# Patient Record
Sex: FEMALE | Race: WHITE | Employment: OTHER | URBAN - METROPOLITAN AREA
[De-identification: names, ages, dates, MRNs, and addresses within clinical notes are randomized per-mention and may not be internally consistent; named-entity substitution may affect disease eponyms.]

---

## 2018-06-20 ENCOUNTER — HOSPITAL ENCOUNTER (OUTPATIENT)
Dept: MAMMOGRAPHY | Age: 53
Discharge: HOME | End: 2018-06-21
Attending: PHYSICIAN ASSISTANT | Admitting: PHYSICIAN ASSISTANT

## 2018-06-20 DIAGNOSIS — Z12.31 VISIT FOR SCREENING MAMMOGRAM: ICD-10-CM

## 2019-01-14 ENCOUNTER — APPOINTMENT (OUTPATIENT)
Dept: CT IMAGING | Age: 54
End: 2019-01-14
Payer: MEDICARE

## 2019-01-14 ENCOUNTER — HOSPITAL ENCOUNTER (EMERGENCY)
Age: 54
Discharge: HOME | End: 2019-01-14
Attending: EMERGENCY MEDICINE
Payer: MEDICARE

## 2019-01-14 ENCOUNTER — APPOINTMENT (OUTPATIENT)
Dept: GENERAL RADIOLOGY | Age: 54
End: 2019-01-14
Payer: MEDICARE

## 2019-01-14 VITALS
BODY MASS INDEX: 55.32 KG/M2 | TEMPERATURE: 98.5 F | RESPIRATION RATE: 16 BRPM | DIASTOLIC BLOOD PRESSURE: 110 MMHG | WEIGHT: 293 LBS | OXYGEN SATURATION: 95 % | HEART RATE: 88 BPM | HEIGHT: 61 IN | SYSTOLIC BLOOD PRESSURE: 141 MMHG

## 2019-01-14 DIAGNOSIS — R25.1 TREMOR: ICD-10-CM

## 2019-01-14 DIAGNOSIS — R03.0 ELEVATED BLOOD PRESSURE READING: ICD-10-CM

## 2019-01-14 DIAGNOSIS — R42 DIZZINESS: Primary | ICD-10-CM

## 2019-01-14 DIAGNOSIS — E83.42 HYPOMAGNESEMIA: ICD-10-CM

## 2019-01-14 LAB
A/G RATIO: 0.9 (ref 1.1–2.2)
ALBUMIN SERPL-MCNC: 3.5 G/DL (ref 3.4–5)
ALP BLD-CCNC: 93 U/L (ref 40–129)
ALT SERPL-CCNC: 19 U/L (ref 10–40)
ANION GAP SERPL CALCULATED.3IONS-SCNC: 10 MMOL/L (ref 3–16)
AST SERPL-CCNC: 14 U/L (ref 15–37)
BACTERIA: ABNORMAL /HPF
BASOPHILS ABSOLUTE: 0 K/UL (ref 0–0.2)
BASOPHILS RELATIVE PERCENT: 0.4 %
BILIRUB SERPL-MCNC: <0.2 MG/DL (ref 0–1)
BILIRUBIN URINE: NEGATIVE
BLOOD, URINE: ABNORMAL
BUN BLDV-MCNC: 22 MG/DL (ref 7–20)
CALCIUM SERPL-MCNC: 8.9 MG/DL (ref 8.3–10.6)
CHLORIDE BLD-SCNC: 98 MMOL/L (ref 99–110)
CLARITY: CLEAR
CO2: 29 MMOL/L (ref 21–32)
COLOR: YELLOW
CREAT SERPL-MCNC: 0.9 MG/DL (ref 0.6–1.1)
EOSINOPHILS ABSOLUTE: 0.1 K/UL (ref 0–0.6)
EOSINOPHILS RELATIVE PERCENT: 1.1 %
EPITHELIAL CELLS, UA: ABNORMAL /HPF
GFR AFRICAN AMERICAN: >60
GFR NON-AFRICAN AMERICAN: >60
GLOBULIN: 3.9 G/DL
GLUCOSE BLD-MCNC: 234 MG/DL (ref 70–99)
GLUCOSE URINE: 100 MG/DL
HCT VFR BLD CALC: 35.4 % (ref 36–48)
HEMOGLOBIN: 11.1 G/DL (ref 12–16)
KETONES, URINE: NEGATIVE MG/DL
LEUKOCYTE ESTERASE, URINE: NEGATIVE
LYMPHOCYTES ABSOLUTE: 1.3 K/UL (ref 1–5.1)
LYMPHOCYTES RELATIVE PERCENT: 17 %
MAGNESIUM: 1.4 MG/DL (ref 1.8–2.4)
MAGNESIUM: 1.8 MG/DL (ref 1.8–2.4)
MCH RBC QN AUTO: 26.6 PG (ref 26–34)
MCHC RBC AUTO-ENTMCNC: 31.4 G/DL (ref 31–36)
MCV RBC AUTO: 84.7 FL (ref 80–100)
MICROSCOPIC EXAMINATION: YES
MONOCYTES ABSOLUTE: 0.3 K/UL (ref 0–1.3)
MONOCYTES RELATIVE PERCENT: 4.5 %
NEUTROPHILS ABSOLUTE: 5.7 K/UL (ref 1.7–7.7)
NEUTROPHILS RELATIVE PERCENT: 77 %
NITRITE, URINE: NEGATIVE
PDW BLD-RTO: 14.5 % (ref 12.4–15.4)
PH UA: 6
PLATELET # BLD: 202 K/UL (ref 135–450)
PMV BLD AUTO: 8.8 FL (ref 5–10.5)
POTASSIUM SERPL-SCNC: 5.1 MMOL/L (ref 3.5–5.1)
POTASSIUM SERPL-SCNC: 5.5 MMOL/L (ref 3.5–5.1)
POTASSIUM SERPL-SCNC: 5.7 MMOL/L (ref 3.5–5.1)
PROTEIN UA: NEGATIVE MG/DL
RBC # BLD: 4.18 M/UL (ref 4–5.2)
RBC UA: ABNORMAL /HPF (ref 0–2)
SODIUM BLD-SCNC: 137 MMOL/L (ref 136–145)
SPECIFIC GRAVITY UA: 1.01
TOTAL PROTEIN: 7.4 G/DL (ref 6.4–8.2)
TROPONIN: <0.01 NG/ML
URINE TYPE: ABNORMAL
UROBILINOGEN, URINE: 0.2 E.U./DL
WBC # BLD: 7.4 K/UL (ref 4–11)
WBC UA: ABNORMAL /HPF (ref 0–5)

## 2019-01-14 PROCEDURE — 70498 CT ANGIOGRAPHY NECK: CPT

## 2019-01-14 PROCEDURE — 84132 ASSAY OF SERUM POTASSIUM: CPT

## 2019-01-14 PROCEDURE — 99284 EMERGENCY DEPT VISIT MOD MDM: CPT

## 2019-01-14 PROCEDURE — 6360000002 HC RX W HCPCS: Performed by: EMERGENCY MEDICINE

## 2019-01-14 PROCEDURE — 71046 X-RAY EXAM CHEST 2 VIEWS: CPT

## 2019-01-14 PROCEDURE — 83735 ASSAY OF MAGNESIUM: CPT

## 2019-01-14 PROCEDURE — 80053 COMPREHEN METABOLIC PANEL: CPT

## 2019-01-14 PROCEDURE — 84484 ASSAY OF TROPONIN QUANT: CPT

## 2019-01-14 PROCEDURE — 81001 URINALYSIS AUTO W/SCOPE: CPT

## 2019-01-14 PROCEDURE — 85025 COMPLETE CBC W/AUTO DIFF WBC: CPT

## 2019-01-14 PROCEDURE — 96366 THER/PROPH/DIAG IV INF ADDON: CPT

## 2019-01-14 PROCEDURE — 93010 ELECTROCARDIOGRAM REPORT: CPT | Performed by: INTERNAL MEDICINE

## 2019-01-14 PROCEDURE — 6370000000 HC RX 637 (ALT 250 FOR IP): Performed by: NURSE PRACTITIONER

## 2019-01-14 PROCEDURE — 70450 CT HEAD/BRAIN W/O DYE: CPT

## 2019-01-14 PROCEDURE — 93005 ELECTROCARDIOGRAM TRACING: CPT | Performed by: EMERGENCY MEDICINE

## 2019-01-14 PROCEDURE — 70496 CT ANGIOGRAPHY HEAD: CPT

## 2019-01-14 PROCEDURE — 6360000004 HC RX CONTRAST MEDICATION: Performed by: EMERGENCY MEDICINE

## 2019-01-14 PROCEDURE — 2580000003 HC RX 258: Performed by: EMERGENCY MEDICINE

## 2019-01-14 PROCEDURE — 96365 THER/PROPH/DIAG IV INF INIT: CPT

## 2019-01-14 RX ORDER — PREGABALIN 300 MG/1
300 CAPSULE ORAL 2 TIMES DAILY
COMMUNITY

## 2019-01-14 RX ORDER — MECLIZINE HCL 12.5 MG/1
25 TABLET ORAL ONCE
Status: COMPLETED | OUTPATIENT
Start: 2019-01-14 | End: 2019-01-14

## 2019-01-14 RX ORDER — MAGNESIUM SULFATE IN WATER 40 MG/ML
2 INJECTION, SOLUTION INTRAVENOUS ONCE
Status: COMPLETED | OUTPATIENT
Start: 2019-01-14 | End: 2019-01-14

## 2019-01-14 RX ORDER — MELOXICAM 15 MG/1
15 TABLET ORAL DAILY
COMMUNITY

## 2019-01-14 RX ORDER — 0.9 % SODIUM CHLORIDE 0.9 %
500 INTRAVENOUS SOLUTION INTRAVENOUS ONCE
Status: COMPLETED | OUTPATIENT
Start: 2019-01-14 | End: 2019-01-14

## 2019-01-14 RX ORDER — MECLIZINE HYDROCHLORIDE 25 MG/1
25 TABLET ORAL 3 TIMES DAILY PRN
Qty: 20 TABLET | Refills: 0 | Status: SHIPPED | OUTPATIENT
Start: 2019-01-14 | End: 2019-01-24

## 2019-01-14 RX ADMIN — MAGNESIUM SULFATE HEPTAHYDRATE 2 G: 40 INJECTION, SOLUTION INTRAVENOUS at 21:19

## 2019-01-14 RX ADMIN — MECLIZINE 25 MG: 12.5 TABLET ORAL at 18:39

## 2019-01-14 RX ADMIN — SODIUM CHLORIDE 500 ML: 9 INJECTION, SOLUTION INTRAVENOUS at 21:26

## 2019-01-14 RX ADMIN — IOPAMIDOL 75 ML: 755 INJECTION, SOLUTION INTRAVENOUS at 21:36

## 2019-01-15 PROBLEM — R03.0 ELEVATED BLOOD PRESSURE READING: Status: ACTIVE | Noted: 2019-01-15

## 2019-01-15 LAB
EKG ATRIAL RATE: 85 BPM
EKG DIAGNOSIS: NORMAL
EKG P AXIS: 30 DEGREES
EKG P-R INTERVAL: 176 MS
EKG Q-T INTERVAL: 398 MS
EKG QRS DURATION: 94 MS
EKG QTC CALCULATION (BAZETT): 473 MS
EKG R AXIS: 19 DEGREES
EKG T AXIS: 58 DEGREES
EKG VENTRICULAR RATE: 85 BPM

## 2019-01-15 ASSESSMENT — ENCOUNTER SYMPTOMS
WHEEZING: 0
COUGH: 0
NAUSEA: 0
COLOR CHANGE: 0
SHORTNESS OF BREATH: 0
ABDOMINAL PAIN: 0
DIARRHEA: 0
BACK PAIN: 0
PHOTOPHOBIA: 0
VOMITING: 0

## 2019-07-25 ENCOUNTER — APPOINTMENT (OUTPATIENT)
Dept: CT IMAGING | Age: 54
End: 2019-07-25
Payer: MEDICARE

## 2019-07-25 ENCOUNTER — HOSPITAL ENCOUNTER (EMERGENCY)
Age: 54
Discharge: HOME | End: 2019-07-25
Attending: EMERGENCY MEDICINE
Payer: MEDICARE

## 2019-07-25 VITALS
SYSTOLIC BLOOD PRESSURE: 135 MMHG | TEMPERATURE: 99.5 F | HEIGHT: 62 IN | DIASTOLIC BLOOD PRESSURE: 88 MMHG | BODY MASS INDEX: 53.92 KG/M2 | OXYGEN SATURATION: 99 % | HEART RATE: 84 BPM | WEIGHT: 293 LBS | RESPIRATION RATE: 18 BRPM

## 2019-07-25 DIAGNOSIS — I25.10 CORONARY ARTERY CALCIFICATION SEEN ON CAT SCAN: ICD-10-CM

## 2019-07-25 DIAGNOSIS — R10.13 ABDOMINAL PAIN, EPIGASTRIC: ICD-10-CM

## 2019-07-25 DIAGNOSIS — E83.42 HYPOMAGNESEMIA: ICD-10-CM

## 2019-07-25 DIAGNOSIS — R25.1 TREMOR: Primary | ICD-10-CM

## 2019-07-25 LAB
A/G RATIO: 1 (ref 1.1–2.2)
ALBUMIN SERPL-MCNC: 4 G/DL (ref 3.4–5)
ALP BLD-CCNC: 92 U/L (ref 40–129)
ALT SERPL-CCNC: 29 U/L (ref 10–40)
ANION GAP SERPL CALCULATED.3IONS-SCNC: 10 MMOL/L (ref 3–16)
AST SERPL-CCNC: 17 U/L (ref 15–37)
BACTERIA: ABNORMAL /HPF
BASOPHILS ABSOLUTE: 0.1 K/UL (ref 0–0.2)
BASOPHILS RELATIVE PERCENT: 0.7 %
BILIRUB SERPL-MCNC: <0.2 MG/DL (ref 0–1)
BILIRUBIN URINE: NEGATIVE
BLOOD, URINE: NEGATIVE
BUN BLDV-MCNC: 22 MG/DL (ref 7–20)
CALCIUM SERPL-MCNC: 9.4 MG/DL (ref 8.3–10.6)
CHLORIDE BLD-SCNC: 98 MMOL/L (ref 99–110)
CLARITY: ABNORMAL
CO2: 27 MMOL/L (ref 21–32)
COLOR: YELLOW
CREAT SERPL-MCNC: 0.8 MG/DL (ref 0.6–1.1)
EOSINOPHILS ABSOLUTE: 0.1 K/UL (ref 0–0.6)
EOSINOPHILS RELATIVE PERCENT: 1.1 %
EPITHELIAL CELLS, UA: ABNORMAL /HPF
GFR AFRICAN AMERICAN: >60
GFR NON-AFRICAN AMERICAN: >60
GLOBULIN: 3.9 G/DL
GLUCOSE BLD-MCNC: 219 MG/DL (ref 70–99)
GLUCOSE URINE: >=1000 MG/DL
HCG QUALITATIVE: NEGATIVE
HCT VFR BLD CALC: 39.5 % (ref 36–48)
HEMOGLOBIN: 12.5 G/DL (ref 12–16)
KETONES, URINE: NEGATIVE MG/DL
LACTIC ACID, SEPSIS: 1.3 MMOL/L (ref 0.4–1.9)
LACTIC ACID, SEPSIS: 2.1 MMOL/L (ref 0.4–1.9)
LEUKOCYTE ESTERASE, URINE: NEGATIVE
LIPASE: 64 U/L (ref 13–60)
LYMPHOCYTES ABSOLUTE: 1.6 K/UL (ref 1–5.1)
LYMPHOCYTES RELATIVE PERCENT: 17.2 %
MAGNESIUM: 1.6 MG/DL (ref 1.8–2.4)
MCH RBC QN AUTO: 26.6 PG (ref 26–34)
MCHC RBC AUTO-ENTMCNC: 31.7 G/DL (ref 31–36)
MCV RBC AUTO: 83.7 FL (ref 80–100)
MICROSCOPIC EXAMINATION: YES
MONOCYTES ABSOLUTE: 0.4 K/UL (ref 0–1.3)
MONOCYTES RELATIVE PERCENT: 4.4 %
NEUTROPHILS ABSOLUTE: 7.2 K/UL (ref 1.7–7.7)
NEUTROPHILS RELATIVE PERCENT: 76.6 %
NITRITE, URINE: NEGATIVE
PDW BLD-RTO: 15.2 % (ref 12.4–15.4)
PH UA: 5.5 (ref 5–8)
PLATELET # BLD: 197 K/UL (ref 135–450)
PMV BLD AUTO: 9 FL (ref 5–10.5)
POTASSIUM SERPL-SCNC: 5.1 MMOL/L (ref 3.5–5.1)
PROTEIN UA: ABNORMAL MG/DL
RBC # BLD: 4.72 M/UL (ref 4–5.2)
RBC UA: ABNORMAL /HPF (ref 0–2)
SODIUM BLD-SCNC: 135 MMOL/L (ref 136–145)
SPECIFIC GRAVITY UA: 1.02 (ref 1–1.03)
TOTAL PROTEIN: 7.9 G/DL (ref 6.4–8.2)
TROPONIN: <0.01 NG/ML
TROPONIN: <0.01 NG/ML
URINE TYPE: ABNORMAL
UROBILINOGEN, URINE: 0.2 E.U./DL
WBC # BLD: 9.4 K/UL (ref 4–11)
WBC UA: ABNORMAL /HPF (ref 0–5)

## 2019-07-25 PROCEDURE — 74177 CT ABD & PELVIS W/CONTRAST: CPT

## 2019-07-25 PROCEDURE — 6370000000 HC RX 637 (ALT 250 FOR IP): Performed by: EMERGENCY MEDICINE

## 2019-07-25 PROCEDURE — 93005 ELECTROCARDIOGRAM TRACING: CPT | Performed by: EMERGENCY MEDICINE

## 2019-07-25 PROCEDURE — 85025 COMPLETE CBC W/AUTO DIFF WBC: CPT

## 2019-07-25 PROCEDURE — 99284 EMERGENCY DEPT VISIT MOD MDM: CPT

## 2019-07-25 PROCEDURE — 96365 THER/PROPH/DIAG IV INF INIT: CPT

## 2019-07-25 PROCEDURE — 6360000002 HC RX W HCPCS: Performed by: EMERGENCY MEDICINE

## 2019-07-25 PROCEDURE — 81001 URINALYSIS AUTO W/SCOPE: CPT

## 2019-07-25 PROCEDURE — 80053 COMPREHEN METABOLIC PANEL: CPT

## 2019-07-25 PROCEDURE — 96375 TX/PRO/DX INJ NEW DRUG ADDON: CPT

## 2019-07-25 PROCEDURE — 83735 ASSAY OF MAGNESIUM: CPT

## 2019-07-25 PROCEDURE — 83690 ASSAY OF LIPASE: CPT

## 2019-07-25 PROCEDURE — 84703 CHORIONIC GONADOTROPIN ASSAY: CPT

## 2019-07-25 PROCEDURE — 71260 CT THORAX DX C+: CPT

## 2019-07-25 PROCEDURE — 2580000003 HC RX 258: Performed by: EMERGENCY MEDICINE

## 2019-07-25 PROCEDURE — 83605 ASSAY OF LACTIC ACID: CPT

## 2019-07-25 PROCEDURE — 6360000004 HC RX CONTRAST MEDICATION: Performed by: EMERGENCY MEDICINE

## 2019-07-25 PROCEDURE — 36415 COLL VENOUS BLD VENIPUNCTURE: CPT

## 2019-07-25 PROCEDURE — 96366 THER/PROPH/DIAG IV INF ADDON: CPT

## 2019-07-25 PROCEDURE — 84484 ASSAY OF TROPONIN QUANT: CPT

## 2019-07-25 RX ORDER — ONDANSETRON 2 MG/ML
4 INJECTION INTRAMUSCULAR; INTRAVENOUS ONCE
Status: COMPLETED | OUTPATIENT
Start: 2019-07-25 | End: 2019-07-25

## 2019-07-25 RX ORDER — MAGNESIUM SULFATE 1 G/100ML
1 INJECTION INTRAVENOUS ONCE
Status: COMPLETED | OUTPATIENT
Start: 2019-07-25 | End: 2019-07-25

## 2019-07-25 RX ORDER — 0.9 % SODIUM CHLORIDE 0.9 %
500 INTRAVENOUS SOLUTION INTRAVENOUS ONCE
Status: COMPLETED | OUTPATIENT
Start: 2019-07-25 | End: 2019-07-25

## 2019-07-25 RX ORDER — OXYCODONE HYDROCHLORIDE 5 MG/1
10 TABLET ORAL ONCE
Status: COMPLETED | OUTPATIENT
Start: 2019-07-25 | End: 2019-07-25

## 2019-07-25 RX ADMIN — SODIUM CHLORIDE 500 ML: 9 INJECTION, SOLUTION INTRAVENOUS at 18:08

## 2019-07-25 RX ADMIN — IOPAMIDOL 75 ML: 755 INJECTION, SOLUTION INTRAVENOUS at 18:39

## 2019-07-25 RX ADMIN — ONDANSETRON 4 MG: 2 INJECTION INTRAMUSCULAR; INTRAVENOUS at 18:09

## 2019-07-25 RX ADMIN — MAGNESIUM SULFATE IN DEXTROSE 1 G: 10 INJECTION, SOLUTION INTRAVENOUS at 20:03

## 2019-07-25 RX ADMIN — OXYCODONE HYDROCHLORIDE 10 MG: 5 TABLET ORAL at 18:21

## 2019-07-25 ASSESSMENT — PAIN SCALES - GENERAL: PAINLEVEL_OUTOF10: 4

## 2019-07-25 NOTE — ED PROVIDER NOTES
provided for review. Dose modulation, iterative reconstruction, and/or weight based adjustment of the mA/kV was utilized to reduce the radiation dose to as low as reasonably achievable. COMPARISON: 10/07/2016 HISTORY: ORDERING SYSTEM PROVIDED HISTORY: ABDOMINAL PAIN TECHNOLOGIST PROVIDED HISTORY: Additional Contrast?->None Reason for Exam: generalized abd pain x2-3 days, nausea Acuity: Acute Type of Exam: Initial Relevant Medical/Surgical History: prev  History of diverticulosis, hyperlipidemia, diabetes, Caesarean section, endometrial ablation FINDINGS: Lower Chest: Posterior left lower lobe scarring is again identified. Organs: The liver is prominent size and shows diffuse fatty infiltration, also present on prior study. No focal hepatic lesion. The spleen, pancreas, adrenal glands, gallbladder, and kidneys show no acute abnormality. GI/Bowel: The the bowel shows normal caliber and course without suspected wall thickening. Normal appendix. There are diverticula throughout the colon, most numerous of the distal colon, without evidence of active inflammation. Pelvis: The uterus and urinary bladder are within normal limits. Peritoneum/Retroperitoneum: The aorta is normal in caliber and course, showing a few calcifications. Bones/Soft Tissues: No acute bony abnormality is identified. There is laxity of the lower anterior abdominal wall, similar to the prior study. There is also increased attenuation within the subcutaneous fat of the lower anterior abdominal wall. No subcutaneous focal fluid collection is identified. Fatty infiltration of the liver. Colonic diverticulosis without radiographic evidence of active inflammation. Increased attenuation within the subcutaneous fat of the lower anterior abdominal wall. Correlation for possibility of cellulitis is recommended.          Ct Chest Pulmonary Embolism W Contrast    Result Date: 2019  EXAMINATION: CTA OF THE CHEST 2019 6:32 pm TECHNIQUE:

## 2019-07-26 ENCOUNTER — HOSPITAL ENCOUNTER (INPATIENT)
Age: 54
LOS: 1 days | Discharge: HOME | DRG: 092 | End: 2019-07-27
Attending: EMERGENCY MEDICINE | Admitting: INTERNAL MEDICINE
Payer: MEDICARE

## 2019-07-26 ENCOUNTER — APPOINTMENT (OUTPATIENT)
Dept: CT IMAGING | Age: 54
DRG: 092 | End: 2019-07-26
Payer: MEDICARE

## 2019-07-26 ENCOUNTER — APPOINTMENT (OUTPATIENT)
Dept: GENERAL RADIOLOGY | Age: 54
DRG: 092 | End: 2019-07-26
Payer: MEDICARE

## 2019-07-26 DIAGNOSIS — E83.42 HYPOMAGNESEMIA: ICD-10-CM

## 2019-07-26 DIAGNOSIS — H66.003 NON-RECURRENT ACUTE SUPPURATIVE OTITIS MEDIA OF BOTH EARS WITHOUT SPONTANEOUS RUPTURE OF TYMPANIC MEMBRANES: Primary | ICD-10-CM

## 2019-07-26 DIAGNOSIS — R11.2 INTRACTABLE VOMITING WITH NAUSEA, UNSPECIFIED VOMITING TYPE: ICD-10-CM

## 2019-07-26 DIAGNOSIS — R10.13 ABDOMINAL PAIN, EPIGASTRIC: ICD-10-CM

## 2019-07-26 PROBLEM — R25.1 TREMOR: Status: ACTIVE | Noted: 2019-07-26

## 2019-07-26 PROBLEM — E66.01 MORBID OBESITY (HCC): Status: ACTIVE | Noted: 2019-07-26

## 2019-07-26 PROBLEM — R25.1 TREMORS OF NERVOUS SYSTEM: Status: ACTIVE | Noted: 2019-07-26

## 2019-07-26 PROBLEM — E78.5 HYPERLIPIDEMIA: Status: ACTIVE | Noted: 2019-07-26

## 2019-07-26 PROBLEM — E11.9 DM (DIABETES MELLITUS) (HCC): Status: ACTIVE | Noted: 2019-07-26

## 2019-07-26 PROBLEM — I50.9 CHF (CONGESTIVE HEART FAILURE) (HCC): Status: ACTIVE | Noted: 2019-07-26

## 2019-07-26 LAB
A/G RATIO: 1 (ref 1.1–2.2)
ALBUMIN SERPL-MCNC: 3.9 G/DL (ref 3.4–5)
ALP BLD-CCNC: 78 U/L (ref 40–129)
ALT SERPL-CCNC: 24 U/L (ref 10–40)
ANION GAP SERPL CALCULATED.3IONS-SCNC: 8 MMOL/L (ref 3–16)
AST SERPL-CCNC: 13 U/L (ref 15–37)
BASOPHILS ABSOLUTE: 0 K/UL (ref 0–0.2)
BASOPHILS RELATIVE PERCENT: 0.5 %
BILIRUB SERPL-MCNC: <0.2 MG/DL (ref 0–1)
BUN BLDV-MCNC: 19 MG/DL (ref 7–20)
CALCIUM SERPL-MCNC: 9.6 MG/DL (ref 8.3–10.6)
CHLORIDE BLD-SCNC: 97 MMOL/L (ref 99–110)
CO2: 29 MMOL/L (ref 21–32)
CREAT SERPL-MCNC: 0.8 MG/DL (ref 0.6–1.1)
EKG ATRIAL RATE: 83 BPM
EKG DIAGNOSIS: NORMAL
EKG P AXIS: 31 DEGREES
EKG P-R INTERVAL: 180 MS
EKG Q-T INTERVAL: 410 MS
EKG QRS DURATION: 92 MS
EKG QTC CALCULATION (BAZETT): 481 MS
EKG R AXIS: 37 DEGREES
EKG T AXIS: 38 DEGREES
EKG VENTRICULAR RATE: 83 BPM
EOSINOPHILS ABSOLUTE: 0 K/UL (ref 0–0.6)
EOSINOPHILS RELATIVE PERCENT: 0.2 %
GFR AFRICAN AMERICAN: >60
GFR NON-AFRICAN AMERICAN: >60
GLOBULIN: 4 G/DL
GLUCOSE BLD-MCNC: 139 MG/DL (ref 70–99)
GLUCOSE BLD-MCNC: 249 MG/DL (ref 70–99)
HCG QUALITATIVE: NEGATIVE
HCT VFR BLD CALC: 35.7 % (ref 36–48)
HEMOGLOBIN: 11.7 G/DL (ref 12–16)
LACTIC ACID, SEPSIS: 1.1 MMOL/L (ref 0.4–1.9)
LACTIC ACID, SEPSIS: 2 MMOL/L (ref 0.4–1.9)
LYMPHOCYTES ABSOLUTE: 1.3 K/UL (ref 1–5.1)
LYMPHOCYTES RELATIVE PERCENT: 15.9 %
MAGNESIUM: 1.7 MG/DL (ref 1.8–2.4)
MCH RBC QN AUTO: 27.2 PG (ref 26–34)
MCHC RBC AUTO-ENTMCNC: 32.7 G/DL (ref 31–36)
MCV RBC AUTO: 83.1 FL (ref 80–100)
MONOCYTES ABSOLUTE: 0.3 K/UL (ref 0–1.3)
MONOCYTES RELATIVE PERCENT: 3.4 %
NEUTROPHILS ABSOLUTE: 6.7 K/UL (ref 1.7–7.7)
NEUTROPHILS RELATIVE PERCENT: 80 %
PDW BLD-RTO: 15 % (ref 12.4–15.4)
PERFORMED ON: ABNORMAL
PLATELET # BLD: 188 K/UL (ref 135–450)
PMV BLD AUTO: 8.7 FL (ref 5–10.5)
POTASSIUM SERPL-SCNC: 4.9 MMOL/L (ref 3.5–5.1)
PRO-BNP: 808 PG/ML (ref 0–124)
RBC # BLD: 4.29 M/UL (ref 4–5.2)
SODIUM BLD-SCNC: 134 MMOL/L (ref 136–145)
TOTAL PROTEIN: 7.9 G/DL (ref 6.4–8.2)
TROPONIN: <0.01 NG/ML
WBC # BLD: 8.4 K/UL (ref 4–11)

## 2019-07-26 PROCEDURE — 96374 THER/PROPH/DIAG INJ IV PUSH: CPT

## 2019-07-26 PROCEDURE — 84703 CHORIONIC GONADOTROPIN ASSAY: CPT

## 2019-07-26 PROCEDURE — 83605 ASSAY OF LACTIC ACID: CPT

## 2019-07-26 PROCEDURE — 83880 ASSAY OF NATRIURETIC PEPTIDE: CPT

## 2019-07-26 PROCEDURE — 2580000003 HC RX 258: Performed by: INTERNAL MEDICINE

## 2019-07-26 PROCEDURE — 70450 CT HEAD/BRAIN W/O DYE: CPT

## 2019-07-26 PROCEDURE — 93005 ELECTROCARDIOGRAM TRACING: CPT | Performed by: EMERGENCY MEDICINE

## 2019-07-26 PROCEDURE — 94761 N-INVAS EAR/PLS OXIMETRY MLT: CPT

## 2019-07-26 PROCEDURE — 2580000003 HC RX 258: Performed by: EMERGENCY MEDICINE

## 2019-07-26 PROCEDURE — 93010 ELECTROCARDIOGRAM REPORT: CPT | Performed by: INTERNAL MEDICINE

## 2019-07-26 PROCEDURE — 85025 COMPLETE CBC W/AUTO DIFF WBC: CPT

## 2019-07-26 PROCEDURE — 99285 EMERGENCY DEPT VISIT HI MDM: CPT

## 2019-07-26 PROCEDURE — 94150 VITAL CAPACITY TEST: CPT

## 2019-07-26 PROCEDURE — 80053 COMPREHEN METABOLIC PANEL: CPT

## 2019-07-26 PROCEDURE — 6360000002 HC RX W HCPCS: Performed by: EMERGENCY MEDICINE

## 2019-07-26 PROCEDURE — 84484 ASSAY OF TROPONIN QUANT: CPT

## 2019-07-26 PROCEDURE — 94640 AIRWAY INHALATION TREATMENT: CPT

## 2019-07-26 PROCEDURE — 6360000002 HC RX W HCPCS: Performed by: INTERNAL MEDICINE

## 2019-07-26 PROCEDURE — 83735 ASSAY OF MAGNESIUM: CPT

## 2019-07-26 PROCEDURE — 2700000000 HC OXYGEN THERAPY PER DAY

## 2019-07-26 PROCEDURE — 1200000000 HC SEMI PRIVATE

## 2019-07-26 PROCEDURE — 71045 X-RAY EXAM CHEST 1 VIEW: CPT

## 2019-07-26 PROCEDURE — 6370000000 HC RX 637 (ALT 250 FOR IP): Performed by: INTERNAL MEDICINE

## 2019-07-26 PROCEDURE — 6370000000 HC RX 637 (ALT 250 FOR IP): Performed by: EMERGENCY MEDICINE

## 2019-07-26 RX ORDER — LEVOFLOXACIN 500 MG/1
500 TABLET, FILM COATED ORAL DAILY
Status: DISCONTINUED | OUTPATIENT
Start: 2019-07-27 | End: 2019-07-27 | Stop reason: HOSPADM

## 2019-07-26 RX ORDER — FERROUS SULFATE 325(65) MG
325 TABLET ORAL
Status: DISCONTINUED | OUTPATIENT
Start: 2019-07-26 | End: 2019-07-27 | Stop reason: HOSPADM

## 2019-07-26 RX ORDER — PROCHLORPERAZINE EDISYLATE 5 MG/ML
10 INJECTION INTRAMUSCULAR; INTRAVENOUS EVERY 6 HOURS PRN
Status: DISCONTINUED | OUTPATIENT
Start: 2019-07-26 | End: 2019-07-27 | Stop reason: HOSPADM

## 2019-07-26 RX ORDER — MAGNESIUM SULFATE IN WATER 40 MG/ML
2 INJECTION, SOLUTION INTRAVENOUS ONCE
Status: COMPLETED | OUTPATIENT
Start: 2019-07-26 | End: 2019-07-26

## 2019-07-26 RX ORDER — NICOTINE POLACRILEX 4 MG
15 LOZENGE BUCCAL PRN
Status: DISCONTINUED | OUTPATIENT
Start: 2019-07-26 | End: 2019-07-27 | Stop reason: HOSPADM

## 2019-07-26 RX ORDER — INSULIN GLARGINE 100 [IU]/ML
60 INJECTION, SOLUTION SUBCUTANEOUS NIGHTLY
Status: DISCONTINUED | OUTPATIENT
Start: 2019-07-26 | End: 2019-07-27 | Stop reason: HOSPADM

## 2019-07-26 RX ORDER — ALBUTEROL SULFATE 2.5 MG/3ML
2.5 SOLUTION RESPIRATORY (INHALATION) EVERY 6 HOURS PRN
Status: DISCONTINUED | OUTPATIENT
Start: 2019-07-26 | End: 2019-07-27 | Stop reason: HOSPADM

## 2019-07-26 RX ORDER — SODIUM CHLORIDE 0.9 % (FLUSH) 0.9 %
10 SYRINGE (ML) INJECTION PRN
Status: DISCONTINUED | OUTPATIENT
Start: 2019-07-26 | End: 2019-07-27 | Stop reason: HOSPADM

## 2019-07-26 RX ORDER — ALBUTEROL SULFATE 90 UG/1
2 AEROSOL, METERED RESPIRATORY (INHALATION) EVERY 6 HOURS PRN
Status: DISCONTINUED | OUTPATIENT
Start: 2019-07-26 | End: 2019-07-26

## 2019-07-26 RX ORDER — ESCITALOPRAM OXALATE 10 MG/1
40 TABLET ORAL DAILY
Status: DISCONTINUED | OUTPATIENT
Start: 2019-07-26 | End: 2019-07-27 | Stop reason: HOSPADM

## 2019-07-26 RX ORDER — MELOXICAM 7.5 MG/1
7.5 TABLET ORAL DAILY
Status: DISCONTINUED | OUTPATIENT
Start: 2019-07-26 | End: 2019-07-27 | Stop reason: HOSPADM

## 2019-07-26 RX ORDER — ONDANSETRON 2 MG/ML
4 INJECTION INTRAMUSCULAR; INTRAVENOUS EVERY 6 HOURS PRN
Status: DISCONTINUED | OUTPATIENT
Start: 2019-07-26 | End: 2019-07-27 | Stop reason: HOSPADM

## 2019-07-26 RX ORDER — ONDANSETRON 2 MG/ML
4 INJECTION INTRAMUSCULAR; INTRAVENOUS ONCE
Status: COMPLETED | OUTPATIENT
Start: 2019-07-26 | End: 2019-07-26

## 2019-07-26 RX ORDER — 0.9 % SODIUM CHLORIDE 0.9 %
1000 INTRAVENOUS SOLUTION INTRAVENOUS ONCE
Status: COMPLETED | OUTPATIENT
Start: 2019-07-26 | End: 2019-07-26

## 2019-07-26 RX ORDER — LEVOFLOXACIN 5 MG/ML
750 INJECTION, SOLUTION INTRAVENOUS ONCE
Status: COMPLETED | OUTPATIENT
Start: 2019-07-26 | End: 2019-07-26

## 2019-07-26 RX ORDER — DEXTROSE MONOHYDRATE 50 MG/ML
100 INJECTION, SOLUTION INTRAVENOUS PRN
Status: DISCONTINUED | OUTPATIENT
Start: 2019-07-26 | End: 2019-07-27 | Stop reason: HOSPADM

## 2019-07-26 RX ORDER — LOSARTAN POTASSIUM 25 MG/1
50 TABLET ORAL DAILY
Status: DISCONTINUED | OUTPATIENT
Start: 2019-07-26 | End: 2019-07-27 | Stop reason: HOSPADM

## 2019-07-26 RX ORDER — DICYCLOMINE HYDROCHLORIDE 10 MG/1
10 CAPSULE ORAL 3 TIMES DAILY
Status: DISCONTINUED | OUTPATIENT
Start: 2019-07-26 | End: 2019-07-27 | Stop reason: HOSPADM

## 2019-07-26 RX ORDER — OXYCODONE HYDROCHLORIDE 5 MG/1
10 TABLET ORAL ONCE
Status: COMPLETED | OUTPATIENT
Start: 2019-07-26 | End: 2019-07-26

## 2019-07-26 RX ORDER — MAGNESIUM SULFATE HEPTAHYDRATE 500 MG/ML
2 INJECTION, SOLUTION INTRAMUSCULAR; INTRAVENOUS ONCE
Status: DISCONTINUED | OUTPATIENT
Start: 2019-07-26 | End: 2019-07-26

## 2019-07-26 RX ORDER — DEXTROSE MONOHYDRATE 25 G/50ML
12.5 INJECTION, SOLUTION INTRAVENOUS PRN
Status: DISCONTINUED | OUTPATIENT
Start: 2019-07-26 | End: 2019-07-27 | Stop reason: HOSPADM

## 2019-07-26 RX ORDER — SODIUM CHLORIDE 0.9 % (FLUSH) 0.9 %
10 SYRINGE (ML) INJECTION EVERY 12 HOURS SCHEDULED
Status: DISCONTINUED | OUTPATIENT
Start: 2019-07-26 | End: 2019-07-27 | Stop reason: HOSPADM

## 2019-07-26 RX ORDER — ALBUTEROL SULFATE 90 UG/1
2 AEROSOL, METERED RESPIRATORY (INHALATION)
Status: DISCONTINUED | OUTPATIENT
Start: 2019-07-27 | End: 2019-07-27 | Stop reason: HOSPADM

## 2019-07-26 RX ORDER — OXYCODONE AND ACETAMINOPHEN 7.5; 325 MG/1; MG/1
1 TABLET ORAL EVERY 4 HOURS PRN
Status: DISCONTINUED | OUTPATIENT
Start: 2019-07-26 | End: 2019-07-27 | Stop reason: HOSPADM

## 2019-07-26 RX ORDER — GLIPIZIDE 5 MG/1
5 TABLET ORAL
Status: DISCONTINUED | OUTPATIENT
Start: 2019-07-27 | End: 2019-07-27 | Stop reason: HOSPADM

## 2019-07-26 RX ORDER — ASPIRIN 81 MG/1
81 TABLET ORAL DAILY
Status: DISCONTINUED | OUTPATIENT
Start: 2019-07-26 | End: 2019-07-27 | Stop reason: HOSPADM

## 2019-07-26 RX ORDER — SIMVASTATIN 10 MG
20 TABLET ORAL NIGHTLY
Status: DISCONTINUED | OUTPATIENT
Start: 2019-07-26 | End: 2019-07-27 | Stop reason: HOSPADM

## 2019-07-26 RX ADMIN — MAGNESIUM SULFATE IN WATER 2 G: 40 INJECTION, SOLUTION INTRAVENOUS at 21:17

## 2019-07-26 RX ADMIN — Medication 10 ML: at 21:13

## 2019-07-26 RX ADMIN — LEVOFLOXACIN 750 MG: 5 INJECTION, SOLUTION INTRAVENOUS at 17:14

## 2019-07-26 RX ADMIN — DICYCLOMINE HYDROCHLORIDE 10 MG: 10 CAPSULE ORAL at 21:13

## 2019-07-26 RX ADMIN — ONDANSETRON 4 MG: 2 INJECTION INTRAMUSCULAR; INTRAVENOUS at 17:13

## 2019-07-26 RX ADMIN — SIMVASTATIN 20 MG: 10 TABLET, FILM COATED ORAL at 21:13

## 2019-07-26 RX ADMIN — ASPIRIN 81 MG: 81 TABLET ORAL at 21:13

## 2019-07-26 RX ADMIN — PREGABALIN 100 MG: 75 CAPSULE ORAL at 21:13

## 2019-07-26 RX ADMIN — OXYCODONE HYDROCHLORIDE 10 MG: 5 TABLET ORAL at 18:20

## 2019-07-26 RX ADMIN — Medication 2 PUFF: at 20:42

## 2019-07-26 RX ADMIN — OXYCODONE HYDROCHLORIDE AND ACETAMINOPHEN 1 TABLET: 7.5; 325 TABLET ORAL at 21:13

## 2019-07-26 RX ADMIN — FERROUS SULFATE TAB 325 MG (65 MG ELEMENTAL FE) 325 MG: 325 (65 FE) TAB at 21:13

## 2019-07-26 RX ADMIN — INSULIN GLARGINE 60 UNITS: 100 INJECTION, SOLUTION SUBCUTANEOUS at 22:18

## 2019-07-26 RX ADMIN — SODIUM CHLORIDE 1000 ML: 9 INJECTION, SOLUTION INTRAVENOUS at 17:13

## 2019-07-26 RX ADMIN — PROCHLORPERAZINE EDISYLATE 10 MG: 5 INJECTION INTRAMUSCULAR; INTRAVENOUS at 21:15

## 2019-07-26 RX ADMIN — MELOXICAM 7.5 MG: 7.5 TABLET ORAL at 21:13

## 2019-07-26 RX ADMIN — LOSARTAN POTASSIUM 50 MG: 25 TABLET, FILM COATED ORAL at 21:13

## 2019-07-26 ASSESSMENT — PAIN SCALES - GENERAL
PAINLEVEL_OUTOF10: 5
PAINLEVEL_OUTOF10: 5
PAINLEVEL_OUTOF10: 0
PAINLEVEL_OUTOF10: 5
PAINLEVEL_OUTOF10: 5

## 2019-07-26 ASSESSMENT — PAIN DESCRIPTION - LOCATION: LOCATION: ABDOMEN

## 2019-07-26 ASSESSMENT — PAIN DESCRIPTION - DESCRIPTORS: DESCRIPTORS: ACHING

## 2019-07-26 ASSESSMENT — PAIN DESCRIPTION - ONSET: ONSET: ON-GOING

## 2019-07-26 ASSESSMENT — PAIN DESCRIPTION - PAIN TYPE: TYPE: ACUTE PAIN;CHRONIC PAIN

## 2019-07-26 NOTE — ED PROVIDER NOTES
[facility] Emergency Department    CHIEF COMPLAINT  Fever (seen yesterday, since leaving, feeling weaker, frequent vomiting. low grade fever upon arrival. )      HISTORY OF PRESENT ILLNESS  Brianne Alatorre is a 46 y.o. female returning to the ER after being seen yesterday. Patient says that she got home yesterday and was able to sleep and picked up her Ceftin antibiotic prescription for her ear infections both of her ears. She took the medication and ended up having several episodes of vomiting. She says her tremors returned that she came in for yesterday and also had some transient upper abdominal pain. She denies any chest pain. She does have a mild headache as well that she thinks is related to the vomiting. She denies any diarrhea. No other complaints, modifying factors or associated symptoms. I have reviewed the following from the nursing documentation.     Past Medical History:   Diagnosis Date    Anxiety     Arthritis     CHF (congestive heart failure) (HCC)     COPD (chronic obstructive pulmonary disease) (HCC)     Depression     Diabetes mellitus (HCC)     Diverticulosis     Hyperlipidemia     Obesities, morbid (HCC)      Past Surgical History:   Procedure Laterality Date     SECTION      x3    ENDOMETRIAL ABLATION       Family History   Problem Relation Age of Onset    Colon Cancer Mother     High Cholesterol Father     Heart Disease Father      Social History     Socioeconomic History    Marital status:      Spouse name: Not on file    Number of children: Not on file    Years of education: Not on file    Highest education level: Not on file   Occupational History    Not on file   Social Needs    Financial resource strain: Not on file    Food insecurity:     Worry: Not on file     Inability: Not on file    Transportation needs:     Medical: Not on file     Non-medical: Not on file   Tobacco Use    Smoking status: Former Smoker     Packs/day: tablet 1    albuterol sulfate  (90 BASE) MCG/ACT inhaler Inhale 2 puffs into the lungs every 6 hours as needed for Wheezing      nystatin (MYCOSTATIN) 705898 UNIT/GM cream Apply topically nightly Apply under breasts at night      OXYGEN Inhale 4 L into the lungs      insulin aspart (NOVOLOG) 100 UNIT/ML injection pen Inject into the skin 3 times daily (before meals) SSI.  metFORMIN (GLUCOPHAGE) 500 MG tablet Take 1,000 mg by mouth 2 times daily (with meals)       aspirin 81 MG EC tablet Take 1 tablet by mouth daily 30 tablet 3    ferrous sulfate (FE TABS) 325 (65 FE) MG EC tablet Take 1 tablet by mouth 3 times daily (with meals) 90 tablet 2    Insulin Syringes, Disposable, U-100 0.3 ML MISC 1 each by Does not apply route daily 100 each 3    Insulin Pen Needle 29G X 12.7MM MISC 1 each by Does not apply route daily 100 each 3    glucose monitoring kit (FREESTYLE) monitoring kit 1 kit by Does not apply route daily as needed 1 kit 0    nystatin (MYCOSTATIN) 361351 UNIT/GM powder Three times daily on the anterior abdominal wall and inguinal area. Use for 2 weeks. (Patient taking differently: Apply topically 2-3 times per day under breasts) 2 Bottle 0    escitalopram (LEXAPRO) 20 MG tablet Take 40 mg by mouth daily       Simvastatin (ZOCOR PO) Take 20 mg by mouth nightly       dicyclomine (BENTYL) 10 MG capsule Take 10 mg by mouth 3 times daily.  Esomeprazole Magnesium (NEXIUM PO) Take 40 mg by mouth daily        Allergies   Allergen Reactions    Dilaudid [Hydromorphone Hcl]      Respiratory distress    Erythromycin Other (See Comments)     Increases blood sugar    Lisinopril     Morphine     Pcn [Penicillins]        REVIEW OF SYSTEMS  10 systems reviewed, pertinent positives per HPI otherwise noted to be negative.     PHYSICAL EXAM  BP (!) 158/88   Pulse 88   Temp 100 °F (37.8 °C) (Oral)   Resp 16   Ht 5' 2\" (1.575 m)   Wt (!) 430 lb (195 kg)   SpO2 98%   BMI 78.65 kg/m²   GENERAL APPEARANCE: Awake and alert. Cooperative. Morbidly obese  HEAD: Normocephalic. Atraumatic. EYES: PERRL. EOM's grossly intact. ENT: Mucous membranes are minimally dry. Bilateral tympanic membrane erythema right greater than left  NECK: Supple. Non-tender  HEART: RRR. LUNGS: Respirations unlabored. CTAB. Good air exchange  ABDOMEN: Soft. Non-distended. Non-tender. No masses. No organomegaly. No guarding or rebound. BACK:  No midline Tenderness. EXTREMITIES: No peripheral edema. Moves all extremities equally. All extremities neurovascularly intact. SKIN: Warm and dry. No acute rashes. NEUROLOGICAL: Alert and oriented. CN's 2-12 intact. No gross facial drooping. Strength 5/5, sensation intact  PSYCHIATRIC: Normal mood and affect. LABS  I have reviewed all labs for this visit.    Results for orders placed or performed during the hospital encounter of 07/26/19   CBC Auto Differential   Result Value Ref Range    WBC 8.4 4.0 - 11.0 K/uL    RBC 4.29 4.00 - 5.20 M/uL    Hemoglobin 11.7 (L) 12.0 - 16.0 g/dL    Hematocrit 35.7 (L) 36.0 - 48.0 %    MCV 83.1 80.0 - 100.0 fL    MCH 27.2 26.0 - 34.0 pg    MCHC 32.7 31.0 - 36.0 g/dL    RDW 15.0 12.4 - 15.4 %    Platelets 068 181 - 255 K/uL    MPV 8.7 5.0 - 10.5 fL    Neutrophils % 80.0 %    Lymphocytes % 15.9 %    Monocytes % 3.4 %    Eosinophils % 0.2 %    Basophils % 0.5 %    Neutrophils # 6.7 1.7 - 7.7 K/uL    Lymphocytes # 1.3 1.0 - 5.1 K/uL    Monocytes # 0.3 0.0 - 1.3 K/uL    Eosinophils # 0.0 0.0 - 0.6 K/uL    Basophils # 0.0 0.0 - 0.2 K/uL   Comprehensive Metabolic Panel   Result Value Ref Range    Sodium 134 (L) 136 - 145 mmol/L    Potassium 4.9 3.5 - 5.1 mmol/L    Chloride 97 (L) 99 - 110 mmol/L    CO2 29 21 - 32 mmol/L    Anion Gap 8 3 - 16    Glucose 249 (H) 70 - 99 mg/dL    BUN 19 7 - 20 mg/dL    CREATININE 0.8 0.6 - 1.1 mg/dL    GFR Non-African American >60 >60    GFR African American >60 >60    Calcium 9.6 8.3 - 10.6 mg/dL    Total Protein 7.9 6.4 - 8.2 g/dL    Alb 3.9 3.4 - 5.0 g/dL    Albumin/Globulin Ratio 1.0 (L) 1.1 - 2.2    Total Bilirubin <0.2 0.0 - 1.0 mg/dL    Alkaline Phosphatase 78 40 - 129 U/L    ALT 24 10 - 40 U/L    AST 13 (L) 15 - 37 U/L    Globulin 4.0 g/dL   Lactate, Sepsis   Result Value Ref Range    Lactic Acid, Sepsis 2.0 (H) 0.4 - 1.9 mmol/L   Magnesium   Result Value Ref Range    Magnesium 1.70 (L) 1.80 - 2.40 mg/dL   Troponin   Result Value Ref Range    Troponin <0.01 <0.01 ng/mL   Brain Natriuretic Peptide   Result Value Ref Range    Pro- (H) 0 - 124 pg/mL   HCG Qualitative, Serum   Result Value Ref Range    hCG Qual Negative Detects HCG level >10 MIU/mL   EKG 12 Lead   Result Value Ref Range    Ventricular Rate 85 BPM    Atrial Rate 85 BPM    P-R Interval 180 ms    QRS Duration 94 ms    Q-T Interval 408 ms    QTc Calculation (Bazett) 485 ms    P Axis 36 degrees    R Axis -12 degrees    T Axis 62 degrees    Diagnosis       Normal sinus rhythmLow voltage QRSCannot rule out Anterior infarct (cited on or before 25-JUL-2019)Abnormal ECGWhen compared with ECG of 25-JUL-2019 18:14,No significant change was found       The Ekg interpreted by me shows  normal sinus rhythm with a rate of 85  Axis is   Normal  QTc is  normal  Intervals and Durations are unremarkable. ST Segments: no acute change        RADIOLOGY    Ct Head Wo Contrast    Result Date: 7/26/2019  EXAMINATION: CT OF THE HEAD WITHOUT CONTRAST  7/26/2019 6:01 pm TECHNIQUE: CT of the head was performed without the administration of intravenous contrast. Dose modulation, iterative reconstruction, and/or weight based adjustment of the mA/kV was utilized to reduce the radiation dose to as low as reasonably achievable. COMPARISON: 01/14/2019 HISTORY: ORDERING SYSTEM PROVIDED HISTORY: HEADACHE TECHNOLOGIST PROVIDED HISTORY: Has a \"code stroke\" or \"stroke alert\" been called? ->No Reason for Exam: Headache, dizziness, vomitting x 1 day Acuity: Acute Type of Exam: Initial FINDINGS:

## 2019-07-26 NOTE — H&P
failure, controlled on home meds - continued. DM2 - controlled on home Lantus - continued w/ oral antiGlycemics, Metformin held. Follow FSBS/SSI med regimen. Ear infection - bilateral, started Ceftin outpt which seems to have caused the N/V. Levaquin in ED - continued. DVT Prophylaxis:LMWH  Diet: General  Code Status: Full Code      PT/OT Eval Status: Not yet ordered    Dispo - Likely discharge Sunday 28 July pending clinical improvement. Camden Garcia MD    Thank you Katherine Mercedes PA-C for the opportunity to be involved in this patient's care. If you have any questions or concerns please feel free to contact me at [phone].

## 2019-07-26 NOTE — ED NOTES
Report given at bedside. L of fluids complete. Repeat lactic drawn and sent to lab. Pt left ED in stable condition.       Juan Jacobo RN  07/26/19 6274

## 2019-07-27 VITALS
RESPIRATION RATE: 18 BRPM | WEIGHT: 293 LBS | HEIGHT: 62 IN | BODY MASS INDEX: 53.92 KG/M2 | HEART RATE: 95 BPM | DIASTOLIC BLOOD PRESSURE: 85 MMHG | TEMPERATURE: 97.8 F | SYSTOLIC BLOOD PRESSURE: 135 MMHG | OXYGEN SATURATION: 92 %

## 2019-07-27 LAB
ALBUMIN SERPL-MCNC: 3.9 G/DL (ref 3.4–5)
ANION GAP SERPL CALCULATED.3IONS-SCNC: 8 MMOL/L (ref 3–16)
BUN BLDV-MCNC: 15 MG/DL (ref 7–20)
CALCIUM SERPL-MCNC: 9.6 MG/DL (ref 8.3–10.6)
CHLORIDE BLD-SCNC: 99 MMOL/L (ref 99–110)
CO2: 31 MMOL/L (ref 21–32)
CREAT SERPL-MCNC: 0.7 MG/DL (ref 0.6–1.1)
EKG ATRIAL RATE: 85 BPM
EKG DIAGNOSIS: NORMAL
EKG P AXIS: 36 DEGREES
EKG P-R INTERVAL: 180 MS
EKG Q-T INTERVAL: 408 MS
EKG QRS DURATION: 94 MS
EKG QTC CALCULATION (BAZETT): 485 MS
EKG R AXIS: -12 DEGREES
EKG T AXIS: 62 DEGREES
EKG VENTRICULAR RATE: 85 BPM
GFR AFRICAN AMERICAN: >60
GFR NON-AFRICAN AMERICAN: >60
GLUCOSE BLD-MCNC: 193 MG/DL (ref 70–99)
GLUCOSE BLD-MCNC: 210 MG/DL (ref 70–99)
HCT VFR BLD CALC: 36.7 % (ref 36–48)
HEMOGLOBIN: 11.8 G/DL (ref 12–16)
MAGNESIUM: 2 MG/DL (ref 1.8–2.4)
MCH RBC QN AUTO: 26.7 PG (ref 26–34)
MCHC RBC AUTO-ENTMCNC: 32.2 G/DL (ref 31–36)
MCV RBC AUTO: 83.1 FL (ref 80–100)
PDW BLD-RTO: 14.8 % (ref 12.4–15.4)
PERFORMED ON: ABNORMAL
PHOSPHORUS: 3 MG/DL (ref 2.5–4.9)
PLATELET # BLD: 191 K/UL (ref 135–450)
PMV BLD AUTO: 8.9 FL (ref 5–10.5)
POTASSIUM SERPL-SCNC: 4.8 MMOL/L (ref 3.5–5.1)
RBC # BLD: 4.41 M/UL (ref 4–5.2)
SODIUM BLD-SCNC: 138 MMOL/L (ref 136–145)
WBC # BLD: 8.1 K/UL (ref 4–11)

## 2019-07-27 PROCEDURE — 83735 ASSAY OF MAGNESIUM: CPT

## 2019-07-27 PROCEDURE — 94761 N-INVAS EAR/PLS OXIMETRY MLT: CPT

## 2019-07-27 PROCEDURE — 36415 COLL VENOUS BLD VENIPUNCTURE: CPT

## 2019-07-27 PROCEDURE — 2500000003 HC RX 250 WO HCPCS: Performed by: INTERNAL MEDICINE

## 2019-07-27 PROCEDURE — 6360000002 HC RX W HCPCS: Performed by: INTERNAL MEDICINE

## 2019-07-27 PROCEDURE — 6370000000 HC RX 637 (ALT 250 FOR IP): Performed by: INTERNAL MEDICINE

## 2019-07-27 PROCEDURE — 6370000000 HC RX 637 (ALT 250 FOR IP): Performed by: NURSE PRACTITIONER

## 2019-07-27 PROCEDURE — 2580000003 HC RX 258: Performed by: INTERNAL MEDICINE

## 2019-07-27 PROCEDURE — 2700000000 HC OXYGEN THERAPY PER DAY

## 2019-07-27 PROCEDURE — 94640 AIRWAY INHALATION TREATMENT: CPT

## 2019-07-27 PROCEDURE — 85027 COMPLETE CBC AUTOMATED: CPT

## 2019-07-27 PROCEDURE — 80069 RENAL FUNCTION PANEL: CPT

## 2019-07-27 PROCEDURE — 83036 HEMOGLOBIN GLYCOSYLATED A1C: CPT

## 2019-07-27 PROCEDURE — 93010 ELECTROCARDIOGRAM REPORT: CPT | Performed by: INTERNAL MEDICINE

## 2019-07-27 RX ORDER — PANTOPRAZOLE SODIUM 40 MG/1
40 TABLET, DELAYED RELEASE ORAL
Status: DISCONTINUED | OUTPATIENT
Start: 2019-07-27 | End: 2019-07-27 | Stop reason: HOSPADM

## 2019-07-27 RX ORDER — LEVOFLOXACIN 500 MG/1
500 TABLET, FILM COATED ORAL DAILY
Qty: 7 TABLET | Refills: 0 | Status: SHIPPED | OUTPATIENT
Start: 2019-07-27 | End: 2019-08-03

## 2019-07-27 RX ORDER — CHOLECALCIFEROL (VITAMIN D3) 125 MCG
5 CAPSULE ORAL NIGHTLY PRN
Status: DISCONTINUED | OUTPATIENT
Start: 2019-07-27 | End: 2019-07-27 | Stop reason: HOSPADM

## 2019-07-27 RX ADMIN — LOSARTAN POTASSIUM 50 MG: 25 TABLET, FILM COATED ORAL at 09:10

## 2019-07-27 RX ADMIN — MICONAZOLE NITRATE: 20 POWDER TOPICAL at 09:11

## 2019-07-27 RX ADMIN — Medication 10 ML: at 09:11

## 2019-07-27 RX ADMIN — FERROUS SULFATE TAB 325 MG (65 MG ELEMENTAL FE) 325 MG: 325 (65 FE) TAB at 09:11

## 2019-07-27 RX ADMIN — Medication 2 PUFF: at 08:00

## 2019-07-27 RX ADMIN — Medication 2 PUFF: at 11:30

## 2019-07-27 RX ADMIN — MELATONIN TAB 5 MG 5 MG: 5 TAB at 02:40

## 2019-07-27 RX ADMIN — LEVOFLOXACIN 500 MG: 500 TABLET, FILM COATED ORAL at 09:10

## 2019-07-27 RX ADMIN — ASPIRIN 81 MG: 81 TABLET ORAL at 09:11

## 2019-07-27 RX ADMIN — ENOXAPARIN SODIUM 40 MG: 40 INJECTION SUBCUTANEOUS at 09:11

## 2019-07-27 RX ADMIN — INSULIN LISPRO 4 UNITS: 100 INJECTION, SOLUTION INTRAVENOUS; SUBCUTANEOUS at 09:14

## 2019-07-27 RX ADMIN — ESCITALOPRAM OXALATE 40 MG: 10 TABLET ORAL at 09:10

## 2019-07-27 RX ADMIN — MELOXICAM 7.5 MG: 7.5 TABLET ORAL at 09:10

## 2019-07-27 RX ADMIN — PANTOPRAZOLE SODIUM 40 MG: 40 TABLET, DELAYED RELEASE ORAL at 05:58

## 2019-07-27 RX ADMIN — ONDANSETRON 4 MG: 2 INJECTION INTRAMUSCULAR; INTRAVENOUS at 09:27

## 2019-07-27 RX ADMIN — PREGABALIN 100 MG: 75 CAPSULE ORAL at 09:10

## 2019-07-27 RX ADMIN — DICYCLOMINE HYDROCHLORIDE 10 MG: 10 CAPSULE ORAL at 09:12

## 2019-07-27 RX ADMIN — Medication 10 ML: at 00:40

## 2019-07-27 RX ADMIN — OXYCODONE HYDROCHLORIDE AND ACETAMINOPHEN 1 TABLET: 7.5; 325 TABLET ORAL at 09:11

## 2019-07-27 RX ADMIN — GLIPIZIDE 5 MG: 5 TABLET ORAL at 05:58

## 2019-07-27 RX ADMIN — ONDANSETRON 4 MG: 2 INJECTION INTRAMUSCULAR; INTRAVENOUS at 00:40

## 2019-07-27 ASSESSMENT — PAIN DESCRIPTION - LOCATION: LOCATION: HEAD

## 2019-07-27 ASSESSMENT — PAIN DESCRIPTION - FREQUENCY: FREQUENCY: CONTINUOUS

## 2019-07-27 ASSESSMENT — PAIN SCALES - GENERAL
PAINLEVEL_OUTOF10: 9
PAINLEVEL_OUTOF10: 9

## 2019-07-27 ASSESSMENT — PAIN DESCRIPTION - ONSET: ONSET: AWAKENED FROM SLEEP

## 2019-07-27 ASSESSMENT — PAIN DESCRIPTION - DESCRIPTORS: DESCRIPTORS: ACHING

## 2019-07-27 ASSESSMENT — PAIN DESCRIPTION - PAIN TYPE: TYPE: ACUTE PAIN

## 2019-07-27 NOTE — DISCHARGE INSTR - COC
Continuity of Care Form    Patient Name: Mayte Doe   :  1965  MRN:  [id number]    Admit date:  2019  Discharge date:  ***    Code Status Order: Full Code   Advance Directives:   Advance Care Flowsheet Documentation     Date/Time Healthcare Directive Type of Healthcare Directive Copy in [address] Agent's Name Healthcare Agent's Phone Number    [phone]  No, patient does not have an advance directive for healthcare treatment -- -- -- -- --          Admitting Physician:  Eb Reza MD  PCP: Eric Varghese PA-C    Discharging Nurse: [facility] Unit/Room#: 3277/6626-38  Discharging Unit Phone Number: ***    Emergency Contact:   Extended Emergency Contact Information  Primary Emergency Contact: Jose Gunn  Address: [address]           [facility], [address] [place] [address] [address]  Phone: [phone]  Relation: Spouse  Secondary Emergency Contact: Malou Higginbotham  Address: [address]           [phone]  Home Phone: [phone]  Relation: Child    Past Surgical History:  Past Surgical History:   Procedure Laterality Date     SECTION      x3    ENDOMETRIAL ABLATION         Immunization History:   Immunization History   Administered Date(s) Administered    Influenza, Quadv, 3 yrs and older, IM, PF (Fluzone 3 yrs and older or Afluria 5 yrs and older) 10/11/2016       Active Problems:  Patient Active Problem List   Diagnosis Code    [facility] J96.00    Fluid overload E87.70    Acidosis E87.2    Panniculitis M79.3    COPD (chronic obstructive pulmonary disease) ([facility] J44.9    General weakness R53.1    PNA (pneumonia) J18.9    Elevated blood pressure reading R03.0    Morbid obesity ([facility] E66.01    Hyperlipidemia E78.5    DM (diabetes mellitus) ([facility] E11.9    CHF (congestive heart failure) (HCC) I50.9    Tremor R25.1    Hypomagnesemia E83.42    Tremors of nervous system R25.1

## 2019-07-28 LAB
ESTIMATED AVERAGE GLUCOSE: 208.7 MG/DL
HBA1C MFR BLD: 8.9 %

## 2019-08-16 ENCOUNTER — APPOINTMENT (OUTPATIENT)
Dept: GENERAL RADIOLOGY | Age: 54
End: 2019-08-16
Payer: MEDICARE

## 2019-08-16 ENCOUNTER — APPOINTMENT (OUTPATIENT)
Dept: CT IMAGING | Age: 54
End: 2019-08-16
Payer: MEDICARE

## 2019-08-16 ENCOUNTER — HOSPITAL ENCOUNTER (EMERGENCY)
Age: 54
Discharge: HOME | End: 2019-08-17
Attending: EMERGENCY MEDICINE
Payer: MEDICARE
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
## 2019-08-16 DIAGNOSIS — S09.90XA INJURY OF HEAD, INITIAL ENCOUNTER: ICD-10-CM

## 2019-08-16 DIAGNOSIS — M79.642 HAND PAIN, LEFT: ICD-10-CM

## 2019-08-16 DIAGNOSIS — S00.83XA CONTUSION OF FACE, INITIAL ENCOUNTER: ICD-10-CM

## 2019-08-16 PROCEDURE — 90715 TDAP VACCINE 7 YRS/> IM: CPT | Performed by: NURSE PRACTITIONER

## 2019-08-16 PROCEDURE — 99284 EMERGENCY DEPT VISIT MOD MDM: CPT

## 2019-08-16 PROCEDURE — 72125 CT NECK SPINE W/O DYE: CPT

## 2019-08-16 PROCEDURE — 6360000002 HC RX W HCPCS: Performed by: NURSE PRACTITIONER

## 2019-08-16 PROCEDURE — 70450 CT HEAD/BRAIN W/O DYE: CPT

## 2019-08-16 PROCEDURE — 90471 IMMUNIZATION ADMIN: CPT | Performed by: NURSE PRACTITIONER

## 2019-08-16 PROCEDURE — 73130 X-RAY EXAM OF HAND: CPT

## 2019-08-16 RX ADMIN — TETANUS TOXOID, REDUCED DIPHTHERIA TOXOID AND ACELLULAR PERTUSSIS VACCINE, ADSORBED 0.5 ML: 5; 2.5; 8; 8; 2.5 SUSPENSION INTRAMUSCULAR at 23:46

## 2019-08-16 ASSESSMENT — PAIN SCALES - GENERAL: PAINLEVEL_OUTOF10: 9

## 2019-08-17 VITALS
BODY MASS INDEX: 53.92 KG/M2 | DIASTOLIC BLOOD PRESSURE: 64 MMHG | HEART RATE: 74 BPM | HEIGHT: 62 IN | OXYGEN SATURATION: 95 % | TEMPERATURE: 98.8 F | WEIGHT: 293 LBS | SYSTOLIC BLOOD PRESSURE: 127 MMHG | RESPIRATION RATE: 18 BRPM

## 2019-08-17 PROCEDURE — 6370000000 HC RX 637 (ALT 250 FOR IP): Performed by: NURSE PRACTITIONER

## 2019-08-17 RX ORDER — ACETAMINOPHEN 325 MG/1
650 TABLET ORAL ONCE
Status: COMPLETED | OUTPATIENT
Start: 2019-08-17 | End: 2019-08-17

## 2019-08-17 RX ADMIN — ACETAMINOPHEN 650 MG: 325 TABLET ORAL at 00:26

## 2019-08-17 ASSESSMENT — PAIN SCALES - GENERAL
PAINLEVEL_OUTOF10: 9
PAINLEVEL_OUTOF10: 7

## 2019-08-17 NOTE — ED PROVIDER NOTES
Date: 8/17/2019  EXAMINATION: CT OF THE HEAD WITHOUT CONTRAST  8/16/2019 11:19 pm TECHNIQUE: CT of the head was performed without the administration of intravenous contrast. Dose modulation, iterative reconstruction, and/or weight based adjustment of the mA/kV was utilized to reduce the radiation dose to as low as reasonably achievable. COMPARISON: 07/26/2019 HISTORY: ORDERING SYSTEM PROVIDED HISTORY: fall, head injury TECHNOLOGIST PROVIDED HISTORY: Has a \"code stroke\" or \"stroke alert\" been called? ->No Reason for Exam: fall. Acuity: Acute Type of Exam: Initial Relevant Medical/Surgical History: no hx of seizure, stroke or sugery. FINDINGS: BRAIN/VENTRICLES: There is no acute intracranial hemorrhage, mass effect or midline shift. No abnormal extra-axial fluid collection. The gray-white differentiation is maintained without evidence of an acute infarct. There is no evidence of hydrocephalus. ORBITS: The visualized portion of the orbits demonstrate no acute abnormality. SINUSES: The visualized paranasal sinuses and mastoid air cells demonstrate no acute abnormality. SOFT TISSUES/SKULL:  No acute abnormality of the visualized skull or soft tissues. No acute intracranial abnormality. Ct Cervical Spine Wo Contrast    Result Date: 8/17/2019  EXAMINATION: CT OF THE CERVICAL SPINE WITHOUT CONTRAST 8/16/2019 11:19 pm TECHNIQUE: CT of the cervical spine was performed without the administration of intravenous contrast. Multiplanar reformatted images are provided for review. Dose modulation, iterative reconstruction, and/or weight based adjustment of the mA/kV was utilized to reduce the radiation dose to as low as reasonably achievable. COMPARISON: None. HISTORY: ORDERING SYSTEM PROVIDED HISTORY: fall, pain TECHNOLOGIST PROVIDED HISTORY: Reason for Exam: fall. neck pain Acuity: Acute Type of Exam: Initial Relevant Medical/Surgical History: milton hx of surgery.  FINDINGS: BONES/ALIGNMENT: Study is technically limited by discussed returning to the Emergency Department immediately if new or worsening symptoms occur. We have discussed the symptoms which are most concerning (e.g., changing or worsening pain, weakness, vomiting, fever) that necessitate immediate return. Final Impression    1. Fall, initial encounter    2. Injury of head, initial encounter    3. Contusion of face, initial encounter    4. Hand pain, left        Discharge Vital Signs:  Blood pressure 127/64, pulse 74, temperature 98.8 °F (37.1 °C), temperature source Oral, resp. rate 18, height 5' 2\" (1.575 m), weight (!) 347 lb (157.4 kg), SpO2 95 %. DISPOSITION  Patient was discharged to home in good condition.           AMAYA Tran - MCKENNA  08/17/19 9399

## 2019-08-18 ENCOUNTER — HOSPITAL ENCOUNTER (EMERGENCY)
Age: 54
Discharge: HOME | End: 2019-08-19
Attending: EMERGENCY MEDICINE
Payer: MEDICARE

## 2019-08-18 DIAGNOSIS — R42 LIGHTHEADEDNESS: Primary | ICD-10-CM

## 2019-08-18 DIAGNOSIS — E87.5 HYPERKALEMIA: ICD-10-CM

## 2019-08-18 LAB
A/G RATIO: 1.1 (ref 1.1–2.2)
ALBUMIN SERPL-MCNC: 3.8 G/DL (ref 3.4–5)
ALP BLD-CCNC: 86 U/L (ref 40–129)
ALT SERPL-CCNC: 24 U/L (ref 10–40)
ANION GAP SERPL CALCULATED.3IONS-SCNC: 8 MMOL/L (ref 3–16)
AST SERPL-CCNC: 23 U/L (ref 15–37)
BACTERIA: ABNORMAL /HPF
BASOPHILS ABSOLUTE: 0.1 K/UL (ref 0–0.2)
BASOPHILS RELATIVE PERCENT: 0.8 %
BILIRUB SERPL-MCNC: <0.2 MG/DL (ref 0–1)
BILIRUBIN URINE: NEGATIVE
BLOOD, URINE: NEGATIVE
BUN BLDV-MCNC: 20 MG/DL (ref 7–20)
CALCIUM SERPL-MCNC: 9.3 MG/DL (ref 8.3–10.6)
CHLORIDE BLD-SCNC: 100 MMOL/L (ref 99–110)
CLARITY: CLEAR
CO2: 30 MMOL/L (ref 21–32)
COLOR: YELLOW
CREAT SERPL-MCNC: 0.9 MG/DL (ref 0.6–1.1)
EOSINOPHILS ABSOLUTE: 0.1 K/UL (ref 0–0.6)
EOSINOPHILS RELATIVE PERCENT: 1.4 %
EPITHELIAL CELLS, UA: ABNORMAL /HPF
GFR AFRICAN AMERICAN: >60
GFR NON-AFRICAN AMERICAN: >60
GLOBULIN: 3.4 G/DL
GLUCOSE BLD-MCNC: 221 MG/DL (ref 70–99)
GLUCOSE URINE: 250 MG/DL
HCT VFR BLD CALC: 35.4 % (ref 36–48)
HEMOGLOBIN: 11 G/DL (ref 12–16)
KETONES, URINE: ABNORMAL MG/DL
LEUKOCYTE ESTERASE, URINE: ABNORMAL
LYMPHOCYTES ABSOLUTE: 1.3 K/UL (ref 1–5.1)
LYMPHOCYTES RELATIVE PERCENT: 18.5 %
MAGNESIUM: 1.8 MG/DL (ref 1.8–2.4)
MCH RBC QN AUTO: 26.2 PG (ref 26–34)
MCHC RBC AUTO-ENTMCNC: 30.9 G/DL (ref 31–36)
MCV RBC AUTO: 84.8 FL (ref 80–100)
MICROSCOPIC EXAMINATION: YES
MONOCYTES ABSOLUTE: 0.3 K/UL (ref 0–1.3)
MONOCYTES RELATIVE PERCENT: 4.9 %
NEUTROPHILS ABSOLUTE: 5.1 K/UL (ref 1.7–7.7)
NEUTROPHILS RELATIVE PERCENT: 74.4 %
NITRITE, URINE: NEGATIVE
PDW BLD-RTO: 15.2 % (ref 12.4–15.4)
PH UA: 5.5 (ref 5–8)
PLATELET # BLD: 173 K/UL (ref 135–450)
PMV BLD AUTO: 9.4 FL (ref 5–10.5)
POTASSIUM SERPL-SCNC: 5.9 MMOL/L (ref 3.5–5.1)
POTASSIUM SERPL-SCNC: 6 MMOL/L (ref 3.5–5.1)
PROTEIN UA: NEGATIVE MG/DL
RBC # BLD: 4.18 M/UL (ref 4–5.2)
RBC UA: ABNORMAL /HPF (ref 0–2)
SODIUM BLD-SCNC: 138 MMOL/L (ref 136–145)
SPECIFIC GRAVITY UA: 1.02 (ref 1–1.03)
TOTAL PROTEIN: 7.2 G/DL (ref 6.4–8.2)
URINE TYPE: ABNORMAL
UROBILINOGEN, URINE: 0.2 E.U./DL
WBC # BLD: 6.8 K/UL (ref 4–11)
WBC UA: ABNORMAL /HPF (ref 0–5)

## 2019-08-18 PROCEDURE — 93005 ELECTROCARDIOGRAM TRACING: CPT | Performed by: EMERGENCY MEDICINE

## 2019-08-18 PROCEDURE — 96374 THER/PROPH/DIAG INJ IV PUSH: CPT

## 2019-08-18 PROCEDURE — 87086 URINE CULTURE/COLONY COUNT: CPT

## 2019-08-18 PROCEDURE — 85025 COMPLETE CBC W/AUTO DIFF WBC: CPT

## 2019-08-18 PROCEDURE — 2580000003 HC RX 258: Performed by: NURSE PRACTITIONER

## 2019-08-18 PROCEDURE — 80053 COMPREHEN METABOLIC PANEL: CPT

## 2019-08-18 PROCEDURE — 99284 EMERGENCY DEPT VISIT MOD MDM: CPT

## 2019-08-18 PROCEDURE — 6370000000 HC RX 637 (ALT 250 FOR IP): Performed by: NURSE PRACTITIONER

## 2019-08-18 PROCEDURE — 87186 SC STD MICRODIL/AGAR DIL: CPT

## 2019-08-18 PROCEDURE — 87077 CULTURE AEROBIC IDENTIFY: CPT

## 2019-08-18 PROCEDURE — 84132 ASSAY OF SERUM POTASSIUM: CPT

## 2019-08-18 PROCEDURE — 83735 ASSAY OF MAGNESIUM: CPT

## 2019-08-18 PROCEDURE — 81001 URINALYSIS AUTO W/SCOPE: CPT

## 2019-08-18 RX ORDER — ALBUTEROL SULFATE 2.5 MG/3ML
10 SOLUTION RESPIRATORY (INHALATION) ONCE
Status: COMPLETED | OUTPATIENT
Start: 2019-08-18 | End: 2019-08-19

## 2019-08-18 RX ORDER — 0.9 % SODIUM CHLORIDE 0.9 %
1000 INTRAVENOUS SOLUTION INTRAVENOUS ONCE
Status: COMPLETED | OUTPATIENT
Start: 2019-08-18 | End: 2019-08-19

## 2019-08-18 RX ADMIN — SODIUM CHLORIDE 1000 ML: 9 INJECTION, SOLUTION INTRAVENOUS at 22:41

## 2019-08-18 RX ADMIN — INSULIN HUMAN 7 UNITS: 100 INJECTION, SOLUTION PARENTERAL at 22:49

## 2019-08-18 ASSESSMENT — PAIN DESCRIPTION - DESCRIPTORS: DESCRIPTORS: ACHING

## 2019-08-18 ASSESSMENT — PAIN DESCRIPTION - PAIN TYPE: TYPE: ACUTE PAIN

## 2019-08-19 VITALS
HEART RATE: 83 BPM | OXYGEN SATURATION: 95 % | DIASTOLIC BLOOD PRESSURE: 67 MMHG | BODY MASS INDEX: 53.92 KG/M2 | WEIGHT: 293 LBS | TEMPERATURE: 99.4 F | SYSTOLIC BLOOD PRESSURE: 128 MMHG | HEIGHT: 62 IN | RESPIRATION RATE: 16 BRPM

## 2019-08-19 LAB
EKG ATRIAL RATE: 76 BPM
EKG DIAGNOSIS: NORMAL
EKG P AXIS: 36 DEGREES
EKG P-R INTERVAL: 178 MS
EKG Q-T INTERVAL: 428 MS
EKG QRS DURATION: 94 MS
EKG QTC CALCULATION (BAZETT): 481 MS
EKG R AXIS: 33 DEGREES
EKG T AXIS: 54 DEGREES
EKG VENTRICULAR RATE: 76 BPM
POTASSIUM SERPL-SCNC: 5.4 MMOL/L (ref 3.5–5.1)

## 2019-08-19 PROCEDURE — 2700000000 HC OXYGEN THERAPY PER DAY

## 2019-08-19 PROCEDURE — 6360000002 HC RX W HCPCS: Performed by: NURSE PRACTITIONER

## 2019-08-19 PROCEDURE — 94761 N-INVAS EAR/PLS OXIMETRY MLT: CPT

## 2019-08-19 PROCEDURE — 94644 CONT INHLJ TX 1ST HOUR: CPT

## 2019-08-19 PROCEDURE — 93010 ELECTROCARDIOGRAM REPORT: CPT | Performed by: INTERNAL MEDICINE

## 2019-08-19 PROCEDURE — 84132 ASSAY OF SERUM POTASSIUM: CPT

## 2019-08-19 RX ADMIN — ALBUTEROL SULFATE 10 MG: 2.5 SOLUTION RESPIRATORY (INHALATION) at 00:32

## 2019-08-19 NOTE — ED PROVIDER NOTES
breastfeeding.     DISPOSITION  Patient was discharged to home in good condition.    ]      Jeannie Worthy, AMAYA - CNP  08/19/19 4086

## 2019-08-20 ENCOUNTER — HOSPITAL ENCOUNTER (EMERGENCY)
Age: 54
Discharge: HOME | End: 2019-08-20
Attending: EMERGENCY MEDICINE
Payer: MEDICARE

## 2019-08-20 ENCOUNTER — APPOINTMENT (OUTPATIENT)
Dept: GENERAL RADIOLOGY | Age: 54
End: 2019-08-20
Payer: MEDICARE

## 2019-08-20 ENCOUNTER — APPOINTMENT (OUTPATIENT)
Dept: CT IMAGING | Age: 54
End: 2019-08-20
Payer: MEDICARE

## 2019-08-20 VITALS
BODY MASS INDEX: 53.92 KG/M2 | SYSTOLIC BLOOD PRESSURE: 155 MMHG | HEART RATE: 71 BPM | DIASTOLIC BLOOD PRESSURE: 72 MMHG | HEIGHT: 62 IN | RESPIRATION RATE: 16 BRPM | TEMPERATURE: 98.1 F | OXYGEN SATURATION: 100 % | WEIGHT: 293 LBS

## 2019-08-20 DIAGNOSIS — E87.5 HYPERKALEMIA: ICD-10-CM

## 2019-08-20 DIAGNOSIS — R53.1 GENERALIZED WEAKNESS: Primary | ICD-10-CM

## 2019-08-20 LAB
A/G RATIO: 0.9 (ref 1.1–2.2)
ALBUMIN SERPL-MCNC: 3.9 G/DL (ref 3.4–5)
ALP BLD-CCNC: 85 U/L (ref 40–129)
ALT SERPL-CCNC: 27 U/L (ref 10–40)
ANION GAP SERPL CALCULATED.3IONS-SCNC: 9 MMOL/L (ref 3–16)
AST SERPL-CCNC: 26 U/L (ref 15–37)
BACTERIA: ABNORMAL /HPF
BASOPHILS ABSOLUTE: 0 K/UL (ref 0–0.2)
BASOPHILS RELATIVE PERCENT: 0.4 %
BILIRUB SERPL-MCNC: <0.2 MG/DL (ref 0–1)
BILIRUBIN URINE: NEGATIVE
BLOOD, URINE: NEGATIVE
BUN BLDV-MCNC: 22 MG/DL (ref 7–20)
CALCIUM SERPL-MCNC: 9.4 MG/DL (ref 8.3–10.6)
CHLORIDE BLD-SCNC: 101 MMOL/L (ref 99–110)
CLARITY: ABNORMAL
CO2: 30 MMOL/L (ref 21–32)
COLOR: YELLOW
CREAT SERPL-MCNC: 1 MG/DL (ref 0.6–1.1)
EKG ATRIAL RATE: 74 BPM
EKG DIAGNOSIS: NORMAL
EKG P AXIS: 47 DEGREES
EKG P-R INTERVAL: 188 MS
EKG Q-T INTERVAL: 426 MS
EKG QRS DURATION: 98 MS
EKG QTC CALCULATION (BAZETT): 472 MS
EKG R AXIS: 81 DEGREES
EKG T AXIS: 51 DEGREES
EKG VENTRICULAR RATE: 74 BPM
EOSINOPHILS ABSOLUTE: 0.1 K/UL (ref 0–0.6)
EOSINOPHILS RELATIVE PERCENT: 1.3 %
EPITHELIAL CELLS, UA: ABNORMAL /HPF
GFR AFRICAN AMERICAN: >60
GFR NON-AFRICAN AMERICAN: 58
GLOBULIN: 4.2 G/DL
GLUCOSE BLD-MCNC: 140 MG/DL (ref 70–99)
GLUCOSE URINE: NEGATIVE MG/DL
HCT VFR BLD CALC: 37.1 % (ref 36–48)
HEMOGLOBIN: 11.5 G/DL (ref 12–16)
KETONES, URINE: ABNORMAL MG/DL
LEUKOCYTE ESTERASE, URINE: ABNORMAL
LYMPHOCYTES ABSOLUTE: 1.3 K/UL (ref 1–5.1)
LYMPHOCYTES RELATIVE PERCENT: 19.7 %
MCH RBC QN AUTO: 26.5 PG (ref 26–34)
MCHC RBC AUTO-ENTMCNC: 31.1 G/DL (ref 31–36)
MCV RBC AUTO: 85.2 FL (ref 80–100)
MICROSCOPIC EXAMINATION: YES
MONOCYTES ABSOLUTE: 0.4 K/UL (ref 0–1.3)
MONOCYTES RELATIVE PERCENT: 5.7 %
NEUTROPHILS ABSOLUTE: 4.9 K/UL (ref 1.7–7.7)
NEUTROPHILS RELATIVE PERCENT: 72.9 %
NITRITE, URINE: NEGATIVE
PDW BLD-RTO: 15 % (ref 12.4–15.4)
PH UA: 5 (ref 5–8)
PHOSPHORUS: 3.9 MG/DL (ref 2.5–4.9)
PLATELET # BLD: 168 K/UL (ref 135–450)
PMV BLD AUTO: 9.3 FL (ref 5–10.5)
POTASSIUM REFLEX MAGNESIUM: 5.5 MMOL/L (ref 3.5–5.1)
PROTEIN UA: ABNORMAL MG/DL
RBC # BLD: 4.35 M/UL (ref 4–5.2)
RBC UA: ABNORMAL /HPF (ref 0–2)
SODIUM BLD-SCNC: 140 MMOL/L (ref 136–145)
SPECIFIC GRAVITY UA: >=1.03 (ref 1–1.03)
TOTAL PROTEIN: 8.1 G/DL (ref 6.4–8.2)
URINE TYPE: ABNORMAL
UROBILINOGEN, URINE: 0.2 E.U./DL
WBC # BLD: 6.8 K/UL (ref 4–11)
WBC UA: ABNORMAL /HPF (ref 0–5)

## 2019-08-20 PROCEDURE — 97110 THERAPEUTIC EXERCISES: CPT

## 2019-08-20 PROCEDURE — 99284 EMERGENCY DEPT VISIT MOD MDM: CPT

## 2019-08-20 PROCEDURE — 97162 PT EVAL MOD COMPLEX 30 MIN: CPT

## 2019-08-20 PROCEDURE — 85025 COMPLETE CBC W/AUTO DIFF WBC: CPT

## 2019-08-20 PROCEDURE — 2580000003 HC RX 258: Performed by: EMERGENCY MEDICINE

## 2019-08-20 PROCEDURE — 93005 ELECTROCARDIOGRAM TRACING: CPT | Performed by: EMERGENCY MEDICINE

## 2019-08-20 PROCEDURE — 93010 ELECTROCARDIOGRAM REPORT: CPT | Performed by: INTERNAL MEDICINE

## 2019-08-20 PROCEDURE — 84100 ASSAY OF PHOSPHORUS: CPT

## 2019-08-20 PROCEDURE — 97530 THERAPEUTIC ACTIVITIES: CPT

## 2019-08-20 PROCEDURE — 70450 CT HEAD/BRAIN W/O DYE: CPT

## 2019-08-20 PROCEDURE — 71046 X-RAY EXAM CHEST 2 VIEWS: CPT

## 2019-08-20 PROCEDURE — 80053 COMPREHEN METABOLIC PANEL: CPT

## 2019-08-20 PROCEDURE — 81001 URINALYSIS AUTO W/SCOPE: CPT

## 2019-08-20 RX ORDER — 0.9 % SODIUM CHLORIDE 0.9 %
1000 INTRAVENOUS SOLUTION INTRAVENOUS ONCE
Status: COMPLETED | OUTPATIENT
Start: 2019-08-20 | End: 2019-08-20

## 2019-08-20 RX ADMIN — SODIUM CHLORIDE 1000 ML: 9 INJECTION, SOLUTION INTRAVENOUS at 13:17

## 2019-08-20 ASSESSMENT — ENCOUNTER SYMPTOMS
SHORTNESS OF BREATH: 0
ABDOMINAL PAIN: 0
VOMITING: 0
CHEST TIGHTNESS: 0
SORE THROAT: 0
WHEEZING: 0
RHINORRHEA: 0
COUGH: 0
DIARRHEA: 0
NAUSEA: 0
TROUBLE SWALLOWING: 0
STRIDOR: 0

## 2019-08-20 NOTE — ED NOTES
Bed: 07  Expected date:   Expected time:   Means of arrival:   Comments:     Liz Solis RN  08/20/19 1049

## 2019-08-20 NOTE — CARE COORDINATION
CASE MANAGEMENT INITIAL ASSESSMENT      Reviewed chart and met with patient today, re: 46year old female. Explained Case Management role/services. Family present:    Primary contact information: Elizabeth Poole [phone]    Admit date/status: 8/20 ED  Diagnosis: weakness    Insurance: medicare primary and Medicaid secondary   Precert required for SNF - n       3 night stay required - y    Living arrangements, Adls, care needs, prior to admission: patient lives with  in a mobile home. Has 3 steps to enter. Transportation: private    [employer] at home: Walker_X_Cane_X_RTS__ BSC__Shower Chair__  02_X-[employer]_ HHN__ CPAP__  BiPap__  Hospital Bed__ W/C_X__ Other__________    Services in the home and/or outpatient, prior to admission: none    PT/OT recs: SNF    Hospital Exemption Notification (HEN): not initiated    Barriers to discharge: none    Plan/comments:   Met with patient and  at bedside. They are voicing concern about her returning home.  states \"if I take her home she will fall. \"  In order for patient to go to SNF she would need 3 inpatient midnights and patient does not meet criteria for admission. Spoke to MD who reports that Per nursing patient was able to ambulate to [facility] and to [facility]. When therapy was there to work with and evaluate her per nursing patient declined to walk stating she was too weak. Due to her lack of effort with therapy they recommended SNF. After discussions with MD we do not feel SNF is appropriate and that [facility] and if fails at that time the [address] [facility] could assist with placement via Medicaid through the LTC benefit. Writer and MD went together to bedside to discuss this and patient and spouse upset stating to take IV out and discharge her and she will fall and go to another hospital.  CM and MD offered to arrange [address] and they declined stating \"its pointless. \"      Ayana Clemente RN

## 2019-08-20 NOTE — ED NOTES
Called DCP-Boris @[phone]  Re: consult for SNF placement  @[phone] Boris spoke to  re: need for 3 [place] stays to qualify for SNF placement based on insurance requirements     Cyd Doing Naegele  08/20/19 1533

## 2019-08-20 NOTE — ED PROVIDER NOTES
[facility]  ED  eMERGENCYdEPARTMENT eNCOUnter      Pt Name: Mazin Gill  MRN: [id number]  Hilarygfaidee 1965  Date of evaluation: 8/20/2019  Ladonna Couch MD    CHIEF COMPLAINT       Chief Complaint   Patient presents with    Other     pt was seen last night in the ED last night for hyperkalemia. discharged with 5.4 lab value. squad reports patient returning to the ED to have K+ checked again because she \"felt unsteady on her feet\" pt believes that her potassium is off. per squad report pt ambulatory with assistance upon their arrival. pt walked from cot to bed in the ED. She reports she has a walker at home but doesn't use it          HISTORY OF PRESENT ILLNESS   (Location/Symptom, Timing/Onset,Context/Setting, Quality, Duration, Modifying Factors, Severity)  Note limiting factors. Mazin Gill is a 46 y.o. female who presents to the emergency department for   -patient reports she was told on Sunday that her potassium was high,   -Today is hardly able to stand up, speech is slurred, 'shaking really bad.' 'jerking' and it is the same way she's felt when her potassium is high. -per  she is not currently having slurred speech, but 'when you continue to talk to her she gets slurred' and has had previous episodes for it before. HPI    Nursing Notes were reviewed. REVIEW OF SYSTEMS    (2-9 systems for level 4, 10 or more for level 5)     Review of Systems   Constitutional: Positive for fatigue. Negative for activity change, appetite change, diaphoresis and fever. HENT: Negative for congestion, rhinorrhea, sore throat and trouble swallowing. Respiratory: Negative for cough, chest tightness, shortness of breath, wheezing and stridor. Cardiovascular: Negative for chest pain and palpitations. Gastrointestinal: Negative for abdominal pain, diarrhea, nausea and vomiting. Genitourinary: Negative for dysuria and flank pain. Skin: Negative for rash and wound. motor subscore is 6. Skin: Skin is warm and dry. No rash noted. DIAGNOSTIC RESULTS     EKG: All EKG's are interpreted by the Emergency Department Physicianwho either signs or Co-signs this chart in the absence of a cardiologist.    The Ekg interpreted by me shows  normal sinus rhythm with a rate of 74  Axis is   Normal  QTc is  472  Intervals and Durations are unremarkable. ST Segments: normal  No significant change from prior EKG dated 8/18/2019      RADIOLOGY:   Non-plain film images such as CT, Ultrasound and MRI are read by the radiologist. Plain radiographic images are visualized and preliminarily interpreted by the emergency physician with the below findings:      Interpretation per the Radiologist below, if available at the time of this note:    XR CHEST STANDARD (2 VW)   Final Result   No acute cardiopulmonary disease is identified. CT Head WO Contrast   Final Result   No acute intracranial abnormality.                ED BEDSIDE ULTRASOUND:   Performed by ED Physician - none    LABS:  Labs Reviewed   CBC WITH AUTO DIFFERENTIAL - Abnormal; Notable for the following components:       Result Value    Hemoglobin 11.5 (*)     All other components within normal limits    Narrative:     Performed at:  [address], [facility]   Phone [phone]   COMPREHENSIVE METABOLIC PANEL W/ REFLEX TO MG FOR LOW K - Abnormal; Notable for the following components:    Potassium reflex Magnesium 5.5 (*)     Glucose 140 (*)     BUN 22 (*)     GFR Non-African American 58 (*)     Albumin/Globulin Ratio 0.9 (*)     All other components within normal limits    Narrative:     Performed at:  [address], [facility]   Phone [phone]   URINALYSIS - Abnormal; Notable for the following components:    Clarity, UA SL CLOUDY (*)     Ketones, Urine TRACE (*)     Protein, UA TRACE (*)     Leukocyte

## 2019-08-21 LAB
ORGANISM: ABNORMAL
URINE CULTURE, ROUTINE: ABNORMAL
URINE CULTURE, ROUTINE: ABNORMAL

## 2019-09-24 NOTE — ED PROVIDER NOTES
I personally interviewed and examined this patient, discussed the findings, diagnostic studies, interventions and treatment plan with CARLOS. . I reviewed the clinical notes and test results. I personally supervised all pertinent procedures performed on the patient by the CARLOS throughout the course and was available to manage complications as they arose, if applicable. I agree with the assessment, management and disposition as presented by the CARLOS with exceptions/corrections as documented. Patient presented here for dizziness. Her orthostatic vital signs were negative. Her potassium was high though at 6.0. Patient was given IV fluids insulin and albuterol. Potassium was 5.4. After intervention. I do think the patient is stable for discharge home. Nothing she requires the mission to the hospital at this time. I do think she should follow up urgently with her primary care provider patient verbalize understanding about this. On my exam she appears in no distress lungs are clear heart is regular rate and rhythm no abdominal pain per the patient she is no abdominal tenderness or distention on exam. Patient denies any muscle cramping. EKG showed Normal sinus rhythm, QTC 41, QRS 94, no significant ST elevations or depressions. For further details of this emergency department encounter, please see the CARLOS's documentation. ED Triage Vitals [08/18/19 1907]   BP Temp Temp Source Pulse Resp SpO2 Height Weight   (!) 147/63 99.4 °F (37.4 °C) Oral 81 18 92 % 5' 2\" (1.575 m) (!) 344 lb (156 kg)        Results for orders placed or performed during the hospital encounter of 08/18/19   Urine Culture   Result Value Ref Range    Urine Culture, Routine (A)      <10,000 CFU/ml mixed skin/urogenital kofi.  No further workup    Organism Enterococcus faecium (A)     Urine Culture, Routine 50,000 CFU/ml        Susceptibility    Enterococcus faecium - BACTERIAL SUSCEPTIBILITY PANEL BY MENA     ampicillin <=2 Sensitive mcg/mL ciprofloxacin <=1 Sensitive mcg/mL     levofloxacin <=1 Sensitive mcg/mL     nitrofurantoin 64 Intermediate mcg/mL     tetracycline <=4 Sensitive mcg/mL     vancomycin 1 Sensitive mcg/mL   CBC Auto Differential   Result Value Ref Range    WBC 6.8 4.0 - 11.0 K/uL    RBC 4.18 4.00 - 5.20 M/uL    Hemoglobin 11.0 (L) 12.0 - 16.0 g/dL    Hematocrit 35.4 (L) 36.0 - 48.0 %    MCV 84.8 80.0 - 100.0 fL    MCH 26.2 26.0 - 34.0 pg    MCHC 30.9 (L) 31.0 - 36.0 g/dL    RDW 15.2 12.4 - 15.4 %    Platelets 660 938 - 389 K/uL    MPV 9.4 5.0 - 10.5 fL    Neutrophils % 74.4 %    Lymphocytes % 18.5 %    Monocytes % 4.9 %    Eosinophils % 1.4 %    Basophils % 0.8 %    Neutrophils Absolute 5.1 1.7 - 7.7 K/uL    Lymphocytes Absolute 1.3 1.0 - 5.1 K/uL    Monocytes Absolute 0.3 0.0 - 1.3 K/uL    Eosinophils Absolute 0.1 0.0 - 0.6 K/uL    Basophils Absolute 0.1 0.0 - 0.2 K/uL   Comprehensive metabolic panel   Result Value Ref Range    Sodium 138 136 - 145 mmol/L    Potassium 5.9 (H) 3.5 - 5.1 mmol/L    Chloride 100 99 - 110 mmol/L    CO2 30 21 - 32 mmol/L    Anion Gap 8 3 - 16    Glucose 221 (H) 70 - 99 mg/dL    BUN 20 7 - 20 mg/dL    CREATININE 0.9 0.6 - 1.1 mg/dL    GFR Non-African American >60 >60    GFR African American >60 >60    Calcium 9.3 8.3 - 10.6 mg/dL    Total Protein 7.2 6.4 - 8.2 g/dL    Alb 3.8 3.4 - 5.0 g/dL    Albumin/Globulin Ratio 1.1 1.1 - 2.2    Total Bilirubin <0.2 0.0 - 1.0 mg/dL    Alkaline Phosphatase 86 40 - 129 U/L    ALT 24 10 - 40 U/L    AST 23 15 - 37 U/L    Globulin 3.4 g/dL   Magnesium   Result Value Ref Range    Magnesium 1.80 1.80 - 2.40 mg/dL   Urinalysis, reflex to microscopic   Result Value Ref Range    Color, UA Yellow Straw/Yellow    Clarity, UA Clear Clear    Glucose, Ur 250 (A) Negative mg/dL    Bilirubin Urine Negative Negative    Ketones, Urine TRACE (A) Negative mg/dL    Specific Gravity, UA 1.025 1.005 - 1.030    Blood, Urine Negative Negative    pH, UA 5.5 5.0 - 8.0    Protein, UA Negative

## 2021-06-15 ENCOUNTER — APPOINTMENT (OUTPATIENT)
Dept: CT IMAGING | Age: 56
End: 2021-06-15
Payer: MEDICARE

## 2021-06-15 ENCOUNTER — HOSPITAL ENCOUNTER (EMERGENCY)
Age: 56
Discharge: HOME | End: 2021-06-16
Attending: EMERGENCY MEDICINE
Payer: MEDICARE

## 2021-06-15 ENCOUNTER — APPOINTMENT (OUTPATIENT)
Dept: GENERAL RADIOLOGY | Age: 56
End: 2021-06-15
Payer: MEDICARE

## 2021-06-15 DIAGNOSIS — J44.1 COPD EXACERBATION (HCC): Primary | ICD-10-CM

## 2021-06-15 DIAGNOSIS — R09.02 HYPOXEMIA REQUIRING SUPPLEMENTAL OXYGEN: ICD-10-CM

## 2021-06-15 DIAGNOSIS — R06.09 DYSPNEA ON EXERTION: ICD-10-CM

## 2021-06-15 DIAGNOSIS — Z99.81 HYPOXEMIA REQUIRING SUPPLEMENTAL OXYGEN: ICD-10-CM

## 2021-06-15 LAB
A/G RATIO: 0.9 (ref 1.1–2.2)
ALBUMIN SERPL-MCNC: 3.7 G/DL (ref 3.4–5)
ALP BLD-CCNC: 86 U/L (ref 40–129)
ALT SERPL-CCNC: 30 U/L (ref 10–40)
ANION GAP SERPL CALCULATED.3IONS-SCNC: 15 MMOL/L (ref 3–16)
AST SERPL-CCNC: 27 U/L (ref 15–37)
BASOPHILS ABSOLUTE: 0 K/UL (ref 0–0.2)
BASOPHILS RELATIVE PERCENT: 0.6 %
BILIRUB SERPL-MCNC: <0.2 MG/DL (ref 0–1)
BUN BLDV-MCNC: 16 MG/DL (ref 7–20)
CALCIUM SERPL-MCNC: 9.6 MG/DL (ref 8.3–10.6)
CHLORIDE BLD-SCNC: 98 MMOL/L (ref 99–110)
CO2: 25 MMOL/L (ref 21–32)
CREAT SERPL-MCNC: 1 MG/DL (ref 0.6–1.1)
EOSINOPHILS ABSOLUTE: 0.1 K/UL (ref 0–0.6)
EOSINOPHILS RELATIVE PERCENT: 1.7 %
GFR AFRICAN AMERICAN: >60
GFR NON-AFRICAN AMERICAN: 57
GLOBULIN: 4.3 G/DL
GLUCOSE BLD-MCNC: 196 MG/DL (ref 70–99)
HCT VFR BLD CALC: 36.8 % (ref 36–48)
HEMOGLOBIN: 11.8 G/DL (ref 12–16)
LYMPHOCYTES ABSOLUTE: 2.2 K/UL (ref 1–5.1)
LYMPHOCYTES RELATIVE PERCENT: 27.3 %
MCH RBC QN AUTO: 26.9 PG (ref 26–34)
MCHC RBC AUTO-ENTMCNC: 32.1 G/DL (ref 31–36)
MCV RBC AUTO: 83.6 FL (ref 80–100)
MONOCYTES ABSOLUTE: 0.5 K/UL (ref 0–1.3)
MONOCYTES RELATIVE PERCENT: 5.8 %
NEUTROPHILS ABSOLUTE: 5.3 K/UL (ref 1.7–7.7)
NEUTROPHILS RELATIVE PERCENT: 64.6 %
PDW BLD-RTO: 14.3 % (ref 12.4–15.4)
PLATELET # BLD: 173 K/UL (ref 135–450)
PMV BLD AUTO: 8.8 FL (ref 5–10.5)
POTASSIUM SERPL-SCNC: 4.7 MMOL/L (ref 3.5–5.1)
RBC # BLD: 4.4 M/UL (ref 4–5.2)
SARS-COV-2, NAAT: NOT DETECTED
SODIUM BLD-SCNC: 138 MMOL/L (ref 136–145)
TOTAL PROTEIN: 8 G/DL (ref 6.4–8.2)
WBC # BLD: 8.1 K/UL (ref 4–11)

## 2021-06-15 PROCEDURE — 83880 ASSAY OF NATRIURETIC PEPTIDE: CPT

## 2021-06-15 PROCEDURE — 71046 X-RAY EXAM CHEST 2 VIEWS: CPT

## 2021-06-15 PROCEDURE — 84484 ASSAY OF TROPONIN QUANT: CPT

## 2021-06-15 PROCEDURE — 93005 ELECTROCARDIOGRAM TRACING: CPT | Performed by: NURSE PRACTITIONER

## 2021-06-15 PROCEDURE — 71260 CT THORAX DX C+: CPT

## 2021-06-15 PROCEDURE — 6360000004 HC RX CONTRAST MEDICATION: Performed by: NURSE PRACTITIONER

## 2021-06-15 PROCEDURE — 87635 SARS-COV-2 COVID-19 AMP PRB: CPT

## 2021-06-15 PROCEDURE — 96374 THER/PROPH/DIAG INJ IV PUSH: CPT

## 2021-06-15 PROCEDURE — 99284 EMERGENCY DEPT VISIT MOD MDM: CPT

## 2021-06-15 PROCEDURE — 6360000002 HC RX W HCPCS: Performed by: NURSE PRACTITIONER

## 2021-06-15 PROCEDURE — 85025 COMPLETE CBC W/AUTO DIFF WBC: CPT

## 2021-06-15 PROCEDURE — 80053 COMPREHEN METABOLIC PANEL: CPT

## 2021-06-15 RX ORDER — METHYLPREDNISOLONE SODIUM SUCCINATE 125 MG/2ML
80 INJECTION, POWDER, LYOPHILIZED, FOR SOLUTION INTRAMUSCULAR; INTRAVENOUS ONCE
Status: COMPLETED | OUTPATIENT
Start: 2021-06-15 | End: 2021-06-15

## 2021-06-15 RX ADMIN — IOPAMIDOL 75 ML: 755 INJECTION, SOLUTION INTRAVENOUS at 23:16

## 2021-06-15 RX ADMIN — METHYLPREDNISOLONE SODIUM SUCCINATE 80 MG: 125 INJECTION, POWDER, FOR SOLUTION INTRAMUSCULAR; INTRAVENOUS at 21:31

## 2021-06-15 NOTE — ED NOTES
Bed: 09  Expected date:   Expected time:   Means of arrival:   Comments:     Nicohlas Davis RN  06/15/21 1954

## 2021-06-16 VITALS
OXYGEN SATURATION: 94 % | TEMPERATURE: 98 F | BODY MASS INDEX: 62.19 KG/M2 | DIASTOLIC BLOOD PRESSURE: 71 MMHG | RESPIRATION RATE: 17 BRPM | SYSTOLIC BLOOD PRESSURE: 164 MMHG | WEIGHT: 293 LBS | HEART RATE: 84 BPM

## 2021-06-16 LAB
EKG ATRIAL RATE: 86 BPM
EKG DIAGNOSIS: NORMAL
EKG P AXIS: 3 DEGREES
EKG P-R INTERVAL: 172 MS
EKG Q-T INTERVAL: 412 MS
EKG QRS DURATION: 92 MS
EKG QTC CALCULATION (BAZETT): 493 MS
EKG R AXIS: 38 DEGREES
EKG T AXIS: 47 DEGREES
EKG VENTRICULAR RATE: 86 BPM
PRO-BNP: 292 PG/ML (ref 0–124)
TROPONIN: <0.01 NG/ML

## 2021-06-16 PROCEDURE — 94761 N-INVAS EAR/PLS OXIMETRY MLT: CPT

## 2021-06-16 PROCEDURE — 6370000000 HC RX 637 (ALT 250 FOR IP): Performed by: NURSE PRACTITIONER

## 2021-06-16 PROCEDURE — 94640 AIRWAY INHALATION TREATMENT: CPT

## 2021-06-16 PROCEDURE — 2700000000 HC OXYGEN THERAPY PER DAY

## 2021-06-16 PROCEDURE — 93010 ELECTROCARDIOGRAM REPORT: CPT | Performed by: INTERNAL MEDICINE

## 2021-06-16 RX ORDER — IPRATROPIUM BROMIDE AND ALBUTEROL SULFATE 2.5; .5 MG/3ML; MG/3ML
1 SOLUTION RESPIRATORY (INHALATION) ONCE
Status: COMPLETED | OUTPATIENT
Start: 2021-06-16 | End: 2021-06-16

## 2021-06-16 RX ORDER — PREDNISONE 10 MG/1
40 TABLET ORAL DAILY
Qty: 20 TABLET | Refills: 0 | Status: SHIPPED | OUTPATIENT
Start: 2021-06-16 | End: 2021-06-21

## 2021-06-16 RX ADMIN — IPRATROPIUM BROMIDE AND ALBUTEROL SULFATE 1 AMPULE: .5; 3 SOLUTION RESPIRATORY (INHALATION) at 02:38

## 2021-06-16 ASSESSMENT — ENCOUNTER SYMPTOMS
WHEEZING: 0
DIARRHEA: 0
VOMITING: 0
CHEST TIGHTNESS: 1
BACK PAIN: 0
NAUSEA: 0
SHORTNESS OF BREATH: 1
COUGH: 0
COLOR CHANGE: 0
ABDOMINAL PAIN: 0

## 2021-06-16 NOTE — ED PROVIDER NOTES
**ADVANCED PRACTICE PROVIDER, I HAVE EVALUATED THIS [facility]  ED  EMERGENCY DEPARTMENT ENCOUNTER      Pt Name: Jose Grey  QYI:[id number]  [place] 1965  Date of evaluation: 6/15/2021  Provider: AMAYA Deras CNP      Chief Complaint:    Chief Complaint   Patient presents with    Shortness of Breath     states woresning sob, diagnosed with pneu last week, finished abx,          Nursing Notes, Past Medical Hx, Past Surgical Hx, Social Hx, Allergies, and Family Hx were all reviewed and agreed with or any disagreements were addressed in the HPI.    HPI: (Location, Duration, Timing, Severity, Quality, Assoc Sx, Context, Modifying factors)  This is a  63 y.o. female who presents with SOB and was diagnosed with pneumonia last week, she was admitted to [facility] last week, was discharged on Friday, as she is from [address] She states that she is continued to have SOB, she also has history of COPD and CHF, she is a former smoke, quit smoking 7-8 years ago. She states that she has tightness in her chest with having a hard time breathing. She wears O2 at home 4lpm/nc. She states that her covid test was negative, along with influenza, she has had both covid vaccines. PastMedical/Surgical History:      Diagnosis Date    Anxiety     Arthritis     CHF (congestive heart failure) (HCC)     COPD (chronic obstructive pulmonary disease) (HCC)     Depression     [facility]     Diverticulosis     Hyperlipidemia     Obesities, morbid ([facility]          Procedure Laterality Date     SECTION      x3    ENDOMETRIAL ABLATION         Medications:  Previous Medications    ALBUTEROL SULFATE  (90 BASE) MCG/ACT INHALER    Inhale 2 puffs into the lungs every 6 hours as needed for Wheezing    ASPIRIN 81 MG EC TABLET    Take 1 tablet by mouth daily    DICYCLOMINE (BENTYL) 10 MG CAPSULE    Take 10 mg by mouth 3 times daily. ESCITALOPRAM (LEXAPRO) 20 MG TABLET    Take 40 mg by mouth daily     ESOMEPRAZOLE MAGNESIUM (NEXIUM PO)    Take 40 mg by mouth daily     FERROUS SULFATE (FE TABS) 325 (65 FE) MG EC TABLET    Take 1 tablet by mouth 3 times daily (with meals)    GLIPIZIDE (GLUCOTROL XL) 5 MG EXTENDED RELEASE TABLET    Take 5 mg by mouth daily    GLUCOSE MONITORING KIT (FREESTYLE) MONITORING KIT    1 kit by Does not apply route daily as needed    INSULIN ASPART (NOVOLOG) 100 UNIT/ML INJECTION PEN    Inject into the skin 3 times daily (before meals) SSI. INSULIN DETEMIR (LEVEMIR) 100 UNIT/ML INJECTION PEN    Inject 50 Units into the skin nightly    INSULIN PEN NEEDLE 29G X 12.7MM MISC    1 each by Does not apply route daily    INSULIN SYRINGES, DISPOSABLE, U-100 0.3 ML MISC    1 each by Does not apply route daily    LOSARTAN (COZAAR) 25 MG TABLET    Take 2 tablets by mouth daily    MELOXICAM (MOBIC) 7.5 MG TABLET    Take 7.5 mg by mouth daily    METFORMIN (GLUCOPHAGE) 500 MG TABLET    Take 1,000 mg by mouth 2 times daily (with meals)     NYSTATIN (MYCOSTATIN) 181840 UNIT/GM CREAM    Apply topically nightly Apply under breasts at night    NYSTATIN (MYCOSTATIN) 288603 UNIT/GM POWDER    Three times daily on the anterior abdominal wall and inguinal area. Use for 2 weeks. OXYCODONE-ACETAMINOPHEN (PERCOCET) 7.5-325 MG PER TABLET    Take 1 tablet by mouth every 4 hours as needed for Pain . OXYGEN    Inhale 4 L into the lungs    PREGABALIN (LYRICA) 100 MG CAPSULE    Take 100 mg by mouth 2 times daily. Newton Kung SIMVASTATIN (ZOCOR PO)    Take 20 mg by mouth nightly          Review of Systems:  (2-9 systems needed)  Review of Systems   Constitutional: Negative for chills and fever. HENT: Negative for congestion. Respiratory: Positive for chest tightness and shortness of breath. Negative for cough and wheezing.          Patient complains of SOB and was diagnosed with pneumonia last week, she was admitted to [facility] last week, was discharged on Friday, as she is from [place], [place]. She states that she is continued to have SOB, she also has history of COPD and CHF, she is a former smoke, quit smoking 7-8 years ago. She states that she has tightness in her chest with having a hard time breathing. She wears O2 at home 4lpm/nc. Cardiovascular: Negative for chest pain. Gastrointestinal: Negative for abdominal pain, diarrhea, nausea and vomiting. Genitourinary: Negative for difficulty urinating and dysuria. Musculoskeletal: Negative for back pain. Skin: Negative for color change. Neurological: Negative for weakness, numbness and headaches. Physical Exam:  Physical Exam  Vitals and nursing note reviewed. Constitutional:       Appearance: She is well-developed. She is not diaphoretic. HENT:      Head: Normocephalic. Right Ear: External ear normal.      Left Ear: External ear normal.   Eyes:      General: No scleral icterus. Right eye: No discharge. Left eye: No discharge. Cardiovascular:      Rate and Rhythm: Normal rate and regular rhythm. Pulmonary:      Effort: Pulmonary effort is normal. No respiratory distress. Breath sounds: Wheezing present. Comments: Airway patent with symmetric rise and fall of chest, lungs are have inspiratory wheezing bilaterally however is not tachypneic during exam but she does become dyspneic. She is on O2 at 4 L per nasal cannula which she wears at home, saturations are 93%  Abdominal:      Palpations: Abdomen is soft. Musculoskeletal:         General: Normal range of motion. Cervical back: Normal range of motion and neck supple. Skin:     General: Skin is warm. Capillary Refill: Capillary refill takes less than 2 seconds. Coloration: Skin is not pale. Neurological:      General: No focal deficit present. Mental Status: She is alert and oriented to person, place, and time. GCS: GCS eye subscore is 4.  GCS verbal subscore findings:      Interpretation per the Radiologist below, if available at the time of this note:    CT CHEST PULMONARY EMBOLISM W CONTRAST   Final Result   No evidence of pulmonary embolism or acute pulmonary abnormality. XR CHEST (2 VW)   Final Result   Vascular congestion. No acute cardiopulmonary disease is otherwise identified. MEDICAL DECISION MAKING / ED COURSE:      PROCEDURES:   Procedures    None    Patient was given:  Medications   methylPREDNISolone sodium (SOLU-MEDROL) injection 80 mg (80 mg Intravenous Given 6/15/21 2131)   iopamidol (ISOVUE-370) 76 % injection 75 mL (75 mLs Intravenous Given 6/15/21 2316)   ipratropium-albuterol (DUONEB) nebulizer solution 1 ampule (1 ampule Inhalation Given 6/16/21 0238)       Patient complains of SOB and was diagnosed with pneumonia last week, she was [facility] last week, was discharged on Friday, as she is from [facility]. She states that she is continued to have SOB, she also has history of COPD and CHF, she is a former smoke, quit smoking 7-8 years ago. She states that she has tightness in her chest with having a hard time breathing. She wears O2 at home 4lpm/nc. After evaluation and examination patient IV access, blood work, chest x-ray and EKG were ordered. Patient does have bilateral inspiratory wheezing, however is not tachypneic during exam but she does become dyspneic. She is on O2 at 4 L per nasal cannula which she wears at home, saturations are 93%. I additionally ordered her steroids, she was refusing to first because she states it raises her blood sugar, informed her that the benefits outweigh the risk of giving her steroids due to her breathing compromise and her COPD exacerbation and that we could monitor her blood sugars more closely and adjust her insulin accordingly. CBC shows no sepsis or anemia. Chest x-ray shows vascular congestion, no acute cardiopulmonary disease otherwise noted. Metabolic panel shows no significant electrolyte disturbances or renal failure with the exception of her elevated glucose at 196. Liver functions are normal. Covid is negative. CT chest shows no evidence of PE. Although, I have low suspicion this is cardiac related, I did add on a troponin and BNP. Nursing staff walked the patient and she became hypoxic with her 4 L of nasal cannula, dropping 85% and also becoming more short of breath and dizzy. Because of this happening, I spoke with the family and patient about being admitted, they agree with this plan. Hospitalist was paged via [employer]. At [address] [place] [address] Dr. Kip Naylor, hospitalist on-call came down to see the patient, they had a long conversation with assessment, it was then agreed that the patient be discharged home with prednisone and increase her oxygen as needed along with monitoring her blood sugars more closely. Patient does not for me she has a pulmonologist that she can see down in [address] where she lives. It was then agreed that the patient be discharged home with outpatient follow-up versus being admitted to the hospital.  She was discharged home with prednisone, continue her nebulizers at home. The patient tolerated their visit well. I evaluated the patient. The physician was available for consultation as needed. The patient and / or the family were informed of the results of any tests, a time was given to answer questions, a plan was proposed and they agreed with plan. Patient verbalized understanding of discharge instructions and was discharged from the department in stable condition. CLINICAL IMPRESSION:  1. COPD exacerbation ([address]    2. Hypoxemia requiring supplemental oxygen    3.  Dyspnea on exertion        DISPOSITION Decision To Discharge 06/16/2021 02:48:31 AM      PATIENT REFERRED TO:  Carolyn Mir PA-C  [address] [facility]  [phone]    Schedule an appointment as soon as possible for a visit in 1 day  Please call your PCP and lung doctor in the morning and make an appointment for follow-up in the next 2 to 3 days for reevaluation    [facility]  ED  [facility] [address] [phone]  Go to   If symptoms worsen      DISCHARGE MEDICATIONS:  New Prescriptions    PREDNISONE (DELTASONE) 10 MG TABLET    Take 4 tablets by mouth daily for 5 doses       DISCONTINUED MEDICATIONS:  Discontinued Medications    No medications on file              (Please note the MDM and HPI sections of this note were completed with a voice recognition program.  Efforts were made to edit the dictations but occasionally words are mis-transcribed.)    Electronically signed, AMAYA Easton CNP,          AMAYA Easton CNP  06/16/21 025

## 2021-06-16 NOTE — ED NOTES
IV established. Pt medicated per order. Blood sent to lab. Pt denies further needs at this time.       Afia Law RN  06/15/21 1581

## 2021-06-16 NOTE — ED NOTES
Pt in bed resting. Pt waiting on plan of care to be established and for pt to be updated on test results. Pt denies needs at this time. Pt's  at bedside.       Rain Ornelas RN  06/16/21 5233

## 2021-06-16 NOTE — ED NOTES
Discharge: Pt discharged to home as per order. IV removed. Scripts plus instructions given. Pt verbalized understanding. Denied questions.        Victorina Cook RN  06/16/21 9740

## 2021-07-13 ENCOUNTER — HOSPITAL ENCOUNTER (EMERGENCY)
Age: 56
Discharge: HOME | End: 2021-07-13
Attending: EMERGENCY MEDICINE
Payer: MEDICARE

## 2021-07-13 VITALS
RESPIRATION RATE: 18 BRPM | HEART RATE: 75 BPM | SYSTOLIC BLOOD PRESSURE: 120 MMHG | TEMPERATURE: 98.7 F | HEIGHT: 61 IN | WEIGHT: 293 LBS | DIASTOLIC BLOOD PRESSURE: 76 MMHG | BODY MASS INDEX: 55.32 KG/M2 | OXYGEN SATURATION: 95 %

## 2021-07-13 DIAGNOSIS — R25.3 TWITCHING: Primary | ICD-10-CM

## 2021-07-13 LAB
A/G RATIO: 1.3 (ref 1.1–2.2)
ALBUMIN SERPL-MCNC: 4 G/DL (ref 3.4–5)
ALP BLD-CCNC: 84 U/L (ref 40–129)
ALT SERPL-CCNC: 25 U/L (ref 10–40)
ANION GAP SERPL CALCULATED.3IONS-SCNC: 11 MMOL/L (ref 3–16)
AST SERPL-CCNC: 25 U/L (ref 15–37)
BASE EXCESS VENOUS: -1.8 MMOL/L (ref -3–3)
BASOPHILS ABSOLUTE: 0.1 K/UL (ref 0–0.2)
BASOPHILS RELATIVE PERCENT: 1.2 %
BILIRUB SERPL-MCNC: <0.2 MG/DL (ref 0–1)
BUN BLDV-MCNC: 30 MG/DL (ref 7–20)
CALCIUM SERPL-MCNC: 9.2 MG/DL (ref 8.3–10.6)
CARBOXYHEMOGLOBIN: 3.2 % (ref 0–1.5)
CHLORIDE BLD-SCNC: 103 MMOL/L (ref 99–110)
CO2: 25 MMOL/L (ref 21–32)
CREAT SERPL-MCNC: 1 MG/DL (ref 0.6–1.1)
EOSINOPHILS ABSOLUTE: 0.1 K/UL (ref 0–0.6)
EOSINOPHILS RELATIVE PERCENT: 1.2 %
GFR AFRICAN AMERICAN: >60
GFR NON-AFRICAN AMERICAN: 57
GLOBULIN: 3.1 G/DL
GLUCOSE BLD-MCNC: 235 MG/DL (ref 70–99)
HCO3 VENOUS: 22.8 MMOL/L (ref 23–29)
HCT VFR BLD CALC: 41.8 % (ref 36–48)
HEMOGLOBIN: 13.2 G/DL (ref 12–16)
LYMPHOCYTES ABSOLUTE: 1.9 K/UL (ref 1–5.1)
LYMPHOCYTES RELATIVE PERCENT: 33.6 %
MAGNESIUM: 1.7 MG/DL (ref 1.8–2.4)
MCH RBC QN AUTO: 27.4 PG (ref 26–34)
MCHC RBC AUTO-ENTMCNC: 31.5 G/DL (ref 31–36)
MCV RBC AUTO: 87.1 FL (ref 80–100)
METHEMOGLOBIN VENOUS: 0.3 %
MONOCYTES ABSOLUTE: 0.3 K/UL (ref 0–1.3)
MONOCYTES RELATIVE PERCENT: 5.5 %
NEUTROPHILS ABSOLUTE: 3.3 K/UL (ref 1.7–7.7)
NEUTROPHILS RELATIVE PERCENT: 58.5 %
O2 CONTENT, VEN: 19 VOL %
O2 SAT, VEN: 99 %
O2 THERAPY: ABNORMAL
PCO2, VEN: 38.2 MMHG (ref 40–50)
PDW BLD-RTO: 14.7 % (ref 12.4–15.4)
PH VENOUS: 7.39 (ref 7.35–7.45)
PLATELET # BLD: 209 K/UL (ref 135–450)
PMV BLD AUTO: 9.5 FL (ref 5–10.5)
PO2, VEN: 124.1 MMHG (ref 25–40)
POTASSIUM SERPL-SCNC: 4.8 MMOL/L (ref 3.5–5.1)
PRO-BNP: 290 PG/ML (ref 0–124)
RBC # BLD: 4.81 M/UL (ref 4–5.2)
SODIUM BLD-SCNC: 139 MMOL/L (ref 136–145)
TCO2 CALC VENOUS: 24 MMOL/L
TOTAL PROTEIN: 7.1 G/DL (ref 6.4–8.2)
TROPONIN: <0.01 NG/ML
WBC # BLD: 5.6 K/UL (ref 4–11)

## 2021-07-13 PROCEDURE — 93005 ELECTROCARDIOGRAM TRACING: CPT | Performed by: EMERGENCY MEDICINE

## 2021-07-13 PROCEDURE — 83880 ASSAY OF NATRIURETIC PEPTIDE: CPT

## 2021-07-13 PROCEDURE — 85025 COMPLETE CBC W/AUTO DIFF WBC: CPT

## 2021-07-13 PROCEDURE — 82803 BLOOD GASES ANY COMBINATION: CPT

## 2021-07-13 PROCEDURE — 83735 ASSAY OF MAGNESIUM: CPT

## 2021-07-13 PROCEDURE — 99284 EMERGENCY DEPT VISIT MOD MDM: CPT

## 2021-07-13 PROCEDURE — 84484 ASSAY OF TROPONIN QUANT: CPT

## 2021-07-13 PROCEDURE — 80053 COMPREHEN METABOLIC PANEL: CPT

## 2021-07-13 RX ORDER — 0.9 % SODIUM CHLORIDE 0.9 %
1000 INTRAVENOUS SOLUTION INTRAVENOUS ONCE
Status: DISCONTINUED | OUTPATIENT
Start: 2021-07-13 | End: 2021-07-13 | Stop reason: HOSPADM

## 2021-07-13 RX ADMIN — Medication 1000 ML: at 19:18

## 2021-07-13 ASSESSMENT — PAIN SCALES - GENERAL: PAINLEVEL_OUTOF10: 2

## 2021-07-13 NOTE — ED TRIAGE NOTES
Pt c/o body twitching. Home o2 4L baseline. No increased SOB. Hx of elevated K+ w similar presenting s/s. No LE edema noted.  Hx of need for admission and bipap

## 2021-07-14 LAB
EKG ATRIAL RATE: 89 BPM
EKG DIAGNOSIS: NORMAL
EKG P AXIS: 4 DEGREES
EKG P-R INTERVAL: 176 MS
EKG Q-T INTERVAL: 396 MS
EKG QRS DURATION: 90 MS
EKG QTC CALCULATION (BAZETT): 481 MS
EKG R AXIS: -73 DEGREES
EKG T AXIS: 31 DEGREES
EKG VENTRICULAR RATE: 89 BPM

## 2021-07-14 PROCEDURE — 93010 ELECTROCARDIOGRAM REPORT: CPT | Performed by: INTERNAL MEDICINE

## 2021-07-14 NOTE — ED PROVIDER NOTES
CHIEF COMPLAINT  Other (pt states K is high. states she gets muscle twitching and slurred speech when her K gets high. s/s began yesterday PCP instructed to come to ED. Pt noted to have oxygen sat 88% during triage. Pt states wears oxygen at home however does not have oxygen with her at timeof visit. )      Evelia Rodriguez is a 63 y.o. female with a history of morbid obesity, diabetes, COPD on home oxygen, CHF, dyslipidemia, anxiety/depression who presents to the ED complaining of possible hyperkalemia. Patient states whenever her potassium levels are high she feels strange and has occasional twitching of her extremities. States she has experienced multiple episodes of hyperkalemia and has been taken off of her potassium supplements. Reports some dyspnea with exertion however this appears to be baseline. Oxygen saturations 88% on arrival however patient was not wearing her mandated supplemental O2. Denies recent illness or trauma. No other complaints, modifying factors or associated symptoms. I have reviewed the following from the nursing documentation.     Past Medical History:   Diagnosis Date    Anxiety     Arthritis     CHF (congestive heart failure) (HCC)     COPD (chronic obstructive pulmonary disease) (HCC)     Depression     Diabetes mellitus (HCC)     Diverticulosis     Hyperlipidemia     Obesities, morbid (HCC)      Past Surgical History:   Procedure Laterality Date     SECTION      x3    ENDOMETRIAL ABLATION       Family History   Problem Relation Age of Onset    Colon Cancer Mother     High Cholesterol Father     Heart Disease Father      Social History     Socioeconomic History    Marital status:      Spouse name: Not on file    Number of children: Not on file    Years of education: Not on file    Highest education level: Not on file   Occupational History    Not on file   Tobacco Use    Smoking status: Former Smoker Packs/day: 2.00     Years: 40.00     Pack years: 80.00     Quit date: 2014     Years since quittin.5    Smokeless tobacco: Never Used   Vaping Use    Vaping Use: Never used   Substance and Sexual Activity    Alcohol use: No    Drug use: No    Sexual activity: Never   Other Topics Concern    Not on file   Social History Narrative    Not on file     Social Determinants of Health     Financial Resource Strain:     Difficulty of Paying Living Expenses:    Food Insecurity:     Worried About Running Out of Food in the Last Year:     Ran Out of Food in the Last Year:    Transportation Needs:     Lack of Transportation (Medical):  Lack of Transportation (Non-Medical):    Physical Activity:     Days of Exercise per Week:     Minutes of Exercise per Session:    Stress:     Feeling of Stress :    Social Connections:     Frequency of Communication with Friends and Family:     Frequency of Social Gatherings with Friends and Family:     Attends Restoration Services:     Active Member of Clubs or Organizations:     Attends Club or Organization Meetings:     Marital Status:    Intimate Partner Violence:     Fear of Current or Ex-Partner:     Emotionally Abused:     Physically Abused:     Sexually Abused:      No current facility-administered medications for this encounter. Current Outpatient Medications   Medication Sig Dispense Refill    meloxicam (MOBIC) 7.5 MG tablet Take 7.5 mg by mouth daily      pregabalin (LYRICA) 100 MG capsule Take 100 mg by mouth 2 times daily. .      glipiZIDE (GLUCOTROL XL) 5 MG extended release tablet Take 5 mg by mouth daily      oxyCODONE-acetaminophen (PERCOCET) 7.5-325 MG per tablet Take 1 tablet by mouth every 4 hours as needed for Pain .       insulin detemir (LEVEMIR) 100 UNIT/ML injection pen Inject 50 Units into the skin nightly (Patient taking differently: Inject 60 Units into the skin nightly ) 5 Pen 0    losartan (COZAAR) 25 MG tablet Take 2 tablets by mouth daily 30 tablet 1    albuterol sulfate  (90 BASE) MCG/ACT inhaler Inhale 2 puffs into the lungs every 6 hours as needed for Wheezing      nystatin (MYCOSTATIN) 112354 UNIT/GM cream Apply topically nightly Apply under breasts at night      OXYGEN Inhale 4 L into the lungs      insulin aspart (NOVOLOG) 100 UNIT/ML injection pen Inject into the skin 3 times daily (before meals) SSI.  metFORMIN (GLUCOPHAGE) 500 MG tablet Take 1,000 mg by mouth 2 times daily (with meals)       aspirin 81 MG EC tablet Take 1 tablet by mouth daily 30 tablet 3    ferrous sulfate (FE TABS) 325 (65 FE) MG EC tablet Take 1 tablet by mouth 3 times daily (with meals) 90 tablet 2    Insulin Syringes, Disposable, U-100 0.3 ML MISC 1 each by Does not apply route daily 100 each 3    Insulin Pen Needle 29G X 12.7MM MISC 1 each by Does not apply route daily 100 each 3    glucose monitoring kit (FREESTYLE) monitoring kit 1 kit by Does not apply route daily as needed 1 kit 0    nystatin (MYCOSTATIN) 831573 UNIT/GM powder Three times daily on the anterior abdominal wall and inguinal area. Use for 2 weeks. (Patient taking differently: Apply topically 2-3 times per day under breasts) 2 Bottle 0    escitalopram (LEXAPRO) 20 MG tablet Take 40 mg by mouth daily       Simvastatin (ZOCOR PO) Take 20 mg by mouth nightly       dicyclomine (BENTYL) 10 MG capsule Take 10 mg by mouth 3 times daily.  Esomeprazole Magnesium (NEXIUM PO) Take 40 mg by mouth daily        Allergies   Allergen Reactions    Dilaudid [Hydromorphone Hcl]      Respiratory distress    Lisinopril Swelling     Swelling of eyes.  Morphine Itching    Pcn [Penicillins] Hives       REVIEW OF SYSTEMS  10 systems reviewed, pertinent positives per HPI otherwise noted to be negative.     PHYSICAL EXAM  /76   Pulse 75   Temp 98.7 °F (37.1 °C)   Resp 18   Ht 5' 1\" (1.549 m)   Wt (!) 326 lb (147.9 kg)   SpO2 95%   BMI 61.60 kg/m²   GENERAL APPEARANCE: Awake and alert. Cooperative. No acute distress. Morbidly obese  HEAD: Normocephalic. Atraumatic. EYES: PERRL. EOM's grossly intact. ENT: Mucous membranes are moist.   NECK: Supple, trachea midline. HEART: RRR. Normal S1, S2. No murmurs, rubs or gallops. LUNGS: Moderate air movement, suspect baseline. No rhonchi or rales speaking comfortably in full sentences. ABDOMEN: Soft. Non-distended. Non-tender. No guarding or rebound. Normal Bowel sounds. EXTREMITIES: No peripheral edema. MAEE. No acute deformities. SKIN: Warm and dry. No acute rashes. NEUROLOGICAL: Alert and oriented X 3. CN II-XII intact. No gross facial drooping. Strength 5/5, sensation intact. No pronator drift. Normal coordination. PSYCHIATRIC: Normal mood and affect. LABS  I have reviewed all labs for this visit.    Results for orders placed or performed during the hospital encounter of 07/13/21   CBC Auto Differential   Result Value Ref Range    WBC 5.6 4.0 - 11.0 K/uL    RBC 4.81 4.00 - 5.20 M/uL    Hemoglobin 13.2 12.0 - 16.0 g/dL    Hematocrit 41.8 36.0 - 48.0 %    MCV 87.1 80.0 - 100.0 fL    MCH 27.4 26.0 - 34.0 pg    MCHC 31.5 31.0 - 36.0 g/dL    RDW 14.7 12.4 - 15.4 %    Platelets 725 649 - 771 K/uL    MPV 9.5 5.0 - 10.5 fL    Neutrophils % 58.5 %    Lymphocytes % 33.6 %    Monocytes % 5.5 %    Eosinophils % 1.2 %    Basophils % 1.2 %    Neutrophils Absolute 3.3 1.7 - 7.7 K/uL    Lymphocytes Absolute 1.9 1.0 - 5.1 K/uL    Monocytes Absolute 0.3 0.0 - 1.3 K/uL    Eosinophils Absolute 0.1 0.0 - 0.6 K/uL    Basophils Absolute 0.1 0.0 - 0.2 K/uL   Blood Gas, Venous   Result Value Ref Range    pH, Isaac 7.393 7.350 - 7.450    pCO2, Isaac 38.2 (L) 40.0 - 50.0 mmHg    pO2, Isaac 124.1 (H) 25 - 40 mmHg    HCO3, Venous 22.8 (L) 23.0 - 29.0 mmol/L    Base Excess, Isaac -1.8 -3.0 - 3.0 mmol/L    O2 Sat, Isaac 99 Not Established %    Carboxyhemoglobin 3.2 (H) 0.0 - 1.5 %    MetHgb, Isaac 0.3 <1.5 %    TC02 (Calc), Isaac 24 Not Established mmol/L    O2 Content, Isaac 19 Not Established VOL %    O2 Therapy Unknown    Comprehensive Metabolic Panel   Result Value Ref Range    Sodium 139 136 - 145 mmol/L    Potassium 4.8 3.5 - 5.1 mmol/L    Chloride 103 99 - 110 mmol/L    CO2 25 21 - 32 mmol/L    Anion Gap 11 3 - 16    Glucose 235 (H) 70 - 99 mg/dL    BUN 30 (H) 7 - 20 mg/dL    CREATININE 1.0 0.6 - 1.1 mg/dL    GFR Non- 57 (A) >60    GFR African American >60 >60    Calcium 9.2 8.3 - 10.6 mg/dL    Total Protein 7.1 6.4 - 8.2 g/dL    Albumin 4.0 3.4 - 5.0 g/dL    Albumin/Globulin Ratio 1.3 1.1 - 2.2    Total Bilirubin <0.2 0.0 - 1.0 mg/dL    Alkaline Phosphatase 84 40 - 129 U/L    ALT 25 10 - 40 U/L    AST 25 15 - 37 U/L    Globulin 3.1 g/dL   Magnesium   Result Value Ref Range    Magnesium 1.70 (L) 1.80 - 2.40 mg/dL   Troponin   Result Value Ref Range    Troponin <0.01 <0.01 ng/mL   Brain Natriuretic Peptide   Result Value Ref Range    Pro- (H) 0 - 124 pg/mL   EKG 12 Lead   Result Value Ref Range    Ventricular Rate 89 BPM    Atrial Rate 89 BPM    P-R Interval 176 ms    QRS Duration 90 ms    Q-T Interval 396 ms    QTc Calculation (Bazett) 481 ms    P Axis 4 degrees    R Axis -73 degrees    T Axis 31 degrees    Diagnosis       Normal sinus rhythmLeft axis deviationLow voltage QRSCannot rule out Anterior infarct , age undeterminedAbnormal ECGWhen compared with ECG of 15-MELVIN-2021 20:57,QRS axis Shifted left       EKG  Normal sinus rhythm, rate 89, left axis deviation, QTC prolonged, no acute ST or T wave abnormalities compared with prior from 8/20/2019. Specifically no peaked T waves suggestive of hyperkalemia. RADIOLOGY  X-RAYS:  I have reviewed radiologic plain film image(s). ALL OTHER NON-PLAIN FILM IMAGES SUCH AS CT, ULTRASOUND AND MRI HAVE BEEN READ BY THE RADIOLOGIST. No orders to display              Rechecks: Physical assessment performed. Appears comfortable and nontoxic      ED COURSE/MDM  Patient seen and evaluated. Old records reviewed. Labs and imaging reviewed and results discussed with patient. Patient with nonspecific symptoms such as intermittent twitching which she attributed to possible hyperkalemia. Labs unremarkable and potassium is within normal limits. Plan for PCP follow-up versus return with any concerns. Discharge Medication List as of 7/13/2021  9:53 PM          CLINICAL IMPRESSION  1. Twitching        Blood pressure 120/76, pulse 75, temperature 98.7 °F (37.1 °C), resp. rate 18, height 5' 1\" (1.549 m), weight (!) 326 lb (147.9 kg), SpO2 95 %, not currently breastfeeding. DISPOSITION  Brianne Garcia was discharged to home in stable condition.         Marisa Royal MD  07/14/21 2042

## 2021-08-10 ENCOUNTER — APPOINTMENT (OUTPATIENT)
Dept: GENERAL RADIOLOGY | Age: 56
End: 2021-08-10
Payer: MEDICARE

## 2021-08-10 ENCOUNTER — HOSPITAL ENCOUNTER (EMERGENCY)
Age: 56
Discharge: HOME | End: 2021-08-10
Attending: EMERGENCY MEDICINE
Payer: MEDICARE

## 2021-08-10 ENCOUNTER — APPOINTMENT (OUTPATIENT)
Dept: CT IMAGING | Age: 56
End: 2021-08-10
Payer: MEDICARE

## 2021-08-10 VITALS
TEMPERATURE: 99 F | RESPIRATION RATE: 16 BRPM | DIASTOLIC BLOOD PRESSURE: 94 MMHG | SYSTOLIC BLOOD PRESSURE: 145 MMHG | HEIGHT: 61 IN | HEART RATE: 84 BPM | WEIGHT: 293 LBS | OXYGEN SATURATION: 96 % | BODY MASS INDEX: 55.32 KG/M2

## 2021-08-10 DIAGNOSIS — J34.0 NOSE CELLULITIS: Primary | ICD-10-CM

## 2021-08-10 DIAGNOSIS — R53.1 GENERAL WEAKNESS: ICD-10-CM
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
## 2021-08-10 DIAGNOSIS — T14.8XXA ABRASION: ICD-10-CM

## 2021-08-10 LAB
A/G RATIO: 1.1 (ref 1.1–2.2)
ALBUMIN SERPL-MCNC: 4.1 G/DL (ref 3.4–5)
ALP BLD-CCNC: 99 U/L (ref 40–129)
ALT SERPL-CCNC: 27 U/L (ref 10–40)
ANION GAP SERPL CALCULATED.3IONS-SCNC: 9 MMOL/L (ref 3–16)
AST SERPL-CCNC: 22 U/L (ref 15–37)
BASOPHILS ABSOLUTE: 0.1 K/UL (ref 0–0.2)
BASOPHILS RELATIVE PERCENT: 0.6 %
BILIRUB SERPL-MCNC: <0.2 MG/DL (ref 0–1)
BILIRUBIN URINE: NEGATIVE
BLOOD, URINE: NEGATIVE
BUN BLDV-MCNC: 20 MG/DL (ref 7–20)
CALCIUM SERPL-MCNC: 9.5 MG/DL (ref 8.3–10.6)
CHLORIDE BLD-SCNC: 98 MMOL/L (ref 99–110)
CLARITY: CLEAR
CO2: 31 MMOL/L (ref 21–32)
COLOR: YELLOW
CREAT SERPL-MCNC: 1 MG/DL (ref 0.6–1.1)
EOSINOPHILS ABSOLUTE: 0.1 K/UL (ref 0–0.6)
EOSINOPHILS RELATIVE PERCENT: 1.3 %
GFR AFRICAN AMERICAN: >60
GFR NON-AFRICAN AMERICAN: 57
GLOBULIN: 3.7 G/DL
GLUCOSE BLD-MCNC: 247 MG/DL (ref 70–99)
GLUCOSE URINE: >=1000 MG/DL
HCT VFR BLD CALC: 39.2 % (ref 36–48)
HEMOGLOBIN: 12.4 G/DL (ref 12–16)
INFLUENZA A: NOT DETECTED
INFLUENZA B: NOT DETECTED
KETONES, URINE: NEGATIVE MG/DL
LACTIC ACID, SEPSIS: 1.5 MMOL/L (ref 0.4–1.9)
LEUKOCYTE ESTERASE, URINE: NEGATIVE
LYMPHOCYTES ABSOLUTE: 1.3 K/UL (ref 1–5.1)
LYMPHOCYTES RELATIVE PERCENT: 15.1 %
MCH RBC QN AUTO: 27.1 PG (ref 26–34)
MCHC RBC AUTO-ENTMCNC: 31.6 G/DL (ref 31–36)
MCV RBC AUTO: 85.5 FL (ref 80–100)
MICROSCOPIC EXAMINATION: ABNORMAL
MONOCYTES ABSOLUTE: 0.4 K/UL (ref 0–1.3)
MONOCYTES RELATIVE PERCENT: 4.9 %
NEUTROPHILS ABSOLUTE: 6.7 K/UL (ref 1.7–7.7)
NEUTROPHILS RELATIVE PERCENT: 78.1 %
NITRITE, URINE: NEGATIVE
PDW BLD-RTO: 14.6 % (ref 12.4–15.4)
PH UA: 5 (ref 5–8)
PLATELET # BLD: 171 K/UL (ref 135–450)
PMV BLD AUTO: 8.8 FL (ref 5–10.5)
POTASSIUM REFLEX MAGNESIUM: 5.1 MMOL/L (ref 3.5–5.1)
PROCALCITONIN: 0.13 NG/ML (ref 0–0.15)
PROTEIN UA: NEGATIVE MG/DL
RBC # BLD: 4.59 M/UL (ref 4–5.2)
SARS-COV-2 RNA, RT PCR: NOT DETECTED
SODIUM BLD-SCNC: 138 MMOL/L (ref 136–145)
SPECIFIC GRAVITY UA: 1.01 (ref 1–1.03)
TOTAL PROTEIN: 7.8 G/DL (ref 6.4–8.2)
URINE REFLEX TO CULTURE: ABNORMAL
URINE TYPE: ABNORMAL
UROBILINOGEN, URINE: 0.2 E.U./DL
WBC # BLD: 8.6 K/UL (ref 4–11)

## 2021-08-10 PROCEDURE — 99285 EMERGENCY DEPT VISIT HI MDM: CPT

## 2021-08-10 PROCEDURE — 83605 ASSAY OF LACTIC ACID: CPT

## 2021-08-10 PROCEDURE — 93005 ELECTROCARDIOGRAM TRACING: CPT | Performed by: NURSE PRACTITIONER

## 2021-08-10 PROCEDURE — 80053 COMPREHEN METABOLIC PANEL: CPT

## 2021-08-10 PROCEDURE — 71046 X-RAY EXAM CHEST 2 VIEWS: CPT

## 2021-08-10 PROCEDURE — 2580000003 HC RX 258: Performed by: NURSE PRACTITIONER

## 2021-08-10 PROCEDURE — 6360000004 HC RX CONTRAST MEDICATION: Performed by: NURSE PRACTITIONER

## 2021-08-10 PROCEDURE — 70487 CT MAXILLOFACIAL W/DYE: CPT

## 2021-08-10 PROCEDURE — 73562 X-RAY EXAM OF KNEE 3: CPT

## 2021-08-10 PROCEDURE — 87636 SARSCOV2 & INF A&B AMP PRB: CPT

## 2021-08-10 PROCEDURE — 81003 URINALYSIS AUTO W/O SCOPE: CPT

## 2021-08-10 PROCEDURE — 6370000000 HC RX 637 (ALT 250 FOR IP): Performed by: NURSE PRACTITIONER

## 2021-08-10 PROCEDURE — 85025 COMPLETE CBC W/AUTO DIFF WBC: CPT

## 2021-08-10 PROCEDURE — 84145 PROCALCITONIN (PCT): CPT

## 2021-08-10 RX ORDER — 0.9 % SODIUM CHLORIDE 0.9 %
500 INTRAVENOUS SOLUTION INTRAVENOUS ONCE
Status: COMPLETED | OUTPATIENT
Start: 2021-08-10 | End: 2021-08-10

## 2021-08-10 RX ORDER — HYDROXYZINE PAMOATE 25 MG/1
1 CAPSULE ORAL 3 TIMES DAILY
COMMUNITY
Start: 2018-07-30

## 2021-08-10 RX ORDER — EPINEPHRINE 0.3 MG/.3ML
0.3 INJECTION SUBCUTANEOUS
COMMUNITY
Start: 2016-12-01

## 2021-08-10 RX ORDER — DOXYCYCLINE 100 MG/1
100 TABLET ORAL 2 TIMES DAILY
Qty: 20 TABLET | Refills: 0 | Status: SHIPPED | OUTPATIENT
Start: 2021-08-10 | End: 2021-08-20

## 2021-08-10 RX ORDER — CIPROFLOXACIN 500 MG/1
500 TABLET, FILM COATED ORAL 2 TIMES DAILY
Qty: 10 TABLET | Refills: 0 | Status: SHIPPED | OUTPATIENT
Start: 2021-08-10 | End: 2021-08-15

## 2021-08-10 RX ORDER — ALLOPURINOL 100 MG/1
100 TABLET ORAL DAILY
COMMUNITY
Start: 2018-07-18 | End: 2022-10-11

## 2021-08-10 RX ORDER — MAGNESIUM OXIDE 400 MG/1
400 TABLET ORAL DAILY
COMMUNITY

## 2021-08-10 RX ORDER — CIPROFLOXACIN 500 MG/1
500 TABLET, FILM COATED ORAL ONCE
Status: COMPLETED | OUTPATIENT
Start: 2021-08-10 | End: 2021-08-10

## 2021-08-10 RX ORDER — DULAGLUTIDE 1.5 MG/.5ML
1.5 INJECTION, SOLUTION SUBCUTANEOUS WEEKLY
COMMUNITY
Start: 2021-08-02

## 2021-08-10 RX ORDER — DOXYCYCLINE HYCLATE 100 MG
100 TABLET ORAL ONCE
Status: COMPLETED | OUTPATIENT
Start: 2021-08-10 | End: 2021-08-10

## 2021-08-10 RX ORDER — TRAZODONE HYDROCHLORIDE 100 MG/1
100 TABLET ORAL NIGHTLY
COMMUNITY
Start: 2018-03-08

## 2021-08-10 RX ADMIN — IOPAMIDOL 75 ML: 755 INJECTION, SOLUTION INTRAVENOUS at 12:11

## 2021-08-10 RX ADMIN — DOXYCYCLINE HYCLATE 100 MG: 100 TABLET, COATED ORAL at 14:16

## 2021-08-10 RX ADMIN — SODIUM CHLORIDE 500 ML: 9 INJECTION, SOLUTION INTRAVENOUS at 11:51

## 2021-08-10 RX ADMIN — SODIUM CHLORIDE 500 ML: 9 INJECTION, SOLUTION INTRAVENOUS at 11:16

## 2021-08-10 RX ADMIN — CIPROFLOXACIN 500 MG: 500 TABLET, FILM COATED ORAL at 14:16

## 2021-08-10 ASSESSMENT — PAIN SCALES - GENERAL: PAINLEVEL_OUTOF10: 6

## 2021-08-10 ASSESSMENT — PAIN DESCRIPTION - ORIENTATION: ORIENTATION: LEFT

## 2021-08-10 ASSESSMENT — ENCOUNTER SYMPTOMS
ABDOMINAL PAIN: 0
RHINORRHEA: 0
SHORTNESS OF BREATH: 0
COLOR CHANGE: 0
SORE THROAT: 0

## 2021-08-10 ASSESSMENT — PAIN DESCRIPTION - PAIN TYPE: TYPE: ACUTE PAIN

## 2021-08-10 ASSESSMENT — PAIN DESCRIPTION - LOCATION: LOCATION: KNEE

## 2021-08-10 NOTE — ED PROVIDER NOTES
[address] ED  EMERGENCY DEPARTMENT ENCOUNTER        Pt Name: Damir Lares  MRN: [id number]  [place] 1965  Dateof evaluation: 8/10/2021  Provider: AMAYA Santana CNP  PCP: Eladia Carpenter PA-C  ED Attending: No att. providers found    [address] [place] [address]       Chief Complaint   Patient presents with    Knee Pain     Pt states that she got up this morning to use the restroom and on her way back to bed pt states that she felt her knees give out. Pt states left knee pain.  Fever     Pt states that she noticed sores in her nose. Pt states that she saw her PCP on Friday and was given an oitment for her nose. Pt states that she has been running low grade fevers since Thursday. HISTORY OF PRESENTILLNESS   (Location/Symptom, Timing/Onset, Context/Setting, Quality, Duration, Modifying Factors, Severity)  Note limiting factors. Damir Lares is a 63 y.o. female for left knee pain. Onset was today. Context includes patient reports that she got up to use the restroom and felt generally weak and states that her legs gave out and she fell onto her knees. Patient has an abrasion on her left knee. Patient denies any dizziness or lightheadedness. She does not believe that she tripped on anything however she does use oxygen at baseline. Patient reports that she also was on antibiotic ointment for her nose. She states that she started noticing swelling to her nose and was put on an antibiotic ointment on Friday and has been running low-grade temps over the weekend. Alleviating factors include nothing. Aggravating factors include nothing. Pain is 6/10. Toradol and Zofran per EMS has been used for pain today. Nursing Notes were all reviewed and agreed with or any disagreements were addressed  in the HPI. REVIEW OF SYSTEMS    (2-9 systems for level 4, 10 or more for level 5)     Review of Systems   Constitutional: Positive for fever.         Fall   HENT: Negative for congestion, rhinorrhea and sore throat. Nasal swelling   Respiratory: Negative for shortness of breath. Cardiovascular: Negative for chest pain. Gastrointestinal: Negative for abdominal pain. Genitourinary: Negative for decreased urine volume and difficulty urinating. Musculoskeletal: Negative for arthralgias and myalgias. Left knee pain   Skin: Positive for wound. Negative for color change and rash. Neurological: Negative for dizziness and light-headedness. Psychiatric/Behavioral: Negative for agitation. All other systems reviewed and are negative. Positives and Pertinent negatives as per HPI. Except as noted above in the ROS, all other systems were reviewed and negative.        PAST MEDICAL HISTORY     Past Medical History:   Diagnosis Date    Anxiety     Arthritis     [facility]     COPD (chronic obstructive pulmonary disease) [facility]     Depression     Diabetes mellitus ([facility]     Diverticulosis     Hyperlipidemia     Obesities, morbid ([facility]          SURGICAL HISTORY       Past Surgical History:   Procedure Laterality Date     SECTION      x3    ENDOMETRIAL ABLATION           CURRENT MEDICATIONS       Discharge Medication List as of 8/10/2021  2:02 PM      CONTINUE these medications which have NOT CHANGED    Details   allopurinol (ZYLOPRIM) 100 MG tablet Take 100 mg by mouth dailyHistorical Med      TRULICITY 1.5 IU/9.9XD SOPN Inject 0.75 mg into the skin once a week, DAWHistorical Med      hydrOXYzine (VISTARIL) 25 MG capsule Take 1 capsule by mouthHistorical Med      magnesium oxide (MAG-OX) 400 MG tablet Take 400 mg by mouth dailyHistorical Med      EPINEPHrine (EPIPEN 2-AUGUSTO) 0.3 MG/0.3ML SOAJ injection Inject 0.3 mLs into the muscleHistorical Med      traZODone (DESYREL) 100 MG tablet Take 100 mg by mouth nightlyHistorical Med      meloxicam (MOBIC) 7.5 MG tablet Take 15 mg by mouth daily Historical Med      pregabalin (LYRICA) 100 MG capsule Take 150 mg by mouth three times daily. Historical Med      glipiZIDE (GLUCOTROL XL) 5 MG extended release tablet Take 5 mg by mouth dailyHistorical Med      oxyCODONE-acetaminophen (PERCOCET) 7.5-325 MG per tablet Take 1 tablet by mouth every 4 hours as needed for Pain . Historical Med      insulin detemir (LEVEMIR) 100 UNIT/ML injection pen Inject 50 Units into the skin nightly, Disp-5 Pen, R-0      albuterol sulfate  (90 BASE) MCG/ACT inhaler Inhale 2 puffs into the lungs every 6 hours as needed for Wheezing      nystatin (MYCOSTATIN) 935938 UNIT/GM cream Apply topically nightly Apply under breasts at night, Topical, NIGHTLY, Until Discontinued, Historical Med      OXYGEN Inhale 4 L into the lungs      insulin aspart (NOVOLOG) 100 UNIT/ML injection pen Inject into the skin 3 times daily (before meals) SSI.      metFORMIN (GLUCOPHAGE) 500 MG tablet Take 1,000 mg by mouth 2 times daily (with meals) Historical Med      aspirin 81 MG EC tablet Take 1 tablet by mouth daily, Disp-30 tablet, R-3      ferrous sulfate (FE TABS) 325 (65 FE) MG EC tablet Take 1 tablet by mouth 3 times daily (with meals), Disp-90 tablet, R-2      Insulin Syringes, Disposable, U-100 0.3 ML MISC DAILY Starting 2/9/2016, Until Discontinued, Disp-100 each, R-3, Print      Insulin Pen Needle 29G X 12.7MM MISC DAILY Starting 2/9/2016, Until Discontinued, Disp-100 each, R-3, Print      glucose monitoring kit (FREESTYLE) monitoring kit DAILY PRN Starting 2/9/2016, Until Discontinued, Disp-1 kit, R-0, Print      nystatin (MYCOSTATIN) 722763 UNIT/GM powder Three times daily on the anterior abdominal wall and inguinal area. Use for 2 weeks. , Disp-2 Bottle, R-0, Print      escitalopram (LEXAPRO) 20 MG tablet Take 40 mg by mouth daily Historical Med      Simvastatin (ZOCOR PO) Take 20 mg by mouth nightly       dicyclomine (BENTYL) 10 MG capsule Take 10 mg by mouth 3 times daily.       Esomeprazole Magnesium (NEXIUM PO) Take 40 mg by mouth daily       losartan (COZAAR) 25 MG tablet Take 2 tablets by mouth daily, Disp-30 tablet, R-1               ALLERGIES     Dilaudid [hydromorphone hcl], Lisinopril, Morphine, and Pcn [penicillins]    FAMILY HISTORY       Family History   Problem Relation Age of Onset    Colon Cancer Mother     High Cholesterol Father     Heart Disease Father           SOCIAL HISTORY       Social History     Socioeconomic History    Marital status:      Spouse name: None    Number of children: None    Years of education: None    Highest education level: None   Occupational History    None   Tobacco Use    Smoking status: Former Smoker     Packs/day: 2.00     Years: 40.00     Pack years: 80.00     Quit date: 2014     Years since quittin.6    Smokeless tobacco: Never Used   Vaping Use    Vaping Use: Never used   Substance and Sexual Activity    Alcohol use: No    Drug use: No    Sexual activity: Never   Other Topics Concern    None   Social History Narrative    None     Social Determinants of Health     Financial Resource Strain:     Difficulty of Paying Living Expenses:    Food Insecurity:     Worried About Running Out of Food in the Last Year:     Ran Out of Food in the Last Year:    Transportation Needs:     Lack of Transportation (Medical):      Lack of Transportation (Non-Medical):    Physical Activity:     Days of Exercise per Week:     Minutes of Exercise per Session:    Stress:     Feeling of Stress :    Social Connections:     Frequency of Communication with Friends and Family:     Frequency of Social Gatherings with Friends and Family:     Attends [employer] Services:     Active Member of Clubs or Organizations:     Attends Club or Organization Meetings:     Marital Status:    Intimate Partner Violence:     Fear of Current or Ex-Partner:     Emotionally Abused:     Physically Abused:     Sexually Abused:        SCREENINGS             PHYSICAL EXAM  (up to 7 for level 4, 8 or more for level 5)     ED Triage Vitals [08/10/21 1021]   BP Temp Temp Source Pulse Resp SpO2 Height Weight   (!) 178/165 99 °F (37.2 °C) Oral 96 20 96 % 5' 1\" (1.549 m) (!) 320 lb (145.2 kg)       Physical Exam  Constitutional:       Appearance: She is well-developed. She is obese. HENT:      Head: Normocephalic and atraumatic. Nose: Nasal tenderness and mucosal edema present. No nasal deformity, septal deviation, signs of injury, laceration, congestion or rhinorrhea. Right Nostril: No foreign body, epistaxis, septal hematoma or occlusion. Left Nostril: No foreign body, epistaxis, septal hematoma or occlusion. Cardiovascular:      Rate and Rhythm: Normal rate. Pulmonary:      Effort: Pulmonary effort is normal. No respiratory distress. Abdominal:      General: There is no distension. Palpations: Abdomen is soft. Tenderness: There is no abdominal tenderness. Musculoskeletal:         General: Tenderness and signs of injury present. No swelling or deformity. Cervical back: Normal range of motion. Comments: Decreased range of motion to left knee due to pain elicited with movement. Abrasion noted to the anterior surface of the left knee. Sensation strength and pulse intact to left leg. Skin:     General: Skin is warm and dry. Neurological:      Mental Status: She is alert and oriented to person, place, and time.          DIAGNOSTIC RESULTS   LABS:    Labs Reviewed   COMPREHENSIVE METABOLIC PANEL W/ REFLEX TO MG FOR LOW K - Abnormal; Notable for the following components:       Result Value    Chloride 98 (*)     Glucose 247 (*)     GFR Non- 57 (*)     All other components within normal limits    Narrative:     Performed at:  [facility]) [facility],  [place], [facility]   Phone [phone]   URINE RT REFLEX TO CULTURE - Abnormal; Notable for the following components:    Glucose, Ur >=1000 (*)     All other components within normal limits    Narrative:     Performed at:  [facility],  [place], [facility]   Phone [phone] & INFLUENZA COMBO    Narrative:     Performed at:  [facility],  [place], [facility]   Phone [phone]   CBC WITH AUTO DIFFERENTIAL    Narrative:     Performed at:  [place] [address],  [place], [facility]   Phone [phone]   LACTATE, SEPSIS    Narrative:     Performed at:  [facility],  [place], [facility]   Phone [phone]   PROCALCITONIN    Narrative:     Performed at:  [facility],  [place], [facility]   Phone [phone]       All other labs werewithin normal range or not returned as of this dictation. EKG: All EKG's are interpreted by the Emergency Department Physician who either signs or Co-signs this chart in the absence of a cardiologist.  Please see their note for interpretation of EKG. RADIOLOGY:   CT of the facial bones with contrast interpreted by radiologist for  Impression:    1. Soft tissue swelling in the region of the nostrils bilaterally, left   greater than right. 2. Somewhat focal decreased attenuation slightly protruding into the left   nostril measuring approximately 8 mm, which likely represents focal   phlegmonous change. 3. No convincing drainable abscess seen at this time. Left knee x-ray interpreted by radiologist for    FINDINGS:   There is no evidence of acute fracture or dislocation.  There is normal   alignment.  There is a joint effusion. Mariel Cheers is no focal soft tissue   swelling.  There is joint space narrowing of the medial tibiofemoral   compartment.  There is tricompartmental osteophyte formation.        Chest x-ray interpreted by radiologist for  Impression: 1. Cardiomegaly with no evidence of edema   2. No acute pulmonary abnormality   3. Scarring/chronic atelectasis in the left mid lung and left base             Interpretation per the Radiologist below, if available at the time of this note:    CT FACIAL BONES W CONTRAST   Final Result   1. Soft tissue swelling in the region of the nostrils bilaterally, left   greater than right. 2. Somewhat focal decreased attenuation slightly protruding into the left   nostril measuring approximately 8 mm, which likely represents focal   phlegmonous change. 3. No convincing drainable abscess seen at this time. XR KNEE LEFT (3 VIEWS)   Final Result   No acute osseous abnormality. XR CHEST (2 VW)   Final Result   1. Cardiomegaly with no evidence of edema   2. No acute pulmonary abnormality   3. Scarring/chronic atelectasis in the left mid lung and left base           No results found. PROCEDURES   Unless otherwise noted below, none     Procedures     CRITICAL CARE TIME   N/A    CONSULTS:  None      EMERGENCYDEPARTMENT COURSE and DIFFERENTIAL DIAGNOSIS/MDM:   Vitals:    Vitals:    08/10/21 1021 08/10/21 1034 08/10/21 1154 08/10/21 1441   BP: (!) 178/165 (!) 140/75 135/75 (!) 145/94   Pulse: 96  83 84   Resp: 20  18 16   Temp: 99 °F (37.2 °C)      TempSrc: Oral      SpO2: 96%  94% 96%   Weight: (!) 320 lb (145.2 kg)      Height: 5' 1\" (1.549 m)          Patient was given the following medications:  Medications   0.9 % sodium chloride bolus (0 mLs Intravenous Stopped 8/10/21 1150)   0.9 % sodium chloride bolus (0 mLs Intravenous Stopped 8/10/21 1402)   iopamidol (ISOVUE-370) 76 % injection 75 mL (75 mLs Intravenous Given 8/10/21 1211)   doxycycline hyclate (VIBRA-TABS) tablet 100 mg (100 mg Oral Given 8/10/21 1416)   ciprofloxacin (CIPRO) tablet 500 mg (500 mg Oral Given 8/10/21 1416)       Patient was seen and evaluated by myself and Dr. Bia Greenberg. Patient here for concerns from fall.   Patient states that she had a fall this morning. Patient adamantly denies any dizziness or lightheadedness. She also reports that she did not trip over anything. Patient states that she felt generally weak in her knees and her knees gave out. Patient reports that she did land on her left knee and has an abrasion to her left knee. She denies hitting her head or any other complaints. Patient also reports that she has been running a fever and has been placed on antibiotic ointment for her nose. She reports that her nose started having some erythema and swelling and was seen by her primary care doctor on Friday. Patient reports that she was put on antibiotics and the symptoms have not improved. Patient does use 4 L of nasal cannula oxygen at baseline. Patient states her tetanus is up-to-date. On exam she is awake and alert hemodynamically stable nontoxic in appearance. Lab values have been reviewed here. At this point I feel the patient would benefit from oral antibiotics. She was started on clindamycin and Cipro. She was given IV fluids here in the ED. Patient reports that she does have ambulatory assistive devices at home already. She will be given instructions to follow-up with her primary care doctor in the next few days and to return to the ED for worsening symptoms. Patient was ultimately discharged home with all questions answered. The patient tolerated their visit well. I have evaluated this patient. My supervising physician was available for consultation. The patient and / or the family were informed of the results of any tests, a time was given to answer questions, a plan was proposed and they agreed with plan. FINAL IMPRESSION      1. Nose cellulitis    2. General weakness    3. Fall, initial encounter    4.  Abrasion          DISPOSITION/PLAN   DISPOSITION Decision To Discharge 08/10/2021 01:46:16 PM      PATIENT REFERRED TO:  ANTONI Gandhi [address] [place] [address]   [phone]    Schedule an appointment as soon as possible for a visit in 2 days  If symptoms worsen    [place] [facility] ED  [address] [facility]  [phone]    If symptoms worsen    [facility]  [address] [facility]          DISCHARGE MEDICATIONS:  Discharge Medication List as of 8/10/2021  2:02 PM      START taking these medications    Details   doxycycline monohydrate (ADOXA) 100 MG tablet Take 1 tablet by mouth 2 times daily for 10 days, Disp-20 tablet, R-0Print      ciprofloxacin (CIPRO) 500 MG tablet Take 1 tablet by mouth 2 times daily for 5 days, Disp-10 tablet, R-0Print             DISCONTINUED MEDICATIONS:  Discharge Medication List as of 8/10/2021  2:02 PM                 (Please note that portions of this note were completed with a voice recognition program.  Efforts were made to edit the dictations but occasionally words are mis-transcribed.)    AMAYA Santana CNP (electronically signed)         AMAYA Santana CNP  08/10/21 8453

## 2021-08-10 NOTE — ED PROVIDER NOTES
I independently performed a history and physical on Indira Ferguson. All diagnostic, treatment, and disposition decisions were made by myself in conjunction with the advanced practice provider. For further details of [address] emergency department encounter, please see the CARLOS/resident's documentation. Briefly, this is a 58-year-old female with history of COPD on oxygen, type 2 diabetes, CHF who presents for evaluation of knee pain, fever, cellulitis. Patient states that she had a mechanical fall this morning and skinned her left knee. X-ray was negative of this. She also has had cellulitis develop of the nose and was prescribed a topical antibiotic by her PCP she has been using this but the redness has been worsening. She has not had any fevers, nausea or vomiting. On exam, no acute distress. She has erythema of the tip of the nose that is extending up to the bridge of the nose. There is no abscess or purulent drainage. There is no evidence of mucormycosis. Because of worsening redness, I have recommended oral antibiotic therapy in addition to the topical antibiotic. Advised on PCP follow-up. EKG  The Ekg interpreted by myself in the emergency department in the absence of a cardiologist.  normal sinus rhythm with a rate of 95  Axis is   Normal  QTc is  500  Intervals and Durations are unremarkable. No specific ST-T wave changes appreciated. No evidence of acute ischemia.    No significant change from prior EKG dated [address]    Forest Gill MD  08/10/21 2635 Hillary CARDOZA MD  08/10/21 7004

## 2021-08-10 NOTE — ED TRIAGE NOTES
Pt given 30 mg Toradol and 4 mg Zofran IV per EMS. Pt ambulatory to ED bed from EMS cot without issue.

## 2021-08-11 LAB
EKG ATRIAL RATE: 95 BPM
EKG DIAGNOSIS: NORMAL
EKG P AXIS: 104 DEGREES
EKG P-R INTERVAL: 176 MS
EKG Q-T INTERVAL: 398 MS
EKG QRS DURATION: 96 MS
EKG QTC CALCULATION (BAZETT): 500 MS
EKG R AXIS: 66 DEGREES
EKG T AXIS: 40 DEGREES
EKG VENTRICULAR RATE: 95 BPM

## 2021-08-11 PROCEDURE — 93010 ELECTROCARDIOGRAM REPORT: CPT | Performed by: INTERNAL MEDICINE

## 2022-09-08 ENCOUNTER — APPOINTMENT (OUTPATIENT)
Dept: CT IMAGING | Age: 57
End: 2022-09-08
Payer: MEDICARE

## 2022-09-08 ENCOUNTER — HOSPITAL ENCOUNTER (OUTPATIENT)
Age: 57
Setting detail: OBSERVATION
Discharge: HOME | End: 2022-09-09
Attending: STUDENT IN AN ORGANIZED HEALTH CARE EDUCATION/TRAINING PROGRAM | Admitting: INTERNAL MEDICINE
Payer: MEDICARE

## 2022-09-08 ENCOUNTER — APPOINTMENT (OUTPATIENT)
Dept: GENERAL RADIOLOGY | Age: 57
End: 2022-09-08
Payer: MEDICARE

## 2022-09-08 DIAGNOSIS — E66.01 CLASS 3 SEVERE OBESITY WITH BODY MASS INDEX (BMI) OF 50.0 TO 59.9 IN ADULT, UNSPECIFIED OBESITY TYPE, UNSPECIFIED WHETHER SERIOUS COMORBIDITY PRESENT (HCC): ICD-10-CM

## 2022-09-08 DIAGNOSIS — R53.1 GENERALIZED WEAKNESS: Primary | ICD-10-CM

## 2022-09-08 DIAGNOSIS — E87.5 HYPERKALEMIA: ICD-10-CM

## 2022-09-08 LAB
A/G RATIO: 1.2 (ref 1.1–2.2)
ALBUMIN SERPL-MCNC: 3.9 G/DL (ref 3.4–5)
ALP BLD-CCNC: 87 U/L (ref 40–129)
ALT SERPL-CCNC: 14 U/L (ref 10–40)
ANION GAP SERPL CALCULATED.3IONS-SCNC: 9 MMOL/L (ref 3–16)
AST SERPL-CCNC: 16 U/L (ref 15–37)
BASE EXCESS VENOUS: 1.8 MMOL/L (ref -3–3)
BASOPHILS ABSOLUTE: 0 K/UL (ref 0–0.2)
BASOPHILS RELATIVE PERCENT: 0.4 %
BILIRUB SERPL-MCNC: <0.2 MG/DL (ref 0–1)
BILIRUBIN URINE: ABNORMAL
BLOOD, URINE: NEGATIVE
BUN BLDV-MCNC: 23 MG/DL (ref 7–20)
CALCIUM SERPL-MCNC: 9.1 MG/DL (ref 8.3–10.6)
CARBOXYHEMOGLOBIN: 3.1 % (ref 0–1.5)
CHLORIDE BLD-SCNC: 100 MMOL/L (ref 99–110)
CLARITY: ABNORMAL
CO2: 30 MMOL/L (ref 21–32)
COLOR: YELLOW
CREAT SERPL-MCNC: 0.9 MG/DL (ref 0.6–1.1)
EKG ATRIAL RATE: 88 BPM
EKG DIAGNOSIS: NORMAL
EKG P AXIS: 43 DEGREES
EKG P-R INTERVAL: 190 MS
EKG Q-T INTERVAL: 416 MS
EKG QRS DURATION: 92 MS
EKG QTC CALCULATION (BAZETT): 503 MS
EKG R AXIS: 33 DEGREES
EKG T AXIS: 45 DEGREES
EKG VENTRICULAR RATE: 88 BPM
EOSINOPHILS ABSOLUTE: 0.1 K/UL (ref 0–0.6)
EOSINOPHILS RELATIVE PERCENT: 1.8 %
EPITHELIAL CELLS, UA: ABNORMAL /HPF (ref 0–5)
GFR AFRICAN AMERICAN: >60
GFR NON-AFRICAN AMERICAN: >60
GLUCOSE BLD-MCNC: 176 MG/DL (ref 70–99)
GLUCOSE BLD-MCNC: 245 MG/DL (ref 70–99)
GLUCOSE URINE: NEGATIVE MG/DL
HCO3 VENOUS: 31.4 MMOL/L (ref 23–29)
HCT VFR BLD CALC: 36.7 % (ref 36–48)
HEMOGLOBIN: 11.4 G/DL (ref 12–16)
KETONES, URINE: NEGATIVE MG/DL
LEUKOCYTE ESTERASE, URINE: NEGATIVE
LIPASE: 11 U/L (ref 13–60)
LYMPHOCYTES ABSOLUTE: 1.2 K/UL (ref 1–5.1)
LYMPHOCYTES RELATIVE PERCENT: 22.5 %
MCH RBC QN AUTO: 26.6 PG (ref 26–34)
MCHC RBC AUTO-ENTMCNC: 31 G/DL (ref 31–36)
MCV RBC AUTO: 86 FL (ref 80–100)
METHEMOGLOBIN VENOUS: 0.3 %
MICROSCOPIC EXAMINATION: YES
MONOCYTES ABSOLUTE: 0.3 K/UL (ref 0–1.3)
MONOCYTES RELATIVE PERCENT: 6 %
NEUTROPHILS ABSOLUTE: 3.7 K/UL (ref 1.7–7.7)
NEUTROPHILS RELATIVE PERCENT: 69.3 %
NITRITE, URINE: NEGATIVE
O2 CONTENT, VEN: 16 VOL %
O2 SAT, VEN: 90 %
O2 THERAPY: ABNORMAL
PCO2, VEN: 77.5 MMHG (ref 40–50)
PDW BLD-RTO: 14.2 % (ref 12.4–15.4)
PERFORMED ON: ABNORMAL
PH UA: 6 (ref 5–8)
PH VENOUS: 7.23 (ref 7.35–7.45)
PLATELET # BLD: 138 K/UL (ref 135–450)
PMV BLD AUTO: 8.7 FL (ref 5–10.5)
PO2, VEN: 70.5 MMHG (ref 25–40)
POTASSIUM REFLEX MAGNESIUM: 5.4 MMOL/L (ref 3.5–5.1)
PRO-BNP: 378 PG/ML (ref 0–124)
PROTEIN UA: ABNORMAL MG/DL
RBC # BLD: 4.27 M/UL (ref 4–5.2)
RBC UA: ABNORMAL /HPF (ref 0–4)
SARS-COV-2, NAAT: NOT DETECTED
SODIUM BLD-SCNC: 139 MMOL/L (ref 136–145)
SPECIFIC GRAVITY UA: >=1.03 (ref 1–1.03)
TCO2 CALC VENOUS: 34 MMOL/L
TOTAL PROTEIN: 7.1 G/DL (ref 6.4–8.2)
TROPONIN: <0.01 NG/ML
URINE REFLEX TO CULTURE: ABNORMAL
URINE TYPE: ABNORMAL
UROBILINOGEN, URINE: 0.2 E.U./DL
WBC # BLD: 5.4 K/UL (ref 4–11)
WBC UA: ABNORMAL /HPF (ref 0–5)

## 2022-09-08 PROCEDURE — 80053 COMPREHEN METABOLIC PANEL: CPT

## 2022-09-08 PROCEDURE — 70498 CT ANGIOGRAPHY NECK: CPT

## 2022-09-08 PROCEDURE — 6360000002 HC RX W HCPCS: Performed by: INTERNAL MEDICINE

## 2022-09-08 PROCEDURE — 84484 ASSAY OF TROPONIN QUANT: CPT

## 2022-09-08 PROCEDURE — 87635 SARS-COV-2 COVID-19 AMP PRB: CPT

## 2022-09-08 PROCEDURE — 96372 THER/PROPH/DIAG INJ SC/IM: CPT

## 2022-09-08 PROCEDURE — G0378 HOSPITAL OBSERVATION PER HR: HCPCS

## 2022-09-08 PROCEDURE — 99285 EMERGENCY DEPT VISIT HI MDM: CPT

## 2022-09-08 PROCEDURE — 83690 ASSAY OF LIPASE: CPT

## 2022-09-08 PROCEDURE — 36415 COLL VENOUS BLD VENIPUNCTURE: CPT

## 2022-09-08 PROCEDURE — 83880 ASSAY OF NATRIURETIC PEPTIDE: CPT

## 2022-09-08 PROCEDURE — 81001 URINALYSIS AUTO W/SCOPE: CPT

## 2022-09-08 PROCEDURE — 93005 ELECTROCARDIOGRAM TRACING: CPT | Performed by: STUDENT IN AN ORGANIZED HEALTH CARE EDUCATION/TRAINING PROGRAM

## 2022-09-08 PROCEDURE — 6360000004 HC RX CONTRAST MEDICATION: Performed by: INTERNAL MEDICINE

## 2022-09-08 PROCEDURE — 94761 N-INVAS EAR/PLS OXIMETRY MLT: CPT

## 2022-09-08 PROCEDURE — 6370000000 HC RX 637 (ALT 250 FOR IP): Performed by: INTERNAL MEDICINE

## 2022-09-08 PROCEDURE — 82803 BLOOD GASES ANY COMBINATION: CPT

## 2022-09-08 PROCEDURE — 2580000003 HC RX 258: Performed by: INTERNAL MEDICINE

## 2022-09-08 PROCEDURE — 71045 X-RAY EXAM CHEST 1 VIEW: CPT

## 2022-09-08 PROCEDURE — 93010 ELECTROCARDIOGRAM REPORT: CPT | Performed by: INTERNAL MEDICINE

## 2022-09-08 PROCEDURE — 70450 CT HEAD/BRAIN W/O DYE: CPT

## 2022-09-08 PROCEDURE — 85025 COMPLETE CBC W/AUTO DIFF WBC: CPT

## 2022-09-08 PROCEDURE — 2700000000 HC OXYGEN THERAPY PER DAY

## 2022-09-08 RX ORDER — ONDANSETRON 2 MG/ML
4 INJECTION INTRAMUSCULAR; INTRAVENOUS EVERY 6 HOURS PRN
Status: DISCONTINUED | OUTPATIENT
Start: 2022-09-08 | End: 2022-09-09 | Stop reason: HOSPADM

## 2022-09-08 RX ORDER — LANOLIN ALCOHOL/MO/W.PET/CERES
400 CREAM (GRAM) TOPICAL DAILY
Status: DISCONTINUED | OUTPATIENT
Start: 2022-09-08 | End: 2022-09-09 | Stop reason: HOSPADM

## 2022-09-08 RX ORDER — ESCITALOPRAM OXALATE 10 MG/1
40 TABLET ORAL DAILY
Status: DISCONTINUED | OUTPATIENT
Start: 2022-09-08 | End: 2022-09-09 | Stop reason: HOSPADM

## 2022-09-08 RX ORDER — ALBUTEROL SULFATE 90 UG/1
2 AEROSOL, METERED RESPIRATORY (INHALATION) EVERY 6 HOURS PRN
Status: DISCONTINUED | OUTPATIENT
Start: 2022-09-08 | End: 2022-09-08

## 2022-09-08 RX ORDER — OXYCODONE AND ACETAMINOPHEN 7.5; 325 MG/1; MG/1
1 TABLET ORAL EVERY 4 HOURS PRN
Status: DISCONTINUED | OUTPATIENT
Start: 2022-09-08 | End: 2022-09-09 | Stop reason: HOSPADM

## 2022-09-08 RX ORDER — ONDANSETRON 4 MG/1
4 TABLET, ORALLY DISINTEGRATING ORAL EVERY 8 HOURS PRN
Status: DISCONTINUED | OUTPATIENT
Start: 2022-09-08 | End: 2022-09-09 | Stop reason: HOSPADM

## 2022-09-08 RX ORDER — ACETAMINOPHEN 325 MG/1
650 TABLET ORAL EVERY 6 HOURS PRN
Status: DISCONTINUED | OUTPATIENT
Start: 2022-09-08 | End: 2022-09-09 | Stop reason: HOSPADM

## 2022-09-08 RX ORDER — GLIPIZIDE 5 MG/1
5 TABLET ORAL
Status: DISCONTINUED | OUTPATIENT
Start: 2022-09-09 | End: 2022-09-09 | Stop reason: HOSPADM

## 2022-09-08 RX ORDER — SODIUM CHLORIDE 0.9 % (FLUSH) 0.9 %
5-40 SYRINGE (ML) INJECTION PRN
Status: DISCONTINUED | OUTPATIENT
Start: 2022-09-08 | End: 2022-09-09 | Stop reason: HOSPADM

## 2022-09-08 RX ORDER — HYDROXYZINE 50 MG/1
25 TABLET, FILM COATED ORAL 3 TIMES DAILY PRN
Status: DISCONTINUED | OUTPATIENT
Start: 2022-09-08 | End: 2022-09-09 | Stop reason: HOSPADM

## 2022-09-08 RX ORDER — ACETAMINOPHEN 650 MG/1
650 SUPPOSITORY RECTAL EVERY 6 HOURS PRN
Status: DISCONTINUED | OUTPATIENT
Start: 2022-09-08 | End: 2022-09-09 | Stop reason: HOSPADM

## 2022-09-08 RX ORDER — PREGABALIN 100 MG/1
300 CAPSULE ORAL 2 TIMES DAILY
Status: DISCONTINUED | OUTPATIENT
Start: 2022-09-08 | End: 2022-09-09 | Stop reason: HOSPADM

## 2022-09-08 RX ORDER — SODIUM CHLORIDE 0.9 % (FLUSH) 0.9 %
5-40 SYRINGE (ML) INJECTION EVERY 12 HOURS SCHEDULED
Status: DISCONTINUED | OUTPATIENT
Start: 2022-09-08 | End: 2022-09-09 | Stop reason: HOSPADM

## 2022-09-08 RX ORDER — INSULIN LISPRO 100 [IU]/ML
0-4 INJECTION, SOLUTION INTRAVENOUS; SUBCUTANEOUS NIGHTLY
Status: DISCONTINUED | OUTPATIENT
Start: 2022-09-08 | End: 2022-09-09 | Stop reason: HOSPADM

## 2022-09-08 RX ORDER — TRAZODONE HYDROCHLORIDE 100 MG/1
100 TABLET ORAL NIGHTLY
Status: DISCONTINUED | OUTPATIENT
Start: 2022-09-08 | End: 2022-09-09 | Stop reason: HOSPADM

## 2022-09-08 RX ORDER — DICYCLOMINE HYDROCHLORIDE 10 MG/1
10 CAPSULE ORAL 3 TIMES DAILY
Status: DISCONTINUED | OUTPATIENT
Start: 2022-09-08 | End: 2022-09-09 | Stop reason: HOSPADM

## 2022-09-08 RX ORDER — ENOXAPARIN SODIUM 100 MG/ML
40 INJECTION SUBCUTANEOUS DAILY
Status: DISCONTINUED | OUTPATIENT
Start: 2022-09-08 | End: 2022-09-09 | Stop reason: HOSPADM

## 2022-09-08 RX ORDER — ALLOPURINOL 100 MG/1
100 TABLET ORAL DAILY
Status: DISCONTINUED | OUTPATIENT
Start: 2022-09-08 | End: 2022-09-09 | Stop reason: HOSPADM

## 2022-09-08 RX ORDER — MELOXICAM 15 MG/1
15 TABLET ORAL DAILY
Status: DISCONTINUED | OUTPATIENT
Start: 2022-09-08 | End: 2022-09-09 | Stop reason: HOSPADM

## 2022-09-08 RX ORDER — INSULIN LISPRO 100 [IU]/ML
0-4 INJECTION, SOLUTION INTRAVENOUS; SUBCUTANEOUS
Status: DISCONTINUED | OUTPATIENT
Start: 2022-09-09 | End: 2022-09-09 | Stop reason: HOSPADM

## 2022-09-08 RX ORDER — POLYETHYLENE GLYCOL 3350 17 G/17G
17 POWDER, FOR SOLUTION ORAL DAILY PRN
Status: DISCONTINUED | OUTPATIENT
Start: 2022-09-08 | End: 2022-09-09 | Stop reason: HOSPADM

## 2022-09-08 RX ORDER — ESOMEPRAZOLE MAGNESIUM 40 MG/1
40 FOR SUSPENSION ORAL DAILY
Status: ON HOLD | COMMUNITY
End: 2022-09-09 | Stop reason: HOSPADM

## 2022-09-08 RX ORDER — ASPIRIN 81 MG/1
81 TABLET ORAL DAILY
Status: DISCONTINUED | OUTPATIENT
Start: 2022-09-08 | End: 2022-09-09 | Stop reason: HOSPADM

## 2022-09-08 RX ORDER — ALBUTEROL SULFATE 90 UG/1
2 AEROSOL, METERED RESPIRATORY (INHALATION) EVERY 4 HOURS PRN
Status: DISCONTINUED | OUTPATIENT
Start: 2022-09-08 | End: 2022-09-09 | Stop reason: HOSPADM

## 2022-09-08 RX ORDER — SODIUM CHLORIDE 9 MG/ML
INJECTION, SOLUTION INTRAVENOUS PRN
Status: DISCONTINUED | OUTPATIENT
Start: 2022-09-08 | End: 2022-09-09 | Stop reason: HOSPADM

## 2022-09-08 RX ADMIN — ENOXAPARIN SODIUM 40 MG: 100 INJECTION SUBCUTANEOUS at 23:25

## 2022-09-08 RX ADMIN — DICYCLOMINE HYDROCHLORIDE 10 MG: 10 CAPSULE ORAL at 23:20

## 2022-09-08 RX ADMIN — METFORMIN HYDROCHLORIDE 1000 MG: 500 TABLET ORAL at 23:17

## 2022-09-08 RX ADMIN — Medication 10 ML: at 23:19

## 2022-09-08 RX ADMIN — TRAZODONE HYDROCHLORIDE 100 MG: 100 TABLET ORAL at 23:19

## 2022-09-08 RX ADMIN — MELOXICAM 15 MG: 15 TABLET ORAL at 23:19

## 2022-09-08 RX ADMIN — Medication 400 MG: at 23:18

## 2022-09-08 RX ADMIN — IOPAMIDOL 85 ML: 755 INJECTION, SOLUTION INTRAVENOUS at 19:10

## 2022-09-08 RX ADMIN — PREGABALIN 300 MG: 100 CAPSULE ORAL at 23:19

## 2022-09-08 RX ADMIN — ALLOPURINOL 100 MG: 100 TABLET ORAL at 23:18

## 2022-09-08 ASSESSMENT — PAIN SCALES - GENERAL: PAINLEVEL_OUTOF10: 3

## 2022-09-08 ASSESSMENT — PAIN DESCRIPTION - ORIENTATION: ORIENTATION: LOWER

## 2022-09-08 ASSESSMENT — PAIN DESCRIPTION - DESCRIPTORS: DESCRIPTORS: ACHING

## 2022-09-08 ASSESSMENT — PAIN - FUNCTIONAL ASSESSMENT: PAIN_FUNCTIONAL_ASSESSMENT: NONE - DENIES PAIN

## 2022-09-08 ASSESSMENT — PAIN DESCRIPTION - LOCATION: LOCATION: BACK

## 2022-09-08 NOTE — ED NOTES
1754 Perfect serve sent to Dr. Cristiana Doan  09/08/22 1751    56 consult completed with call back from Dr. Fabio Weldon speaking with Emi Adam  09/08/22 [phone]

## 2022-09-08 NOTE — ED PROVIDER NOTES
ATTENDING PHYSICIAN NOTE       Date of evaluation: 2022    Chief Complaint     Extremity Weakness (Via ems, pt reports her rt arm and leg are weak, pt describes it as \"a fish flopping. \" Pt reports this has been happening for years. Pt denies tingling and numbness. Pt reports hx of high potassium and states this happens with that. Pt on 4L nasal canulla at baseline. Pt has hx of copd. Sating 98% at this time. Pt denies worsening sob, denies cp.)      History of Present Illness     Brianne Bruce is a 61 y.o. female who presents with extremity weakness and shakiness. Reports that symptoms have progressively worsened over the last few days. At baseline she says she has right arm and right leg are weakness which has not worsened today. Reports that her shakiness feels like her arms are fish flopping. Denies any recent fever, appetite change, vision changes, sore throat, congestion, sinus pressure, chest pain, palpitations, lower extremity swelling, orthopnea, shortness of breath, cough, vomiting, abdominal pain, changes in bowel or bladder. She has remained on her home 4 L of supplemental oxygen for COPD. She denies any sick contacts. She has been compliant on her diabetic medications. Review of Systems     Review of Systems   All other systems reviewed and are negative. Past Medical, Surgical, Family, and Social History     She has a past medical history of Anxiety, Arthritis, CHF (congestive heart failure) ([address] COPD (chronic obstructive pulmonary disease) ([address] Depression, Diabetes mellitus ([address] Diverticulosis, Hyperlipidemia, Hypertension, and Obesities, morbid ([address] She has a past surgical history that includes Endometrial ablation;  section; Colonoscopy; and Endoscopy, colon, diagnostic. Her family history includes Colon Cancer in her mother; Heart Disease in her father; High Cholesterol in her father. She reports that she quit smoking about 7 years ago.  Her smoking use included cigarettes. She has a 80.00 pack-year smoking history. She has never used smokeless tobacco. She reports that she does not drink alcohol and does not use drugs. Medications     Current Discharge Medication List        CONTINUE these medications which have NOT CHANGED    Details   !! esomeprazole Magnesium (NEXIUM) 40 MG PACK Take 40 mg by mouth daily      allopurinol (ZYLOPRIM) 100 MG tablet Take 100 mg by mouth daily      TRULICITY 1.5 SP/5.8NK SOPN Inject 0.75 mg into the skin once a week Takes on Fridays due tomorrow      hydrOXYzine pamoate (VISTARIL) 25 MG capsule Take 1 capsule by mouth 3 times daily      magnesium oxide (MAG-OX) 400 MG tablet Take 400 mg by mouth daily      EPINEPHrine (EPIPEN) 0.3 MG/0.3ML SOAJ injection Inject 0.3 mLs into the muscle Allergy of unknown origin      traZODone (DESYREL) 100 MG tablet Take 100 mg by mouth nightly      meloxicam (MOBIC) 7.5 MG tablet Take 15 mg by mouth daily       pregabalin (LYRICA) 100 MG capsule Take 150 mg by mouth three times daily. glipiZIDE (GLUCOTROL XL) 5 MG extended release tablet Take 10 mg by mouth daily      oxyCODONE-acetaminophen (PERCOCET) 7.5-325 MG per tablet Take 1 tablet by mouth every 4 hours as needed for Pain .       insulin detemir (LEVEMIR) 100 UNIT/ML injection pen Inject 50 Units into the skin nightly  Qty: 5 Pen, Refills: 0      losartan (COZAAR) 25 MG tablet Take 2 tablets by mouth daily  Qty: 30 tablet, Refills: 1      albuterol sulfate  (90 BASE) MCG/ACT inhaler Inhale 2 puffs into the lungs every 6 hours as needed for Wheezing      nystatin (MYCOSTATIN) 179562 UNIT/GM cream Apply topically nightly Apply under breasts at night      OXYGEN Inhale 4 L into the lungs      insulin aspart (NOVOLOG) 100 UNIT/ML injection pen Inject into the skin 3 times daily (before meals) SSI.      metFORMIN (GLUCOPHAGE) 500 MG tablet Take 1,000 mg by mouth 2 times daily (with meals)       aspirin 81 MG EC tablet Take 1 tablet by mouth daily  Qty: 30 tablet, Refills: 3      ferrous sulfate (FE TABS) 325 (65 FE) MG EC tablet Take 1 tablet by mouth 3 times daily (with meals)  Qty: 90 tablet, Refills: 2      Insulin Syringes, Disposable, U-100 0.3 ML MISC 1 each by Does not apply route daily  Qty: 100 each, Refills: 3      Insulin Pen Needle 29G X 12.7MM MISC 1 each by Does not apply route daily  Qty: 100 each, Refills: 3      glucose monitoring kit (FREESTYLE) monitoring kit 1 kit by Does not apply route daily as needed  Qty: 1 kit, Refills: 0      nystatin (MYCOSTATIN) 016510 UNIT/GM powder Three times daily on the anterior abdominal wall and inguinal area. Use for 2 weeks. Qty: 2 Bottle, Refills: 0      escitalopram (LEXAPRO) 20 MG tablet Take 40 mg by mouth daily       Simvastatin (ZOCOR PO) Take 20 mg by mouth nightly       dicyclomine (BENTYL) 10 MG capsule Take 10 mg by mouth 3 times daily. !! Esomeprazole Magnesium (NEXIUM PO) Take 40 mg by mouth daily        ! ! - Potential duplicate medications found. Please discuss with provider. Allergies     She is allergic to dilaudid [hydromorphone hcl], lisinopril, morphine, and pcn [penicillins]. Physical Exam     INITIAL VITALS: BP: 117/81, Temp: 97.6 °F (36.4 °C), Heart Rate: 84, Resp: 18, SpO2: 98 %   Physical Exam  Vitals and nursing note reviewed. Constitutional:       General: She is not in acute distress. Appearance: She is not toxic-appearing. HENT:      Head: Normocephalic and atraumatic. Right Ear: External ear normal.      Left Ear: External ear normal.      Nose: Nose normal.      Mouth/Throat:      Pharynx: Oropharynx is clear. No oropharyngeal exudate or posterior oropharyngeal erythema. Eyes:      Conjunctiva/sclera: Conjunctivae normal.   Cardiovascular:      Rate and Rhythm: Normal rate and regular rhythm. Pulses: Normal pulses. Heart sounds: Normal heart sounds.    Pulmonary:      Effort: Pulmonary effort is normal.      Breath sounds: Normal breath sounds. Abdominal:      General: There is no distension. Palpations: Abdomen is soft. Tenderness: There is no abdominal tenderness. There is no right CVA tenderness, left CVA tenderness, guarding or rebound. Musculoskeletal:         General: Normal range of motion. Cervical back: Normal range of motion and neck supple. No rigidity. Skin:     General: Skin is warm. Capillary Refill: Capillary refill takes less than 2 seconds. Findings: No bruising or erythema. Neurological:      General: No focal deficit present. Cranial Nerves: No cranial nerve deficit. Sensory: No sensory deficit. Motor: No weakness. Coordination: Coordination normal.      Gait: Gait normal.   Psychiatric:         Mood and Affect: Mood normal.         Behavior: Behavior normal.       Diagnostic Results     EKG   I interpreted the EKG and note normal sinus rhythm, ventricular rate of 88, normal QRS, QTC prolonged at 503, no new ST abnormalities when compared to prior. RADIOLOGY:  CTA HEAD NECK W CONTRAST   Final Result   1. No acute intracranial abnormality. 2. No acute arterial abnormality or hemodynamically significant arterial   stenosis in the head or neck. CT HEAD WO CONTRAST   Final Result   1. No acute intracranial abnormality. 2. No acute arterial abnormality or hemodynamically significant arterial   stenosis in the head or neck. XR CHEST PORTABLE   Final Result   Cardiomegaly with associated mild pulmonary vascular congestion.              LABS:   Results for orders placed or performed during the hospital encounter of 09/08/22   COVID-19, Rapid    Specimen: Nasopharyngeal Swab   Result Value Ref Range    SARS-CoV-2, NAAT Not Detected Not Detected   CBC with Auto Differential   Result Value Ref Range    WBC 5.4 4.0 - 11.0 K/uL    RBC 4.27 4.00 - 5.20 M/uL    Hemoglobin 11.4 (L) 12.0 - 16.0 g/dL    Hematocrit 36.7 36.0 - 48.0 %    MCV 86.0 80.0 - 100.0 fL    MCH 26.6 26.0 - 34.0 pg    MCHC 31.0 31.0 - 36.0 g/dL    RDW 14.2 12.4 - 15.4 %    Platelets 835 851 - 727 K/uL    MPV 8.7 5.0 - 10.5 fL    Neutrophils % 69.3 %    Lymphocytes % 22.5 %    Monocytes % 6.0 %    Eosinophils % 1.8 %    Basophils % 0.4 %    Neutrophils Absolute 3.7 1.7 - 7.7 K/uL    Lymphocytes Absolute 1.2 1.0 - 5.1 K/uL    Monocytes Absolute 0.3 0.0 - 1.3 K/uL    Eosinophils Absolute 0.1 0.0 - 0.6 K/uL    Basophils Absolute 0.0 0.0 - 0.2 K/uL   CMP w/ Reflex to MG   Result Value Ref Range    Sodium 139 136 - 145 mmol/L    Potassium reflex Magnesium 5.4 (H) 3.5 - 5.1 mmol/L    Chloride 100 99 - 110 mmol/L    CO2 30 21 - 32 mmol/L    Anion Gap 9 3 - 16    Glucose 176 (H) 70 - 99 mg/dL    BUN 23 (H) 7 - 20 mg/dL    Creatinine 0.9 0.6 - 1.1 mg/dL    GFR Non-African American >60 >60    GFR African American >60 >60    Calcium 9.1 8.3 - 10.6 mg/dL    Total Protein 7.1 6.4 - 8.2 g/dL    Albumin 3.9 3.4 - 5.0 g/dL    Albumin/Globulin Ratio 1.2 1.1 - 2.2    Total Bilirubin <0.2 0.0 - 1.0 mg/dL    Alkaline Phosphatase 87 40 - 129 U/L    ALT 14 10 - 40 U/L    AST 16 15 - 37 U/L   Lipase   Result Value Ref Range    Lipase 11.0 (L) 13.0 - 60.0 U/L   Troponin   Result Value Ref Range    Troponin <0.01 <0.01 ng/mL   Urinalysis with Reflex to Culture    Specimen: Urine   Result Value Ref Range    Color, UA Yellow Straw/Yellow    Clarity, UA SL CLOUDY (A) Clear    Glucose, Ur Negative Negative mg/dL    Bilirubin Urine SMALL (A) Negative    Ketones, Urine Negative Negative mg/dL    Specific Gravity, UA >=1.030 1.005 - 1.030    Blood, Urine Negative Negative    pH, UA 6.0 5.0 - 8.0    Protein, UA TRACE (A) Negative mg/dL    Urobilinogen, Urine 0.2 <2.0 E.U./dL    Nitrite, Urine Negative Negative    Leukocyte Esterase, Urine Negative Negative    Microscopic Examination YES     Urine Type NotGiven     Urine Reflex to Culture Not Indicated    Blood Gas, Venous   Result Value Ref Range    pH, Isaac 7.226 (L) 7.350 - 7.450    pCO2, Isaac 77.5 (H) 40.0 - 50.0 mmHg    pO2, Isaac 70.5 (H) 25.0 - 40.0 mmHg    HCO3, Venous 31.4 (H) 23.0 - 29.0 mmol/L    Base Excess, Isaac 1.8 -3.0 - 3.0 mmol/L    O2 Sat, Isaac 90 Not Established %    Carboxyhemoglobin 3.1 (H) 0.0 - 1.5 %    MetHgb, Isaac 0.3 <1.5 %    TC02 (Calc), Isaac 34 Not Established mmol/L    O2 Content, Isaac 16 Not Established VOL %    O2 Therapy Unknown    Brain Natriuretic Peptide   Result Value Ref Range    Pro- (H) 0 - 124 pg/mL   Microscopic Urinalysis   Result Value Ref Range    WBC, UA 0-2 0 - 5 /HPF    RBC, UA 0-2 0 - 4 /HPF    Epithelial Cells, UA 11-20 (A) 0 - 5 /HPF   POCT Glucose   Result Value Ref Range    POC Glucose 245 (H) 70 - 99 mg/dl    Performed on ACCU-CHEK    EKG 12 Lead   Result Value Ref Range    Ventricular Rate 88 BPM    Atrial Rate 88 BPM    P-R Interval 190 ms    QRS Duration 92 ms    Q-T Interval 416 ms    QTc Calculation (Bazett) 503 ms    P Axis 43 degrees    R Axis 33 degrees    T Axis 45 degrees    Diagnosis       Sinus rhythm with Premature atrial complexesLow voltage QRSSeptal infarct (cited on or before 13-JUL-2021)Prolonged QTNonspecific ST abnormalityWhen compared with ECG of 10-AUG-2021 11:01,Premature atrial complexes are now PresentConfirmed by MUKUND WINTER MD ([id number]) on 9/8/2022 6:17:26 PM         ED BEDSIDE ULTRASOUND:  No results found. RECENT VITALS:  BP: (!) 164/87,Temp: 98.7 °F (37.1 °C), Heart Rate: 94, Resp: 18, SpO2: 98 %     Procedures         ED Course     Nursing Notes, Past Medical Hx, Past Surgical Hx, Social Hx,Allergies, and Family Hx were reviewed.          patient was given the following medications:  Orders Placed This Encounter   Medications    DISCONTD: albuterol sulfate HFA (PROVENTIL;VENTOLIN;PROAIR) 108 (90 Base) MCG/ACT inhaler 2 puff     Order Specific Question:   Initiate RT Bronchodilator Protocol     Answer:   Yes - Inpatient Protocol    allopurinol (ZYLOPRIM) tablet 100 mg    aspirin EC tablet 81 mg    dicyclomine (BENTYL) capsule 10 mg    escitalopram (LEXAPRO) tablet 40 mg    glipiZIDE (GLUCOTROL) tablet 5 mg    hydrOXYzine HCl (ATARAX) tablet 25 mg    magnesium oxide (MAG-OX) tablet 400 mg    meloxicam (MOBIC) tablet 15 mg    metFORMIN (GLUCOPHAGE) tablet 1,000 mg    oxyCODONE-acetaminophen (PERCOCET) 7.5-325 MG per tablet 1 tablet    pregabalin (LYRICA) capsule 300 mg    traZODone (DESYREL) tablet 100 mg    sodium chloride flush 0.9 % injection 5-40 mL    sodium chloride flush 0.9 % injection 5-40 mL    0.9 % sodium chloride infusion    enoxaparin (LOVENOX) injection 40 mg     Order Specific Question:   Indication of Use     Answer:   Prophylaxis-DVT/PE    OR Linked Order Group     ondansetron (ZOFRAN-ODT) disintegrating tablet 4 mg     ondansetron (ZOFRAN) injection 4 mg    polyethylene glycol (GLYCOLAX) packet 17 g    OR Linked Order Group     acetaminophen (TYLENOL) tablet 650 mg     acetaminophen (TYLENOL) suppository 650 mg    insulin lispro (HUMALOG) injection vial 0-4 Units    insulin lispro (HUMALOG) injection vial 0-4 Units    iopamidol (ISOVUE-370) 76 % injection 85 mL    albuterol sulfate HFA (PROVENTIL;VENTOLIN;PROAIR) 108 (90 Base) MCG/ACT inhaler 2 puff     Order Specific Question:   Initiate RT Bronchodilator Protocol     Answer:   Yes - Inpatient Protocol       CONSULTS:  IP CONSULT TO HOSPITALIST    MEDICAL DECISIONMAKING / ASSESSMENT / Leopoldo Closs is a 61 y.o. female who presents with extremity weakness. Patient presented normotensive, afebrile, heart rate of 84, normal respiratory rate of 18 and satting at 98% on room air. On exam she does appear tremulous but otherwise she is neuro intact. Given history and exam my differential diagnosis includes but is not limited to DKA, electrolyte abnormalities, FREDRICK, dehydration, COVID-19 infection, pneumonia, COPD exacerbation. Again she has no focal neurologic deficits to suggest acute stroke or hemorrhagic change.   She has never been followed for neurology regarding this issue. I obtained labs and imaging studies as noted below    I interpreted the labs and note  CBC with normal white blood cell count, decreased hemoglobin to 11.4 but normal hematocrit at 36.7, normal platelet cell count  CMP with hyperkalemia to 5.4, normal sodium, elevated BUN to 23, normal creatinine and GFR, hyperglycemic to 176, normal LFTs and T bili, low lipase at 11  Troponin negative at less than 0.01  UA with no evidence of infection    Interpreted the imaging and note  Chest x-ray significant for cardiomegaly and mild pulmonary vascular congestion  CT head without contrast with no acute abnormality  CTA head and neck with no acute abnormality or stenosis    We attempted to ambulate patient however she felt extremely unstable. At this time I felt she was a fall risk. We are unable to obtain PT or OT assessment given timeframe here in the emergency department. Given this I admitted patient to medicine team.  Patient is amenable with admission. All questions and concerns were addressed. Critical Care: I spent 25 minutes providing critical care. This time excludes time spent performing procedures but includes time spent on direct patient care, history retrieval, review of the chart, and discussions with patient, family, and consultant(s). Clinical Impression     1. Generalized weakness    2. Class 3 severe obesity with body mass index (BMI) of 50.0 to 59.9 in adult, unspecified obesity type, unspecified whether serious comorbidity present ([facility] [address]    3. Hyperkalemia        Disposition     PATIENT REFERRED TO:  No follow-up provider specified.     DISCHARGE MEDICATIONS:  Current Discharge Medication List          DISPOSITION Admitted 09/08/2022 05:58:56 PM          Cori Ayon MD  09/08/22 [id number]

## 2022-09-09 VITALS
SYSTOLIC BLOOD PRESSURE: 137 MMHG | OXYGEN SATURATION: 97 % | BODY MASS INDEX: 55.32 KG/M2 | WEIGHT: 293 LBS | RESPIRATION RATE: 16 BRPM | HEIGHT: 61 IN | HEART RATE: 76 BPM | DIASTOLIC BLOOD PRESSURE: 72 MMHG | TEMPERATURE: 98.2 F

## 2022-09-09 PROBLEM — E66.813 CLASS 3 SEVERE OBESITY WITH BODY MASS INDEX (BMI) OF 50.0 TO 59.9 IN ADULT: Status: ACTIVE | Noted: 2019-07-26

## 2022-09-09 PROBLEM — E87.5 HYPERKALEMIA: Status: ACTIVE | Noted: 2022-09-09

## 2022-09-09 LAB
ANION GAP SERPL CALCULATED.3IONS-SCNC: 10 MMOL/L (ref 3–16)
BUN BLDV-MCNC: 20 MG/DL (ref 7–20)
CALCIUM SERPL-MCNC: 9.2 MG/DL (ref 8.3–10.6)
CHLORIDE BLD-SCNC: 100 MMOL/L (ref 99–110)
CO2: 28 MMOL/L (ref 21–32)
CREAT SERPL-MCNC: 0.9 MG/DL (ref 0.6–1.1)
GFR AFRICAN AMERICAN: >60
GFR NON-AFRICAN AMERICAN: >60
GLUCOSE BLD-MCNC: 182 MG/DL (ref 70–99)
GLUCOSE BLD-MCNC: 221 MG/DL (ref 70–99)
GLUCOSE BLD-MCNC: 229 MG/DL (ref 70–99)
GLUCOSE BLD-MCNC: 245 MG/DL (ref 70–99)
PERFORMED ON: ABNORMAL
POTASSIUM SERPL-SCNC: 5.3 MMOL/L (ref 3.5–5.1)
SODIUM BLD-SCNC: 138 MMOL/L (ref 136–145)

## 2022-09-09 PROCEDURE — 80048 BASIC METABOLIC PNL TOTAL CA: CPT

## 2022-09-09 PROCEDURE — 97116 GAIT TRAINING THERAPY: CPT

## 2022-09-09 PROCEDURE — 97166 OT EVAL MOD COMPLEX 45 MIN: CPT

## 2022-09-09 PROCEDURE — 2580000003 HC RX 258: Performed by: INTERNAL MEDICINE

## 2022-09-09 PROCEDURE — 36415 COLL VENOUS BLD VENIPUNCTURE: CPT

## 2022-09-09 PROCEDURE — 97162 PT EVAL MOD COMPLEX 30 MIN: CPT

## 2022-09-09 PROCEDURE — 99220 PR INITIAL OBSERVATION CARE/DAY 70 MINUTES: CPT | Performed by: INTERNAL MEDICINE

## 2022-09-09 PROCEDURE — 97530 THERAPEUTIC ACTIVITIES: CPT

## 2022-09-09 PROCEDURE — 96372 THER/PROPH/DIAG INJ SC/IM: CPT

## 2022-09-09 PROCEDURE — 97535 SELF CARE MNGMENT TRAINING: CPT

## 2022-09-09 PROCEDURE — 6370000000 HC RX 637 (ALT 250 FOR IP): Performed by: INTERNAL MEDICINE

## 2022-09-09 PROCEDURE — 94761 N-INVAS EAR/PLS OXIMETRY MLT: CPT

## 2022-09-09 PROCEDURE — 97110 THERAPEUTIC EXERCISES: CPT

## 2022-09-09 PROCEDURE — 2700000000 HC OXYGEN THERAPY PER DAY

## 2022-09-09 PROCEDURE — 96374 THER/PROPH/DIAG INJ IV PUSH: CPT

## 2022-09-09 PROCEDURE — 6360000002 HC RX W HCPCS: Performed by: INTERNAL MEDICINE

## 2022-09-09 PROCEDURE — G0378 HOSPITAL OBSERVATION PER HR: HCPCS

## 2022-09-09 RX ORDER — FUROSEMIDE 10 MG/ML
40 INJECTION INTRAMUSCULAR; INTRAVENOUS ONCE
Status: COMPLETED | OUTPATIENT
Start: 2022-09-09 | End: 2022-09-09

## 2022-09-09 RX ADMIN — ALLOPURINOL 100 MG: 100 TABLET ORAL at 08:37

## 2022-09-09 RX ADMIN — MELOXICAM 15 MG: 15 TABLET ORAL at 08:37

## 2022-09-09 RX ADMIN — ESCITALOPRAM OXALATE 40 MG: 10 TABLET ORAL at 08:37

## 2022-09-09 RX ADMIN — METFORMIN HYDROCHLORIDE 1000 MG: 500 TABLET ORAL at 08:37

## 2022-09-09 RX ADMIN — INSULIN LISPRO 1 UNITS: 100 INJECTION, SOLUTION INTRAVENOUS; SUBCUTANEOUS at 12:00

## 2022-09-09 RX ADMIN — Medication 400 MG: at 08:37

## 2022-09-09 RX ADMIN — ASPIRIN 81 MG: 81 TABLET, COATED ORAL at 08:37

## 2022-09-09 RX ADMIN — INSULIN LISPRO 1 UNITS: 100 INJECTION, SOLUTION INTRAVENOUS; SUBCUTANEOUS at 08:38

## 2022-09-09 RX ADMIN — GLIPIZIDE 5 MG: 5 TABLET ORAL at 06:16

## 2022-09-09 RX ADMIN — PREGABALIN 300 MG: 100 CAPSULE ORAL at 08:37

## 2022-09-09 RX ADMIN — Medication 10 ML: at 08:38

## 2022-09-09 RX ADMIN — FUROSEMIDE 40 MG: 10 INJECTION, SOLUTION INTRAMUSCULAR; INTRAVENOUS at 12:00

## 2022-09-09 RX ADMIN — ENOXAPARIN SODIUM 40 MG: 100 INJECTION SUBCUTANEOUS at 08:37

## 2022-09-09 RX ADMIN — DICYCLOMINE HYDROCHLORIDE 10 MG: 10 CAPSULE ORAL at 15:22

## 2022-09-09 RX ADMIN — DICYCLOMINE HYDROCHLORIDE 10 MG: 10 CAPSULE ORAL at 08:37

## 2022-09-09 ASSESSMENT — PAIN SCALES - GENERAL: PAINLEVEL_OUTOF10: 0

## 2022-09-09 NOTE — PROGRESS NOTES
Inpatient Physical Therapy Daily Treatment Note    Unit: [facility]  Date:  9/9/2022  Patient Name:    Kerrie Sierra  Admitting diagnosis:  Weakness [R53.1]  Admit Date:  9/8/2022  Precautions/Restrictions/WB Status/ Lines/ Wounds/ Oxygen: Fall risk, Bed/chair alarm, and Lines -Supplemental O2 (4L)    Treatment Time:  7238-0721  Treatment Number:  1   Timed Code Treatment Minutes: 60 minutes  Total Treatment Minutes:  60  minutes    Patient Goals for Therapy: pt now says she would like to go home,  in agreeement.  asks that we help pt trial walking across room (simulating chair<>bathroom at home). He says if she can do this he would like to take her home. Discharge Recommendations: Home 24 hr assist  and Home PT  DME needs for discharge: Needs Met       Therapy recommendation for EMS Transport: can transport by wheelchair    Therapy recommendations for staff:   Assist of 1 with use of rolling walker (RW) and gait belt for all stand pivot ambulation to/from bathroom    History of Present Illness: Per Dr. Queen Richards H&P 509: \"61year-old female the history of morbid obesity, COPD, chronic hypoxic respiratory failure, diastolic congestive heart failure, type 2 diabetes presented emergency room with generalized weakness and inability to walk. She states she is not able to take any steps because her legs just give out. Also she feels like her arms and legs sometimes jerk on their own. Denies any other complaints. Denies any chest pain or shortness of breath no cough or fever\"    Home Health S4 Level Recommendation:  NA  AM-PAC Mobility Score    AM-PAC Inpatient Mobility Raw Score : 22       Pain   Yes  Location: low back  Rating: mild /10  Pain Medicine Status: No request made    Cognition    A&O x4   Able to follow 2 step commands    Subjective  Patient lying supine in bed with family at bedside. Pt agreeable to this PT/OT cotx to progress mobility.  Says she's starting to feel stronger, less shaylee. Pt and  say this is a typical presentation, ie relatively rapid onset of symptoms and rapid resolution within a few days, typically under medical management. Coordination and Tone  No tremors at rest. Also no tremors now noted with ROM and mobility. Balance  Sitting:  Normal; Supervision  Comments: at EOB and toilet. Standing: Good - ; CGA  Comments: able to take steps, rotate, pivot without UE support. Bed Mobility   Supine to Sit:    Supervision  Sit to Supine:   Not Tested  Rolling:   Not Tested  Scooting in sitting: Supervision  Scooting in supine:  Not Tested    Transfer Training     Sit to stand:   CGA from EOB, w/c x numerous trials, recliner, std height commode. Stand to sit:   CGA  Bed > Chair:   CGA with use of No AD and gait belt. Pt able to stand from wheelchair parked outside bathroom door and walk in to sit on the commode without walker. Gait gait completed as indicated below  Distance:      8 ft + 20 ft + 10 ft  Deviations (firm surface/linoleum):  increased DEANNA, M/L sway (likely 2/2 body habitus)  Assistive Device Used:    rolling walker (RW) - bariatric. Level of Assist:    CGA  Comment: Steady throughout. Stair Training stairs completed as indicated below  # of Steps:   4  Level of Assist:  CGA  UE Support:  bilateral  Assistive Device:  No AD  Pattern:   non-reciprocal pattern  Comments: Relative ease. Activity Tolerance   Pt completed therapy session with No adverse symptoms noted w/activity. VSS throughout. Positioning Needs   Pt up in chair, alarm set, positioned in proper neutral alignment and pressure relief provided. Call light provided and all needs within reach    Exercises Initiated  Cony deferred secondary to treatment focus on functional mobility  NA    Other  None.     Patient/Family Education   Pt educated on role of inpatient PT, POC, importance of continued activity, DC recommendations, HEP, and calling for assist with mobility. Assessment  2nd therapy session of the day at the request of case management, who reports family is now requesting pt d/c home vs SNF. Upon re-assessment this afternoon, pt is now demonstrating no intention tremors and no tremors with resistance/weight bearing in any extremities. Staff also reports observation of this change over course of the day today. Pt was able to ambulate multiple household-distance bouts with walker at [facility]/SBA and relative ease. She demonstrated ability to transfer on/off std height toilet at SBA and ascend/descend 4 stairs as needed for home entry. Pt appears to be now functioning at or very near her baseline. Recommending Home 24 hr assist and with home PT upon discharge as patient functioning close to baseline level and would benefit from continued therapy services to ensure safety at home given recent dramatic change in level of function which has only resolved over last few hours. Goals : To be met in 3 visits:  1). Independent with LE/Trunk Ex x 10 reps    To be met in 6 visits:  1). Supine to/from sit: SBA  2). Sit to/from stand: Supervision  3). Bed to chair: Supervision  4). Gait: Ambulate  25 ft.   with  SBA and use of LRAD (least restrictive assistive device)  5). Tolerate B LE exercises 3 sets of 10-15 reps    Rehabilitation Potential: Good  Strengths for achieving goals include:   Pt motivated, PLOF, Family Support, and Pt cooperative   Barriers to achieving goals include:    No Barriers    Plan    To be seen 3-5 x / week  while in acute care setting for therapeutic exercises, bed mobility, transfers, progressive gait training, balance training, and family/patient education. Signature: Delrae Babinski, PT, DPT    If patient discharges from this facility prior to next visit, this note will serve as the Discharge Summary.

## 2022-09-09 NOTE — CARE COORDINATION
Case Management Assessment  Initial Evaluation      Patient Name: Eliza Lock  YOB: 1965  Diagnosis: Weakness [R53.1]  Date / Time: 9/8/2022  1:36 PM    Admission status/Date:09/08/2022 Observation   Chart Reviewed: Yes      Patient Interviewed: Yes   Family Interviewed:  Yes - , son and his girlfriend with pt's permission      Hospitalization in the last 30 days:  No    Health Care Decision Maker : pt did not address    Met with: pt and family at bedside     Current PCP: Eli Jefferson [address] required for SNF : Y          3 night stay required -  N    ADLS  Support Systems/Care Needs: Spouse/Significant Other, Children, Family Members  Transportation:       Meal Preparation:     Housing  Living Arrangements: pt lives at home with her  who is with her 24/7  Steps: 3  Intent for return to present living arrangements: Yes  Identified Issues: Dex Chaudhry  Active with [email or web address] : No Agency:(Services)  Type of Home Care Services: None  Passport/Waiver : No  :                      Phone Number:    Passport/Waiver Services: n/a          Durable Medical Equiptment   DME Provider: [address]  [phone] confirmed pt has home o2 at 2 liters cont. Equipment:   Walker__x_Cane__x_RTS___ BSC__x (dont use)_Shower Chair___Hospital Bed___W/C_x___Other________  02 at _4___Liter(s)---wears(frequency)___cont____ HHN ___ CPAP___ BiPap___   N/A____    Home O2 Use :  Yes    If No for home O2---if presently on O2 during hospitalization:  Yes  if yes CM to follow for potential DC O2 need  Informed of need for care provider to bring portable home O2 tank on day of discharge for nursing to connect prior to leaving:   Yes  Verbalized agreement/Understanding:   Yes    Community Service Affiliation  Dialysis:  No    Agency:  Location:  Dialysis Schedule:  Phone:   Fax:     Other Community Services: n/a    DISCHARGE PLAN: Explained Case Management role/services. Chart review completed. Met with pt and family at bedside. Pt stated she is normally independent at home and has no current services. Discussed PT recommendations for SNF and provided her with a SNF list. Both pt and  are interested in working with PT/OT to see if she is able to go home rather than SNF. Explained it is there choice and the goal is home if they feel comfortable with going home. Pt's  stated that if pt is able to walk to the bathroom, etc then he would feel comfortable caring for pt at home. Explained [place] [facility] [id number] vs SNF regarding therapy. [facility] [place] PT stated that OT will see pt this PM and will work with pt/; they are aware. Fran Blake at [facility] [address] stated they have no current openings. Pt and family aware. Provided them with a [place] [id number] list as well. CM will follow. Please notify CM if needs or concerns arise.      Ursula Merino MSW, SHARONS

## 2022-09-09 NOTE — PROGRESS NOTES
RT Inhaler-Nebulizer Bronchodilator Protocol Note    There is a bronchodilator order in the chart from a provider indicating to follow the RT Bronchodilator Protocol and there is an Initiate RT Inhaler-Nebulizer Bronchodilator Protocol order as well (see protocol at bottom of note). CXR Findings:  XR CHEST PORTABLE    Result Date: 9/8/2022  Cardiomegaly with associated mild pulmonary vascular congestion. The findings from the last RT Protocol Assessment were as follows:   History Pulmonary Disease: Chronic pulmonary disease  Respiratory Pattern: Regular pattern and RR 12-20 bpm  Breath Sounds: Clear breath sounds  Cough: Strong, spontaneous, non-productive  Indication for Bronchodilator Therapy:    Bronchodilator Assessment Score: 2    Aerosolized bronchodilator medication orders have been revised according to the RT Inhaler-Nebulizer Bronchodilator Protocol below. Respiratory Therapist to perform RT Therapy Protocol Assessment initially then follow the protocol. Repeat RT Therapy Protocol Assessment PRN for score 0-3 or on second treatment, BID, and PRN for scores above 3. No Indications - adjust the frequency to every 6 hours PRN wheezing or bronchospasm, if no treatments needed after 48 hours then discontinue using Per Protocol order mode. If indication present, adjust the RT bronchodilator orders based on the Bronchodilator Assessment Score as indicated below. Use Inhaler orders unless patient has one or more of the following: on home nebulizer, not able to hold breath for 10 seconds, is not alert and oriented, cannot activate and use MDI correctly, or respiratory rate 25 breaths per minute or more, then use the equivalent nebulizer order(s) with same Frequency and PRN reasons based on the score. If a patient is on this medication at home then do not decrease Frequency below that used at home.     0-3 - enter or revise RT bronchodilator order(s) to equivalent RT Bronchodilator order with Frequency of every 4 hours PRN for wheezing or increased work of breathing using Per Protocol order mode. 4-6 - enter or revise RT Bronchodilator order(s) to two equivalent RT bronchodilator orders with one order with BID Frequency and one order with Frequency of every 4 hours PRN wheezing or increased work of breathing using Per Protocol order mode. 7-10 - enter or revise RT Bronchodilator order(s) to two equivalent RT bronchodilator orders with one order with TID Frequency and one order with Frequency of every 4 hours PRN wheezing or increased work of breathing using Per Protocol order mode. 11-13 - enter or revise RT Bronchodilator order(s) to one equivalent RT bronchodilator order with QID Frequency and an Albuterol order with Frequency of every 4 hours PRN wheezing or increased work of breathing using Per Protocol order mode. Greater than 13 - enter or revise RT Bronchodilator order(s) to one equivalent RT bronchodilator order with every 4 hours Frequency and an Albuterol order with Frequency of every 2 hours PRN wheezing or increased work of breathing using Per Protocol order mode.          Electronically signed by Barbara Mcdonald RCP on 9/8/2022 at 8:48 PM

## 2022-09-09 NOTE — PROGRESS NOTES
Occupational Therapy  Inpatient Occupational Therapy  Evaluation and Treatment    Unit: [facility]  Date:  9/9/2022  Patient Name:    Virginia Ellsworth  Admitting diagnosis:  Weakness [R53.1]  Admit Date:  9/8/2022  Precautions/Restrictions/WB Status/ Lines/ Wounds/ Oxygen: Fall risk, Bed/chair alarm, and Lines -Supplemental O2 (4L)     Treatment Time:  8461-8530  Treatment Number: 1     Billable Treatment Time: 50 minutes   Total Treatment Time:   60   minutes    Patient Goals for Therapy:  \" Go home \"      Discharge Recommendations: Home with PRN assist and home therapy  DME needs for discharge: defer to home OT       Therapy recommendations for staff:   Assist of 1 with use of rolling walker (RW) for all transfers to/from bathroom    History of Present Illness:  Per Dr. Shelley Cifuentes H&P 509: \"61year-old female the history of morbid obesity, COPD, chronic hypoxic respiratory failure, diastolic congestive heart failure, type 2 diabetes presented emergency room with generalized weakness and inability to walk. She states she is not able to take any steps because her legs just give out. Also she feels like her arms and legs sometimes jerk on their own. Denies any other complaints. Denies any chest pain or shortness of breath no cough or fever\"    Home Health S4 Level Recommendation:  Level 1 Standard  AM-PAC Score: AM-PAC Inpatient Daily Activity Raw Score: 18    Preadmission Environment    Pt. Lives with spouse who is on disability and is available 24/7. Home environment:    one story home  Steps to enter first floor:  3 steps to enter and hand rail bilateral  Steps to second floor: N/A  Bathroom: tub/shower unit and standard height commode  Equipment owned: SW, wc (manual), SPC, home O2 (4L) continous, pulse ox, and reacher     Preadmission Status:  Pt. Able to drive: No  Pt Fully independent with ADLs: No - pt bathes indep (sitting on edge of tub).   does setup assist for clothing and total assist to don socks and shoes. Pt. Required assistance from family for: Cleaning, Cooking, and Laundry   Pt. independent for transfers and gait and walked within the house (recliner<>bathroom only) with No Device; she uses w/c for community mobility.  provides light physical assist up/down stairs. History of falls Yes    Subjective  Patient lying supine in bed with  present   Pt agreeable to this OT eval & tx. Cognition    A&O x4   Able to follow 2 step commands    Pain  No  Rating:NA  Location:NA  Pain Medicine Status: No request made        Upper Extremity ROM:    WFL,  pt able to perform all bed mobility, transfers, and gait without ROM limitation. Upper Extremity Strength:    Strength Assessment (measured on a 0-5 scale):    RUE:  Shoulder flexion 4-  Elbow flexion  4  Elbow extension 4    LUE:  Shoulder flexion 3+  Elbow flexion  4  Elbow extension 4    Upper Extremity Sensation    WNL    Upper Extremity Proprioception:  WNL    Coordination and Tone  WNL    Balance  Static Sitting:  Good - EOB  Dynamic Sitting: Not tested  Static Standing:  Good - w/o device  Dynamic Standing: Not tested    Bed mobility:    Supine to sit:   SBA  Sit to supine:   Not Tested  Rolling:    Not Tested  Scooting in sitting:  SBA  Scooting to head of bed:   Not Tested    Bridging:   Not Tested    Transfers:    Sit to stand:  CGA  Stand to sit:  CGA  Bed >BSC> WC:  CGA- with RW  WC>Standard toilet: CGA- without AD      Dressing:      UE:   Not Tested  LE:    Max A- Zayraen Estimable-  typically dons socks at home. Bathing:    UE:  Not Tested  LE:  Not Tested    Eating:   Not Tested    Toileting:  Initially Min A for mac care using BSC progressed to  SBA on toilet.     Grooming/hygiene: Not Tested    Activity Tolerance   Pt completed therapy session with No adverse symptoms noted w/activity        BP (mmHg)  HR (bpm) SpO2 (%)   Seated after activity  65 97%         Positioning Needs:   Up in chair, call light and needs in reach. Alarm Set    Exercise / Activities Initiated:   N/A    Patient/Family Education:   Role of OT  Recommendations for DC  Use of AE  Safe RW use/hand placement  Oxygen tubing management    Assessment of Deficits: Pt seen for Occupational therapy evaluation in acute care setting. Pt demonstrated decreased Activity tolerance, ADLs, and Transfers. Pt functioning below baseline and will likely benefit from skilled occupational therapy services to maximize safety and independence. Goal(s) : To be met in 3 Visits:  1). Bed to toilet/BSC: Supervision    To be met in 5 Visits:  1). Supine to/from Sit:  Independent  2). Upper Body Bathing:   Independent  3). Lower Body Bathing:   Supervision  4). Upper Body Dressing:  Independent  5). Lower Body Dressing:  CGA  6). Pt to demonstrate UE exs x 15 reps with minimal cues    Rehabilitation Potential:  Good for goals listed above. Strengths for achieving goals include: PLOF, Family Support, and Pt cooperative  Barriers to achieving goals include:  Complexity of condition     Plan: To be seen 3-5 x/wk while in acute care setting for therapeutic exercises, bed mobility, transfers, dressing, bathing, family/patient education, ADL/IADL retraining, energy conservation training.       Barbara Garay OTR/L #[id number]        If patient discharges from this facility prior to next visit, this note will serve as the Discharge Summary

## 2022-09-09 NOTE — PROGRESS NOTES
Inpatient Physical Therapy Evaluation and Treatment    Unit: [facility]  Date:  9/9/2022  Patient Name:    Keara Yadav  Admitting diagnosis:  Weakness [R53.1]  Admit Date:  9/8/2022  Precautions/Restrictions/WB Status/ Lines/ Wounds/ Oxygen: Fall risk, Bed/chair alarm, and Lines -Supplemental O2 (4L)    Treatment Time:  10:15-11:25  Treatment Number:  1   Timed Code Treatment Minutes: 60 minutes  Total Treatment Minutes:  70  minutes    Patient Goals for Therapy: assistance up to [facility]        Discharge Recommendations: SNF  DME needs for discharge: defer to facility       Therapy recommendation for EMS Transport: requires transport by cot due to need for assist x 2 for safe transfers. Therapy recommendations for staff:   Assist of 2 with use of No AD and gait belt for all stand pivot transfers to/from [facility]  to/from chair    History of Present Illness: Per Dr. Senthil Pak H&P 509: \"61year-old female the history of morbid obesity, COPD, chronic hypoxic respiratory failure, diastolic congestive heart failure, type 2 diabetes presented emergency room with generalized weakness and inability to walk. She states she is not able to take any steps because her legs just give out. Also she feels like her arms and legs sometimes jerk on their own. Denies any other complaints. Denies any chest pain or shortness of breath no cough or fever\"    Home Health S4 Level Recommendation:  NA  AM-PAC Mobility Score    AM-PAC Inpatient Mobility Raw Score : 14       Preadmission Environment    Pt. Lives with spouse who is on disability and is available 24/7.    Home environment:  one story home  Steps to enter first floor:  3 steps to enter and hand rail bilateral  Steps to second floor: N/A  Bathroom: tub/shower unit and standard height commode  Equipment owned: SW, wc (manual), SPC, home O2 (4L) continous, pulse ox, and reacher    Preadmission Status:  Pt. Able to drive: No  Pt Fully independent with ADLs: No - pt bathes indep (sitting on edge of tub).  does setup assist for clothing and total assist to don socks and shoes. Pt. Required assistance from family for: Cleaning, Cooking, and Laundry   Pt. independent for transfers and gait and walked within the house (recliner<>bathroom only) with No Device; she uses w/c for community mobility.  provides light physical assist up/down stairs. History of falls Yes    Pain   Yes  Location: low back  Rating: mild /10  Pain Medicine Status: No request made    Cognition    A&O x4   Able to follow 2 step commands    Subjective  Patient lying supine in bed with no family present. Pt agreeable to this PT eval & tx. Pt says shakiness has started in last few days. She says this happens from time to time, usually lasts a few days, and has been chronic over many years. She typically goes to ED and is treated for electrolyte imbalances. Upper Extremity ROM/Strength  Please see OT evaluation. Lower Extremity ROM / Strength   AROM WFL: No  ROM limitations: bilat ankles WFL. R knee WFL. L knee lacking 20* extension passive and active (pt says this has been since falling 5 years ago). Hips WFL but with limitations 2/2 body habitus. Strength Assessment (measured on a 0-5 scale):  R LE   Quad   3+   Ant Tib  3+   Hamstring 3+   Iliopsoas 2+  L LE  Quad   3+   Ant Tib  3+   Hamstring 3+   Iliopsoas 2+  Notable shakiness upon initiation of resistance. Lower Extremity Sensation    [facility]    Lower Extremity Proprioception:   NT    Coordination and Tone  No tremors at rest. UEs and LEs display mild tremors during active range of motion and moderate tremors/shakiness with resistance. Balance  Sitting:  Normal; Supervision  Comments: at EOB. Standing: Fair ; CGA  Comments: brief standing bouts (5-10 seconds) with noted shakiness in bilat LEs.      Bed Mobility   Supine to Sit:    Min A (of note, pt sleeps in recliner at baseline)  Sit to Supine:   Supervision  Rolling:   Not Tested  Scooting in sitting: Supervision  Scooting in supine:  Not Tested    Transfer Training     Sit to stand:   CGA  Stand to sit:   CGA  Bed <> BSC:   CGA with use of No AD and gait belt. Pt declined use of RW. Based on observation of lateral side steps at EOB with walker, agree that use of the walker is probably more cumbersome for her. Comment: close guarding/blocking of knees with concern for buckling. Gait gait completed as indicated below  Distance:      2 ft (lateral stepping to [facility])  Deviations (firm surface/linoleum): Unable to assess gait pattern. LEs mildly flexed. Assistive Device Used:    rolling walker (RW) - bariatric. Level of Assist:    CGA  Comment: LEs visibly fatigued, prompting rapid return to sitting at EOB. Stair Training deferred, pt unsafe/ not appropriate to complete stairs at this time    Activity Tolerance   Pt completed therapy session with No adverse symptoms noted w/activity. VSS throughout. Positioning Needs   Pt in bed, alarm set, positioned in proper neutral alignment and pressure relief provided. Call light provided and all needs within reach    Exercises Initiated  all completed bilaterally unless indicated, in supine  Knee rocks in hooklying x 25 reps (therapist stabilizing feet)  Ankle pumps x 25 reps  SAQ x 10 reps  Hip ABD x 10 reps  Glut sets x 20 reps  Pelvic tilts x 20 reps    Other  None. Patient/Family Education   Pt educated on role of inpatient PT, POC, importance of continued activity, DC recommendations, HEP, and calling for assist with mobility. Assessment  Pt seen for Physical Therapy evaluation in acute care setting. Pt demonstrated decreased Activity tolerance, Balance, ROM, Safety, and Strength as well as decreased independence with Ambulation and Transfers. Pt is normally indep for transfers and short bouts of ambulation within her home without an assistive device. Now able to complete stand pivot transfers only with careful CGA. Tremors noted in BLE with all AROM and functional movement. Recommending SNF upon discharge as patient functioning well below baseline, demonstrates good rehab potential and unable to return home due to burden of care beyond caregiver ability, home environment not conducive to patient recovery, and limited safety awareness. Goals : To be met in 3 visits:  1). Independent with LE/Trunk Ex x 10 reps    To be met in 6 visits:  1). Supine to/from sit: SBA  2). Sit to/from stand: Supervision  3). Bed to chair: Supervision  4). Gait: Ambulate  25 ft.   with  SBA and use of LRAD (least restrictive assistive device)  5). Tolerate B LE exercises 3 sets of 10-15 reps    Rehabilitation Potential: Good  Strengths for achieving goals include:   Pt motivated, PLOF, Family Support, and Pt cooperative   Barriers to achieving goals include:    No Barriers    Plan    To be seen 3-5 x / week  while in acute care setting for therapeutic exercises, bed mobility, transfers, progressive gait training, balance training, and family/patient education. Signature: Kamilla Gutierrez, PT, DPT    If patient discharges from this facility prior to next visit, this note will serve as the Discharge Summary.

## 2022-09-09 NOTE — H&P
History and Physical        HISTORY OF PRESENT ILLNESS: A 21-year-old female the history of morbid obesity, COPD, chronic hypoxic respiratory failure, diastolic congestive heart failure, type 2 diabetes presented emergency room with generalized weakness and inability to walk. She states she is not able to take any steps because her legs just give out. Also she feels like her arms and legs sometimes jerk on their own. Denies any other complaints. Denies any chest pain or shortness of breath no cough or fever    Patient is allergic to dilaudid [hydromorphone hcl], lisinopril, morphine, and pcn [penicillins].     Past Medical History:   Diagnosis Date    Anxiety     Arthritis     CHF (congestive heart failure) ([facility])     COPD (chronic obstructive pulmonary disease) ([facility])     Depression     Diabetes mellitus ([facility] [address]     Diverticulosis     Hyperlipidemia     Hypertension     Obesities, morbid ([facility] [address]        Past Surgical History:   Procedure Laterality Date     SECTION      x3    COLONOSCOPY      ENDOMETRIAL ABLATION      ENDOSCOPY, COLON, DIAGNOSTIC         Scheduled Meds:   furosemide  40 mg IntraVENous Once    allopurinol  100 mg Oral Daily    aspirin  81 mg Oral Daily    dicyclomine  10 mg Oral TID    escitalopram  40 mg Oral Daily    glipiZIDE  5 mg Oral QAM AC    magnesium oxide  400 mg Oral Daily    meloxicam  15 mg Oral Daily    metFORMIN  1,000 mg Oral BID WC    pregabalin  300 mg Oral BID    traZODone  100 mg Oral Nightly    sodium chloride flush  5-40 mL IntraVENous 2 times per day    enoxaparin  40 mg SubCUTAneous Daily    insulin lispro  0-4 Units SubCUTAneous TID WC    insulin lispro  0-4 Units SubCUTAneous Nightly       Continuous Infusions:   sodium chloride         PRN Meds:  hydrOXYzine HCl, oxyCODONE-acetaminophen, sodium chloride flush, sodium chloride, ondansetron **OR** ondansetron, polyethylene glycol, acetaminophen **OR** acetaminophen, albuterol sulfate HFA       reports that she quit smoking about 7 years ago. Her smoking use included cigarettes. She has a 80.00 pack-year smoking history. She has never used smokeless tobacco.    Family History   Problem Relation Age of Onset    Colon Cancer Mother     High Cholesterol Father     Heart Disease Father        Social History     Socioeconomic History    Marital status:      Spouse name: None    Number of children: None    Years of education: None    Highest education level: None   Tobacco Use    Smoking status: Former     Packs/day: 2.00     Years: 40.00     Pack years: 80.00     Types: Cigarettes     Quit date: 2014     Years since quittin.7    Smokeless tobacco: Never   Vaping Use    Vaping Use: Never used   Substance and Sexual Activity    Alcohol use: No    Drug use: No    Sexual activity: Never     REVIEW OF SYSTEMS:   Constitutional: Negative for fever   HENT: Negative for sore throat   Eyes: Negative for redness   Respiratory: Negative for dyspnea, cough   Cardiovascular: Negative for chest pain   Gastrointestinal: Negative for vomiting, diarrhea   Genitourinary: Negative for hematuria   Musculoskeletal: Negative for arthralgias   Skin: Negative for rash   Neurological: Negative for syncope   Hematological: Negative for adenopathy       Vitals:    22 0815   BP: (!) 159/80   Pulse: 74   Resp: 18   Temp: 97.2 °F (36.2 °C)   SpO2: 93%     Gen: No distress. Alert. Eyes: PERRL. No sclera icterus. No conjunctival injection. ENT: No discharge. Pharynx clear. Neck: Trachea midline. Normal thyroid. Resp: No accessory muscle use. No crackles. No wheezes. No rhonchi. No dullness on percussion. CV: Regular rate. Regular rhythm. No murmur or rub. No edema. GI: Non-tender. Non-distended. No masses. No organomegaly. Normal bowel sounds. No hernia. Skin: Warm and dry. No nodule on exposed extremities. No rash on exposed extremities. Lymph: No cervical LAD. No supraclavicular LAD. M/S: No cyanosis. No joint deformity. No clubbing. Neuro: Awake. Cranial nerves normal.  No focal deficits. Psych: Oriented x 3. No anxiety or agitation. CBC:   Recent Labs     09/08/22  1527   WBC 5.4   HGB 11.4*   HCT 36.7   MCV 86.0        BMP:   Recent Labs     09/08/22  1527      K 5.4*      CO2 30   BUN 23*   CREATININE 0.9     LIVER PROFILE:   Recent Labs     09/08/22  1527   AST 16   ALT 14   LIPASE 11.0*   BILITOT <0.2   ALKPHOS 87     PT/INR: No results for input(s): PROTIME, INR in the last 72 hours. APTT: No results for input(s): APTT in the last 72 hours. UA:  Recent Labs     09/08/22  1717   COLORU Yellow   PHUR 6.0   WBCUA 0-2   RBCUA 0-2   CLARITYU SL CLOUDY*   SPECGRAV >=1.030   LEUKOCYTESUR Negative   UROBILINOGEN 0.2   BILIRUBINUR SMALL*   BLOODU Negative   GLUCOSEU Negative       CTA HEAD NECK W CONTRAST   Final Result   1. No acute intracranial abnormality. 2. No acute arterial abnormality or hemodynamically significant arterial   stenosis in the head or neck. CT HEAD WO CONTRAST   Final Result   1. No acute intracranial abnormality. 2. No acute arterial abnormality or hemodynamically significant arterial   stenosis in the head or neck. XR CHEST PORTABLE   Final Result   Cardiomegaly with associated mild pulmonary vascular congestion. ASSESSMENT:  Principal Problem:    Weakness  Resolved Problems:    * No resolved hospital problems. *      PLAN:  Weakness both lower extremities. CTA head and neck normal.  No focal deficits. Her morbid obesity is contributing. Obtain OT PT consult    COPD and chronic hypoxic respiratory failure. Stable. Continue current inhalers. Continue 4 L of oxygen. Diastolic congestive heart failure. Chest x-ray showed mild vascular congestion. Give 1 dose of IV Lasix. She is not on any diuretics at home. Mild hyperkalemia.  1 dose of IV Lasix today. Morbid obesity. Dietitian consult. Type 2 diabetes. Controlled.   Continue home medications. Sliding scale insulin.     OT PT  Lovenox for DVT prophylaxis    Discharged home in stable condition      Elijah Saldivar MD 9/9/2022 9:32 AM

## 2022-09-09 NOTE — PLAN OF CARE
Problem: Discharge Planning  Goal: Discharge to home or other facility with appropriate resources  Outcome: Progressing  Flowsheets (Taken 9/8/2022 2053)  Discharge to home or other facility with appropriate resources: Identify barriers to discharge with patient and caregiver     Problem: Safety - Adult  Goal: Free from fall injury  Outcome: Progressing     Problem: Skin/Tissue Integrity  Goal: Absence of new skin breakdown  Description: 1. Monitor for areas of redness and/or skin breakdown  2. Assess vascular access sites hourly  3. Every 4-6 hours minimum:  Change oxygen saturation probe site  4. Every 4-6 hours:  If on nasal continuous positive airway pressure, respiratory therapy assess nares and determine need for appliance change or resting period.   Outcome: Progressing     Problem: Pain  Goal: Verbalizes/displays adequate comfort level or baseline comfort level  Outcome: Progressing

## 2022-09-09 NOTE — PROGRESS NOTES
Patient admitted to room 234 from ER. Patient oriented to room, call light, bed rails, phone, lights and bathroom. Patient instructed about the schedule of the day including: vital sign frequency, lab draws, possible tests, frequency of MD and staff rounds, daily weights, I &O's and prescribed diet. Bed alarm on and functioning properly. Bed locked, in lowest position, side rails up 2/4, call light within reach. Recliner Assessment:     Patient is not able to demonstrate the ability to move from a reclining position to an upright position within the recliner due to weakness . Bedside Mobility Assessment Tool (BMAT):     Assessment Level 1- Sit and Shake    1. From a semi-reclined position, ask patient to sit up and rotate to a seated position at the side of the bed. Can use the bedrail. 2. Ask patient to reach out and grab your hand and shake making sure patient reaches across his/her midline. Pass- Patient is able to come to a seated position, maintain core strength. Maintains seated balance while reaching across midline. Move on to Assessment Level 2. Assessment Level 2- Stretch and Point   1. With patient in seated position at the side of the bed, have patient place both feet on the floor (or stool) with knees no higher than hips. 2. Ask patient to stretch one leg and straighten the knee, then bend the ankle/flex and point the toes. If appropriate, repeat with the other leg. Pass- Patient is able to demonstrate appropriate quad strength on intended weight bearing limb(s). Move onto Assessment Level 3. Assessment Level 3- Stand   1. Ask patient to elevate off the bed or chair (seated to standing) using an assistive device (cane, bedrail). 2. Patient should be able to raise buttocks off be and hold for a count of five. May repeat once. Pass- Patient maintains standing stability for at least 5 seconds, proceed to assessment level 4. Assessment Level 4- Walk   1.  Ask patient to march in place at bedside. 2. Then ask patient to advance step and return each foot. Some medical conditions may render a patient from stepping backwards, use your best clinical judgement. Fail- Patient not able to complete tasks OR requires use of assistive device. Patient is MOBILITY LEVEL 3. Mobility Level- 3        4 Eyes Skin Assessment     The patient is being assess for   Admission    I agree that 2 RN's have performed a thorough Head to Toe Skin Assessment on the patient. ALL assessment sites listed below have been assessed. Areas assessed for pressure by both nurses:   [x]   Head, Face, and Ears   [x]   Shoulders, Back, and Chest, Abdomen  [x]   Arms, Elbows, and Hands   [x]   Coccyx, Sacrum, and Ischium  [x]   Legs, Feet, and Heels  Scattered bruising noted. Redness and moisture noted to abdominal folds, intra dry put in place. Skin Assessed Under all Medical Devices by both nurses:  N/a               All Mepilex Borders were peeled back and area peeked at by both nurses:  No: n/a  Please list where Mepilex Borders are located:  n/a             **SHARE this note so that the co-signing nurse is able to place an eSignature**    Co-signer eSignature: Electronically signed by Robert Jurado RN on 9/9/22 at 1:34 AM EDT    Does the Patient have Skin Breakdown related to pressure?   No            Vishnu Prevention initiated:  Yes   Wound Care Orders initiated:  NA      [facility] nurse consulted for Pressure Injury (Stage 3,4, Unstageable, DTI, NWPT, Complex wounds)and New or Established Ostomies:  NA      Primary Nurse eSignature: Electronically signed by Rohan Llamas RN on 9/9/22 at 1:32 AM EDT

## 2022-09-09 NOTE — PLAN OF CARE
Problem: Discharge Planning  Goal: Discharge to home or other facility with appropriate resources  9/9/2022 1143 by Andrey Koehler RN  Outcome: Progressing  Flowsheets  Taken 9/9/2022 0815 by Andrey Koehler RN  Discharge to home or other facility with appropriate resources:   Identify barriers to discharge with patient and caregiver   Arrange for needed discharge resources and transportation as appropriate   Identify discharge learning needs (meds, wound care, etc)   Arrange for interpreters to assist at discharge as needed   Refer to discharge planning if patient needs post-hospital services based on physician order or complex needs related to functional status, cognitive ability or social support system  Taken 9/9/2022 0212 by Daniel Moon RN  Discharge to home or other facility with appropriate resources: Identify barriers to discharge with patient and caregiver  9/9/2022 0129 by Daniel Moon RN  Outcome: Progressing  Flowsheets (Taken 9/8/2022 2053)  Discharge to home or other facility with appropriate resources: Identify barriers to discharge with patient and caregiver     Problem: Safety - Adult  Goal: Free from fall injury  9/9/2022 1143 by Andrey Koehler RN  Outcome: Progressing  9/9/2022 0129 by Daniel Moon RN  Outcome: Progressing     Problem: Skin/Tissue Integrity  Goal: Absence of new skin breakdown  Description: 1. Monitor for areas of redness and/or skin breakdown  2. Assess vascular access sites hourly  3. Every 4-6 hours minimum:  Change oxygen saturation probe site  4. Every 4-6 hours:  If on nasal continuous positive airway pressure, respiratory therapy assess nares and determine need for appliance change or resting period.   9/9/2022 1143 by Andrey Koehler RN  Outcome: Progressing  9/9/2022 0129 by Daniel Moon RN  Outcome: Progressing     Problem: Pain  Goal: Verbalizes/displays adequate comfort level or baseline comfort level  9/9/2022 1143 by Andrey Koehler RN  Outcome: Progressing  Flowsheets (Taken 9/9/2022 0815)  Verbalizes/displays adequate comfort level or baseline comfort level:   Encourage patient to monitor pain and request assistance   Assess pain using appropriate pain scale   Administer analgesics based on type and severity of pain and evaluate response  9/9/2022 0129 by Brigette Castro, RN  Outcome: Progressing

## 2022-09-10 NOTE — CARE COORDINATION
Pt discharged after CM left for the day. Chart reviewed. TC to spouse and he states he would like home care with [facility] for SN/PT/OT. LVM for [facility] as they are closed for the weekend, all needed home care documentation faxed to [facility] at this time. [address]: Spoke with Richelle Hunter from [facility] and she states they are able to accept the pt for SN/PT/OT, she has all needed documentation to start home care.

## 2022-09-10 NOTE — DISCHARGE INSTR - COC
Continuity of Care Form    Patient Name: Ryan May   :  1965  MRN:  [id number]    Admit date:  2022  Discharge date:  2022    Code Status Order: Prior   Advance Directives:     Admitting Physician:  Simon Humphrey MD  PCP: Dora Rogers PA-C    Discharging Nurse: [facility] Unit/Room#: 3095/7583-45  Discharging Unit Phone Number: ***    Emergency Contact:   Extended Emergency Contact Information  Primary Emergency Contact: Jose Gunn  Address: [place] [address] [address] [place] [address] Phone: [phone]  Mobile Phone: [phone]  Relation: Spouse  Secondary Emergency Contact: angelidunia meadows  Mobile Phone: [phone]  Relation: Child   needed?  No    Past Surgical History:  Past Surgical History:   Procedure Laterality Date     SECTION      x3    COLONOSCOPY      ENDOMETRIAL ABLATION      ENDOSCOPY, COLON, DIAGNOSTIC         Immunization History:   Immunization History   Administered Date(s) Administered    Influenza, FLUARIX, FLULAVAL, FLUZONE (age 10 mo+) AND AFLURIA, (age 1 y+), PF, 0.5mL 10/11/2016    Tdap (Boostrix, Adacel) 2019       Active Problems:  Patient Active Problem List   Diagnosis Code    Acute respiratory failure ([facility]) J96.00    Fluid overload E87.70    Acidosis E87.2    Panniculitis M79.3    COPD (chronic obstructive pulmonary disease) ([facility]) J44.9    General weakness R53.1    PNA (pneumonia) J18.9    Elevated blood pressure reading R03.0    Class 3 severe obesity with body mass index (BMI) of 50.0 to 59.9 in adult ([facility]) E66.01, Z68.43    Hyperlipidemia E78.5    DM (diabetes mellitus) ([facility]) E11.9    CHF (congestive heart failure) ([facility]) I50.9    Tremor R25.1    Hypomagnesemia E83.42    Tremors of nervous system R25.1    Generalized weakness R53.1    Hyperkalemia E87.5       Isolation/Infection:   Isolation            No Isolation          Patient Infection Status       Infection Onset Added Last Barium Swallow with Video (Video Swallowing Test): {Done Not Done JRGI:368748012}    Treatments at the Time of Hospital Discharge:   Respiratory Treatments: ***  Oxygen Therapy:  {Therapy; copd oxygen:73570}  Ventilator:    {MH CC Vent JDAI:487810146}    Rehab Therapies: {THERAPEUTIC INTERVENTION:6489121366}  Weight Bearing Status/Restrictions: [address] CC Weight Bearin}  Other Medical Equipment (for information only, NOT a DME order):  {EQUIPMENT:365054065}  Other Treatments: ***    Patient's personal belongings (please select all that are sent with patient):  {CHP DME Belongings:728695460}    RN SIGNATURE:  {Esignature:176039909}    CASE MANAGEMENT/SOCIAL WORK SECTION    Inpatient Status Date: 2022    Readmission Risk Assessment Score:  Readmission Risk              Risk of Unplanned Readmission:  0           Discharging to Facility/ Agency   Name: [facility]  Address:  [facility] [place]:[phone]  Fax:    Dialysis Facility (if applicable)   Name:  Address:  Dialysis Schedule:  Phone:  Fax:    / signature: Electronically signed by Maya Turner RN on 9/10/22 at 2:38 PM EDT    PHYSICIAN SECTION    Prognosis: Good    Condition at Discharge: Stable    Rehab Potential (if transferring to Rehab):     Recommended Labs or Other Treatments After Discharge: Home Care to include Skilled Nursing, PT/OT    Physician Certification: I certify the above information and transfer of Thuy Parks  is necessary for the continuing treatment of the diagnosis listed and that she requires Home Care for less 30 days.      Update Admission H&P: No change in H&P    PHYSICIAN SIGNATURE:  SYLVIA Suarez/Electronically signed by Maya Turner RN on 9/10/22 at 2:38 PM EDT

## 2022-09-16 ENCOUNTER — TELEPHONE (OUTPATIENT)
Dept: PULMONOLOGY | Age: 57
End: 2022-09-16

## 2022-10-11 ENCOUNTER — HOSPITAL ENCOUNTER (INPATIENT)
Age: 57
LOS: 1 days | Discharge: HOME HEALTH | DRG: 189 | End: 2022-10-13
Attending: EMERGENCY MEDICINE | Admitting: INTERNAL MEDICINE
Payer: MEDICARE

## 2022-10-11 ENCOUNTER — APPOINTMENT (OUTPATIENT)
Dept: GENERAL RADIOLOGY | Age: 57
DRG: 189 | End: 2022-10-11
Payer: MEDICARE

## 2022-10-11 DIAGNOSIS — J44.9 CHRONIC OBSTRUCTIVE PULMONARY DISEASE, UNSPECIFIED COPD TYPE (HCC): ICD-10-CM

## 2022-10-11 DIAGNOSIS — J96.01 ACUTE RESPIRATORY FAILURE WITH HYPOXEMIA (HCC): ICD-10-CM

## 2022-10-11 DIAGNOSIS — R06.89 HYPERCAPNIA: ICD-10-CM

## 2022-10-11 DIAGNOSIS — J44.1 CHRONIC OBSTRUCTIVE PULMONARY DISEASE WITH ACUTE EXACERBATION (HCC): ICD-10-CM

## 2022-10-11 DIAGNOSIS — E87.29 RESPIRATORY ACIDOSIS: Primary | ICD-10-CM

## 2022-10-11 LAB
A/G RATIO: 1.2 (ref 1.1–2.2)
ALBUMIN SERPL-MCNC: 4.1 G/DL (ref 3.4–5)
ALP BLD-CCNC: 98 U/L (ref 40–129)
ALT SERPL-CCNC: 23 U/L (ref 10–40)
ANION GAP SERPL CALCULATED.3IONS-SCNC: 9 MMOL/L (ref 3–16)
AST SERPL-CCNC: 20 U/L (ref 15–37)
BASE EXCESS VENOUS: 4.6 MMOL/L (ref -3–3)
BASE EXCESS VENOUS: 7.2 MMOL/L (ref -3–3)
BASOPHILS ABSOLUTE: 0 K/UL (ref 0–0.2)
BASOPHILS RELATIVE PERCENT: 0.3 %
BILIRUB SERPL-MCNC: <0.2 MG/DL (ref 0–1)
BUN BLDV-MCNC: 18 MG/DL (ref 7–20)
CALCIUM SERPL-MCNC: 9.4 MG/DL (ref 8.3–10.6)
CARBOXYHEMOGLOBIN: 1.1 % (ref 0–1.5)
CARBOXYHEMOGLOBIN: 1.3 % (ref 0–1.5)
CHLORIDE BLD-SCNC: 96 MMOL/L (ref 99–110)
CO2: 35 MMOL/L (ref 21–32)
CREAT SERPL-MCNC: 1 MG/DL (ref 0.6–1.1)
EOSINOPHILS ABSOLUTE: 0.1 K/UL (ref 0–0.6)
EOSINOPHILS RELATIVE PERCENT: 1.5 %
GFR AFRICAN AMERICAN: >60
GFR NON-AFRICAN AMERICAN: 57
GLUCOSE BLD-MCNC: 205 MG/DL (ref 70–99)
GLUCOSE BLD-MCNC: 207 MG/DL (ref 70–99)
HCO3 VENOUS: 33.5 MMOL/L (ref 23–29)
HCO3 VENOUS: 35.2 MMOL/L (ref 23–29)
HCT VFR BLD CALC: 39 % (ref 36–48)
HEMOGLOBIN: 12.2 G/DL (ref 12–16)
LYMPHOCYTES ABSOLUTE: 1.3 K/UL (ref 1–5.1)
LYMPHOCYTES RELATIVE PERCENT: 22.2 %
MCH RBC QN AUTO: 26.5 PG (ref 26–34)
MCHC RBC AUTO-ENTMCNC: 31.1 G/DL (ref 31–36)
MCV RBC AUTO: 85 FL (ref 80–100)
METHEMOGLOBIN VENOUS: 0 %
METHEMOGLOBIN VENOUS: 0.1 %
MONOCYTES ABSOLUTE: 0.4 K/UL (ref 0–1.3)
MONOCYTES RELATIVE PERCENT: 6.9 %
NEUTROPHILS ABSOLUTE: 4 K/UL (ref 1.7–7.7)
NEUTROPHILS RELATIVE PERCENT: 69.1 %
O2 SAT, VEN: 90 %
O2 SAT, VEN: 90 %
O2 THERAPY: ABNORMAL
O2 THERAPY: ABNORMAL
PCO2, VEN: 67.4 MMHG (ref 40–50)
PCO2, VEN: 73.3 MMHG (ref 40–50)
PDW BLD-RTO: 13.7 % (ref 12.4–15.4)
PERFORMED ON: ABNORMAL
PH VENOUS: 7.28 (ref 7.35–7.45)
PH VENOUS: 7.34 (ref 7.35–7.45)
PLATELET # BLD: 153 K/UL (ref 135–450)
PMV BLD AUTO: 8.4 FL (ref 5–10.5)
PO2, VEN: 63 MMHG (ref 25–40)
PO2, VEN: 66.6 MMHG (ref 25–40)
POTASSIUM REFLEX MAGNESIUM: 4.7 MMOL/L (ref 3.5–5.1)
RBC # BLD: 4.59 M/UL (ref 4–5.2)
SARS-COV-2, NAAT: NOT DETECTED
SODIUM BLD-SCNC: 140 MMOL/L (ref 136–145)
TCO2 CALC VENOUS: 36 MMOL/L
TCO2 CALC VENOUS: 37 MMOL/L
TOTAL PROTEIN: 7.5 G/DL (ref 6.4–8.2)
TROPONIN: <0.01 NG/ML
WBC # BLD: 5.7 K/UL (ref 4–11)

## 2022-10-11 PROCEDURE — 71045 X-RAY EXAM CHEST 1 VIEW: CPT

## 2022-10-11 PROCEDURE — 94761 N-INVAS EAR/PLS OXIMETRY MLT: CPT

## 2022-10-11 PROCEDURE — 83036 HEMOGLOBIN GLYCOSYLATED A1C: CPT

## 2022-10-11 PROCEDURE — 94660 CPAP INITIATION&MGMT: CPT

## 2022-10-11 PROCEDURE — 82803 BLOOD GASES ANY COMBINATION: CPT

## 2022-10-11 PROCEDURE — 85025 COMPLETE CBC W/AUTO DIFF WBC: CPT

## 2022-10-11 PROCEDURE — 2700000000 HC OXYGEN THERAPY PER DAY

## 2022-10-11 PROCEDURE — 83735 ASSAY OF MAGNESIUM: CPT

## 2022-10-11 PROCEDURE — 93005 ELECTROCARDIOGRAM TRACING: CPT | Performed by: EMERGENCY MEDICINE

## 2022-10-11 PROCEDURE — 99285 EMERGENCY DEPT VISIT HI MDM: CPT

## 2022-10-11 PROCEDURE — 87635 SARS-COV-2 COVID-19 AMP PRB: CPT

## 2022-10-11 PROCEDURE — 96374 THER/PROPH/DIAG INJ IV PUSH: CPT

## 2022-10-11 PROCEDURE — 80053 COMPREHEN METABOLIC PANEL: CPT

## 2022-10-11 PROCEDURE — 6360000002 HC RX W HCPCS: Performed by: EMERGENCY MEDICINE

## 2022-10-11 PROCEDURE — 84484 ASSAY OF TROPONIN QUANT: CPT

## 2022-10-11 PROCEDURE — 36415 COLL VENOUS BLD VENIPUNCTURE: CPT

## 2022-10-11 RX ORDER — ESOMEPRAZOLE MAGNESIUM 40 MG/1
CAPSULE, DELAYED RELEASE ORAL
COMMUNITY
Start: 2018-07-05 | End: 2022-10-11

## 2022-10-11 RX ORDER — DIPHENHYDRAMINE HYDROCHLORIDE 50 MG/ML
25 INJECTION INTRAMUSCULAR; INTRAVENOUS ONCE
Status: COMPLETED | OUTPATIENT
Start: 2022-10-11 | End: 2022-10-11

## 2022-10-11 RX ADMIN — DIPHENHYDRAMINE HYDROCHLORIDE 25 MG: 50 INJECTION, SOLUTION INTRAMUSCULAR; INTRAVENOUS at 21:44

## 2022-10-11 ASSESSMENT — PAIN - FUNCTIONAL ASSESSMENT: PAIN_FUNCTIONAL_ASSESSMENT: NONE - DENIES PAIN

## 2022-10-12 ENCOUNTER — APPOINTMENT (OUTPATIENT)
Dept: GENERAL RADIOLOGY | Age: 57
DRG: 189 | End: 2022-10-12
Payer: MEDICARE

## 2022-10-12 PROBLEM — J96.01 ACUTE RESPIRATORY FAILURE WITH HYPOXEMIA (HCC): Status: ACTIVE | Noted: 2022-10-12

## 2022-10-12 LAB
A/G RATIO: 1.2 (ref 1.1–2.2)
ALBUMIN SERPL-MCNC: 4 G/DL (ref 3.4–5)
ALP BLD-CCNC: 94 U/L (ref 40–129)
ALT SERPL-CCNC: 22 U/L (ref 10–40)
ANION GAP SERPL CALCULATED.3IONS-SCNC: 8 MMOL/L (ref 3–16)
AST SERPL-CCNC: 20 U/L (ref 15–37)
BASE EXCESS ARTERIAL: 4.7 MMOL/L (ref -3–3)
BASOPHILS ABSOLUTE: 0 K/UL (ref 0–0.2)
BASOPHILS RELATIVE PERCENT: 0.3 %
BILIRUB SERPL-MCNC: 0.3 MG/DL (ref 0–1)
BILIRUBIN URINE: NEGATIVE
BLOOD, URINE: NEGATIVE
BUN BLDV-MCNC: 17 MG/DL (ref 7–20)
CALCIUM SERPL-MCNC: 9.3 MG/DL (ref 8.3–10.6)
CARBOXYHEMOGLOBIN ARTERIAL: 0.4 % (ref 0–1.5)
CHLORIDE BLD-SCNC: 96 MMOL/L (ref 99–110)
CLARITY: CLEAR
CO2: 32 MMOL/L (ref 21–32)
COLOR: YELLOW
CREAT SERPL-MCNC: 0.9 MG/DL (ref 0.6–1.1)
EKG ATRIAL RATE: 85 BPM
EKG DIAGNOSIS: NORMAL
EKG P AXIS: 114 DEGREES
EKG P-R INTERVAL: 188 MS
EKG Q-T INTERVAL: 414 MS
EKG QRS DURATION: 96 MS
EKG QTC CALCULATION (BAZETT): 492 MS
EKG R AXIS: 65 DEGREES
EKG T AXIS: 57 DEGREES
EKG VENTRICULAR RATE: 85 BPM
EOSINOPHILS ABSOLUTE: 0 K/UL (ref 0–0.6)
EOSINOPHILS RELATIVE PERCENT: 0.7 %
ESTIMATED AVERAGE GLUCOSE: 185.8 MG/DL
GFR AFRICAN AMERICAN: >60
GFR NON-AFRICAN AMERICAN: >60
GLUCOSE BLD-MCNC: 179 MG/DL (ref 70–99)
GLUCOSE BLD-MCNC: 264 MG/DL (ref 70–99)
GLUCOSE BLD-MCNC: 281 MG/DL (ref 70–99)
GLUCOSE BLD-MCNC: 318 MG/DL (ref 70–99)
GLUCOSE BLD-MCNC: 325 MG/DL (ref 70–99)
GLUCOSE BLD-MCNC: 334 MG/DL (ref 70–99)
GLUCOSE BLD-MCNC: 339 MG/DL (ref 70–99)
GLUCOSE URINE: NEGATIVE MG/DL
HBA1C MFR BLD: 8.1 %
HCO3 ARTERIAL: 31.5 MMOL/L (ref 21–29)
HCT VFR BLD CALC: 38.1 % (ref 36–48)
HEMOGLOBIN, ART, EXTENDED: 13.3 G/DL (ref 12–16)
HEMOGLOBIN: 12.3 G/DL (ref 12–16)
KETONES, URINE: NEGATIVE MG/DL
LEUKOCYTE ESTERASE, URINE: NEGATIVE
LYMPHOCYTES ABSOLUTE: 0.9 K/UL (ref 1–5.1)
LYMPHOCYTES RELATIVE PERCENT: 13.4 %
MAGNESIUM: 1.6 MG/DL (ref 1.8–2.4)
MCH RBC QN AUTO: 27.5 PG (ref 26–34)
MCHC RBC AUTO-ENTMCNC: 32.4 G/DL (ref 31–36)
MCV RBC AUTO: 84.9 FL (ref 80–100)
METHEMOGLOBIN ARTERIAL: 0 %
MICROSCOPIC EXAMINATION: NORMAL
MONOCYTES ABSOLUTE: 0.2 K/UL (ref 0–1.3)
MONOCYTES RELATIVE PERCENT: 2.4 %
NEUTROPHILS ABSOLUTE: 5.9 K/UL (ref 1.7–7.7)
NEUTROPHILS RELATIVE PERCENT: 83.2 %
NITRITE, URINE: NEGATIVE
O2 SAT, ARTERIAL: 96 %
O2 THERAPY: ABNORMAL
PCO2 ARTERIAL: 56.3 MMHG (ref 35–45)
PDW BLD-RTO: 13.5 % (ref 12.4–15.4)
PERFORMED ON: ABNORMAL
PH ARTERIAL: 7.37 (ref 7.35–7.45)
PH UA: 6 (ref 5–8)
PLATELET # BLD: 149 K/UL (ref 135–450)
PMV BLD AUTO: 8.3 FL (ref 5–10.5)
PO2 ARTERIAL: 85.6 MMHG (ref 75–108)
POTASSIUM REFLEX MAGNESIUM: 5.3 MMOL/L (ref 3.5–5.1)
PROTEIN UA: NEGATIVE MG/DL
RBC # BLD: 4.49 M/UL (ref 4–5.2)
SODIUM BLD-SCNC: 136 MMOL/L (ref 136–145)
SPECIFIC GRAVITY UA: 1.02 (ref 1–1.03)
TCO2 ARTERIAL: 33.2 MMOL/L
TOTAL PROTEIN: 7.4 G/DL (ref 6.4–8.2)
URINE REFLEX TO CULTURE: NORMAL
URINE TYPE: NORMAL
UROBILINOGEN, URINE: 0.2 E.U./DL
WBC # BLD: 7 K/UL (ref 4–11)

## 2022-10-12 PROCEDURE — 94640 AIRWAY INHALATION TREATMENT: CPT

## 2022-10-12 PROCEDURE — 94660 CPAP INITIATION&MGMT: CPT

## 2022-10-12 PROCEDURE — 6370000000 HC RX 637 (ALT 250 FOR IP): Performed by: INTERNAL MEDICINE

## 2022-10-12 PROCEDURE — 36415 COLL VENOUS BLD VENIPUNCTURE: CPT

## 2022-10-12 PROCEDURE — 6360000002 HC RX W HCPCS: Performed by: INTERNAL MEDICINE

## 2022-10-12 PROCEDURE — 99223 1ST HOSP IP/OBS HIGH 75: CPT | Performed by: INTERNAL MEDICINE

## 2022-10-12 PROCEDURE — 2580000003 HC RX 258: Performed by: INTERNAL MEDICINE

## 2022-10-12 PROCEDURE — 85025 COMPLETE CBC W/AUTO DIFF WBC: CPT

## 2022-10-12 PROCEDURE — 71045 X-RAY EXAM CHEST 1 VIEW: CPT

## 2022-10-12 PROCEDURE — 93010 ELECTROCARDIOGRAM REPORT: CPT | Performed by: INTERNAL MEDICINE

## 2022-10-12 PROCEDURE — 82803 BLOOD GASES ANY COMBINATION: CPT

## 2022-10-12 PROCEDURE — 2500000003 HC RX 250 WO HCPCS: Performed by: INTERNAL MEDICINE

## 2022-10-12 PROCEDURE — 80053 COMPREHEN METABOLIC PANEL: CPT

## 2022-10-12 PROCEDURE — 94761 N-INVAS EAR/PLS OXIMETRY MLT: CPT

## 2022-10-12 PROCEDURE — 2700000000 HC OXYGEN THERAPY PER DAY

## 2022-10-12 PROCEDURE — 2060000000 HC ICU INTERMEDIATE R&B

## 2022-10-12 PROCEDURE — 81003 URINALYSIS AUTO W/O SCOPE: CPT

## 2022-10-12 RX ORDER — NYSTATIN 100000 U/G
CREAM TOPICAL 2 TIMES DAILY
Status: DISCONTINUED | OUTPATIENT
Start: 2022-10-12 | End: 2022-10-13 | Stop reason: HOSPADM

## 2022-10-12 RX ORDER — ALBUTEROL SULFATE 2.5 MG/3ML
2.5 SOLUTION RESPIRATORY (INHALATION)
Status: DISCONTINUED | OUTPATIENT
Start: 2022-10-12 | End: 2022-10-13 | Stop reason: HOSPADM

## 2022-10-12 RX ORDER — SODIUM CHLORIDE 0.9 % (FLUSH) 0.9 %
5-40 SYRINGE (ML) INJECTION PRN
Status: DISCONTINUED | OUTPATIENT
Start: 2022-10-12 | End: 2022-10-13 | Stop reason: HOSPADM

## 2022-10-12 RX ORDER — MAGNESIUM HYDROXIDE/ALUMINUM HYDROXICE/SIMETHICONE 120; 1200; 1200 MG/30ML; MG/30ML; MG/30ML
30 SUSPENSION ORAL EVERY 6 HOURS PRN
Status: DISCONTINUED | OUTPATIENT
Start: 2022-10-12 | End: 2022-10-13 | Stop reason: HOSPADM

## 2022-10-12 RX ORDER — DICYCLOMINE HYDROCHLORIDE 10 MG/1
10 CAPSULE ORAL 3 TIMES DAILY
Status: DISCONTINUED | OUTPATIENT
Start: 2022-10-12 | End: 2022-10-13 | Stop reason: HOSPADM

## 2022-10-12 RX ORDER — LANOLIN ALCOHOL/MO/W.PET/CERES
400 CREAM (GRAM) TOPICAL DAILY
Status: DISCONTINUED | OUTPATIENT
Start: 2022-10-12 | End: 2022-10-13 | Stop reason: HOSPADM

## 2022-10-12 RX ORDER — INSULIN GLARGINE 100 [IU]/ML
40 INJECTION, SOLUTION SUBCUTANEOUS NIGHTLY
Status: DISCONTINUED | OUTPATIENT
Start: 2022-10-12 | End: 2022-10-12

## 2022-10-12 RX ORDER — LOSARTAN POTASSIUM 25 MG/1
50 TABLET ORAL DAILY
Status: DISCONTINUED | OUTPATIENT
Start: 2022-10-12 | End: 2022-10-13 | Stop reason: HOSPADM

## 2022-10-12 RX ORDER — MELOXICAM 15 MG/1
15 TABLET ORAL DAILY
Status: DISCONTINUED | OUTPATIENT
Start: 2022-10-12 | End: 2022-10-13 | Stop reason: HOSPADM

## 2022-10-12 RX ORDER — PROCHLORPERAZINE EDISYLATE 5 MG/ML
10 INJECTION INTRAMUSCULAR; INTRAVENOUS EVERY 6 HOURS PRN
Status: DISCONTINUED | OUTPATIENT
Start: 2022-10-12 | End: 2022-10-13 | Stop reason: HOSPADM

## 2022-10-12 RX ORDER — ALLOPURINOL 100 MG/1
100 TABLET ORAL DAILY
Status: DISCONTINUED | OUTPATIENT
Start: 2022-10-12 | End: 2022-10-13 | Stop reason: HOSPADM

## 2022-10-12 RX ORDER — SODIUM CHLORIDE 9 MG/ML
INJECTION, SOLUTION INTRAVENOUS CONTINUOUS
Status: DISCONTINUED | OUTPATIENT
Start: 2022-10-12 | End: 2022-10-13 | Stop reason: HOSPADM

## 2022-10-12 RX ORDER — GLIPIZIDE 5 MG/1
10 TABLET, FILM COATED, EXTENDED RELEASE ORAL DAILY
Status: DISCONTINUED | OUTPATIENT
Start: 2022-10-12 | End: 2022-10-12 | Stop reason: CLARIF

## 2022-10-12 RX ORDER — ESOMEPRAZOLE MAGNESIUM 40 MG/1
40 CAPSULE, DELAYED RELEASE ORAL 2 TIMES DAILY
COMMUNITY

## 2022-10-12 RX ORDER — NYSTATIN 100000 U/G
CREAM TOPICAL 2 TIMES DAILY
Status: DISCONTINUED | OUTPATIENT
Start: 2022-10-12 | End: 2022-10-12

## 2022-10-12 RX ORDER — INSULIN GLARGINE 100 [IU]/ML
30 INJECTION, SOLUTION SUBCUTANEOUS NIGHTLY
Status: DISCONTINUED | OUTPATIENT
Start: 2022-10-12 | End: 2022-10-12

## 2022-10-12 RX ORDER — FERROUS SULFATE 325(65) MG
325 TABLET ORAL 2 TIMES DAILY WITH MEALS
Status: DISCONTINUED | OUTPATIENT
Start: 2022-10-12 | End: 2022-10-13 | Stop reason: HOSPADM

## 2022-10-12 RX ORDER — PREGABALIN 100 MG/1
300 CAPSULE ORAL 2 TIMES DAILY
Status: DISCONTINUED | OUTPATIENT
Start: 2022-10-12 | End: 2022-10-13 | Stop reason: HOSPADM

## 2022-10-12 RX ORDER — HYDRALAZINE HYDROCHLORIDE 20 MG/ML
10 INJECTION INTRAMUSCULAR; INTRAVENOUS EVERY 6 HOURS PRN
Status: DISCONTINUED | OUTPATIENT
Start: 2022-10-12 | End: 2022-10-13 | Stop reason: HOSPADM

## 2022-10-12 RX ORDER — ACETAMINOPHEN 650 MG/1
650 SUPPOSITORY RECTAL EVERY 6 HOURS PRN
Status: DISCONTINUED | OUTPATIENT
Start: 2022-10-12 | End: 2022-10-13 | Stop reason: HOSPADM

## 2022-10-12 RX ORDER — SODIUM CHLORIDE 9 MG/ML
INJECTION, SOLUTION INTRAVENOUS PRN
Status: DISCONTINUED | OUTPATIENT
Start: 2022-10-12 | End: 2022-10-13 | Stop reason: HOSPADM

## 2022-10-12 RX ORDER — ENOXAPARIN SODIUM 100 MG/ML
30 INJECTION SUBCUTANEOUS 2 TIMES DAILY
Status: DISCONTINUED | OUTPATIENT
Start: 2022-10-12 | End: 2022-10-13 | Stop reason: HOSPADM

## 2022-10-12 RX ORDER — SODIUM CHLORIDE 0.9 % (FLUSH) 0.9 %
5-40 SYRINGE (ML) INJECTION EVERY 12 HOURS SCHEDULED
Status: DISCONTINUED | OUTPATIENT
Start: 2022-10-12 | End: 2022-10-13 | Stop reason: HOSPADM

## 2022-10-12 RX ORDER — METHYLPREDNISOLONE SODIUM SUCCINATE 40 MG/ML
40 INJECTION, POWDER, LYOPHILIZED, FOR SOLUTION INTRAMUSCULAR; INTRAVENOUS EVERY 12 HOURS
Status: DISCONTINUED | OUTPATIENT
Start: 2022-10-12 | End: 2022-10-13 | Stop reason: HOSPADM

## 2022-10-12 RX ORDER — ALLOPURINOL 100 MG/1
100 TABLET ORAL DAILY
COMMUNITY

## 2022-10-12 RX ORDER — DEXTROSE MONOHYDRATE 100 MG/ML
INJECTION, SOLUTION INTRAVENOUS CONTINUOUS PRN
Status: DISCONTINUED | OUTPATIENT
Start: 2022-10-12 | End: 2022-10-13 | Stop reason: HOSPADM

## 2022-10-12 RX ORDER — INSULIN GLARGINE 100 [IU]/ML
50 INJECTION, SOLUTION SUBCUTANEOUS NIGHTLY
Status: DISCONTINUED | OUTPATIENT
Start: 2022-10-12 | End: 2022-10-13 | Stop reason: HOSPADM

## 2022-10-12 RX ORDER — POLYETHYLENE GLYCOL 3350 17 G/17G
17 POWDER, FOR SOLUTION ORAL DAILY PRN
Status: DISCONTINUED | OUTPATIENT
Start: 2022-10-12 | End: 2022-10-13 | Stop reason: HOSPADM

## 2022-10-12 RX ORDER — HYDROXYZINE HYDROCHLORIDE 25 MG/1
25 TABLET, FILM COATED ORAL 3 TIMES DAILY PRN
Status: DISCONTINUED | OUTPATIENT
Start: 2022-10-12 | End: 2022-10-13 | Stop reason: HOSPADM

## 2022-10-12 RX ORDER — PREDNISONE 20 MG/1
40 TABLET ORAL DAILY
Status: DISCONTINUED | OUTPATIENT
Start: 2022-10-14 | End: 2022-10-13 | Stop reason: HOSPADM

## 2022-10-12 RX ORDER — ACETAMINOPHEN 325 MG/1
650 TABLET ORAL EVERY 6 HOURS PRN
Status: DISCONTINUED | OUTPATIENT
Start: 2022-10-12 | End: 2022-10-13 | Stop reason: HOSPADM

## 2022-10-12 RX ORDER — HYDROXYZINE HYDROCHLORIDE 25 MG/1
25 TABLET, FILM COATED ORAL 3 TIMES DAILY PRN
Status: DISCONTINUED | OUTPATIENT
Start: 2022-10-12 | End: 2022-10-13 | Stop reason: SDUPTHER

## 2022-10-12 RX ORDER — PANTOPRAZOLE SODIUM 40 MG/1
40 TABLET, DELAYED RELEASE ORAL DAILY
Status: DISCONTINUED | OUTPATIENT
Start: 2022-10-12 | End: 2022-10-13 | Stop reason: HOSPADM

## 2022-10-12 RX ORDER — INSULIN LISPRO 100 [IU]/ML
0-16 INJECTION, SOLUTION INTRAVENOUS; SUBCUTANEOUS
Status: DISCONTINUED | OUTPATIENT
Start: 2022-10-12 | End: 2022-10-13 | Stop reason: HOSPADM

## 2022-10-12 RX ORDER — OXYCODONE AND ACETAMINOPHEN 7.5; 325 MG/1; MG/1
1 TABLET ORAL EVERY 6 HOURS PRN
Status: DISCONTINUED | OUTPATIENT
Start: 2022-10-12 | End: 2022-10-13 | Stop reason: HOSPADM

## 2022-10-12 RX ORDER — ASPIRIN 81 MG/1
81 TABLET ORAL DAILY
Status: DISCONTINUED | OUTPATIENT
Start: 2022-10-12 | End: 2022-10-13 | Stop reason: HOSPADM

## 2022-10-12 RX ORDER — ATORVASTATIN CALCIUM 10 MG/1
10 TABLET, FILM COATED ORAL NIGHTLY
Status: DISCONTINUED | OUTPATIENT
Start: 2022-10-12 | End: 2022-10-13 | Stop reason: HOSPADM

## 2022-10-12 RX ORDER — SIMVASTATIN 20 MG
20 TABLET ORAL NIGHTLY
COMMUNITY

## 2022-10-12 RX ORDER — INSULIN LISPRO 100 [IU]/ML
0-8 INJECTION, SOLUTION INTRAVENOUS; SUBCUTANEOUS EVERY 4 HOURS
Status: DISCONTINUED | OUTPATIENT
Start: 2022-10-12 | End: 2022-10-12

## 2022-10-12 RX ORDER — ESCITALOPRAM OXALATE 10 MG/1
10 TABLET ORAL DAILY
Status: DISCONTINUED | OUTPATIENT
Start: 2022-10-12 | End: 2022-10-13 | Stop reason: HOSPADM

## 2022-10-12 RX ORDER — INSULIN LISPRO 100 [IU]/ML
0-4 INJECTION, SOLUTION INTRAVENOUS; SUBCUTANEOUS NIGHTLY
Status: DISCONTINUED | OUTPATIENT
Start: 2022-10-12 | End: 2022-10-13 | Stop reason: HOSPADM

## 2022-10-12 RX ORDER — TRAZODONE HYDROCHLORIDE 100 MG/1
100 TABLET ORAL NIGHTLY
Status: DISCONTINUED | OUTPATIENT
Start: 2022-10-12 | End: 2022-10-13 | Stop reason: HOSPADM

## 2022-10-12 RX ORDER — GLIPIZIDE 10 MG/1
10 TABLET ORAL
Status: DISCONTINUED | OUTPATIENT
Start: 2022-10-12 | End: 2022-10-13 | Stop reason: HOSPADM

## 2022-10-12 RX ORDER — FUROSEMIDE 20 MG/1
10 TABLET ORAL DAILY
COMMUNITY

## 2022-10-12 RX ORDER — IPRATROPIUM BROMIDE AND ALBUTEROL SULFATE 2.5; .5 MG/3ML; MG/3ML
1 SOLUTION RESPIRATORY (INHALATION)
Status: DISCONTINUED | OUTPATIENT
Start: 2022-10-12 | End: 2022-10-13 | Stop reason: HOSPADM

## 2022-10-12 RX ADMIN — IPRATROPIUM BROMIDE AND ALBUTEROL SULFATE 1 AMPULE: 2.5; .5 SOLUTION RESPIRATORY (INHALATION) at 16:13

## 2022-10-12 RX ADMIN — METHYLPREDNISOLONE SODIUM SUCCINATE 40 MG: 40 INJECTION, POWDER, FOR SOLUTION INTRAMUSCULAR; INTRAVENOUS at 01:56

## 2022-10-12 RX ADMIN — DICYCLOMINE HYDROCHLORIDE 10 MG: 10 CAPSULE ORAL at 08:45

## 2022-10-12 RX ADMIN — MUPIROCIN: 20 OINTMENT TOPICAL at 08:46

## 2022-10-12 RX ADMIN — PANTOPRAZOLE SODIUM 40 MG: 40 TABLET, DELAYED RELEASE ORAL at 08:45

## 2022-10-12 RX ADMIN — ALLOPURINOL 100 MG: 100 TABLET ORAL at 12:28

## 2022-10-12 RX ADMIN — IPRATROPIUM BROMIDE AND ALBUTEROL SULFATE 1 AMPULE: 2.5; .5 SOLUTION RESPIRATORY (INHALATION) at 11:53

## 2022-10-12 RX ADMIN — ASPIRIN 81 MG: 81 TABLET, COATED ORAL at 08:46

## 2022-10-12 RX ADMIN — INSULIN LISPRO 12 UNITS: 100 INJECTION, SOLUTION INTRAVENOUS; SUBCUTANEOUS at 16:57

## 2022-10-12 RX ADMIN — INSULIN GLARGINE 50 UNITS: 100 INJECTION, SOLUTION SUBCUTANEOUS at 20:27

## 2022-10-12 RX ADMIN — MUPIROCIN: 20 OINTMENT TOPICAL at 20:19

## 2022-10-12 RX ADMIN — METFORMIN HYDROCHLORIDE 1000 MG: 500 TABLET ORAL at 17:00

## 2022-10-12 RX ADMIN — ACETAMINOPHEN 650 MG: 325 TABLET ORAL at 14:44

## 2022-10-12 RX ADMIN — LOSARTAN POTASSIUM 50 MG: 25 TABLET, FILM COATED ORAL at 08:46

## 2022-10-12 RX ADMIN — INSULIN LISPRO 12 UNITS: 100 INJECTION, SOLUTION INTRAVENOUS; SUBCUTANEOUS at 08:47

## 2022-10-12 RX ADMIN — DICYCLOMINE HYDROCHLORIDE 10 MG: 10 CAPSULE ORAL at 20:19

## 2022-10-12 RX ADMIN — DOXYCYCLINE 100 MG: 100 INJECTION, POWDER, LYOPHILIZED, FOR SOLUTION INTRAVENOUS at 12:34

## 2022-10-12 RX ADMIN — ACETAMINOPHEN 650 MG: 325 TABLET ORAL at 20:45

## 2022-10-12 RX ADMIN — ESCITALOPRAM OXALATE 10 MG: 10 TABLET ORAL at 08:46

## 2022-10-12 RX ADMIN — METHYLPREDNISOLONE SODIUM SUCCINATE 40 MG: 40 INJECTION, POWDER, FOR SOLUTION INTRAMUSCULAR; INTRAVENOUS at 12:28

## 2022-10-12 RX ADMIN — ATORVASTATIN CALCIUM 10 MG: 10 TABLET, FILM COATED ORAL at 20:19

## 2022-10-12 RX ADMIN — INSULIN LISPRO 4 UNITS: 100 INJECTION, SOLUTION INTRAVENOUS; SUBCUTANEOUS at 05:15

## 2022-10-12 RX ADMIN — Medication 10 ML: at 20:20

## 2022-10-12 RX ADMIN — DOXYCYCLINE 100 MG: 100 INJECTION, POWDER, LYOPHILIZED, FOR SOLUTION INTRAVENOUS at 01:59

## 2022-10-12 RX ADMIN — IPRATROPIUM BROMIDE AND ALBUTEROL SULFATE 1 AMPULE: 2.5; .5 SOLUTION RESPIRATORY (INHALATION) at 08:25

## 2022-10-12 RX ADMIN — FERROUS SULFATE TAB 325 MG (65 MG ELEMENTAL FE) 325 MG: 325 (65 FE) TAB at 08:50

## 2022-10-12 RX ADMIN — SODIUM CHLORIDE: 9 INJECTION, SOLUTION INTRAVENOUS at 01:57

## 2022-10-12 RX ADMIN — MAGNESIUM GLUCONATE 500 MG ORAL TABLET 400 MG: 500 TABLET ORAL at 08:46

## 2022-10-12 RX ADMIN — FERROUS SULFATE TAB 325 MG (65 MG ELEMENTAL FE) 325 MG: 325 (65 FE) TAB at 17:00

## 2022-10-12 RX ADMIN — IPRATROPIUM BROMIDE AND ALBUTEROL SULFATE 1 AMPULE: 2.5; .5 SOLUTION RESPIRATORY (INHALATION) at 23:17

## 2022-10-12 RX ADMIN — PREGABALIN 300 MG: 100 CAPSULE ORAL at 20:19

## 2022-10-12 RX ADMIN — GLIPIZIDE 10 MG: 10 TABLET ORAL at 15:52

## 2022-10-12 RX ADMIN — Medication 10 ML: at 08:50

## 2022-10-12 RX ADMIN — INSULIN LISPRO 4 UNITS: 100 INJECTION, SOLUTION INTRAVENOUS; SUBCUTANEOUS at 20:27

## 2022-10-12 RX ADMIN — DICYCLOMINE HYDROCHLORIDE 10 MG: 10 CAPSULE ORAL at 12:28

## 2022-10-12 RX ADMIN — MELOXICAM 15 MG: 15 TABLET ORAL at 12:28

## 2022-10-12 RX ADMIN — NYSTATIN: 100000 CREAM TOPICAL at 20:20

## 2022-10-12 RX ADMIN — PREGABALIN 300 MG: 100 CAPSULE ORAL at 08:45

## 2022-10-12 RX ADMIN — INSULIN LISPRO 12 UNITS: 100 INJECTION, SOLUTION INTRAVENOUS; SUBCUTANEOUS at 12:28

## 2022-10-12 RX ADMIN — ALUMINUM HYDROXIDE, MAGNESIUM HYDROXIDE, AND SIMETHICONE 30 ML: 200; 200; 20 SUSPENSION ORAL at 20:18

## 2022-10-12 RX ADMIN — ENOXAPARIN SODIUM 30 MG: 100 INJECTION SUBCUTANEOUS at 08:46

## 2022-10-12 RX ADMIN — IPRATROPIUM BROMIDE AND ALBUTEROL SULFATE 1 AMPULE: 2.5; .5 SOLUTION RESPIRATORY (INHALATION) at 19:38

## 2022-10-12 RX ADMIN — ENOXAPARIN SODIUM 30 MG: 100 INJECTION SUBCUTANEOUS at 20:18

## 2022-10-12 RX ADMIN — TRAZODONE HYDROCHLORIDE 100 MG: 100 TABLET ORAL at 20:19

## 2022-10-12 RX ADMIN — SALINE NASAL SPRAY 1 SPRAY: 1.5 SOLUTION NASAL at 08:46

## 2022-10-12 RX ADMIN — INSULIN GLARGINE 30 UNITS: 100 INJECTION, SOLUTION SUBCUTANEOUS at 01:56

## 2022-10-12 ASSESSMENT — PAIN DESCRIPTION - LOCATION
LOCATION: HEAD
LOCATION: HEAD

## 2022-10-12 ASSESSMENT — PAIN SCALES - GENERAL
PAINLEVEL_OUTOF10: 6
PAINLEVEL_OUTOF10: 8

## 2022-10-12 ASSESSMENT — PAIN DESCRIPTION - DESCRIPTORS: DESCRIPTORS: ACHING

## 2022-10-12 NOTE — THERAPY EVALUATION
Music Therapy  Individual Services/Consult & Assessment  Facility/Department: [address] [place] [address]        NAME: Cecil Kay  : 63  MRN: [id number]  ROOM: [place] [address]  ADMISSION DATE: 10/12/22       Referral Source: Pt request    Type of Session: Individual    Patient was received: in bed, in pt room    Others present: none     Treatment Objectives: Increase: mood, autonomy and control, self-expression  Decrease: anxiety, isolation    Music Therapy Interventions/Techniques:  Counseling  Breathing techniques  Music-assisted relaxation  Singing    Behavioral Observation:  Initial affect displayed: labile, withdrawn  Ending affect displayed: calm, contented    Physiological Responses:  Heart Rate: N/A  Respiration: N/A  Oxygen Saturation: N/A    Self-Report:  Initial Pain Score: 4 Ending Pain Score: 4  Initial Anxiety Level: 4 Ending Anxiety Level: 2  Initial Relaxation Level: 0 Ending Relaxation Level: 8    Communication Responses: active listening, smiling, verbalizations, positive non-verbal behaviors  Patient Verbalized: action-plan statement, statements re: emotions, anxiety about future, coping statements  Motor Responses: fine-motor movements, LE + UE  Behavioral Responses: active participation, relaxation behaviors  Musical Responses: singing, rhythmic movement     Notes: This pt waved MT-BC into room after visual contact made in hallway. During session, Brianne reflected on her difficulty resting and getting sleep. She discussed the circumstances leading to hospitalization at length, referencing challenges in seeking treatment through covid-19 pandemic, losing her primary care provider due to failure to appear in sessions (pt attributes this to agoraphobia and fear of leaving home due to increased risk of exposure). She spoke fondly of treatment team here on ICU, expressed \"ease\" after seeing physician today and establishing continuity of care.  Brianne reflected on structure of home environment and relationship with youngest daughter and granddaughter as primary support and outlets for expression. She expressed challenges with loss of autonomy and mobility, limited ability to engage in preferred leisure activities or relaxation stimuli. She verbalized preferences for live music stimuli, adapting focus to select music with the goal of increasing relaxation. Through transition to relaxing music stimuli, she was observed yawning as well as verbally reporting an increase in feelings of active relaxation. At close of session she expressed gratitude for services rendered and desire to continue receiving music therapy services through hospitalization.       Sealm Gamboa, MM, MT-BC  Board-Certified Music Therapist  Psychoeducation Specialist

## 2022-10-12 NOTE — PROGRESS NOTES
Pharmacy Medication Reconciliation Note     List of medications Brianne Wheleer is currently taking is complete. Source of information:   1. EMR and med dispense reports     Notes regarding home medications:   1. All listed home meds have recent dispense reports with history showing adherence. 2. Unable to verify basal/bolus insulin units, RN has updated these   3.  Added and deleted medications, will update meds on rounds with ICU MD and pharmacist     Tami Weinstein, Pharmacy Intern  10/12/2022  8:01 AM

## 2022-10-12 NOTE — PROGRESS NOTES
10/11/22 3660   NIV Type   $NIV $Daily Charge   Skin Assessment Clean, dry, & intact   Skin Protection for O2 Device Yes   NIV Started/Stopped (S)  On   Equipment Type V60   Mode Bilevel   Mask Type Full face mask   Mask Size Medium   Settings/Measurements   IPAP 16 cmH20   CPAP/EPAP 6 cmH2O   Vt (Measured) 671 mL   Rate Ordered 18   Resp 21   FiO2  35 %   Mask Leak (lpm) 9 lpm   Comfort Level Good   Using Accessory Muscles No   SpO2 100   Patient's Home Machine No   Alarm Settings   Alarms On Y

## 2022-10-12 NOTE — ACP (ADVANCE CARE PLANNING)
Advance Care Planning     General Advance Care Planning (ACP) Conversation    Date of Conversation: 10/11/2022  Conducted with: Patient with Decision Making Capacity    Healthcare Decision Maker:    Primary Decision Maker: Koko Sapp - Spouse - [phone]  Click here to complete Healthcare Decision Makers including selection of the Healthcare Decision Maker Relationship (ie \"Primary\"). Today we documented Decision Maker(s) consistent with Legal Next of Kin hierarchy.     Content/Action Overview:    Reviewed DNR/DNI and patient elects Full Code (Attempt Resuscitation)  ventilation preferences  Pt stated she would want a Vent if medically necessary     Length of Voluntary ACP Conversation in minutes:  <16 minutes (Non-Billable)    Payam Unger MSW, SHARONS

## 2022-10-12 NOTE — H&P
Hospital Medicine History & Physical      PCP: Marcus Lynn PA-C    Date of Service: Pt seen/examined on 10/12/22 and admitted on 10/12/22 to Inpatient    Chief Complaint   Patient presents with    Tremors     Pt called 911 for \" whole body shaking\" that started this morning. Pt states that the last time this happened her potasium was low. Denies v/d.  ON lasix but does not take potassium because it makes her potassium too high. History Of Present Illness: The patient is a 61 y.o. female with PMH below, presents with SOB, tremors. Pt reports that she has had whole body tremors throughout the day today. She has had this in the past when her K was low. She is on Lasix at home but does not take K supp 2/2 Hx of it making her K too high. Her K was normal.  She was found to be hypoxic and was eventually placed on NIV in the ER. She has upcoming New Pt appt in  for JOSE w/ Dr. Randal Roman. She is obese, snores and reports apneic events at night. No CP, cough, N/V, fever. Past Medical History:        Diagnosis Date    Anxiety     Arthritis     CHF (congestive heart failure) (HCC)     COPD (chronic obstructive pulmonary disease) (HCC)     Depression     Diabetes mellitus ([facility] [address]     Diverticulosis     Hyperlipidemia     Hypertension     Obesities, morbid ([facility] [address]        Past Surgical History:        Procedure Laterality Date     SECTION      x3    COLONOSCOPY      ENDOMETRIAL ABLATION      ENDOSCOPY, COLON, DIAGNOSTIC         Medications Prior to Admission:    Prior to Admission medications    Medication Sig Start Date End Date Taking?  Authorizing Provider   TRULICITY 1.5 UC/1.7TX SOPN Inject 0.75 mg into the skin once a week Takes on  due tomorrow 21   Historical Provider, MD   hydrOXYzine pamoate (VISTARIL) 25 MG capsule Take 1 capsule by mouth 3 times daily 18   Historical Provider, MD   magnesium oxide (MAG-OX) 400 MG tablet Take 400 mg by mouth daily    Historical Provider, MD   EPINEPHrine (EPIPEN) 0.3 MG/0.3ML SOAJ injection Inject 0.3 mLs into the muscle Allergy of unknown origin 12/1/16   Historical Provider, MD   traZODone (DESYREL) 100 MG tablet Take 100 mg by mouth nightly 3/8/18   Historical Provider, MD   meloxicam (MOBIC) 7.5 MG tablet Take 15 mg by mouth daily     Historical Provider, MD   pregabalin (LYRICA) 100 MG capsule Take 150 mg by mouth three times daily. Historical Provider, MD   glipiZIDE (GLUCOTROL XL) 5 MG extended release tablet Take 10 mg by mouth daily    Historical Provider, MD   oxyCODONE-acetaminophen (PERCOCET) 7.5-325 MG per tablet Take 1 tablet by mouth every 4 hours as needed for Pain . Historical Provider, MD   insulin detemir (LEVEMIR) 100 UNIT/ML injection pen Inject 50 Units into the skin nightly  Patient taking differently: Inject 60 Units into the skin nightly  10/11/16   Candelaria Martinez MD   losartan (COZAAR) 25 MG tablet Take 2 tablets by mouth daily 10/11/16   Candelaria Martinez MD   albuterol sulfate  (90 BASE) MCG/ACT inhaler Inhale 2 puffs into the lungs every 6 hours as needed for Wheezing    Historical Provider, MD   nystatin (MYCOSTATIN) 159722 UNIT/GM cream Apply topically nightly Apply under breasts at night    Historical Provider, MD   OXYGEN Inhale 4 L into the lungs    Historical Provider, MD   insulin aspart (NOVOLOG) 100 UNIT/ML injection pen Inject into the skin 3 times daily (before meals) SSI.     Historical Provider, MD   metFORMIN (GLUCOPHAGE) 500 MG tablet Take 1,000 mg by mouth 2 times daily (with meals)     Historical Provider, MD   aspirin 81 MG EC tablet Take 1 tablet by mouth daily 2/9/16   Jamaica Guevara MD   ferrous sulfate (FE TABS) 325 (65 FE) MG EC tablet Take 1 tablet by mouth 3 times daily (with meals) 2/9/16   Jamaica Guevara MD   Insulin Syringes, Disposable, U-100 0.3 ML MISC 1 each by Does not apply route daily 2/9/16   Jamaica Guevara MD   Insulin Pen Needle 29G X 12.7MM MISC 1 each by Does not apply route daily 2/9/16   David Stanton MD   glucose monitoring kit (FREESTYLE) monitoring kit 1 kit by Does not apply route daily as needed 2/9/16   David Stanton MD   nystatin (MYCOSTATIN) 735182 UNIT/GM powder Three times daily on the anterior abdominal wall and inguinal area. Use for 2 weeks. Patient taking differently: Apply topically 2-3 times per day under breasts 12/28/14   Jane Hawkins MD   escitalopram (LEXAPRO) 20 MG tablet Take 40 mg by mouth daily     Historical Provider, MD   Simvastatin (ZOCOR PO) Take 20 mg by mouth nightly     Historical Provider, MD   dicyclomine (BENTYL) 10 MG capsule Take 10 mg by mouth 3 times daily. Historical Provider, MD   Esomeprazole Magnesium (NEXIUM PO) Take 40 mg by mouth daily     Historical Provider, MD       Allergies:  Dilaudid [hydromorphone hcl], Lisinopril, Morphine, and Pcn [penicillins]    Social History:    TOBACCO:   reports that she quit smoking about 7 years ago. Her smoking use included cigarettes. She has a 80.00 pack-year smoking history. She has never used smokeless tobacco.  ETOH:   reports no history of alcohol use. Family History:  Reviewed in detail and negative for DM, Early CAD, Cancer (except as below). Positive as follows:        Problem Relation Age of Onset    Colon Cancer Mother     High Cholesterol Father     Heart Disease Father        REVIEW OF SYSTEMS:   Pertinent positives/negatives as follows: SOB, tremors, and as discussed in HPI, otherwise a complete ROS performed and all other systems are negative. PHYSICAL EXAM PERFORMED:  /69   Pulse 78   Temp 98.6 °F (37 °C) (Oral)   Resp 21   Ht 5' 1\" (1.549 m)   Wt (!) 330 lb (149.7 kg)   SpO2 97%   BMI 62.35 kg/m²   GEN:  A&Ox3. Obese female  HEENT:  NC/AT,EOMI, MMM, no erythema/exudates or visible masses. CVS:  Normal S1,S2. RRR. Without M/G/R.   LUNG:   Diminished bilat. Tachypnea. Mild increased WOB.   No wheezes, rales or rhonchi. ABD:  Soft, ND/NT, BS+ x4. Without G/R.  EXT: 2+ pulses, no c/c/e. Brisk cap refill. PSY:  Thought process intact, affect appropriate. MEET:  CN III-XII grossly intact. Moves all 4 spontaneously. Sensory grossly intact. Mildly tremulous upper and lower ext. SKIN: No rash or lesions on visible skin. Chart review shows recent radiographs:  XR CHEST PORTABLE    Result Date: 10/11/2022  EXAMINATION: ONE XRAY VIEW OF THE CHEST 10/11/2022 10:03 pm COMPARISON: 09/08/2022 HISTORY: ORDERING SYSTEM PROVIDED HISTORY: hypercapneic resp failure TECHNOLOGIST PROVIDED HISTORY: Reason for exam:->hypercapneic resp failure Reason for Exam: cough sob Initial encounter FINDINGS: Slightly increasing right basilar airspace opacity. No other focal consolidation, pleural effusion or pneumothorax. The cardiomediastinal silhouette is stable. No overt pulmonary edema. The osseous structures are stable. Right basilar atelectasis versus pneumonia.        EKG:    EKG 12 Lead [[id number]]    Collected: 10/11/22 1934    Updated: 10/11/22 2032     Ventricular Rate 85 BPM    Atrial Rate 85 BPM    P-R Interval 188 ms    QRS Duration 96 ms    Q-T Interval 414 ms    QTc Calculation (Bazett) 492 ms    P Axis 114 degrees    R Axis 65 degrees    T Axis 57 degrees    Diagnosis Sinus rhythm with frequent Premature ventricular complexesLow voltage QRSProlonged QTAbnormal ECGWhen compared with ECG of 08-SEP-2022 14:20,Premature ventricular complexes are now PresentPremature atrial complexes are no longer Present       CBC:  Recent Labs     10/11/22  1941   WBC 5.7   HGB 12.2   HCT 39.0         RENAL  Recent Labs     10/11/22  1941      K 4.7   CL 96*   CO2 35*   BUN 18   CREATININE 1.0   GLUCOSE 207*     Hemoglobin a1c:  Lab Results   Component Value Date    LABA1C 8.9 07/27/2019    LABA1C 12.3 10/08/2016    LABA1C 12.6 02/05/2016       LFT'S:  Recent Labs     10/11/22  1941   AST 20   ALT 23   BILITOT <0.2 ALKPHOS 98     CARDIAC ENZYMES:   Recent Labs     10/11/22  1941   TROPONINI <0.01     Lab Results   Component Value Date    PROBNP 378 (H) 09/08/2022    PROBNP 290 (H) 07/13/2021    PROBNP 292 (H) 06/15/2021       VBG:  Recent Labs     10/11/22  2002 10/11/22  2256   PHVEN 7.278* 7.336*   YSM3FOH 73.3* 67.4*   JWN9VZD 33.5* 35.2*   PO2VEN 66.6* 63.0*   [id number] 90 90        PHYSICIAN CERTIFICATION  I certify that Cyndee Horton is expected to be hospitalized for 2 midnights based on the following assessment and plan:    ASSESSMENT/PLAN:  Acute on chronic respiratory failure with hypoxia and hypercapnia, likely related to #2, supplemental O2 to maintain SPO2 ? 92%, continuous pulse ox. Wean as tolerated. Continue BiPAP per my orders until seen by Pulm Service. Pulm c/s. aeCOPD w/ probable MOHS component and JOSE not compliant w/ home NIV. IV solumedrol, IV Doxy, PRN/JOSHUA intensive NEB therapy. Check respiratory culture. Pulm consult. Hold home regimen for now. DM2, hold oral Rx, chk A1c, add Mod SSI q4h, continue home long acting insulinat 1/2 dose while NPO  Morbid Obesity (BMI is 62 kg/m²). Counseled wt loss. Complicating assessment and treatment. Placing patient at risk for multiple comorbidities as well as early death and contributing to the patients presentation. Prolonged QTc, 492 ms, avoid QT prolonging agents as able. Chronic pain, cont home Percocet. OARRS verified     DVT Prophylaxis: Lx  Diet: NPO  Code Status: Full Code   PT/OT Eval Status: Will order if needed and as patient condition allows  Dispo - Admit to inpatient ICU    Total critical care time caring for this patient with life threatening, unstable organ failure, including direct patient contact, management of life support systems, review of data including imaging and labs, discussions with other team members and physicians is 32 minutes so far today, excluding procedures.       Ashkan Tomlin MD    Thank you Silas Apley ANTONI White for the opportunity to be involved in this patient's care. If you have any questions or concerns please feel free to contact me via the [employer] at [phone]. This chart was generated using the Barnie Pinzon dictation system. I created this record but it may contain dictation errors given the limitations of this technology.

## 2022-10-12 NOTE — ED PROVIDER NOTES
[facility] Emergency Department      CHIEF COMPLAINT  Tremors (Pt called 911 for \" whole body shaking\" that started this morning. Pt states that the last time this happened her potasium was low. Denies v/d.  ON lasix but does not take potassium because it makes her potassium too high. )      HISTORY OF PRESENT ILLNESS  Brianne Alfaro is a 61 y.o. female with a history of CHF, COPD, diabetes, hypertension and obesity presents with twitching. She states since she got up this morning she has been having twitching in her whole body. She states this happened 1 time in the past when her potassium was low. She denies chest pain or shortness of breath. No fever or cough. She has been eating and drinking normally. She is on Lasix at home. .   No other complaints, modifying factors or associated symptoms. I have reviewed the following from the nursing documentation.     Past Medical History:   Diagnosis Date    Anxiety     Arthritis     CHF (congestive heart failure) (HCC)     COPD (chronic obstructive pulmonary disease) (HCC)     Depression     Diabetes mellitus ([address]     Diverticulosis     Hyperlipidemia     Hypertension     Obesities, morbid ([address]      Past Surgical History:   Procedure Laterality Date     SECTION      x3    COLONOSCOPY      ENDOMETRIAL ABLATION      ENDOSCOPY, COLON, DIAGNOSTIC       Family History   Problem Relation Age of Onset    Colon Cancer Mother     High Cholesterol Father     Heart Disease Father      Social History     Socioeconomic History    Marital status:      Spouse name: Not on file    Number of children: Not on file    Years of education: Not on file    Highest education level: Not on file   Occupational History    Not on file   Tobacco Use    Smoking status: Former     Packs/day: 2.00     Years: 40.00     Pack years: 80.00     Types: Cigarettes     Quit date: 2014     Years since quittin.7    Smokeless tobacco: Never   Vaping Use    Vaping Use: Never used   Substance and Sexual Activity    Alcohol use: No    Drug use: No    Sexual activity: Never   Other Topics Concern    Not on file   Social History Narrative    Not on file     Social Determinants of Health     Financial Resource Strain: Not on file   Food Insecurity: Not on file   Transportation Needs: Not on file   Physical Activity: Not on file   Stress: Not on file   Social Connections: Not on file   Intimate Partner Violence: Not on file   Housing Stability: Not on file     No current facility-administered medications for this encounter. Current Outpatient Medications   Medication Sig Dispense Refill    TRULICITY 1.5 ZG/8.8XR SOPN Inject 0.75 mg into the skin once a week Takes on Fridays due tomorrow      hydrOXYzine pamoate (VISTARIL) 25 MG capsule Take 1 capsule by mouth 3 times daily      magnesium oxide (MAG-OX) 400 MG tablet Take 400 mg by mouth daily      EPINEPHrine (EPIPEN) 0.3 MG/0.3ML SOAJ injection Inject 0.3 mLs into the muscle Allergy of unknown origin      traZODone (DESYREL) 100 MG tablet Take 100 mg by mouth nightly      meloxicam (MOBIC) 7.5 MG tablet Take 15 mg by mouth daily       pregabalin (LYRICA) 100 MG capsule Take 150 mg by mouth three times daily. glipiZIDE (GLUCOTROL XL) 5 MG extended release tablet Take 10 mg by mouth daily      oxyCODONE-acetaminophen (PERCOCET) 7.5-325 MG per tablet Take 1 tablet by mouth every 4 hours as needed for Pain .       insulin detemir (LEVEMIR) 100 UNIT/ML injection pen Inject 50 Units into the skin nightly (Patient taking differently: Inject 60 Units into the skin nightly ) 5 Pen 0    losartan (COZAAR) 25 MG tablet Take 2 tablets by mouth daily 30 tablet 1    albuterol sulfate  (90 BASE) MCG/ACT inhaler Inhale 2 puffs into the lungs every 6 hours as needed for Wheezing      nystatin (MYCOSTATIN) 962537 UNIT/GM cream Apply topically nightly Apply under breasts at night      OXYGEN Inhale 4 L into the lungs      insulin aspart (NOVOLOG) 100 UNIT/ML injection pen Inject into the skin 3 times daily (before meals) SSI.      metFORMIN (GLUCOPHAGE) 500 MG tablet Take 1,000 mg by mouth 2 times daily (with meals)       aspirin 81 MG EC tablet Take 1 tablet by mouth daily 30 tablet 3    ferrous sulfate (FE TABS) 325 (65 FE) MG EC tablet Take 1 tablet by mouth 3 times daily (with meals) 90 tablet 2    Insulin Syringes, Disposable, U-100 0.3 ML MISC 1 each by Does not apply route daily 100 each 3    Insulin Pen Needle 29G X 12.7MM MISC 1 each by Does not apply route daily 100 each 3    glucose monitoring kit (FREESTYLE) monitoring kit 1 kit by Does not apply route daily as needed 1 kit 0    nystatin (MYCOSTATIN) 894391 UNIT/GM powder Three times daily on the anterior abdominal wall and inguinal area. Use for 2 weeks. (Patient taking differently: Apply topically 2-3 times per day under breasts) 2 Bottle 0    escitalopram (LEXAPRO) 20 MG tablet Take 40 mg by mouth daily       Simvastatin (ZOCOR PO) Take 20 mg by mouth nightly       dicyclomine (BENTYL) 10 MG capsule Take 10 mg by mouth 3 times daily. Esomeprazole Magnesium (NEXIUM PO) Take 40 mg by mouth daily        Allergies   Allergen Reactions    Dilaudid [Hydromorphone Hcl]      Respiratory distress    Lisinopril Swelling     Swelling of eyes. Morphine Itching    Pcn [Penicillins] Hives       REVIEW OF SYSTEMS      General:  No fevers  Eyes:  No recent vison changes  ENT:  No sore throat, no nasal congestion  Cardiovascular:  no palpitations  Respiratory:   no cough, no wheezing  Gastrointestinal:  No abdominal pain, no vomiting, no diarrhea  Musculoskeletal: Twitching of her arms and legs.   Skin:  No rash   Neurologic:  No speech problems, no headache, no extremity numbness, no extremity weakness  Genitourinary:  No dysuria  Extremities:  no edema, no pain      Unless otherwise stated in this report, this patient's positive and negative responses for review of systems (constitutional, eyes, ENT, cardiovascular, respiratory, gastrointestinal, neurological, genitourinary, musculoskeletal, integument systems and systems related to the presenting problem) are either stated in the preceding paragraph, were not pertinent or were negative for the symptoms and/or complaints related to the medical problem. PHYSICAL EXAM  /73   Pulse 83   Temp 98.6 °F (37 °C) (Oral)   Resp 21   Ht 5' 1\" (1.549 m)   Wt (!) 330 lb (149.7 kg)   SpO2 97%   BMI 62.35 kg/m²   GENERAL APPEARANCE: Awake and alert. Cooperative. Morbidly obese. HEAD: Normocephalic. Atraumatic. EYES: PERRL. EOM's grossly intact. ENT: Mucous membranes are moist.   NECK: Supple, trachea midline. HEART: RRR. LUNGS: Respirations unlabored. CTAB. Good air exchange. No wheezes, rales, or rhonchi. Speaking comfortably in full sentences. ABDOMEN: Soft. Non-distended. Non-tender. No guarding or rebound. EXTREMITIES: No peripheral edema. MAEE. No acute deformities. SKIN: Warm, dry and intact. No acute rashes. NEUROLOGICAL: Alert and oriented X 3. CN II-XII grossly intact. Strength 5/5, sensation intact. Muscle twitching noted in the lower legs bilaterally. PSYCHIATRIC: Normal mood and affect. LABS  I have reviewed all labs for this visit.    Results for orders placed or performed during the hospital encounter of 10/11/22   COVID-19, Rapid    Specimen: Nasopharyngeal Swab   Result Value Ref Range    SARS-CoV-2, NAAT Not Detected Not Detected   Comprehensive Metabolic Panel w/ Reflex to MG   Result Value Ref Range    Sodium 140 136 - 145 mmol/L    Potassium reflex Magnesium 4.7 3.5 - 5.1 mmol/L    Chloride 96 (L) 99 - 110 mmol/L    CO2 35 (H) 21 - 32 mmol/L    Anion Gap 9 3 - 16    Glucose 207 (H) 70 - 99 mg/dL    BUN 18 7 - 20 mg/dL    Creatinine 1.0 0.6 - 1.1 mg/dL    GFR Non- 57 (A) >60    GFR African American >60 >60    Calcium 9.4 8.3 - 10.6 mg/dL    Total Protein 7.5 6.4 - 8.2 g/dL Albumin 4.1 3.4 - 5.0 g/dL    Albumin/Globulin Ratio 1.2 1.1 - 2.2    Total Bilirubin <0.2 0.0 - 1.0 mg/dL    Alkaline Phosphatase 98 40 - 129 U/L    ALT 23 10 - 40 U/L    AST 20 15 - 37 U/L   CBC with Auto Differential   Result Value Ref Range    WBC 5.7 4.0 - 11.0 K/uL    RBC 4.59 4.00 - 5.20 M/uL    Hemoglobin 12.2 12.0 - 16.0 g/dL    Hematocrit 39.0 36.0 - 48.0 %    MCV 85.0 80.0 - 100.0 fL    MCH 26.5 26.0 - 34.0 pg    MCHC 31.1 31.0 - 36.0 g/dL    RDW 13.7 12.4 - 15.4 %    Platelets 942 905 - 837 K/uL    MPV 8.4 5.0 - 10.5 fL    Neutrophils % 69.1 %    Lymphocytes % 22.2 %    Monocytes % 6.9 %    Eosinophils % 1.5 %    Basophils % 0.3 %    Neutrophils Absolute 4.0 1.7 - 7.7 K/uL    Lymphocytes Absolute 1.3 1.0 - 5.1 K/uL    Monocytes Absolute 0.4 0.0 - 1.3 K/uL    Eosinophils Absolute 0.1 0.0 - 0.6 K/uL    Basophils Absolute 0.0 0.0 - 0.2 K/uL   Troponin   Result Value Ref Range    Troponin <0.01 <0.01 ng/mL   Blood gas, venous   Result Value Ref Range    pH, Isaac 7.278 (L) 7.350 - 7.450    pCO2, Isaac 73.3 (H) 40.0 - 50.0 mmHg    pO2, Isaac 66.6 (H) 25.0 - 40.0 mmHg    HCO3, Venous 33.5 (H) 23.0 - 29.0 mmol/L    Base Excess, Isaac 4.6 (H) -3.0 - 3.0 mmol/L    O2 Sat, Isaac 90 Not Established %    Carboxyhemoglobin 1.3 0.0 - 1.5 %    MetHgb, Isaac 0.0 <1.5 %    TC02 (Calc), Isaac 36 Not Established mmol/L    O2 Therapy Unknown    EKG 12 Lead   Result Value Ref Range    Ventricular Rate 85 BPM    Atrial Rate 85 BPM    P-R Interval 188 ms    QRS Duration 96 ms    Q-T Interval 414 ms    QTc Calculation (Bazett) 492 ms    P Axis 114 degrees    R Axis 65 degrees    T Axis 57 degrees    Diagnosis       Sinus rhythm with frequent Premature ventricular complexesLow voltage QRSProlonged QTAbnormal ECGWhen compared with ECG of 08-SEP-2022 14:20,Premature ventricular complexes are now PresentPremature atrial complexes are no longer Present       EKG  The Ekg interpreted by myself  normal sinus rhythm with a rate of 85 with frequent PVCs  Axis is   Normal  QTc is prolonged  Intervals and Durations are unremarkable. No specific ST-T wave changes appreciated. No evidence of acute ischemia. No significant change from prior EKG dated September 8, 2022            RADIOLOGY  X-RAYS: ALL IMAGES INCLUDING PLAIN FILMS, CT, ULTRASOUND AND MRI HAVE BEEN READ BY THE RADIOLOGIST. I have personally reviewed plain film images and have reviewed the radiology reports. XR CHEST PORTABLE   Final Result   Right basilar atelectasis versus pneumonia. Rechecks: Physical assessment performed. Patient is tolerating BiPAP. She has been updated on her lab work findings. Sepsis:  Is this patient to be included in the SEP-1 Core Measure due to severe sepsis or septic shock? No   Exclusion criteria - the patient is NOT to be included for SEP-1 Core Measure due to: Infection is not suspected           ED COURSE/MDM  Patient seen and evaluated. Here the patient is afebrile with normal vitals signs. Old records reviewed. Here the patient is morbidly obese. Lungs are clear bilaterally. She is having some twitches in her legs and arms noted. She is having frequent PVCs. EKG shows sinus rhythm. Electrolytes are normal on her blood work but she is significantly hypercarbic with what appears to be respiratory acidosis. On further discussion she states she has been told she has sleep apnea and is supposed to wear CPAP at night but has not been set up with this yet. She states she is supposed to see a pulmonologist but cannot get in until January. I suspect that she has sleep apnea and obesity hypoventilation syndrome. I suspect this hypercapnia is causing her twitching. I will place her on BiPAP and plan to trial this for a short period of time and repeat her blood gas. She will need to be admitted to the hospitalist for further treatment. She denies chest pain, cough or fever.   Labs and imaging reviewed and results discussed with patient. Patient was reassessed as noted above . Plan of care discussed with patient. Patient in agreement with plan. CLINICAL IMPRESSION  1. Respiratory acidosis    2. Hypercapnia        Blood pressure 139/73, pulse 83, temperature 98.6 °F (37 °C), temperature source Oral, resp. rate 21, height 5' 1\" (1.549 m), weight (!) 330 lb (149.7 kg), SpO2 97 %, not currently breastfeeding. DISPOSITION  Brianne Varner was admitted in stable condition. Urszula Bell MD am the primary clinician of record.     (Please note this note was completed with a voice recognition program.  Efforts were made to edit the dictations but occasionally words are mis-transcribed.)        Manju Vyas MD  10/11/22 [id number]

## 2022-10-12 NOTE — PROGRESS NOTES
Overnight events:   Pt admitted to the ICU from the ER via stretcher. A & O x 4. Pt wears 4L NC at home. Pt brought up on 4L and bipap placed shortly after. Pt on bipap for 1.5 hours and pulled her mask off, getting angry because she is hungry and wants to drink because the mask is making her dry. Pt states that she has not yet had a sleep study and can't get into the pulmonologist until January. Dr. Zoë Mcpherson to the bedside to see pt and educated on the reason for bipap and NPO status. Also suggested for her to get a sleep study prior to pulmonology appt. Pt agreed to wear the bipap at this time. Pt has been hypertensive, pt takes losartan QD at home in the am.     Pt was seen using Afrin nasal spray out of her purse. Educated about use of afrin, told pt she wan't able to use that without doctors order in the hospital. Will ask MD for alternative medication, pt uses multiple times a day.      Neuro:  A & O x 4    Resp:  O2 4L home baseline  Bipap 35%-16/6    Cardiac:  NSR with PVC's    GI:  NPO  BS active  Last BM-10/10    :  Up to bathroom  No problems with urination    Skin:  Redness to buttocks  Abdominal fold no redness, does use nystatin at home    Lines:  PIV x 1    Gtts:  NS @ 75

## 2022-10-12 NOTE — PROGRESS NOTES
4 Eyes Skin Assessment     The patient is being assess for   Admission    I agree that 2 RN's have performed a thorough Head to Toe Skin Assessment on the patient. ALL assessment sites listed below have been assessed. Areas assessed by both nurses:   [x]   Head, Face, and Ears   [x]   Shoulders, Back, and Chest, Abdomen  [x]   Arms, Elbows, and Hands   [x]   Coccyx, Sacrum, and Ischium  [x]   Legs, Feet, and Heels        SKIN: redness to buttocks/blanchable    Co-signer eSignature: Electronically signed by Ernesto Severino RN on 10/12/22 at 6:36 AM EDT    Does the Patient have Skin Breakdown?   No          Vishnu Prevention initiated:  No   Wound Care Orders initiated:  No      WOC nurse consulted for Pressure Injury (Stage 3,4, Unstageable, DTI, NWPT, Complex wounds)and New or Established Ostomies:  No      Primary Nurse eSignature: Electronically signed by Lucas Acosta RN on 10/12/22 at 3:09 AM EDT

## 2022-10-12 NOTE — CONSULTS
IntraVENous, PRN, Vincent Alexander MD    polyethylene glycol [facility]) packet 17 g, 17 g, Oral, Daily PRN, Vincent Alexander MD    enoxaparin Sodium (LOVENOX) injection 30 mg, 30 mg, SubCUTAneous, BID, Vincent Alexander MD, 30 mg at 10/12/22 0846    acetaminophen (TYLENOL) tablet 650 mg, 650 mg, Oral, Q6H PRN **OR** acetaminophen (TYLENOL) suppository 650 mg, 650 mg, Rectal, Q6H PRN, Vincent Alexander MD    0.9 % sodium chloride infusion, , IntraVENous, Continuous, Vincent Alexander MD, Last Rate: 75 mL/hr at 10/12/22 0509, Rate Verify at 10/12/22 0509    albuterol (PROVENTIL) nebulizer solution 2.5 mg, 2.5 mg, Nebulization, Q2H PRN, Vincent Alexander MD    ipratropium-albuterol (DUONEB) nebulizer solution 1 ampule, 1 ampule, Inhalation, Q4H WA, Vincent Alexander MD, 1 ampule at 10/12/22 0825    doxycycline (VIBRAMYCIN) 100 mg in dextrose 5 % 100 mL IVPB, 100 mg, IntraVENous, Q12H, Vincent Alexander MD, Stopped at 10/12/22 0259    methylPREDNISolone sodium (SOLU-MEDROL) injection 40 mg, 40 mg, IntraVENous, Q12H, 40 mg at 10/12/22 0156 **FOLLOWED BY** [START ON 10/14/2022] predniSONE (DELTASONE) tablet 40 mg, 40 mg, Oral, Daily, Vincent Alexander MD    prochlorperazine (COMPAZINE) injection 10 mg, 10 mg, IntraVENous, Q6H PRN, Vincent Alexander MD    nystatin (MYCOSTATIN) cream, , Topical, BID, Vincent Alexander MD    mupirocin Neftaly Robin) 2 % ointment, , Nasal, BID, Santa Duarte MD, Given at 10/12/22 0846    insulin lispro (HUMALOG) injection vial 0-16 Units, 0-16 Units, SubCUTAneous, TID WC, Sriram Smith MD, 12 Units at 10/12/22 0847    insulin lispro (HUMALOG) injection vial 0-4 Units, 0-4 Units, SubCUTAneous, Nightly, Sriram Smith MD    sodium chloride (OCEAN, BABY AYR) 0.65 % nasal spray 1 spray, 1 spray, Each Nostril, Q2H PRN, Sriram Smith MD, 1 spray at 10/12/22 0846      REVIEW OF SYSTEMS:  Constitutional: Negative for fever  HENT: Negative for sore throat  Eyes: Negative for redness   Respiratory:+dyspnea, cough  Cardiovascular: Negative for chest pain  Gastrointestinal: Negative for vomiting, diarrhea   Genitourinary: Negative for hematuria   Musculoskeletal: Negative for arthralgias   Skin: Negative for rash  Neurological: Negative for syncope  Hematological: Negative for adenopathy  Psychiatric/Behavorial: Negative for anxiety      Objective:   PHYSICAL EXAM:    Blood pressure (!) 142/84, pulse 98, temperature 98.6 °F (37 °C), temperature source Oral, resp. rate 15, height 5' 1\" (1.549 m), weight (!) 319 lb 3.6 oz (144.8 kg), SpO2 93 %, not currently breastfeeding.' on RA  Gen: No distress. Eyes: PERRL. No sclera icterus. No conjunctival injection. ENT: No discharge. Pharynx clear. Neck: Trachea midline. No obvious mass. Resp: Rhonchi bilateral   CV: Regular rate. Regular rhythm. No murmur or rub. No edema. GI: Non-tender. Non-distended. No hernia. Skin: Warm and dry. No nodule on exposed extremities. Lymph: No cervical LAD. No supraclavicular LAD. M/S: No cyanosis. No joint deformity. No clubbing. Neuro: Awake. Alert. Moves all four extremities. Psych: Oriented x 3. No anxiety.            DATA reviewed by me:   CXRno acute process ,        LABS/IMAGING:    CBC:  Lab Results   Component Value Date    WBC 7.0 10/12/2022    HGB 12.3 10/12/2022    HCT 38.1 10/12/2022    MCV 84.9 10/12/2022     10/12/2022    LYMPHOPCT 13.4 10/12/2022    RBC 4.49 10/12/2022    MCH 27.5 10/12/2022    MCHC 32.4 10/12/2022    RDW 13.5 10/12/2022    NEUTOPHILPCT 83.2 10/12/2022    MONOPCT 2.4 10/12/2022    BASOPCT 0.3 10/12/2022    NEUTROABS 5.9 10/12/2022    LYMPHSABS 0.9 (L) 10/12/2022    MONOSABS 0.2 10/12/2022    EOSABS 0.0 10/12/2022    BASOSABS 0.0 10/12/2022       Recent Labs     10/12/22  0442 10/11/22  1941   WBC 7.0 5.7   HGB 12.3 12.2   HCT 38.1 39.0   MCV 84.9 85.0    153       BMP:   Recent Labs     10/11/22  1941 10/12/22  0442    136   K 4.7 5.3*   CL 96* 96*   CO2 35* 32   BUN 18 17   CREATININE 1.0 0.9       MG:   Lab Results   Component Value Date/Time    MG 1.60 10/11/2022 07:41 PM     Ca/Phos:   Lab Results   Component Value Date    CALCIUM 9.3 10/12/2022    PHOS 3.9 08/20/2019     LIVER PROFILE:   Recent Labs     10/11/22  1941 10/12/22  0442   AST 20 20   ALT 23 22   BILITOT <0.2 0.3   ALKPHOS 98 94       PT/INR: No results for input(s): PROTIME, INR in the last 72 hours. APTT: No results for input(s): APTT in the last 72 hours. Cardiac Enzymes:  Lab Results   Component Value Date    TROPONINI <0.01 10/11/2022       Assessment:       -Acute hypoxic hypercapnic resp failure   -Acute  copd exacerbation   -Acute encephalopathy sec above  -Possible JOSE  -      Plan:      *-IV steroids wean to PO    check viral panel   *- Sputum culture   *BiPAP  *- procalcitonin  *-BD  ICS   D dimer   Incentive spirmetery and flutter valve   Watch IVF   CT chest :as OP and PFT   Check o2 at ambulation before discharge  May needed NIMV   This patient has Chronic Respiratory Failure due to copd and hypercapnia   Patients condition has worsened, and their quality of life has deteriorated. A non-invasive home ventilator is quired to reduce the risk of dying from acute on top of chronic hypercapnic respiratory failure if not seen in an acute care hospital in time. It is my medical opinion that patient is requiring augmented tidal volume to resolve CO2 retention that is not provided by BiPAP or BiPAP ST. This patient would benefit from NIV utilizing the AVAPS AE to provide a targeted tidal volume. Patients last PFT shows_COPD . It is therefore required that due to the severity of lung disease present that the non-invasive ventilator is medically necessary in the management of patients severe condition.  Without advanced therapy in the home this patient it at risk for life threatening exacerbations of thir diagnosis wilma    Pr MAX 36  P min 6  PS MAx 30   EPAP 4 ,EPA max 15  Auto rate     Thank you very much for allowing me to participate in the care of this pleasant patient , should you have any questions ,please do not hesitate to contact me      Aniceto Leach MD,[facility]  Pulmonary&Critical Care Medicine    Heriberto Fritz    NOTE: This report was transcribed using voice recognition software. Every effort was made to ensure accuracy; however, inadvertent computerized transcription errors may be present.

## 2022-10-12 NOTE — PROGRESS NOTES
RT Inhaler-Nebulizer Bronchodilator Protocol Note    There is a bronchodilator order in the chart from a provider indicating to follow the RT Bronchodilator Protocol and there is an Initiate RT Inhaler-Nebulizer Bronchodilator Protocol order as well (see protocol at bottom of note). CXR Findings:  XR CHEST PORTABLE    Result Date: 10/11/2022  Right basilar atelectasis versus pneumonia. The findings from the last RT Protocol Assessment were as follows:   History Pulmonary Disease: (P) Chronic pulmonary disease  Respiratory Pattern: (P) Dyspnea on exertion or RR 21-25 bpm  Breath Sounds: (P) Slightly diminished and/or crackles  Cough: (P) Strong, spontaneous, non-productive  Indication for Bronchodilator Therapy: (P) On home bronchodilators  Bronchodilator Assessment Score: (P) 6    Aerosolized bronchodilator medication orders have been revised according to the RT Inhaler-Nebulizer Bronchodilator Protocol below. Respiratory Therapist to perform RT Therapy Protocol Assessment initially then follow the protocol. Repeat RT Therapy Protocol Assessment PRN for score 0-3 or on second treatment, BID, and PRN for scores above 3. No Indications - adjust the frequency to every 6 hours PRN wheezing or bronchospasm, if no treatments needed after 48 hours then discontinue using Per Protocol order mode. If indication present, adjust the RT bronchodilator orders based on the Bronchodilator Assessment Score as indicated below. Use Inhaler orders unless patient has one or more of the following: on home nebulizer, not able to hold breath for 10 seconds, is not alert and oriented, cannot activate and use MDI correctly, or respiratory rate 25 breaths per minute or more, then use the equivalent nebulizer order(s) with same Frequency and PRN reasons based on the score. If a patient is on this medication at home then do not decrease Frequency below that used at home.     0-3 - enter or revise RT bronchodilator order(s) to equivalent RT Bronchodilator order with Frequency of every 4 hours PRN for wheezing or increased work of breathing using Per Protocol order mode. 4-6 - enter or revise RT Bronchodilator order(s) to two equivalent RT bronchodilator orders with one order with BID Frequency and one order with Frequency of every 4 hours PRN wheezing or increased work of breathing using Per Protocol order mode. 7-10 - enter or revise RT Bronchodilator order(s) to two equivalent RT bronchodilator orders with one order with TID Frequency and one order with Frequency of every 4 hours PRN wheezing or increased work of breathing using Per Protocol order mode. 11-13 - enter or revise RT Bronchodilator order(s) to one equivalent RT bronchodilator order with QID Frequency and an Albuterol order with Frequency of every 4 hours PRN wheezing or increased work of breathing using Per Protocol order mode. Greater than 13 - enter or revise RT Bronchodilator order(s) to one equivalent RT bronchodilator order with every 4 hours Frequency and an Albuterol order with Frequency of every 2 hours PRN wheezing or increased work of breathing using Per Protocol order mode.        Electronically signed by Ashlyn Alejo RCP on 10/12/2022 at 3:20 AM

## 2022-10-12 NOTE — ED NOTES
Chief Complaint   Patient presents with    Tremors     Pt called 911 for \" whole body shaking\" that started this morning. Pt states that the last time this happened her potasium was low. Denies v/d.  ON lasix but does not take potassium because it makes her potassium too high.         / Level of Consciousness: Alert (0)    Vitals:    10/11/22 2306 10/11/22 2332 10/12/22 0001 10/12/22 0007   BP: 114/69 126/68 125/70    Pulse: 78 74 73 76   Resp:       Temp:       TempSrc:       SpO2: 97% 95% 96% 97%   Weight:       Height:              Allergies   Allergen Reactions    Dilaudid [Hydromorphone Hcl]      Respiratory distress    Lisinopril Swelling     Swelling of eyes.  Morphine Itching    Pcn [Penicillins] Hives       No current facility-administered medications for this encounter. Current Outpatient Medications   Medication Sig Dispense Refill    TRULICITY 1.5 CT/6.7GU SOPN Inject 0.75 mg into the skin once a week Takes on Fridays due tomorrow      hydrOXYzine pamoate (VISTARIL) 25 MG capsule Take 1 capsule by mouth 3 times daily      magnesium oxide (MAG-OX) 400 MG tablet Take 400 mg by mouth daily      EPINEPHrine (EPIPEN) 0.3 MG/0.3ML SOAJ injection Inject 0.3 mLs into the muscle Allergy of unknown origin      traZODone (DESYREL) 100 MG tablet Take 100 mg by mouth nightly      meloxicam (MOBIC) 7.5 MG tablet Take 15 mg by mouth daily       pregabalin (LYRICA) 100 MG capsule Take 150 mg by mouth three times daily.  glipiZIDE (GLUCOTROL XL) 5 MG extended release tablet Take 10 mg by mouth daily      oxyCODONE-acetaminophen (PERCOCET) 7.5-325 MG per tablet Take 1 tablet by mouth every 4 hours as needed for Pain .       insulin detemir (LEVEMIR) 100 UNIT/ML injection pen Inject 50 Units into the skin nightly (Patient taking differently: Inject 60 Units into the skin nightly ) 5 Pen 0    losartan (COZAAR) 25 MG tablet Take 2 tablets by mouth daily 30 tablet 1    albuterol sulfate  (90 BASE) MCG/ACT inhaler Inhale 2 puffs into the lungs every 6 hours as needed for Wheezing      nystatin (MYCOSTATIN) 397891 UNIT/GM cream Apply topically nightly Apply under breasts at night      OXYGEN Inhale 4 L into the lungs      insulin aspart (NOVOLOG) 100 UNIT/ML injection pen Inject into the skin 3 times daily (before meals) SSI.  metFORMIN (GLUCOPHAGE) 500 MG tablet Take 1,000 mg by mouth 2 times daily (with meals)       aspirin 81 MG EC tablet Take 1 tablet by mouth daily 30 tablet 3    ferrous sulfate (FE TABS) 325 (65 FE) MG EC tablet Take 1 tablet by mouth 3 times daily (with meals) 90 tablet 2    Insulin Syringes, Disposable, U-100 0.3 ML MISC 1 each by Does not apply route daily 100 each 3    Insulin Pen Needle 29G X 12.7MM MISC 1 each by Does not apply route daily 100 each 3    glucose monitoring kit (FREESTYLE) monitoring kit 1 kit by Does not apply route daily as needed 1 kit 0    nystatin (MYCOSTATIN) 254946 UNIT/GM powder Three times daily on the anterior abdominal wall and inguinal area. Use for 2 weeks. (Patient taking differently: Apply topically 2-3 times per day under breasts) 2 Bottle 0    escitalopram (LEXAPRO) 20 MG tablet Take 40 mg by mouth daily       Simvastatin (ZOCOR PO) Take 20 mg by mouth nightly       dicyclomine (BENTYL) 10 MG capsule Take 10 mg by mouth 3 times daily.  Esomeprazole Magnesium (NEXIUM PO) Take 40 mg by mouth daily         List of medications patient is currently taking is complete. Source of medications in list are updated. .       Patient Active Problem List   Diagnosis    Acute respiratory failure (HCC)    Fluid overload    Acidosis    Panniculitis    COPD (chronic obstructive pulmonary disease) (HCC)    General weakness    PNA (pneumonia)    Elevated blood pressure reading    Class 3 severe obesity with body mass index (BMI) of 50.0 to 59.9 in adult ([facility] [address]    Hyperlipidemia    DM (diabetes mellitus) ([facility] [address]    CHF (congestive heart failure) (HCC)    Tremor    Hypomagnesemia    Tremors of nervous system    Generalized weakness    Hyperkalemia    Acute respiratory failure with hypoxemia (HCC)                     Vitals:    10/11/22 2306 10/11/22 2332 10/12/22 0001 10/12/22 0007   BP: 114/69 126/68 125/70    Pulse: 78 74 73 76   Resp:       Temp:       TempSrc:       SpO2: 97% 95% 96% 97%   Weight:       Height:              -----------------------------------------------------------------------------------------    Pt was updated about admission. **To be filled out after report is complete*    Bed assignment: 307    Report called to [facility] on ICU. Pertinent labs, allergies, medications and conditions were reviewed. Present Skin issues include redness to bottom, no open areas. Patient belongings that will be going with the patient during admission are included in pt belongings bag. Emergency contact * was notified of admission.      Mushtaq Chen, RN     Mushtaq Chen, RN  10/12/22 [address] [facility] [address], RN  10/12/22 2914

## 2022-10-12 NOTE — PROGRESS NOTES
08:30 Pt awake a&o x4, VSS assessment as charted  09:45 Critical care rounds completed @ bedside w/ Dr. Rosanna Russell, pt downgraded to PCU status  15:24 Pt remains awake  a&o x4, VS remian stable reassessment stable @ as charted  19:00 Bedside handoff given to night nurse Ruddy Zepeda, pt awake a&o x4, stabel @ handoff

## 2022-10-12 NOTE — PROGRESS NOTES
RT Inhaler-Nebulizer Bronchodilator Protocol Note    There is a bronchodilator order in the chart from a provider indicating to follow the RT Bronchodilator Protocol and there is an Initiate RT Inhaler-Nebulizer Bronchodilator Protocol order as well (see protocol at bottom of note). CXR Findings:  XR CHEST PORTABLE    Result Date: 10/12/2022  1. No significant change. XR CHEST PORTABLE    Result Date: 10/11/2022  Right basilar atelectasis versus pneumonia. The findings from the last RT Protocol Assessment were as follows:   History Pulmonary Disease: (P) Chronic pulmonary disease  Respiratory Pattern: (P) Dyspnea on exertion or RR 21-25 bpm  Breath Sounds: (P) Slightly diminished and/or crackles  Cough: (P) Strong, spontaneous, non-productive  Indication for Bronchodilator Therapy: (P) On home bronchodilators  Bronchodilator Assessment Score: (P) 6    Aerosolized bronchodilator medication orders have been revised according to the RT Inhaler-Nebulizer Bronchodilator Protocol below. Respiratory Therapist to perform RT Therapy Protocol Assessment initially then follow the protocol. Repeat RT Therapy Protocol Assessment PRN for score 0-3 or on second treatment, BID, and PRN for scores above 3. No Indications - adjust the frequency to every 6 hours PRN wheezing or bronchospasm, if no treatments needed after 48 hours then discontinue using Per Protocol order mode. If indication present, adjust the RT bronchodilator orders based on the Bronchodilator Assessment Score as indicated below. Use Inhaler orders unless patient has one or more of the following: on home nebulizer, not able to hold breath for 10 seconds, is not alert and oriented, cannot activate and use MDI correctly, or respiratory rate 25 breaths per minute or more, then use the equivalent nebulizer order(s) with same Frequency and PRN reasons based on the score.   If a patient is on this medication at home then do not decrease Frequency below that used at home. 0-3 - enter or revise RT bronchodilator order(s) to equivalent RT Bronchodilator order with Frequency of every 4 hours PRN for wheezing or increased work of breathing using Per Protocol order mode. 4-6 - enter or revise RT Bronchodilator order(s) to two equivalent RT bronchodilator orders with one order with BID Frequency and one order with Frequency of every 4 hours PRN wheezing or increased work of breathing using Per Protocol order mode. 7-10 - enter or revise RT Bronchodilator order(s) to two equivalent RT bronchodilator orders with one order with TID Frequency and one order with Frequency of every 4 hours PRN wheezing or increased work of breathing using Per Protocol order mode. 11-13 - enter or revise RT Bronchodilator order(s) to one equivalent RT bronchodilator order with QID Frequency and an Albuterol order with Frequency of every 4 hours PRN wheezing or increased work of breathing using Per Protocol order mode. Greater than 13 - enter or revise RT Bronchodilator order(s) to one equivalent RT bronchodilator order with every 4 hours Frequency and an Albuterol order with Frequency of every 2 hours PRN wheezing or increased work of breathing using Per Protocol order mode.        Electronically signed by Melissa Jain RCP on 10/12/2022 at 7:40 PM

## 2022-10-12 NOTE — CARE COORDINATION
Case Management Assessment  Initial Evaluation      Patient Name: Fariha Torre  YOB: 1965  Diagnosis: Respiratory acidosis [E87.29]  Hypercapnia [R06.89]  Acute respiratory failure with hypoxemia ([facility] [address] [J96.01]  Date / Time: 10/11/2022  7:19 PM    Admission status/Date:10/11/2022 Inpatient   Chart Reviewed: Yes      Patient Interviewed: Yes   Family Interviewed:  No      Hospitalization in the last 30 days:  No    Health Care Decision Maker :   Primary Decision Maker: GunnJose - Spouse - [phone]    (CM - must 1st enter selection under Navigator - emergency contact- Health Care Decision Maker Relationship and pick relationship)   Who do you trust or have selected to make healthcare decisions for you    Met with: pt  Interview conducted  (bedside/phone):bedside    Current PCP: Nenita Miles PA-C    Financial  [place] Medicare and Medicaid   Precert required for SNF : Y          3 night stay required -  N    ADLS  Support Systems/Care Needs:    Transportation:       Meal Preparation:     Housing  Living Arrangements: pt lives at home with her  who is with her 24/7  Steps: 0  Intent for return to present living arrangements: Yes  Identified Issues: Dex Chaudhry  Active with [address] : Yes - [facility]) Agency:(Services) Nelda confirmed pt is active with them for RN and opened her on 9/13. PT did an eval only and OT discharged her on 10/10. She stated to notify her on pt's discharge. Passport/Waiver : No  :                      Phone Number:    Passport/Waiver Services: n/a          Durable Medical Equiptment   DME Provider: Jovan Morse ([phone])Matt confirmed pt has home o2 with them at 2 liters continuous and will need updated orders.    Equipment:   Walker_x__Cane_x__RTS___ BSC_x__Shower Chair__x_Hospital Bed__x_W/C_x___Other_Rolator_______  02 at __4__Liter(s)---wears(frequency)___cont____ [facility] __x_ CPAP___ BiPap___   N/A____    Home O2 Use :  Yes    If No for home O2---if presently on O2 during hospitalization:  Yes  if yes CM to follow for potential DC O2 need  Informed of need for care provider to bring portable home O2 tank on day of discharge for nursing to connect prior to leaving:   Yes  Verbalized agreement/Understanding:   Yes    Community Service Affiliation  Dialysis:  No    Agency:  Location:  Dialysis Schedule:  Phone:   Fax: Other Community Services: n/a    DISCHARGE PLAN: Explained Case Management role/services. Chart review completed. Met with pt at bedside. Pt stated she is independent at home with her ADL's and her  helps her with dressing when needed. She stated that the plan is for her to return home when discharged. She stated she has an upcoming appointment with the Pulmonary group at [facility]. She stated she will have a ride home on discharged. Completed Interdisciplinary rounds with ICU staff. Dr. Debbi Sarabia stated pt will need a Trilogy/AVAP on discharge and will put documentation in his note today. Met with pt at bedside after rounds. She is aware of the above and that sometimes, she will need to follow up on discharge for the machine if not able to be obtained directly from the hospital. Discussed DME companies verbally per her request and she was given a DME list. She stated \"[employer] drags their feet\" and \"[employer] is not good\". She requested referral to [facility] to see if they could obtain one for her \"quicker\" and go to her location. Referral called to Oscar Mcrae at [facility] who states she will review pt's chart and stated they can go to the location. She is aware pt doesn't want to switch o2 providers. Addendum at 3:02pm: Oscar Mcrae confirmed they can go to pt's location and will continue to follow. CM will continue to follow and assist. Please notify CM if needs or concerns arise.     Lashanda REDD, LORRI

## 2022-10-13 ENCOUNTER — APPOINTMENT (OUTPATIENT)
Dept: GENERAL RADIOLOGY | Age: 57
DRG: 189 | End: 2022-10-13
Payer: MEDICARE

## 2022-10-13 VITALS
OXYGEN SATURATION: 95 % | SYSTOLIC BLOOD PRESSURE: 153 MMHG | BODY MASS INDEX: 55.32 KG/M2 | RESPIRATION RATE: 20 BRPM | DIASTOLIC BLOOD PRESSURE: 93 MMHG | HEART RATE: 98 BPM | TEMPERATURE: 98 F | WEIGHT: 293 LBS | HEIGHT: 61 IN

## 2022-10-13 PROBLEM — R06.89 HYPERCAPNIA: Status: ACTIVE | Noted: 2022-10-13

## 2022-10-13 LAB
ANION GAP SERPL CALCULATED.3IONS-SCNC: 9 MMOL/L (ref 3–16)
BASOPHILS ABSOLUTE: 0 K/UL (ref 0–0.2)
BASOPHILS RELATIVE PERCENT: 0.4 %
BUN BLDV-MCNC: 26 MG/DL (ref 7–20)
CALCIUM SERPL-MCNC: 9.8 MG/DL (ref 8.3–10.6)
CHLORIDE BLD-SCNC: 95 MMOL/L (ref 99–110)
CO2: 29 MMOL/L (ref 21–32)
CREAT SERPL-MCNC: 0.9 MG/DL (ref 0.6–1.1)
EOSINOPHILS ABSOLUTE: 0 K/UL (ref 0–0.6)
EOSINOPHILS RELATIVE PERCENT: 0 %
GFR AFRICAN AMERICAN: >60
GFR NON-AFRICAN AMERICAN: >60
GLUCOSE BLD-MCNC: 338 MG/DL (ref 70–99)
GLUCOSE BLD-MCNC: 402 MG/DL (ref 70–99)
GLUCOSE BLD-MCNC: 511 MG/DL (ref 70–99)
HCT VFR BLD CALC: 38.6 % (ref 36–48)
HEMOGLOBIN: 12.2 G/DL (ref 12–16)
LYMPHOCYTES ABSOLUTE: 0.6 K/UL (ref 1–5.1)
LYMPHOCYTES RELATIVE PERCENT: 8.1 %
MCH RBC QN AUTO: 26.4 PG (ref 26–34)
MCHC RBC AUTO-ENTMCNC: 31.7 G/DL (ref 31–36)
MCV RBC AUTO: 83.4 FL (ref 80–100)
MONOCYTES ABSOLUTE: 0.1 K/UL (ref 0–1.3)
MONOCYTES RELATIVE PERCENT: 1.9 %
NEUTROPHILS ABSOLUTE: 6.8 K/UL (ref 1.7–7.7)
NEUTROPHILS RELATIVE PERCENT: 89.6 %
PDW BLD-RTO: 13.8 % (ref 12.4–15.4)
PERFORMED ON: ABNORMAL
PERFORMED ON: ABNORMAL
PLATELET # BLD: 185 K/UL (ref 135–450)
PMV BLD AUTO: 9.2 FL (ref 5–10.5)
POTASSIUM SERPL-SCNC: 5.2 MMOL/L (ref 3.5–5.1)
RBC # BLD: 4.63 M/UL (ref 4–5.2)
SODIUM BLD-SCNC: 133 MMOL/L (ref 136–145)
WBC # BLD: 7.6 K/UL (ref 4–11)

## 2022-10-13 PROCEDURE — 2500000003 HC RX 250 WO HCPCS: Performed by: INTERNAL MEDICINE

## 2022-10-13 PROCEDURE — 2700000000 HC OXYGEN THERAPY PER DAY

## 2022-10-13 PROCEDURE — 36415 COLL VENOUS BLD VENIPUNCTURE: CPT

## 2022-10-13 PROCEDURE — 94660 CPAP INITIATION&MGMT: CPT

## 2022-10-13 PROCEDURE — 99239 HOSP IP/OBS DSCHRG MGMT >30: CPT | Performed by: INTERNAL MEDICINE

## 2022-10-13 PROCEDURE — 6370000000 HC RX 637 (ALT 250 FOR IP): Performed by: INTERNAL MEDICINE

## 2022-10-13 PROCEDURE — 80048 BASIC METABOLIC PNL TOTAL CA: CPT

## 2022-10-13 PROCEDURE — 94761 N-INVAS EAR/PLS OXIMETRY MLT: CPT

## 2022-10-13 PROCEDURE — 83036 HEMOGLOBIN GLYCOSYLATED A1C: CPT

## 2022-10-13 PROCEDURE — 85025 COMPLETE CBC W/AUTO DIFF WBC: CPT

## 2022-10-13 PROCEDURE — 6360000002 HC RX W HCPCS: Performed by: INTERNAL MEDICINE

## 2022-10-13 PROCEDURE — 71045 X-RAY EXAM CHEST 1 VIEW: CPT

## 2022-10-13 PROCEDURE — 2580000003 HC RX 258: Performed by: INTERNAL MEDICINE

## 2022-10-13 RX ORDER — LEVOFLOXACIN 500 MG/1
500 TABLET, FILM COATED ORAL DAILY
Status: DISCONTINUED | OUTPATIENT
Start: 2022-10-13 | End: 2022-10-13 | Stop reason: HOSPADM

## 2022-10-13 RX ORDER — ESCITALOPRAM OXALATE 10 MG/1
10 TABLET ORAL DAILY
Qty: 30 TABLET | Refills: 3 | Status: SHIPPED | OUTPATIENT
Start: 2022-10-14

## 2022-10-13 RX ORDER — LEVOFLOXACIN 500 MG/1
500 TABLET, FILM COATED ORAL DAILY
Qty: 5 TABLET | Refills: 0 | Status: SHIPPED | OUTPATIENT
Start: 2022-10-13 | End: 2022-10-18

## 2022-10-13 RX ORDER — FLUTICASONE FUROATE, UMECLIDINIUM BROMIDE AND VILANTEROL TRIFENATATE 100; 62.5; 25 UG/1; UG/1; UG/1
1 POWDER RESPIRATORY (INHALATION) DAILY
Qty: 1 EACH | Refills: 2 | Status: SHIPPED | OUTPATIENT
Start: 2022-10-13 | End: 2023-01-11

## 2022-10-13 RX ORDER — PREDNISONE 10 MG/1
TABLET ORAL
Qty: 30 TABLET | Refills: 0 | Status: SHIPPED | OUTPATIENT
Start: 2022-10-13

## 2022-10-13 RX ADMIN — FERROUS SULFATE TAB 325 MG (65 MG ELEMENTAL FE) 325 MG: 325 (65 FE) TAB at 08:20

## 2022-10-13 RX ADMIN — DICYCLOMINE HYDROCHLORIDE 10 MG: 10 CAPSULE ORAL at 08:22

## 2022-10-13 RX ADMIN — LOSARTAN POTASSIUM 50 MG: 25 TABLET, FILM COATED ORAL at 08:20

## 2022-10-13 RX ADMIN — ASPIRIN 81 MG: 81 TABLET, COATED ORAL at 08:21

## 2022-10-13 RX ADMIN — DOXYCYCLINE 100 MG: 100 INJECTION, POWDER, LYOPHILIZED, FOR SOLUTION INTRAVENOUS at 02:11

## 2022-10-13 RX ADMIN — PANTOPRAZOLE SODIUM 40 MG: 40 TABLET, DELAYED RELEASE ORAL at 08:21

## 2022-10-13 RX ADMIN — MAGNESIUM GLUCONATE 500 MG ORAL TABLET 400 MG: 500 TABLET ORAL at 08:20

## 2022-10-13 RX ADMIN — PREGABALIN 300 MG: 100 CAPSULE ORAL at 08:20

## 2022-10-13 RX ADMIN — GLIPIZIDE 10 MG: 10 TABLET ORAL at 06:56

## 2022-10-13 RX ADMIN — ALLOPURINOL 100 MG: 100 TABLET ORAL at 08:20

## 2022-10-13 RX ADMIN — Medication 10 ML: at 08:21

## 2022-10-13 RX ADMIN — INSULIN LISPRO 16 UNITS: 100 INJECTION, SOLUTION INTRAVENOUS; SUBCUTANEOUS at 08:22

## 2022-10-13 RX ADMIN — ESCITALOPRAM OXALATE 10 MG: 10 TABLET ORAL at 08:21

## 2022-10-13 RX ADMIN — METHYLPREDNISOLONE SODIUM SUCCINATE 40 MG: 40 INJECTION, POWDER, FOR SOLUTION INTRAMUSCULAR; INTRAVENOUS at 02:08

## 2022-10-13 RX ADMIN — MELOXICAM 15 MG: 15 TABLET ORAL at 08:20

## 2022-10-13 RX ADMIN — MUPIROCIN: 20 OINTMENT TOPICAL at 08:22

## 2022-10-13 RX ADMIN — METFORMIN HYDROCHLORIDE 1000 MG: 500 TABLET ORAL at 08:21

## 2022-10-13 RX ADMIN — INSULIN LISPRO 12 UNITS: 100 INJECTION, SOLUTION INTRAVENOUS; SUBCUTANEOUS at 11:46

## 2022-10-13 RX ADMIN — LEVOFLOXACIN 500 MG: 500 TABLET, FILM COATED ORAL at 11:02

## 2022-10-13 RX ADMIN — SODIUM ZIRCONIUM CYCLOSILICATE 10 G: 10 POWDER, FOR SUSPENSION ORAL at 11:02

## 2022-10-13 RX ADMIN — ENOXAPARIN SODIUM 30 MG: 100 INJECTION SUBCUTANEOUS at 08:21

## 2022-10-13 NOTE — PLAN OF CARE
Problem: Discharge Planning  Goal: Discharge to home or other facility with appropriate resources  Outcome: Progressing     Problem: Safety - Adult  Goal: Free from fall injury  Outcome: Progressing  Note: Fall precautions in place.       Problem: Pain  Goal: Verbalizes/displays adequate comfort level or baseline comfort level  Outcome: Progressing

## 2022-10-13 NOTE — DISCHARGE INSTR - COC
Continuity of Care Form    Patient Name: Saranya Patrick   :  1965  MRN:  [id number]    Admit date:  10/11/2022  Discharge date:  10/13/2022    Code Status Order: Full Code   Advance Directives:     Admitting Physician:  Trish Radford MD  PCP: Leesa Franco PA-C    Discharging Nurse: [facility] Unit/Room#: 3007/3007-01  Discharging Unit Phone Number: [phone]    Emergency Contact:   Extended Emergency Contact Information  Primary Emergency Contact: Jose Gunn  Address: [address] Phone: [phone]  Relation: Spouse  Secondary Emergency Contact: Luis Antonio Moreau Phone: [phone]  Mobile Phone: [phone]  Relation: Child   needed?  No    Past Surgical History:  Past Surgical History:   Procedure Laterality Date     SECTION      x3    COLONOSCOPY      ENDOMETRIAL ABLATION      ENDOSCOPY, COLON, DIAGNOSTIC         Immunization History:   Immunization History   Administered Date(s) Administered    COVID-19, MODERNA BLUE border, Primary or Immunocompromised, (age 12y+), IM, 100 mcg/0.5mL 2021, 2021, 12/10/2021    COVID-19, PFIZER Bivalent BOOSTER, (age 12y+), IM, 30 mcg/0.3 mL dose 10/05/2022    Influenza Virus Vaccine 2009, 2011, 2011, 10/05/2012, 10/08/2012, 10/23/2017    Influenza, FLUARIX, FLULAVAL, FLUZONE (age 10 mo+) AND AFLURIA, (age 1 y+), PF, 0.5mL 10/11/2016, 10/23/2017, 10/03/2018, 2019, 10/06/2020, 10/13/2021, 10/05/2022    Tdap (Boostrix, Adacel) 2019    Zoster Live (Zostavax) 2016       Active Problems:  Patient Active Problem List   Diagnosis Code    Acute respiratory failure (HCC) J96.00    Fluid overload E87.70    Acidosis E87.20    Panniculitis M79.3    Chronic obstructive pulmonary disease with acute exacerbation (HCC) J44.1    General weakness R53.1    PNA (pneumonia) J18.9    Elevated blood pressure reading R03.0    Morbid obesity ([facility] E66.01    Hyperlipidemia E78.5    DM (diabetes mellitus) ([facility] E11.9    CHF (congestive heart failure) ([facility]) I50.9    Tremor R25.1    Hypomagnesemia E83.42    Tremors of nervous system R25.1    Generalized weakness R53.1    Hyperkalemia E87.5    Acute respiratory failure with hypoxemia ([facility]) J96.01    Hypercapnia R06.89       Isolation/Infection:   Isolation            No Isolation          Patient Infection Status       Infection Onset Added Last Indicated Last Indicated By Review Planned Expiration Resolved Resolved By    None active    Resolved    COVID-19 (Rule Out) 09/08/22 09/08/22 09/08/22 COVID-19, Rapid (Ordered)   09/08/22 Rule-Out Test Resulted    COVID-19 (Rule Out) 08/10/21 08/10/21 08/10/21 COVID-19 & Influenza Combo (Ordered)   08/10/21 Rule-Out Test Resulted    COVID-19 (Rule Out) 06/15/21 06/15/21 06/15/21 COVID-19, Rapid (Ordered)   06/15/21 Rule-Out Test Resulted            Nurse Assessment:  Last Vital Signs: BP (!) 153/93   Pulse 92   Temp 98.4 °F (36.9 °C) (Oral)   Resp 20   Ht 5' 1\" (1.549 m)   Wt (!) 319 lb 3.6 oz (144.8 kg)   SpO2 95%   BMI 60.32 kg/m²     Last documented pain score (0-10 scale): Pain Level: 8  Last Weight:   Wt Readings from Last 1 Encounters:   10/12/22 (!) 319 lb 3.6 oz (144.8 kg)     Mental Status:  oriented    IV Access:  - None    Nursing Mobility/ADLs:  Walking   Independent  Transfer  Independent  Bathing  Assisted  Dressing  Assisted  Toileting  Independent  Feeding  [address]  Independent  Med Delivery   whole    Wound Care Documentation and Therapy:        Elimination:  Continence: Bowel: No  Bladder: No  Urinary Catheter: None   Colostomy/Ileostomy/Ileal Conduit: No       Date of Last BM: 10/11/22    Intake/Output Summary (Last 24 hours) at 10/13/2022 1018  Last data filed at 10/13/2022 0857  Gross per 24 hour   Intake 1005.02 ml   Output 950 ml   Net 55.02 ml     I/O last 3 completed shifts: In: 1236.8 [P.O.:560; I.V.:487.4;  IV Piggyback:189.3]  Out: 1600 [Urine:1600]    Safety Concerns:     None    Impairments/Disabilities:      None    Nutrition Therapy:  Current Nutrition Therapy:   - Oral Diet:  Carb Control 3 carbs/meal (1500kcals/day)    Routes of Feeding: Oral  Liquids: Thin Liquids  Daily Fluid Restriction: no  Last Modified Barium Swallow with Video (Video Swallowing Test): not done    Treatments at the Time of Hospital Discharge:   Respiratory Treatments:   Oxygen Therapy:  is on oxygen at 3 L/min per nasal cannula. Ventilator:    - CPAP   only when sleeping    Rehab Therapies: Physical Therapy, Occupational Therapy, and SN  Weight Bearing Status/Restrictions: No weight bearing restrictions  Other Medical Equipment (for information only, NOT a DME order):  Per pt she has all DME supplies at home she needs  Other Treatments:     Patient's personal belongings (please select all that are sent with patient):  None    RN SIGNATURE:  Electronically signed by Kendal Jaramillo RN on 10/13/22 at 11:42 AM EDT    CASE MANAGEMENT/SOCIAL WORK SECTION    Inpatient Status Date: 10/12/2022    Readmission Risk Assessment Score:  Readmission Risk              Risk of Unplanned Readmission:  18           Discharging to Facility/ Agency   Name: [address] [place] [address]   Address:  Phone: [phone] [facility] [employer]  Phone: [phone]    / signature: Electronically signed by IVANIA Angulo LISW-S on 10/13/22 at 10:27 AM EDT    PHYSICIAN SECTION    Prognosis: Good    Condition at Discharge: Stable    Rehab Potential (if transferring to Rehab): Good    Recommended Labs or Other Treatments After Discharge: Home care    Physician Certification: I certify the above information and transfer of Jennifer Ruiz  is necessary for the continuing treatment of the diagnosis listed and that she requires Home Care for less 30 days.      Update Admission H&P: No change in H&P    PHYSICIAN SIGNATURE:  Electronically signed by Gladys Ramirez MD on 10/13/22 at 10:26 AM EDT

## 2022-10-13 NOTE — PROGRESS NOTES
[facility]                                 Pulmonary Consult /Progress Note :                                                                  CHIEF COMPLAINT: Shortness of breath and change in mental status    HPI:   Doing much better  On base line O2  No chest pain   No fever or chills       Objective:   PHYSICAL EXAM:    Blood pressure (!) 142/75, pulse 85, temperature 97.8 °F (36.6 °C), resp. rate 20, height 5' 1\" (1.549 m), weight (!) 319 lb 3.6 oz (144.8 kg), SpO2 95 %, not currently breastfeeding.' on RA  Gen: No distress. Eyes: PERRL. No sclera icterus. No conjunctival injection. ENT: No discharge. Pharynx clear. Neck: Trachea midline. No obvious mass. Resp: Rhonchi bilateral   CV: Regular rate. Regular rhythm. No murmur or rub. No edema. GI: Non-tender. Non-distended. No hernia. Skin: Warm and dry. No nodule on exposed extremities. Lymph: No cervical LAD. No supraclavicular LAD. M/S: No cyanosis. No joint deformity. No clubbing. Neuro: Awake. Alert. Moves all four extremities. Psych: Oriented x 3. No anxiety.            DATA reviewed by me:   CXRno acute process ,        LABS/IMAGING:    CBC:  Lab Results   Component Value Date    WBC 7.0 10/12/2022    HGB 12.3 10/12/2022    HCT 38.1 10/12/2022    MCV 84.9 10/12/2022     10/12/2022    LYMPHOPCT 13.4 10/12/2022    RBC 4.49 10/12/2022    MCH 27.5 10/12/2022    MCHC 32.4 10/12/2022    RDW 13.5 10/12/2022    NEUTOPHILPCT 83.2 10/12/2022    MONOPCT 2.4 10/12/2022    BASOPCT 0.3 10/12/2022    NEUTROABS 5.9 10/12/2022    LYMPHSABS 0.9 (L) 10/12/2022    MONOSABS 0.2 10/12/2022    EOSABS 0.0 10/12/2022    BASOSABS 0.0 10/12/2022       Recent Labs     10/12/22  0442 10/11/22  1941   WBC 7.0 5.7   HGB 12.3 12.2   HCT 38.1 39.0   MCV 84.9 85.0    153         BMP:   Recent Labs     10/11/22  1941 10/12/22  0442    136   K 4.7 5.3*   CL 96* 96*   CO2 35* 32   BUN 18 17 CREATININE 1.0 0.9         MG:   Lab Results   Component Value Date/Time    MG 1.60 10/11/2022 07:41 PM     Ca/Phos:   Lab Results   Component Value Date    CALCIUM 9.3 10/12/2022    PHOS 3.9 08/20/2019     LIVER PROFILE:   Recent Labs     10/11/22  1941 10/12/22  0442   AST 20 20   ALT 23 22   BILITOT <0.2 0.3   ALKPHOS 98 94         PT/INR: No results for input(s): PROTIME, INR in the last 72 hours. APTT: No results for input(s): APTT in the last 72 hours. Cardiac Enzymes:  Lab Results   Component Value Date    TROPONINI <0.01 10/11/2022       Assessment:       -Acute hypoxic hypercapnic resp failure   -Acute  copd exacerbation   -Acute encephalopathy sec above  -Possible JOSE  -      Plan:      *-IV steroids wean to PO taper and stop in 10 days   *BiPAP  *-BD  ICS   D dimer   Incentive spirmetery and flutter valve   Change to trelegy 100 on discharge   Trilogy (NIMV at home ,arranged)  CT chest :as OP and PFT   Check o2 at ambulation before discharge  May needed NIMV   Levaquin 500 X 5 days   To follow with me in office       Vanessa Leach MD,[facility]  Pulmonary&Critical Care Medicine    [place] [facility]    NOTE: This report was transcribed using voice recognition software. Every effort was made to ensure accuracy; however, inadvertent computerized transcription errors may be present.

## 2022-10-13 NOTE — DISCHARGE INSTR - DIET
Good nutrition is important when healing from an illness, injury, or surgery. Follow any nutrition recommendations given to you during your hospital stay. If you were given an oral nutrition supplement while in the hospital, continue to take this supplement at home. You can take it with meals, in-between meals, and/or before bedtime. These supplements can be purchased at most local grocery stores, pharmacies, and chain [facility]-stores. If you have any questions about your diet or nutrition, call the hospital and ask for the dietitian.     Carb Control Diabetic Diet, 3 carbs per meal, Cardiac Diet

## 2022-10-13 NOTE — CARE COORDINATION
INTERDISCIPLINARY PLAN OF CARE CONFERENCE    Date/Time: 10/13/2022 9:42 AM  Completed by: Merline Poe MSW, LISW-S  Case Management      Patient Name:  Jillian Zamora  YOB: 1965  Admitting Diagnosis: Respiratory acidosis [E87.29]  Hypercapnia [R06.89]  Acute respiratory failure with hypoxemia ([facility] [J96.01]     Admit Date/Time:  10/11/2022  7:19 PM    Chart reviewed. Interdisciplinary team contacted or reviewed plan related to patient progress and discharge plans. Disciplines included Case Management, Nursing, and Dietitian. Current Status:ongoing  PT/OT recommendation for discharge plan of care: n/a    Expected D/C Disposition:  Home  Confirmed plan with patient and/or family Yes confirmed with: (name) pt  Met with:pt    Discharge Plan Comments: Chart review completed. Dr. Petra Rothman stated pt is a potential discharge and to follow up on the AVAPS. MD aware that pt needs HHC orders and a BOY to be resumed. MD aware that pt may not get the AVAP directly from the hospital which he updated the pt. Spoke with Rick Underwood at [facility] who states she will email the order from to  for MD to sign and they can set up machine today. Rick Underwood stated that they don't need prior authorization for the AVAPS. Dr. Arlette Stauffer signed the order for the [address] and faxed to Rick Underwood at [phone] after speaking with Rick Underwood. Met with pt at bedside. Pt stated she is returning home today. She is aware that [employer] will deliver the machine today and if there is co-pay, etc then they would get a bill. She stated understanding. Home O2 in place on admit: Yes  Pt informed of need to bring portable home O2 tank on day of discharge for nursing to connect prior to leaving:  Yes  Verbalized agreement/Understanding:   Yes

## 2022-10-13 NOTE — CARE COORDINATION
DISCHARGE ORDER  Date/Time 10/13/2022 10:19 AM  Completed by: Cori Chavez MSW, LORRI  Case Management    Patient Name: Saskia Macario      : 1965  Admitting Diagnosis: Respiratory acidosis [E87.29]  Hypercapnia [R06.89]  Acute respiratory failure with hypoxemia ([facility] [J96.01]      Admit order Date and Status:10/12/2022 Inpatient  (verify MD's last order for status of admission)      Noted discharge order. If applicable PT/OT recommendation at Discharge: n/a  DME recommendation by PT/OT:n/a    Confirmed discharge plan: Yes  with whom pt   If pt confirmed DC plan does family need to be contacted by CM No if yes who n/a she notified family     Discharge Plan:     Date of Last IMM Given: 10/12/2022    Reviewed chart. Role of discharge planner explained and patient verbalized understanding. Discharge order is noted. Met with pt at bedside. She confirmed she is going home today and will have a ride from her  which he will be here around noon. She is aware that she will need to await the delivery for the AVAPS. She was directed to call [address] [place] [address] office if questions or problems arise with the machine which she stated understanding. She is aware [facility] will be sent orders and [facility] will get an updated order per their request. Pt stated her  is bring her o2 tank. No further discharge needs identified. Nelda at [facility] home care [facility]) is aware of discharge today  with the orders for RN/PT/OT and requested orders be faxed to her at [phone]. [facility] Nelda. Spoke with Venia Dandy at [facility] who stated to send an updated order to them at [phone] and they need nothing additional. DME order faxed. Annette DARLING aware of the above and that the AVAP will be delivered today which pt has to await for arrival. Homa Harman RN aware too.      Has Home O2 in place on admit:  Yes  Informed of need to bring portable home O2 tank on day of discharge for nursing to connect prior to leaving:   Yes  Verbalized agreement/Understanding:   Yes    Pt is being d/c'd to home today. Pt's O2 sats are 95% on 4 liters per vitals in epic. Discharge timeout done with Hamzah Mann RN. All discharge needs and concerns addressed. Addendum at 11:45am: Spoke with Georgia Fernando at [facility] who stated they have the approval for the AVAPS and Carley Felix will be here shortly with the DME.  RN made aware via ICU CLerk     Addendum at 1:34pm: Annette DARLING stated Nany at bedside educating on AVAP machine and she placed a note in epic; see RN note

## 2022-10-13 NOTE — PROGRESS NOTES
08:52 Pt awake a&o x4, VSS assessment as charted  09:30 Hospitalist  @ bedside seeing pt  10:00 Critical care rounds completed @ bediside w/Dr. Nicholas Marquez  13:20 Pt being prepared for d/c to home.  D/c instructions and meds reviewed w/ pt and , f/u instructions reviewed w/ pt and  paper copy of all above reviewed with pt,  13:26 Home Care RT in room with pt new home AVAP, pt and  being educated by home care on AVAP  13:40 Pt d/c to home via w/c with

## 2022-10-13 NOTE — PROGRESS NOTES
Physician Progress Note      PATIENTHarleonel Gamez  [facility] #:                  [id number]  :                       1965  ADMIT DATE:       10/11/2022 7:19 PM  [address] [place] DATE:  RESPONDING  PROVIDER #:        Renetta Perry MD        QUERY TEXT:    Type of Encephalopathy: Please provide further specificity, if known. Clinical indicators include:  Options provided:  -- Anoxic/hypoxic encephalopathy  -- Metabolic encephalopathy  -- Toxic encephalopathy  -- Hepatic encephalopathy  -- Hypertensive encephalopathy  -- Other - I will add my own diagnosis  -- Disagree - Not applicable / Not valid  -- Disagree - Clinically Unable to determine / Unknown        PROVIDER RESPONSE TEXT:    The patient has metabolic encephalopathy.       Electronically signed by:  Renetta Perry MD 10/13/2022 10:29 AM

## 2022-10-13 NOTE — DISCHARGE SUMMARY
Name:  France Schaefer  Room:  3007/3007-01  MRN:    [id number]    Discharge Summary      This discharge summary is in conjunction with a complete physical exam done on the day of discharge. Discharging Physician: Sherilyn Canavan, MD      Admit: 10/11/2022  Discharge:  10/13/2022     Diagnoses this Admission    Principal Problem:    Acute respiratory failure with hypoxemia (HCC)  Active Problems:    Hypercapnia    Chronic obstructive pulmonary disease with acute exacerbation (HCC)    Morbid obesity ([facility] [address]    Hyperlipidemia    DM (diabetes mellitus) ([email or web address]  Resolved Problems:    * No resolved hospital problems. *      Procedures (Please Review Full Report for Details)    none    Consults    IP CONSULT TO HOSPITALIST  IP CONSULT TO SPIRITUAL SERVICES  IP CONSULT TO PULMONOLOGY      HPI:    61year old white female with COPD/chronic respiratory failure/morbid obesity admitted to ICU for acute respiratory failure. She was placed on Bipap. She is a former smoker. She uses 4 L of oxygen at home. Physical Exam at Discharge:  BP (!) 153/93   Pulse 92   Temp 98.4 °F (36.9 °C) (Oral)   Resp 20   Ht 5' 1\" (1.549 m)   Wt (!) 319 lb 3.6 oz (144.8 kg)   SpO2 95%   BMI 60.32 kg/m²     Physical Exam  Constitutional:       Appearance: She is well-developed. HENT:      Head: Normocephalic. Eyes:      Conjunctiva/sclera: Conjunctivae normal.      Pupils: Pupils are equal, round, and reactive to light. Neck:      Thyroid: No thyroid mass or thyromegaly. Vascular: No carotid bruit or JVD. Trachea: Trachea normal.   Cardiovascular:      Rate and Rhythm: Normal rate and regular rhythm. Heart sounds: Normal heart sounds. No murmur heard. No gallop. Pulmonary:      Effort: Pulmonary effort is normal. No respiratory distress. Breath sounds: Normal breath sounds. Decreased air movement present. No wheezing or rales. Abdominal:      General: Bowel sounds are normal. There is no distension. Palpations: Abdomen is soft. There is no hepatomegaly, splenomegaly or mass. Tenderness: There is no abdominal tenderness. Musculoskeletal:         General: Normal range of motion. Cervical back: Normal range of motion and neck supple. Lymphadenopathy:      Cervical: No cervical adenopathy. Skin:     General: Skin is warm and dry. Findings: No rash. Neurological:      Mental Status: She is alert and oriented to person, place, and time. Cranial Nerves: No cranial nerve deficit. Deep Tendon Reflexes: Reflexes are normal and symmetric. Psychiatric:         Behavior: Behavior normal.         Thought Content: Thought content normal.         Judgment: Judgment normal.              Hospital Course    #Acute on chronic respiratory failure due to hypoxia and hypercarbia. Patient came to emergency room with confusion. Work-up was consistent with acute hypercarbic and hypoxic respiratory failure. She was admitted to the ICU. She was placed on BiPAP. Pulmonary consultation was obtained. Patient has improved considerably. The time of discharge she is back on 4 L of oxygen. Her mentation is normal.  He is able to ambulate to the bathroom without any problems. #Acute metabolic encephalopathy due to hypercarbia. Resolved. #Acute exacerbation of COPD. Treated with IV steroids. Started on Levaquin for exacerbation. Discharged on Trelegy, prednisone and Levaquin. #Morbid obesity. Weight loss recommended. #Diabetes mellitus type 2 uncontrolled made worse by steroids. Discharged on home regimen but increasing dose of insulin. Weight loss recommended. #Chronic pain can resume home medications. #The patient required BiPAP at the time of discharge. The pulmonologist has written these orders. Please refer to his notes as to why she meets criteria for this.       CBC:   Recent Labs     10/11/22  1941 10/12/22  0442 10/13/22  0905   WBC 5.7 7.0 7.6   HGB 12.2 12.3 12.2 HCT 39.0 38.1 38.6   MCV 85.0 84.9 83.4    149 185     BMP:   Recent Labs     10/11/22  1941 10/12/22  0442 10/13/22  0905    136 133*   K 4.7 5.3* 5.2*   CL 96* 96* 95*   CO2 35* 32 29   BUN 18 17 26*   CREATININE 1.0 0.9 0.9     LIVER PROFILE:   Recent Labs     10/11/22  1941 10/12/22  0442   AST 20 20   ALT 23 22   BILITOT <0.2 0.3   ALKPHOS 98 94     PT/INR: No results for input(s): PROTIME, INR in the last 72 hours. APTT: No results for input(s): APTT in the last 72 hours. UA:  Recent Labs     10/12/22  0021   COLORU Yellow   PHUR 6.0   CLARITYU Clear   SPECGRAV 1.025   LEUKOCYTESUR Negative   UROBILINOGEN 0.2   BILIRUBINUR Negative   BLOODU Negative   GLUCOSEU Negative         XR CHEST PORTABLE   Final Result   Findings of mild pulmonary edema with a tiny left pleural effusion. XR CHEST PORTABLE   Final Result   1. No significant change. XR CHEST PORTABLE   Final Result   Right basilar atelectasis versus pneumonia. XR CHEST PORTABLE    (Results Pending)          Discharge Medications     Medication List        START taking these medications      levoFLOXacin 500 MG tablet  Commonly known as: LEVAQUIN  Take 1 tablet by mouth daily for 5 doses     predniSONE 10 MG tablet  Commonly known as: DELTASONE  4 tabs for 3 days 3 tabs for 3 days 2 tabs for 3 days 1 tabs for 3 days     Trelegy Ellipta 100-62.5-25 MCG/INH Aepb  Generic drug: fluticasone-umeclidin-vilant  Inhale 1 puff into the lungs daily            CHANGE how you take these medications      escitalopram 10 MG tablet  Commonly known as: LEXAPRO  Take 1 tablet by mouth daily  Start taking on: October 14, 2022  What changed:   medication strength  how much to take     insulin aspart 100 UNIT/ML injection pen  Commonly known as: NovoLOG  Inject 20 Units into the skin 3 times daily (before meals) SSI.   What changed: how much to take     insulin detemir 100 UNIT/ML injection pen  Commonly known as: LEVEMIR  Inject 40 Units into the skin 2 times daily  What changed:   how much to take  when to take this     nystatin 231256 UNIT/GM cream  Commonly known as: MYCOSTATIN  What changed: Another medication with the same name was removed. Continue taking this medication, and follow the directions you see here.             CONTINUE taking these medications      albuterol sulfate  (90 Base) MCG/ACT inhaler  Commonly known as: PROVENTIL;VENTOLIN;PROAIR     allopurinol 100 MG tablet  Commonly known as: ZYLOPRIM     aspirin 81 MG EC tablet  Take 1 tablet by mouth daily     dicyclomine 10 MG capsule  Commonly known as: BENTYL     EPINEPHrine 0.3 MG/0.3ML Soaj injection  Commonly known as: EPIPEN     ferrous sulfate 325 (65 Fe) MG EC tablet  Commonly known as: Fe Tabs  Take 1 tablet by mouth 3 times daily (with meals)     furosemide 20 MG tablet  Commonly known as: LASIX     glipiZIDE 10 MG extended release tablet  Commonly known as: GLUCOTROL XL     glucose monitoring kit  1 kit by Does not apply route daily as needed     hydrOXYzine pamoate 25 MG capsule  Commonly known as: VISTARIL     Insulin Pen Needle 29G X 12.7MM Misc  1 each by Does not apply route daily     Insulin Syringes (Disposable) U-100 0.3 ML Misc  1 each by Does not apply route daily     magnesium oxide 400 MG tablet  Commonly known as: MAG-OX     meloxicam 15 MG tablet  Commonly known as: MOBIC     metFORMIN 1000 MG tablet  Commonly known as: GLUCOPHAGE     NexIUM 40 MG delayed release capsule  Generic drug: esomeprazole     oxyCODONE-acetaminophen 7.5-325 MG per tablet  Commonly known as: PERCOCET     OXYGEN     pregabalin 300 MG capsule  Commonly known as: LYRICA     simvastatin 20 MG tablet  Commonly known as: ZOCOR     traZODone 100 MG tablet  Commonly known as: DESYREL     Trulicity 1.5 OG/3.1DF Sopn  Generic drug: Dulaglutide               Where to Get Your Medications        These medications were sent to [address] [place], [place] - [address] [facility] [address], [address] [address]      Phone: [phone]   escitalopram 10 MG tablet  insulin aspart 100 UNIT/ML injection pen  insulin detemir 100 UNIT/ML injection pen  levoFLOXacin 500 MG tablet  predniSONE 10 MG tablet  Trelegy Ellipta 100-62.5-25 MCG/INH Aepb           Discharge Condition/Location: Stable    Follow Up: Follow up with PCP. More than 30 mts spent.      Fer Chao MD 10/13/2022 10:21 AM

## 2022-10-13 NOTE — PROGRESS NOTES
10/12/22 2349   NIV Type   Mode Bilevel   Mask Type Full face mask   Mask Size Medium   Settings/Measurements   IPAP 16 cmH20   CPAP/EPAP 6 cmH2O   Vt (Measured) 967 mL   Rate Ordered 18   Resp 20   FiO2  35 %   Comfort Level Good   SpO2 98

## 2022-10-13 NOTE — PROGRESS NOTES
10/13/22 1106   Encounter Summary   Encounter Overview/Reason  Initial Encounter;Spiritual/Emotional Needs   Service Provided For: Patient   Referral/Consult From: Nurse   Support System Spouse; Children   Last Encounter  10/13/22  (Support, prayer)   Complexity of Encounter Moderate   Begin Time 1028   End Time  1108   Total Time Calculated 40 min   Encounter    Type Initial Screen/Assessment   Spiritual/Emotional needs   Type Spiritual Support   Assessment/Intervention/Outcome   Assessment Calm;Coping   Intervention Active listening;Discussed belief system/Rastafari practices/yehuda;Discussed illness injury and its impact; Discussed relationship with God;Explored/Affirmed feelings, thoughts, concerns; Life review/Legacy;Prayer (assurance of)/[place];Sustaining Presence/Ministry of presence   Outcome Comfort; Connection/Belonging;Coping;Engaged in conversation;Expressed feelings, needs, and concerns;Expressed Gratitude;New perspective/awareness;Receptive        provided listening, support, and prayer for patient, who reports that her  is a good support for her, and that she prays daily.

## 2022-10-13 NOTE — PROGRESS NOTES
Patient updates given to Hamzah Mann RN. Patient has rested comfortably overnight without acute changes in assessment noted. Used BiPAP for about 4 hours. Care transferred.

## 2022-10-13 NOTE — PROGRESS NOTES
Patient care assumed, assessment completed as charted. Patient sitting in chair, in no apparent distress. Maalox administered for indigestion, will give Tylenol for headache when it's due. No further needs assessed at this time. Will monitor.

## 2022-10-13 NOTE — PROGRESS NOTES
10/13/22 0300   NIV Type   Mode Bilevel   Mask Type Full face mask   Mask Size Medium   Settings/Measurements   IPAP 16 cmH20   CPAP/EPAP 6 cmH2O   Vt (Measured) 444 mL   Rate Ordered 18   Resp 20   FiO2  35 %   Comfort Level Good   SpO2 99

## 2022-10-14 LAB
ESTIMATED AVERAGE GLUCOSE: 191.5 MG/DL
HBA1C MFR BLD: 8.3 %

## 2023-01-12 ENCOUNTER — TELEPHONE (OUTPATIENT)
Dept: PULMONOLOGY | Age: 58
End: 2023-01-12

## 2023-01-12 ENCOUNTER — OFFICE VISIT (OUTPATIENT)
Dept: PULMONOLOGY | Age: 58
End: 2023-01-12
Payer: MEDICARE

## 2023-01-12 VITALS
SYSTOLIC BLOOD PRESSURE: 139 MMHG | HEIGHT: 61 IN | BODY MASS INDEX: 60.32 KG/M2 | OXYGEN SATURATION: 94 % | RESPIRATION RATE: 18 BRPM | HEART RATE: 75 BPM | DIASTOLIC BLOOD PRESSURE: 70 MMHG

## 2023-01-12 DIAGNOSIS — Z87.891 PERSONAL HISTORY OF TOBACCO USE: ICD-10-CM

## 2023-01-12 DIAGNOSIS — F17.200 SMOKING: Primary | ICD-10-CM

## 2023-01-12 PROCEDURE — 99214 OFFICE O/P EST MOD 30 MIN: CPT | Performed by: INTERNAL MEDICINE

## 2023-01-12 PROCEDURE — G0296 VISIT TO DETERM LDCT ELIG: HCPCS | Performed by: INTERNAL MEDICINE

## 2023-01-12 RX ORDER — FLUTICASONE FUROATE, UMECLIDINIUM BROMIDE AND VILANTEROL TRIFENATATE 100; 62.5; 25 UG/1; UG/1; UG/1
1 POWDER RESPIRATORY (INHALATION) DAILY
Qty: 1 EACH | Refills: 3 | Status: SHIPPED | OUTPATIENT
Start: 2023-01-12

## 2023-01-12 NOTE — PROGRESS NOTES
[facility]                                 Pulmonary Consult /Progress Note :                                                                   hospital follow up for hypercapnic RF    HPI:   Patient is 68-year-old female with a 50-60 pack -year smoking history. quit smoking few years ago presented to the hospital with worsening mental status and worsening shortness of breath that has been going on for the last few days and get worse in the last 24 hours before admission    Patient is known COPD and she supposed to follow-up with me in the office in January    She is also known history of congestive heart failure    She is on diuresis at home    She is not on any type of noninvasive mechanical ventilation, she states she only take albuterol  Today visit  She was discharged home on Trilogy   She is on 4 L   Still over weight   No cough   Does not ambulate much       Past Medical History:   Diagnosis Date    Anxiety     Arthritis     CHF (congestive heart failure) ([facility])     COPD (chronic obstructive pulmonary disease) ([facility] [address]     Depression     Diabetes mellitus ([facility] [address]     Diverticulosis     Hyperlipidemia     Hypertension     Obesities, morbid ([facility] [address]        Past Surgical History:        Procedure Laterality Date     SECTION      x3    COLONOSCOPY      ENDOMETRIAL ABLATION      ENDOSCOPY, COLON, DIAGNOSTIC         Allergies:  is allergic to hydromorphone hcl, ace inhibitors, azithromycin, dilaudid [hydromorphone hcl], lisinopril, morphine, and penicillins. Social History:    TOBACCO:   reports that she quit smoking about 8 years ago. Her smoking use included cigarettes. She has a 80.00 pack-year smoking history. She has never used smokeless tobacco.  ETOH:   reports no history of alcohol use.       Family History:       Problem Relation Age of Onset    Brain Cancer Mother     Lung Cancer Mother     High Cholesterol Father     Heart Disease Father Current Medications:    Current Outpatient Medications:     escitalopram (LEXAPRO) 10 MG tablet, Take 1 tablet by mouth daily, Disp: 30 tablet, Rfl: 3    insulin detemir (LEVEMIR) 100 UNIT/ML injection pen, Inject 40 Units into the skin 2 times daily, Disp: 24 mL, Rfl: 2    insulin aspart (NOVOLOG) 100 UNIT/ML injection pen, Inject 20 Units into the skin 3 times daily (before meals) SSI., Disp: 18 mL, Rfl: 2    esomeprazole (NEXIUM) 40 MG delayed release capsule, Take 40 mg by mouth in the morning and at bedtime, Disp: , Rfl:     furosemide (LASIX) 20 MG tablet, Take 10 mg by mouth daily, Disp: , Rfl:     allopurinol (ZYLOPRIM) 100 MG tablet, Take 100 mg by mouth daily, Disp: , Rfl:     simvastatin (ZOCOR) 20 MG tablet, Take 20 mg by mouth nightly, Disp: , Rfl:     TRULICITY 1.5 LL/5.2UA SOPN, Inject 1.5 mg into the skin once a week Takes on Fridays due tomorrow, Disp: , Rfl:     hydrOXYzine pamoate (VISTARIL) 25 MG capsule, Take 1 capsule by mouth 3 times daily, Disp: , Rfl:     magnesium oxide (MAG-OX) 400 MG tablet, Take 400 mg by mouth daily, Disp: , Rfl:     EPINEPHrine (EPIPEN) 0.3 MG/0.3ML SOAJ injection, Inject 0.3 mLs into the muscle Allergy of unknown origin, Disp: , Rfl:     traZODone (DESYREL) 100 MG tablet, Take 100 mg by mouth nightly, Disp: , Rfl:     meloxicam (MOBIC) 15 MG tablet, Take 15 mg by mouth daily , Disp: , Rfl:     pregabalin (LYRICA) 300 MG capsule, Take 300 mg by mouth 2 times daily. , Disp: , Rfl:     glipiZIDE (GLUCOTROL XL) 10 MG extended release tablet, Take 10 mg by mouth daily, Disp: , Rfl:     oxyCODONE-acetaminophen (PERCOCET) 7.5-325 MG per tablet, Take 1 tablet by mouth every 4 hours as needed for Pain ., Disp: , Rfl:     albuterol sulfate  (90 BASE) MCG/ACT inhaler, Inhale 2 puffs into the lungs every 6 hours as needed for Wheezing, Disp: , Rfl:     nystatin (MYCOSTATIN) 154361 UNIT/GM cream, Apply topically nightly Apply under breasts at night, Disp: , Rfl: OXYGEN, Inhale 4 L into the lungs, Disp: , Rfl:     metFORMIN (GLUCOPHAGE) 1000 MG tablet, Take 1,000 mg by mouth 2 times daily (with meals) , Disp: , Rfl:     aspirin 81 MG EC tablet, Take 1 tablet by mouth daily, Disp: 30 tablet, Rfl: 3    ferrous sulfate (FE TABS) 325 (65 FE) MG EC tablet, Take 1 tablet by mouth 3 times daily (with meals), Disp: 90 tablet, Rfl: 2    Insulin Syringes, Disposable, U-100 0.3 ML MISC, 1 each by Does not apply route daily, Disp: 100 each, Rfl: 3    Insulin Pen Needle 29G X 12.7MM MISC, 1 each by Does not apply route daily, Disp: 100 each, Rfl: 3    glucose monitoring kit (FREESTYLE) monitoring kit, 1 kit by Does not apply route daily as needed, Disp: 1 kit, Rfl: 0    dicyclomine (BENTYL) 10 MG capsule, Take 10 mg by mouth 3 times daily. , Disp: , Rfl:     predniSONE (DELTASONE) 10 MG tablet, 4 tabs for 3 days 3 tabs for 3 days 2 tabs for 3 days 1 tabs for 3 days (Patient not taking: Reported on 1/12/2023), Disp: 30 tablet, Rfl: 0    fluticasone-umeclidin-vilant (Thad Lecher) 100-62.5-25 MCG/INH AEPB, Inhale 1 puff into the lungs daily (Patient not taking: Reported on 1/12/2023), Disp: 1 each, Rfl: 2      REVIEW OF SYSTEMS:  Constitutional: Negative for fever  HENT: Negative for sore throat  Eyes: Negative for redness   Respiratory:+dyspnea, cough  Cardiovascular: Negative for chest pain  Gastrointestinal: Negative for vomiting, diarrhea   Genitourinary: Negative for hematuria   Musculoskeletal: Negative for arthralgias   Skin: Negative for rash  Neurological: Negative for syncope  Hematological: Negative for adenopathy  Psychiatric/Behavorial: Negative for anxiety      Objective:   PHYSICAL EXAM:    Blood pressure 139/70, pulse 75, resp. rate 18, height 5' 1\" (1.549 m), SpO2 94 %, not currently breastfeeding.' on RA  Gen: No distress. Eyes: PERRL. No sclera icterus. No conjunctival injection. ENT: No discharge. Pharynx clear. Neck: Trachea midline. No obvious mass.    Resp: Rhonchi bilateral   CV: Regular rate. Regular rhythm. No murmur or rub. No edema. GI: Non-tender. Non-distended. No hernia. Skin: Warm and dry. No nodule on exposed extremities. Lymph: No cervical LAD. No supraclavicular LAD. M/S: No cyanosis. No joint deformity. No clubbing. Neuro: Awake. Alert. Moves all four extremities. Psych: Oriented x 3. No anxiety. DATA reviewed by me:   CXRno acute process ,        LABS/IMAGING:    CBC:  Lab Results   Component Value Date    WBC 7.6 10/13/2022    HGB 12.2 10/13/2022    HCT 38.6 10/13/2022    MCV 83.4 10/13/2022     10/13/2022    LYMPHOPCT 8.1 10/13/2022    RBC 4.63 10/13/2022    MCH 26.4 10/13/2022    MCHC 31.7 10/13/2022    RDW 13.8 10/13/2022    NEUTOPHILPCT 89.6 10/13/2022    MONOPCT 1.9 10/13/2022    BASOPCT 0.4 10/13/2022    NEUTROABS 6.8 10/13/2022    LYMPHSABS 0.6 (L) 10/13/2022    MONOSABS 0.1 10/13/2022    EOSABS 0.0 10/13/2022    BASOSABS 0.0 10/13/2022       No results for input(s): WBC, HGB, HCT, MCV, PLT in the last 720 hours. BMP:   No results for input(s): NA, K, CL, CO2, PHOS, BUN, CREATININE, CA in the last 72 hours. MG:   Lab Results   Component Value Date/Time    MG 1.60 10/11/2022 07:41 PM     Ca/Phos:   Lab Results   Component Value Date    CALCIUM 9.8 10/13/2022    PHOS 3.9 08/20/2019     LIVER PROFILE:   No results for input(s): AST, ALT, LIPASE, BILIDIR, BILITOT, ALKPHOS in the last 72 hours. Invalid input(s): AMYLASE,  ALB      PT/INR: No results for input(s): PROTIME, INR in the last 72 hours. APTT: No results for input(s): APTT in the last 72 hours.     Cardiac Enzymes:  Lab Results   Component Value Date    TROPONINI <0.01 10/11/2022       Assessment:       -recent hypoxic hypercapnic resp failure   -recent  copd exacerbation   -Acute encephalopathy sec above  -Possible JOSE on NIMB   -      Plan:      *on NIMV   Will proceed with following work up  Will need CT scan r/o nodule/cancer  Will get follow up PFT  Will start Trelegy  Eosinophilic count  Ige  Advise to lose weight  Need to continue home O2 and wean gradually     Thank you very much for allowing me to participate in the care of this pleasant patient , should you have any questions ,please do not hesitate to contact me      Lu Leach MD,[facility]  Pulmonary&Critical Care Medicine    Loretta Sweeney    NOTE: This report was transcribed using voice recognition software. Every effort was made to ensure accuracy; however, inadvertent computerized transcription errors may be present.

## 2023-01-12 NOTE — PROGRESS NOTES
Discussed with the patient the current USPSTF guidelines released March 9, 2021 for screening for lung cancer. For adults aged 48 to [de-identified] years who have a 20 pack-year smoking history and currently smoke or have quit within the past 15 years the grade B recommendation is to:  Screen for lung cancer with low-dose computed tomography (LDCT) every year. Stop screening once a person has not smoked for 15 years or has a health problem that limits life expectancy or the ability to have lung surgery. The patient  reports that she quit smoking about 8 years ago. Her smoking use included cigarettes. She has a 80.00 pack-year smoking history. She has never used smokeless tobacco.. Discussed with patient the risks and benefits of screening, including over-diagnosis, false positive rate, and total radiation exposure. The patient currently exhibits no signs or symptoms suggestive of lung cancer. Discussed with patient the importance of compliance with yearly annual lung cancer screenings and willingness to undergo diagnosis and treatment if screening scan is positive. In addition, the patient was counseled regarding the importance of remaining smoke free and/or total smoking cessation.     Also reviewed the following if the patient has Medicare that as of February 10, 2022, Medicare only covers LDCT screening in patients aged 51-72 with at least a 20 pack-year smoking history who currently smoke or have quit in the last 15 years

## 2023-01-12 NOTE — PATIENT INSTRUCTIONS

## 2023-01-16 ENCOUNTER — HOSPITAL ENCOUNTER (EMERGENCY)
Age: 58
Discharge: HOME | End: 2023-01-16
Attending: EMERGENCY MEDICINE
Payer: MEDICARE

## 2023-01-16 VITALS
TEMPERATURE: 98.8 F | RESPIRATION RATE: 15 BRPM | DIASTOLIC BLOOD PRESSURE: 78 MMHG | BODY MASS INDEX: 55.32 KG/M2 | HEIGHT: 61 IN | WEIGHT: 293 LBS | SYSTOLIC BLOOD PRESSURE: 149 MMHG | HEART RATE: 77 BPM | OXYGEN SATURATION: 97 %

## 2023-01-16 DIAGNOSIS — F95.9 TIC DISORDER: Primary | ICD-10-CM

## 2023-01-16 LAB
A/G RATIO: 1.3 (ref 1.1–2.2)
ALBUMIN SERPL-MCNC: 4.3 G/DL (ref 3.4–5)
ALP BLD-CCNC: 117 U/L (ref 40–129)
ALT SERPL-CCNC: 26 U/L (ref 10–40)
ANION GAP SERPL CALCULATED.3IONS-SCNC: 9 MMOL/L (ref 3–16)
AST SERPL-CCNC: 30 U/L (ref 15–37)
BASE EXCESS VENOUS: 4.1 MMOL/L (ref -3–3)
BASOPHILS ABSOLUTE: 0 K/UL (ref 0–0.2)
BASOPHILS RELATIVE PERCENT: 0.5 %
BILIRUB SERPL-MCNC: 0.4 MG/DL (ref 0–1)
BUN BLDV-MCNC: 18 MG/DL (ref 7–20)
CALCIUM SERPL-MCNC: 9.2 MG/DL (ref 8.3–10.6)
CARBOXYHEMOGLOBIN: 5.1 % (ref 0–1.5)
CHLORIDE BLD-SCNC: 93 MMOL/L (ref 99–110)
CO2: 32 MMOL/L (ref 21–32)
CREAT SERPL-MCNC: 0.9 MG/DL (ref 0.6–1.1)
EOSINOPHILS ABSOLUTE: 0.1 K/UL (ref 0–0.6)
EOSINOPHILS RELATIVE PERCENT: 1.9 %
GFR SERPL CREATININE-BSD FRML MDRD: >60 ML/MIN/{1.73_M2}
GLUCOSE BLD-MCNC: 401 MG/DL (ref 70–99)
HCO3 VENOUS: 31.8 MMOL/L (ref 23–29)
HCT VFR BLD CALC: 40 % (ref 36–48)
HEMOGLOBIN: 12.7 G/DL (ref 12–16)
LYMPHOCYTES ABSOLUTE: 0.9 K/UL (ref 1–5.1)
LYMPHOCYTES RELATIVE PERCENT: 15.3 %
MCH RBC QN AUTO: 27.1 PG (ref 26–34)
MCHC RBC AUTO-ENTMCNC: 31.7 G/DL (ref 31–36)
MCV RBC AUTO: 85.4 FL (ref 80–100)
METHEMOGLOBIN VENOUS: 0.3 %
MONOCYTES ABSOLUTE: 0.3 K/UL (ref 0–1.3)
MONOCYTES RELATIVE PERCENT: 5.5 %
NEUTROPHILS ABSOLUTE: 4.7 K/UL (ref 1.7–7.7)
NEUTROPHILS RELATIVE PERCENT: 76.8 %
O2 CONTENT, VEN: 10 VOL %
O2 SAT, VEN: 40 %
O2 THERAPY: ABNORMAL
PCO2, VEN: 61.1 MMHG (ref 40–50)
PDW BLD-RTO: 13.8 % (ref 12.4–15.4)
PH VENOUS: 7.33 (ref 7.35–7.45)
PLATELET # BLD: 157 K/UL (ref 135–450)
PMV BLD AUTO: 8.6 FL (ref 5–10.5)
PO2, VEN: 25 MMHG (ref 25–40)
POTASSIUM REFLEX MAGNESIUM: 4.2 MMOL/L (ref 3.5–5.1)
RBC # BLD: 4.68 M/UL (ref 4–5.2)
SODIUM BLD-SCNC: 134 MMOL/L (ref 136–145)
TCO2 CALC VENOUS: 34 MMOL/L
TOTAL PROTEIN: 7.5 G/DL (ref 6.4–8.2)
WBC # BLD: 6.1 K/UL (ref 4–11)

## 2023-01-16 PROCEDURE — 82803 BLOOD GASES ANY COMBINATION: CPT

## 2023-01-16 PROCEDURE — 80053 COMPREHEN METABOLIC PANEL: CPT

## 2023-01-16 PROCEDURE — 85025 COMPLETE CBC W/AUTO DIFF WBC: CPT

## 2023-01-16 PROCEDURE — 99284 EMERGENCY DEPT VISIT MOD MDM: CPT

## 2023-01-16 ASSESSMENT — ENCOUNTER SYMPTOMS
CHEST TIGHTNESS: 0
SORE THROAT: 0
BACK PAIN: 0
EYE PAIN: 0
VOMITING: 0
SHORTNESS OF BREATH: 0
DIARRHEA: 0
COUGH: 0
NAUSEA: 0
CONSTIPATION: 0
RHINORRHEA: 0
ABDOMINAL PAIN: 0

## 2023-01-16 ASSESSMENT — PAIN - FUNCTIONAL ASSESSMENT: PAIN_FUNCTIONAL_ASSESSMENT: NONE - DENIES PAIN

## 2023-01-17 NOTE — ED PROVIDER NOTES
[address] ED  EMERGENCY DEPARTMENT ENCOUNTER        Pt Name: Héctor Smith  MRN: [id number]  [place] 1965  Date of evaluation: 1/16/2023  Provider: AMAYA Hallman - CNP  PCP: Jie Sterling PA-C    This patient was seen and evaluated by the attending physician Clayton Dejesus MD.    I have evaluated this patient. My supervising physician was available for consultation. CHIEF COMPLAINT       Chief Complaint   Patient presents with    Other     Pt from home home with complaint of internal twitching x 2 days. Pt denies any other complaints. HISTORY OF PRESENT ILLNESS   (Location/Symptom, Timing/Onset, Context/Setting, Quality, Duration, Modifying Factors, Severity)  Note limiting factors. Héctor Smith is a 61 y.o. female who presents via private car for muscle twitching. Onset was several years ago. Duration has been intermittent since the onset. Context includes patient presents to the emergency department this evening stating that she has had this involuntary muscle twitching that has been intermittent over the last couple of years. She does have a neurology appointment scheduled for this Thursday as a new patient. She states she notices the twitching seems to have been worse today and is concerned that she may have an electrolyte abnormality as she has had hypokalemia in the past causing this twitching. Otherwise she denies any chest pain, palpitations or swelling in her extremities. She denies any shortness of breath, cough congestion or fevers. She denies abdominal pain, nausea vomiting, diarrhea or constipation. She denies urinary symptoms including dysuria, urgency, frequency, hematuria or flank pain. Quality is no pain with radiation to nothing. Alleviating factors include nothing. Aggravating factors include nothing. Pain is 0/10. Nothing has been used for pain today.        Chart review reveals pt has significant PMHx of anxiety, arthritis, CHF, COPD, depression, DM, diverticulosis, hyperlipidemia, hypertension, morbid obesity, tremor. They take albuterol, allopurinol, aspirin, bentyl, lexapro, nexium, iron, lasix, flipizide, vistaril, insulin, magnesium, meloxicam, metformin, nystatin, percocet, prednisone, pregabalin, zocor, trazodone, trelegy ellipta, trulicity. Nursing Notes were all reviewed and agreed with or any disagreements were addressed  in the HPI. Pt was seen during the [place] 19 pandemic. Appropriate PPE worn by ME during patient encounters. Pt seen during a time with constrained hospital bed capacity and other potential inpatient and outpatient resources were constrained due to the viral pandemic. REVIEW OF SYSTEMS    (2-9 systems for level 4, 10 or more for level 5)     Review of Systems   Constitutional:  Negative for chills, diaphoresis and fever. HENT:  Negative for congestion, rhinorrhea and sore throat. Eyes:  Negative for pain and visual disturbance. Respiratory:  Negative for cough, chest tightness and shortness of breath. Cardiovascular:  Negative for chest pain, palpitations and leg swelling. Gastrointestinal:  Negative for abdominal pain, constipation, diarrhea, nausea and vomiting. Genitourinary:  Negative for dysuria, flank pain, frequency, hematuria and urgency. Musculoskeletal:  Negative for back pain and neck pain. Skin:  Negative for rash and wound. Neurological:  Positive for tremors. Negative for dizziness, seizures, syncope, facial asymmetry, weakness, light-headedness and headaches. Twitching and tremors     Positives and Pertinent negatives as per HPI. Except as noted abovein the ROS, all other systems were reviewed and negative.        PAST MEDICAL HISTORY     Past Medical History:   Diagnosis Date    Anxiety     Arthritis     CHF (congestive heart failure) (HCC)     COPD (chronic obstructive pulmonary disease) ([facility]     Depression     Diabetes mellitus ([facility]     Diverticulosis Hyperlipidemia     Hypertension     Obesities, morbid ([facility] [address]          SURGICAL HISTORY     Past Surgical History:   Procedure Laterality Date     SECTION      x3    COLONOSCOPY      ENDOMETRIAL ABLATION      ENDOSCOPY, COLON, DIAGNOSTIC           CURRENTMEDICATIONS       Previous Medications    ALBUTEROL SULFATE  (90 BASE) MCG/ACT INHALER    Inhale 2 puffs into the lungs every 6 hours as needed for Wheezing    ALLOPURINOL (ZYLOPRIM) 100 MG TABLET    Take 100 mg by mouth daily    ASPIRIN 81 MG EC TABLET    Take 1 tablet by mouth daily    DICYCLOMINE (BENTYL) 10 MG CAPSULE    Take 10 mg by mouth 3 times daily. EPINEPHRINE (EPIPEN) 0.3 MG/0.3ML SOAJ INJECTION    Inject 0.3 mLs into the muscle Allergy of unknown origin    ESCITALOPRAM (LEXAPRO) 10 MG TABLET    Take 1 tablet by mouth daily    ESOMEPRAZOLE (NEXIUM) 40 MG DELAYED RELEASE CAPSULE    Take 40 mg by mouth in the morning and at bedtime    FERROUS SULFATE (FE TABS) 325 (65 FE) MG EC TABLET    Take 1 tablet by mouth 3 times daily (with meals)    FLUTICASONE-UMECLIDIN-VILANT (TRELEGY ELLIPTA) 100-62.5-25 MCG/ACT AEPB INHALER    Inhale 1 puff into the lungs daily    FLUTICASONE-UMECLIDIN-VILANT (TRELEGY ELLIPTA) 100-62.5-25 MCG/INH AEPB    Inhale 1 puff into the lungs daily    FUROSEMIDE (LASIX) 20 MG TABLET    Take 10 mg by mouth daily    GLIPIZIDE (GLUCOTROL XL) 10 MG EXTENDED RELEASE TABLET    Take 10 mg by mouth daily    GLUCOSE MONITORING KIT (FREESTYLE) MONITORING KIT    1 kit by Does not apply route daily as needed    HYDROXYZINE PAMOATE (VISTARIL) 25 MG CAPSULE    Take 1 capsule by mouth 3 times daily    INSULIN ASPART (NOVOLOG) 100 UNIT/ML INJECTION PEN    Inject 20 Units into the skin 3 times daily (before meals) SSI.     INSULIN DETEMIR (LEVEMIR) 100 UNIT/ML INJECTION PEN    Inject 40 Units into the skin 2 times daily    INSULIN PEN NEEDLE 29G X 12.7MM MISC    1 each by Does not apply route daily    INSULIN SYRINGES, DISPOSABLE, U-100 0.3 ML MISC    1 each by Does not apply route daily    MAGNESIUM OXIDE (MAG-OX) 400 MG TABLET    Take 400 mg by mouth daily    MELOXICAM (MOBIC) 15 MG TABLET    Take 15 mg by mouth daily     METFORMIN (GLUCOPHAGE) 1000 MG TABLET    Take 1,000 mg by mouth 2 times daily (with meals)     NYSTATIN (MYCOSTATIN) 502021 UNIT/GM CREAM    Apply topically nightly Apply under breasts at night    OXYCODONE-ACETAMINOPHEN (PERCOCET) 7.5-325 MG PER TABLET    Take 1 tablet by mouth every 4 hours as needed for Pain . OXYGEN    Inhale 4 L into the lungs    PREDNISONE (DELTASONE) 10 MG TABLET    4 tabs for 3 days 3 tabs for 3 days 2 tabs for 3 days 1 tabs for 3 days    PREGABALIN (LYRICA) 300 MG CAPSULE    Take 300 mg by mouth 2 times daily.     SIMVASTATIN (ZOCOR) 20 MG TABLET    Take 20 mg by mouth nightly    TRAZODONE (DESYREL) 100 MG TABLET    Take 100 mg by mouth nightly    TRULICITY 1.5 PO/3.3VA SOPN    Inject 1.5 mg into the skin once a week Takes on  due tomorrow         ALLERGIES     Hydromorphone hcl, Ace inhibitors, Azithromycin, Dilaudid [hydromorphone hcl], Lisinopril, Morphine, and Penicillins    FAMILYHISTORY       Family History   Problem Relation Age of Onset    Brain Cancer Mother     Lung Cancer Mother     High Cholesterol Father     Heart Disease Father           SOCIAL HISTORY       Social History     Socioeconomic History    Marital status:      Spouse name: None    Number of children: None    Years of education: None    Highest education level: None   Tobacco Use    Smoking status: Former     Packs/day: 2.00     Years: 40.00     Pack years: 80.00     Types: Cigarettes     Quit date: 2014     Years since quittin.0    Smokeless tobacco: Never   Vaping Use    Vaping Use: Never used   Substance and Sexual Activity    Alcohol use: No    Drug use: No    Sexual activity: Never       SCREENINGS    [place]han Coma Scale  Eye Opening: Spontaneous  Best Verbal Response: Oriented  Best Motor Response: Obeys commands  Khushi Coma Scale Score: 15        PHYSICAL EXAM    (up to 7 for level 4, 8 or more for level 5)     ED Triage Vitals   BP Temp Temp Source Heart Rate Resp SpO2 Height Weight   23   (!) 166/81 98.8 °F (37.1 °C) Oral 88 23 (!) 87 % 5' 1\" (1.549 m) (!) 330 lb (149.7 kg)       Physical Exam  Vitals and nursing note reviewed. Constitutional:       Appearance: Normal appearance. She is obese. She is not ill-appearing or diaphoretic. HENT:      Head: Normocephalic and atraumatic. Right Ear: External ear normal.      Left Ear: External ear normal.      Mouth/Throat:      Mouth: Mucous membranes are moist.      Pharynx: Oropharynx is clear. Eyes:      General:         Right eye: No discharge. Left eye: No discharge. Cardiovascular:      Rate and Rhythm: Normal rate and regular rhythm. Pulses: Normal pulses. Radial pulses are 2+ on the right side and 2+ on the left side. Heart sounds: Normal heart sounds. No murmur heard. No friction rub. No gallop. Pulmonary:      Effort: Pulmonary effort is normal. No respiratory distress. Breath sounds: Normal breath sounds. No stridor. No wheezing, rhonchi or rales. Chest:      Chest wall: No tenderness. Abdominal:      General: Abdomen is flat. Bowel sounds are normal.      Palpations: Abdomen is soft. Tenderness: There is no abdominal tenderness. Musculoskeletal:         General: Normal range of motion. Cervical back: Normal range of motion and neck supple. Right lower le+ Edema present. Left lower le+ Edema present. Skin:     General: Skin is warm and dry. Capillary Refill: Capillary refill takes less than 2 seconds. Neurological:      General: No focal deficit present. Mental Status: She is alert and oriented to person, place, and time.       GCS: GCS eye subscore is 4. GCS verbal subscore is 5. GCS motor subscore is 6. Comments: Involuntary twitching noted to bilateral and upper and lower extremities along with face. Psychiatric:         Mood and Affect: Mood normal.         Behavior: Behavior normal.     PHYSICAL EXAM  BP (!) 149/78   Pulse 77   Temp 98.8 °F (37.1 °C) (Oral)   Resp 15   Ht 5' 1\" (1.549 m)   Wt (!) 330 lb (149.7 kg)   SpO2 97%   BMI 62.35 kg/m²       DIAGNOSTIC RESULTS   LABS:    Labs Reviewed   CBC WITH AUTO DIFFERENTIAL - Abnormal; Notable for the following components:       Result Value    Lymphocytes Absolute 0.9 (*)     All other components within normal limits   COMPREHENSIVE METABOLIC PANEL W/ REFLEX TO MG FOR LOW K - Abnormal; Notable for the following components:    Sodium 134 (*)     Chloride 93 (*)     Glucose 401 (*)     All other components within normal limits   BLOOD GAS, VENOUS - Abnormal; Notable for the following components:    pH, Isaac 7.334 (*)     pCO2, Isaac 61.1 (*)     HCO3, Venous 31.8 (*)     Base Excess, Isaac 4.1 (*)     Carboxyhemoglobin 5.1 (*)     All other components within normal limits       All other labs were within normal range or not returned as of this dictation. EKG: All EKG's are interpreted by the Emergency Department Physician who either signs orCo-signs this chart in the absence of a cardiologist.  Please see their note for interpretation of EKG. RADIOLOGY:   Non-plain film images such as CT, Ultrasound and MRI are read by the radiologist. Plain radiographic images are visualized andpreliminarily interpreted by the  ED Provider with the below findings:        Interpretation perthe Radiologist below, if available at the time of this note:    No orders to display     No results found.        PROCEDURES   Unless otherwise noted below, none     Procedures    CRITICAL CARE TIME   N/A    CONSULTS:  None      EMERGENCY DEPARTMENT COURSE and DIFFERENTIALDIAGNOSIS/MDM:   Vitals:    Vitals:    01/16/23 2115 01/16/23 2119 01/16/23 2149 01/16/23 2202   BP: (!) 166/81   (!) 149/78   Pulse: 88 91 91 77   Resp: 23 16  15   Temp:  98.8 °F (37.1 °C)     TempSrc:  Oral     SpO2: (!) 87% 99%  97%   Weight: (!) 330 lb (149.7 kg)      Height: 5' 1\" (1.549 m)          Patient was given thefollowing medications:  Medications - No data to display    PDMP Monitoring:    Last PDMP Jae Matamoros as Reviewed [facility]):  Review User Review Instant Review Result          Last Controlled Substance Monitoring Documentation      [id number]  ED from [facility]/20/2019 [facility]   Comments Pt discharged from ED with discharge instructions x1, and all personal belongings in hand. filed at 08/20/2019 1610          Urine Drug Screenings (1 yr)       Drug screen multi urine  Collected: 2/5/2016 10:20 AM (Final result)                  Medication Contract and Consent for Opioid Use Documents Filed        No documents found                    MDM:   This patient was seen and evaluated by myself and my attending physician. She presents to the emergency department today with complaints of involuntary twitching that has been persistent over the last couple of years but seems worse today. She does have an appointment with a new neurologist this coming Thursday and plans to keep that appointment. She is concerned for electrolyte dyscrasias this evening. On exam she is alert and oriented, hemodynamically stable nontoxic in appearance. She will work-up in the emergency department consisting of laboratory studies. CBC is negative for leukocytosis or anemias. CMP does reveal a mild hyponatremia 134 hyperglycemia of 401 but is otherwise negative for electrolyte dyscrasias. Venous blood gas does reveal a pH of 7.33, CO2 of 61.1, base excess of 4.1, bicarb 31.8 and carboxyhemoglobin of 5.1.   Dr. Benjamin Redd did not see this patient prior to discharge and did have a discussion with her regarding disposition of discharge and maintaining her appointment with neurology. Please see his note for discharge teaching. Is this patient to be included in the SEP-1 Core Measure due to severe sepsis or septic shock? No   Exclusion criteria - the patient is NOT to be included for SEP-1 Core Measure due to:  2+ SIRS criteria are not met    Discharge Time out:  CC Reviewed Yes   Test Results Yes     Vitals:    01/16/23 2202   BP: (!) 149/78   Pulse: 77   Resp: 15   Temp:    SpO2: 97%              FINAL IMPRESSION      1.  Tic disorder          DISPOSITION/PLAN   DISPOSITION Decision To Discharge 01/16/2023 10:18:22 PM      PATIENT REFERREDTO:  Karen Verma PA-C  [address]  [phone]    In 2 days  Re-evaluation    [facility] ED  [address]  [phone]    As needed, If symptoms worsen    DISCHARGE MEDICATIONS:  New Prescriptions    No medications on file       DISCONTINUED MEDICATIONS:  Discontinued Medications    No medications on file              (Please note that portions ofthis note were completed with a voice recognition program.  Efforts were made to edit the dictations but occasionally words are mis-transcribed.)    AMAYA Pruett CNP (electronically signed)       AMAYA Pruett CNP  01/16/23 1689

## 2023-01-17 NOTE — ED PROVIDER NOTES
I independently performed a history and physical on Rosalind Denny. All diagnostic, treatment, and disposition decisions were made by myself in conjunction with the advanced practice provider. For further details of [facility] emergency department encounter, please see Denise Giordano NP's documentation. Patient is here because of twitching of both upper and lower extremities intermittently. She states that today she was having such twitching and she needed help to get out of the house. She has had this happen many times before and has an appointment for this Thursday with neurology. She denies any other complaints. No fevers, chills or sweats. No vomiting. She has chronic hypoxia and is on supplemental oxygen at baseline. No worsening shortness of breath today. No chest pain. At the time of my evaluation the patient states she has improved and is back to her baseline. On exam I do see occasional motor tics in all 4 extremities that are not epileptic in my opinion. Labs Reviewed   CBC WITH AUTO DIFFERENTIAL - Abnormal; Notable for the following components:       Result Value    Lymphocytes Absolute 0.9 (*)     All other components within normal limits   COMPREHENSIVE METABOLIC PANEL W/ REFLEX TO MG FOR LOW K - Abnormal; Notable for the following components:    Sodium 134 (*)     Chloride 93 (*)     Glucose 401 (*)     All other components within normal limits   BLOOD GAS, VENOUS - Abnormal; Notable for the following components:    pH, Isaac 7.334 (*)     pCO2, Isaac 61.1 (*)     HCO3, Venous 31.8 (*)     Base Excess, Isaac 4.1 (*)     Carboxyhemoglobin 5.1 (*)     All other components within normal limits     No orders to display         I personally saw this patient and performed a substantive portion of the visit including all aspects of the medical decision making.     MEDICAL DECISION MAKING  History From: History from : Patient and Significant Other    Limitations to history : None    Chronic Conditions: Diabetes, CHF, hyperlipidemia, morbid obesity    CC/HPI Summary, DDx, ED Course, and Reassessment: Patient's labs are not indicative of any significant pathology. I advised that she should continue to eat and drink a balanced diet and keep the appointment with neurology. She is concerned about what may happen if the symptoms return and I advised her to return if her symptoms come back. Disposition Considerations (Tests not ordered but considered, Shared Decision Making, Pt Expectation of Test or Tx.): Patient asked regarding admission and advised her that if her symptoms have gone now, which she confirmed, there is no reason to be admitted just to anticipate possible his symptoms in the future unless she is wishing to be placed in a nursing facility and she stated she is not. I am the Primary Physician of Record.      Terrie Evans MD  01/17/23 2894

## 2023-01-17 NOTE — ED NOTES
Pt states that she has been seen many times for same complaint. States its either her magnesium, potasium or blood gas.  Wears 4L at baseline     Maikol Morrow RN  01/16/23 7332

## 2023-03-20 ENCOUNTER — APPOINTMENT (OUTPATIENT)
Dept: GENERAL RADIOLOGY | Age: 58
End: 2023-03-20
Payer: MEDICARE

## 2023-03-20 ENCOUNTER — HOSPITAL ENCOUNTER (INPATIENT)
Age: 58
LOS: 4 days | Discharge: NURSING, REHAB OR LONG-TERM CARE | End: 2023-03-24
Attending: EMERGENCY MEDICINE | Admitting: INTERNAL MEDICINE
Payer: MEDICARE

## 2023-03-20 ENCOUNTER — APPOINTMENT (OUTPATIENT)
Dept: CT IMAGING | Age: 58
End: 2023-03-20
Payer: MEDICARE

## 2023-03-20 DIAGNOSIS — R26.2 UNABLE TO WALK: Primary | ICD-10-CM

## 2023-03-20 DIAGNOSIS — R06.02 CHRONIC SHORTNESS OF BREATH: ICD-10-CM

## 2023-03-20 DIAGNOSIS — M25.512 ACUTE PAIN OF LEFT SHOULDER: ICD-10-CM

## 2023-03-20 DIAGNOSIS — M25.561 ACUTE PAIN OF BOTH KNEES: ICD-10-CM

## 2023-03-20 DIAGNOSIS — R42 LIGHTHEADEDNESS: ICD-10-CM

## 2023-03-20 DIAGNOSIS — M79.7 FIBROMYALGIA: ICD-10-CM

## 2023-03-20 DIAGNOSIS — M25.552 ACUTE HIP PAIN, LEFT: ICD-10-CM

## 2023-03-20 DIAGNOSIS — M25.562 ACUTE PAIN OF BOTH KNEES: ICD-10-CM
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
## 2023-03-20 DIAGNOSIS — E87.20 LACTIC ACIDOSIS: ICD-10-CM

## 2023-03-20 DIAGNOSIS — R13.19 ESOPHAGEAL DYSPHAGIA: ICD-10-CM
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
## 2023-03-20 PROBLEM — R13.10 DYSPHAGIA: Status: ACTIVE | Noted: 2023-03-20
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
## 2023-03-20 LAB
ALBUMIN SERPL-MCNC: 4 G/DL (ref 3.4–5)
ALBUMIN/GLOB SERPL: 1.1 {RATIO} (ref 1.1–2.2)
ALP SERPL-CCNC: 92 U/L (ref 40–129)
ALT SERPL-CCNC: 28 U/L (ref 10–40)
ANION GAP SERPL CALCULATED.3IONS-SCNC: 12 MMOL/L (ref 3–16)
AST SERPL-CCNC: 26 U/L (ref 15–37)
BASOPHILS # BLD: 0 K/UL (ref 0–0.2)
BASOPHILS NFR BLD: 0.4 %
BILIRUB SERPL-MCNC: 0.3 MG/DL (ref 0–1)
BUN SERPL-MCNC: 13 MG/DL (ref 7–20)
CALCIUM SERPL-MCNC: 9.7 MG/DL (ref 8.3–10.6)
CHLORIDE SERPL-SCNC: 96 MMOL/L (ref 99–110)
CO2 SERPL-SCNC: 31 MMOL/L (ref 21–32)
CREAT SERPL-MCNC: 0.9 MG/DL (ref 0.6–1.1)
DEPRECATED RDW RBC AUTO: 14.2 % (ref 12.4–15.4)
EKG ATRIAL RATE: 89 BPM
EKG ATRIAL RATE: 98 BPM
EKG DIAGNOSIS: NORMAL
EKG DIAGNOSIS: NORMAL
EKG P AXIS: -1 DEGREES
EKG P AXIS: 114 DEGREES
EKG P-R INTERVAL: 192 MS
EKG P-R INTERVAL: 232 MS
EKG Q-T INTERVAL: 380 MS
EKG Q-T INTERVAL: 408 MS
EKG QRS DURATION: 100 MS
EKG QRS DURATION: 96 MS
EKG QTC CALCULATION (BAZETT): 485 MS
EKG QTC CALCULATION (BAZETT): 496 MS
EKG R AXIS: -59 DEGREES
EKG R AXIS: 34 DEGREES
EKG T AXIS: 71 DEGREES
EKG T AXIS: 77 DEGREES
EKG VENTRICULAR RATE: 89 BPM
EKG VENTRICULAR RATE: 98 BPM
EOSINOPHIL # BLD: 0.2 K/UL (ref 0–0.6)
EOSINOPHIL NFR BLD: 1.8 %
FLUAV RNA RESP QL NAA+PROBE: NOT DETECTED
FLUBV RNA RESP QL NAA+PROBE: NOT DETECTED
GFR SERPLBLD CREATININE-BSD FMLA CKD-EPI: >60 ML/MIN/{1.73_M2}
GLUCOSE BLD-MCNC: 195 MG/DL (ref 70–99)
GLUCOSE BLD-MCNC: 261 MG/DL (ref 70–99)
GLUCOSE BLD-MCNC: 380 MG/DL (ref 70–99)
GLUCOSE SERPL-MCNC: 196 MG/DL (ref 70–99)
HCT VFR BLD AUTO: 40.8 % (ref 36–48)
HGB BLD-MCNC: 13 G/DL (ref 12–16)
LACTATE BLDV-SCNC: 1.7 MMOL/L (ref 0.4–2)
LACTATE BLDV-SCNC: 2.4 MMOL/L (ref 0.4–2)
LYMPHOCYTES # BLD: 1.6 K/UL (ref 1–5.1)
LYMPHOCYTES NFR BLD: 19 %
MAGNESIUM SERPL-MCNC: 1.6 MG/DL (ref 1.8–2.4)
MCH RBC QN AUTO: 27 PG (ref 26–34)
MCHC RBC AUTO-ENTMCNC: 31.8 G/DL (ref 31–36)
MCV RBC AUTO: 84.9 FL (ref 80–100)
MONOCYTES # BLD: 0.6 K/UL (ref 0–1.3)
MONOCYTES NFR BLD: 6.9 %
NEUTROPHILS # BLD: 6 K/UL (ref 1.7–7.7)
NEUTROPHILS NFR BLD: 71.9 %
PERFORMED ON: ABNORMAL
PLATELET # BLD AUTO: 139 K/UL (ref 135–450)
PMV BLD AUTO: 9.1 FL (ref 5–10.5)
POTASSIUM SERPL-SCNC: 4.7 MMOL/L (ref 3.5–5.1)
PROCALCITONIN SERPL IA-MCNC: 0.12 NG/ML (ref 0–0.15)
PROT SERPL-MCNC: 7.5 G/DL (ref 6.4–8.2)
RBC # BLD AUTO: 4.81 M/UL (ref 4–5.2)
SARS-COV-2 RNA RESP QL NAA+PROBE: NOT DETECTED
SODIUM SERPL-SCNC: 139 MMOL/L (ref 136–145)
TROPONIN T SERPL-MCNC: <0.01 NG/ML
WBC # BLD AUTO: 8.3 K/UL (ref 4–11)

## 2023-03-20 PROCEDURE — 99223 1ST HOSP IP/OBS HIGH 75: CPT

## 2023-03-20 PROCEDURE — 6370000000 HC RX 637 (ALT 250 FOR IP)

## 2023-03-20 PROCEDURE — 94640 AIRWAY INHALATION TREATMENT: CPT

## 2023-03-20 PROCEDURE — 97161 PT EVAL LOW COMPLEX 20 MIN: CPT

## 2023-03-20 PROCEDURE — 73502 X-RAY EXAM HIP UNI 2-3 VIEWS: CPT

## 2023-03-20 PROCEDURE — 2700000000 HC OXYGEN THERAPY PER DAY

## 2023-03-20 PROCEDURE — 93005 ELECTROCARDIOGRAM TRACING: CPT | Performed by: EMERGENCY MEDICINE

## 2023-03-20 PROCEDURE — 85025 COMPLETE CBC W/AUTO DIFF WBC: CPT

## 2023-03-20 PROCEDURE — 6360000002 HC RX W HCPCS: Performed by: REGISTERED NURSE

## 2023-03-20 PROCEDURE — 96374 THER/PROPH/DIAG INJ IV PUSH: CPT

## 2023-03-20 PROCEDURE — 97530 THERAPEUTIC ACTIVITIES: CPT

## 2023-03-20 PROCEDURE — 6370000000 HC RX 637 (ALT 250 FOR IP): Performed by: EMERGENCY MEDICINE

## 2023-03-20 PROCEDURE — 83605 ASSAY OF LACTIC ACID: CPT

## 2023-03-20 PROCEDURE — 92610 EVALUATE SWALLOWING FUNCTION: CPT

## 2023-03-20 PROCEDURE — 93010 ELECTROCARDIOGRAM REPORT: CPT | Performed by: INTERNAL MEDICINE

## 2023-03-20 PROCEDURE — 6370000000 HC RX 637 (ALT 250 FOR IP): Performed by: INTERNAL MEDICINE

## 2023-03-20 PROCEDURE — 71045 X-RAY EXAM CHEST 1 VIEW: CPT

## 2023-03-20 PROCEDURE — 6360000002 HC RX W HCPCS: Performed by: EMERGENCY MEDICINE

## 2023-03-20 PROCEDURE — 99285 EMERGENCY DEPT VISIT HI MDM: CPT

## 2023-03-20 PROCEDURE — 2580000003 HC RX 258: Performed by: EMERGENCY MEDICINE

## 2023-03-20 PROCEDURE — 6360000002 HC RX W HCPCS: Performed by: INTERNAL MEDICINE

## 2023-03-20 PROCEDURE — 83036 HEMOGLOBIN GLYCOSYLATED A1C: CPT

## 2023-03-20 PROCEDURE — 6360000002 HC RX W HCPCS

## 2023-03-20 PROCEDURE — 83735 ASSAY OF MAGNESIUM: CPT

## 2023-03-20 PROCEDURE — 80053 COMPREHEN METABOLIC PANEL: CPT

## 2023-03-20 PROCEDURE — 94761 N-INVAS EAR/PLS OXIMETRY MLT: CPT

## 2023-03-20 PROCEDURE — 97165 OT EVAL LOW COMPLEX 30 MIN: CPT

## 2023-03-20 PROCEDURE — 87636 SARSCOV2 & INF A&B AMP PRB: CPT

## 2023-03-20 PROCEDURE — 73562 X-RAY EXAM OF KNEE 3: CPT

## 2023-03-20 PROCEDURE — 84145 PROCALCITONIN (PCT): CPT

## 2023-03-20 PROCEDURE — 84484 ASSAY OF TROPONIN QUANT: CPT

## 2023-03-20 PROCEDURE — 36415 COLL VENOUS BLD VENIPUNCTURE: CPT

## 2023-03-20 PROCEDURE — 93005 ELECTROCARDIOGRAM TRACING: CPT | Performed by: INTERNAL MEDICINE

## 2023-03-20 PROCEDURE — 73030 X-RAY EXAM OF SHOULDER: CPT

## 2023-03-20 PROCEDURE — 70450 CT HEAD/BRAIN W/O DYE: CPT

## 2023-03-20 PROCEDURE — 2060000000 HC ICU INTERMEDIATE R&B

## 2023-03-20 PROCEDURE — C9113 INJ PANTOPRAZOLE SODIUM, VIA: HCPCS | Performed by: REGISTERED NURSE

## 2023-03-20 PROCEDURE — 2580000003 HC RX 258

## 2023-03-20 RX ORDER — ENOXAPARIN SODIUM 100 MG/ML
30 INJECTION SUBCUTANEOUS 2 TIMES DAILY
Status: DISCONTINUED | OUTPATIENT
Start: 2023-03-20 | End: 2023-03-20

## 2023-03-20 RX ORDER — ASPIRIN 81 MG/1
81 TABLET ORAL DAILY
Status: DISCONTINUED | OUTPATIENT
Start: 2023-03-20 | End: 2023-03-24 | Stop reason: HOSPADM

## 2023-03-20 RX ORDER — INSULIN GLARGINE 100 [IU]/ML
60 INJECTION, SOLUTION SUBCUTANEOUS NIGHTLY
Status: DISCONTINUED | OUTPATIENT
Start: 2023-03-20 | End: 2023-03-24 | Stop reason: HOSPADM

## 2023-03-20 RX ORDER — DICYCLOMINE HYDROCHLORIDE 10 MG/1
10 CAPSULE ORAL 3 TIMES DAILY
Status: DISCONTINUED | OUTPATIENT
Start: 2023-03-20 | End: 2023-03-24 | Stop reason: HOSPADM

## 2023-03-20 RX ORDER — SODIUM CHLORIDE 9 MG/ML
INJECTION, SOLUTION INTRAVENOUS PRN
Status: DISCONTINUED | OUTPATIENT
Start: 2023-03-20 | End: 2023-03-24 | Stop reason: HOSPADM

## 2023-03-20 RX ORDER — INSULIN LISPRO 100 [IU]/ML
0-4 INJECTION, SOLUTION INTRAVENOUS; SUBCUTANEOUS
Status: DISCONTINUED | OUTPATIENT
Start: 2023-03-20 | End: 2023-03-24 | Stop reason: HOSPADM

## 2023-03-20 RX ORDER — SODIUM CHLORIDE 9 MG/ML
INJECTION, SOLUTION INTRAVENOUS CONTINUOUS
Status: DISCONTINUED | OUTPATIENT
Start: 2023-03-20 | End: 2023-03-22

## 2023-03-20 RX ORDER — INSULIN LISPRO 100 [IU]/ML
0-4 INJECTION, SOLUTION INTRAVENOUS; SUBCUTANEOUS NIGHTLY
Status: DISCONTINUED | OUTPATIENT
Start: 2023-03-20 | End: 2023-03-24 | Stop reason: HOSPADM

## 2023-03-20 RX ORDER — OXYCODONE AND ACETAMINOPHEN 7.5; 325 MG/1; MG/1
1 TABLET ORAL 4 TIMES DAILY
Status: ON HOLD | COMMUNITY
End: 2023-03-24 | Stop reason: SDUPTHER

## 2023-03-20 RX ORDER — MAGNESIUM SULFATE 1 G/100ML
1000 INJECTION INTRAVENOUS PRN
Status: DISCONTINUED | OUTPATIENT
Start: 2023-03-20 | End: 2023-03-24 | Stop reason: HOSPADM

## 2023-03-20 RX ORDER — PANTOPRAZOLE SODIUM 40 MG/1
40 TABLET, DELAYED RELEASE ORAL
Status: DISCONTINUED | OUTPATIENT
Start: 2023-03-21 | End: 2023-03-20

## 2023-03-20 RX ORDER — ALBUTEROL SULFATE 90 UG/1
2 AEROSOL, METERED RESPIRATORY (INHALATION) EVERY 4 HOURS PRN
Status: DISCONTINUED | OUTPATIENT
Start: 2023-03-20 | End: 2023-03-24 | Stop reason: HOSPADM

## 2023-03-20 RX ORDER — FERROUS SULFATE 325(65) MG
325 TABLET ORAL
Status: DISCONTINUED | OUTPATIENT
Start: 2023-03-20 | End: 2023-03-24 | Stop reason: HOSPADM

## 2023-03-20 RX ORDER — OXYCODONE AND ACETAMINOPHEN 7.5; 325 MG/1; MG/1
1 TABLET ORAL EVERY 4 HOURS PRN
Status: DISCONTINUED | OUTPATIENT
Start: 2023-03-20 | End: 2023-03-20

## 2023-03-20 RX ORDER — BUDESONIDE AND FORMOTEROL FUMARATE DIHYDRATE 160; 4.5 UG/1; UG/1
2 AEROSOL RESPIRATORY (INHALATION) 2 TIMES DAILY
Status: DISCONTINUED | OUTPATIENT
Start: 2023-03-20 | End: 2023-03-24 | Stop reason: HOSPADM

## 2023-03-20 RX ORDER — ACETAMINOPHEN 325 MG/1
650 TABLET ORAL EVERY 6 HOURS PRN
Status: DISCONTINUED | OUTPATIENT
Start: 2023-03-20 | End: 2023-03-24 | Stop reason: HOSPADM

## 2023-03-20 RX ORDER — TRAZODONE HYDROCHLORIDE 100 MG/1
100 TABLET ORAL NIGHTLY
Status: DISCONTINUED | OUTPATIENT
Start: 2023-03-20 | End: 2023-03-24 | Stop reason: HOSPADM

## 2023-03-20 RX ORDER — ACETAMINOPHEN 650 MG/1
650 SUPPOSITORY RECTAL EVERY 6 HOURS PRN
Status: DISCONTINUED | OUTPATIENT
Start: 2023-03-20 | End: 2023-03-24 | Stop reason: HOSPADM

## 2023-03-20 RX ORDER — ALLOPURINOL 100 MG/1
100 TABLET ORAL DAILY
Status: DISCONTINUED | OUTPATIENT
Start: 2023-03-20 | End: 2023-03-24 | Stop reason: HOSPADM

## 2023-03-20 RX ORDER — FUROSEMIDE 20 MG/1
20 TABLET ORAL DAILY
COMMUNITY

## 2023-03-20 RX ORDER — PREGABALIN 100 MG/1
300 CAPSULE ORAL 2 TIMES DAILY
Status: DISCONTINUED | OUTPATIENT
Start: 2023-03-20 | End: 2023-03-24 | Stop reason: HOSPADM

## 2023-03-20 RX ORDER — ONDANSETRON 2 MG/ML
4 INJECTION INTRAMUSCULAR; INTRAVENOUS EVERY 6 HOURS PRN
Status: DISCONTINUED | OUTPATIENT
Start: 2023-03-20 | End: 2023-03-24 | Stop reason: HOSPADM

## 2023-03-20 RX ORDER — SODIUM CHLORIDE 0.9 % (FLUSH) 0.9 %
10 SYRINGE (ML) INJECTION PRN
Status: DISCONTINUED | OUTPATIENT
Start: 2023-03-20 | End: 2023-03-24 | Stop reason: HOSPADM

## 2023-03-20 RX ORDER — ONDANSETRON 4 MG/1
4 TABLET, ORALLY DISINTEGRATING ORAL EVERY 8 HOURS PRN
Status: DISCONTINUED | OUTPATIENT
Start: 2023-03-20 | End: 2023-03-24 | Stop reason: HOSPADM

## 2023-03-20 RX ORDER — ESCITALOPRAM OXALATE 10 MG/1
10 TABLET ORAL DAILY
Status: DISCONTINUED | OUTPATIENT
Start: 2023-03-20 | End: 2023-03-24 | Stop reason: HOSPADM

## 2023-03-20 RX ORDER — PANTOPRAZOLE SODIUM 40 MG/10ML
40 INJECTION, POWDER, LYOPHILIZED, FOR SOLUTION INTRAVENOUS 2 TIMES DAILY
Status: DISCONTINUED | OUTPATIENT
Start: 2023-03-20 | End: 2023-03-24 | Stop reason: HOSPADM

## 2023-03-20 RX ORDER — NYSTATIN 100000 U/G
CREAM TOPICAL NIGHTLY
Status: DISCONTINUED | OUTPATIENT
Start: 2023-03-20 | End: 2023-03-24 | Stop reason: HOSPADM

## 2023-03-20 RX ORDER — ATORVASTATIN CALCIUM 10 MG/1
10 TABLET, FILM COATED ORAL DAILY
Status: DISCONTINUED | OUTPATIENT
Start: 2023-03-20 | End: 2023-03-24 | Stop reason: HOSPADM

## 2023-03-20 RX ORDER — ESOMEPRAZOLE MAGNESIUM 40 MG/1
40 CAPSULE, DELAYED RELEASE ORAL
Status: ON HOLD | COMMUNITY
End: 2023-03-24 | Stop reason: HOSPADM

## 2023-03-20 RX ORDER — DEXTROSE MONOHYDRATE 100 MG/ML
INJECTION, SOLUTION INTRAVENOUS CONTINUOUS PRN
Status: DISCONTINUED | OUTPATIENT
Start: 2023-03-20 | End: 2023-03-24 | Stop reason: HOSPADM

## 2023-03-20 RX ORDER — INSULIN GLARGINE 100 [IU]/ML
40 INJECTION, SOLUTION SUBCUTANEOUS 2 TIMES DAILY
Status: DISCONTINUED | OUTPATIENT
Start: 2023-03-20 | End: 2023-03-20

## 2023-03-20 RX ORDER — DULAGLUTIDE 3 MG/.5ML
4.5 INJECTION, SOLUTION SUBCUTANEOUS WEEKLY
COMMUNITY

## 2023-03-20 RX ORDER — POTASSIUM CHLORIDE 20 MEQ/1
40 TABLET, EXTENDED RELEASE ORAL PRN
Status: DISCONTINUED | OUTPATIENT
Start: 2023-03-20 | End: 2023-03-24 | Stop reason: HOSPADM

## 2023-03-20 RX ORDER — POLYETHYLENE GLYCOL 3350 17 G/17G
17 POWDER, FOR SOLUTION ORAL DAILY PRN
Status: DISCONTINUED | OUTPATIENT
Start: 2023-03-20 | End: 2023-03-24 | Stop reason: HOSPADM

## 2023-03-20 RX ORDER — ACETAMINOPHEN 325 MG/1
650 TABLET ORAL ONCE
Status: COMPLETED | OUTPATIENT
Start: 2023-03-20 | End: 2023-03-20

## 2023-03-20 RX ORDER — ENOXAPARIN SODIUM 100 MG/ML
40 INJECTION SUBCUTANEOUS 2 TIMES DAILY
Status: DISCONTINUED | OUTPATIENT
Start: 2023-03-20 | End: 2023-03-24 | Stop reason: HOSPADM

## 2023-03-20 RX ORDER — POTASSIUM CHLORIDE 7.45 MG/ML
10 INJECTION INTRAVENOUS PRN
Status: DISCONTINUED | OUTPATIENT
Start: 2023-03-20 | End: 2023-03-24 | Stop reason: HOSPADM

## 2023-03-20 RX ORDER — HYDROXYZINE 50 MG/1
25 TABLET, FILM COATED ORAL 3 TIMES DAILY
Status: DISCONTINUED | OUTPATIENT
Start: 2023-03-20 | End: 2023-03-24 | Stop reason: HOSPADM

## 2023-03-20 RX ORDER — FUROSEMIDE 20 MG/1
10 TABLET ORAL DAILY
Status: DISCONTINUED | OUTPATIENT
Start: 2023-03-20 | End: 2023-03-24 | Stop reason: HOSPADM

## 2023-03-20 RX ORDER — OXYCODONE AND ACETAMINOPHEN 7.5; 325 MG/1; MG/1
1 TABLET ORAL 4 TIMES DAILY
Status: DISCONTINUED | OUTPATIENT
Start: 2023-03-20 | End: 2023-03-24 | Stop reason: HOSPADM

## 2023-03-20 RX ORDER — 0.9 % SODIUM CHLORIDE 0.9 %
500 INTRAVENOUS SOLUTION INTRAVENOUS ONCE
Status: COMPLETED | OUTPATIENT
Start: 2023-03-20 | End: 2023-03-20

## 2023-03-20 RX ORDER — INSULIN ASPART 100 [IU]/ML
48 INJECTION, SOLUTION INTRAVENOUS; SUBCUTANEOUS
COMMUNITY

## 2023-03-20 RX ORDER — MAGNESIUM SULFATE 1 G/100ML
1000 INJECTION INTRAVENOUS ONCE
Status: COMPLETED | OUTPATIENT
Start: 2023-03-20 | End: 2023-03-20

## 2023-03-20 RX ORDER — OXYCODONE AND ACETAMINOPHEN 7.5; 325 MG/1; MG/1
1 TABLET ORAL ONCE
Status: COMPLETED | OUTPATIENT
Start: 2023-03-20 | End: 2023-03-20

## 2023-03-20 RX ORDER — ALBUTEROL SULFATE 90 UG/1
2 AEROSOL, METERED RESPIRATORY (INHALATION) EVERY 6 HOURS PRN
Status: DISCONTINUED | OUTPATIENT
Start: 2023-03-20 | End: 2023-03-20

## 2023-03-20 RX ORDER — LANOLIN ALCOHOL/MO/W.PET/CERES
400 CREAM (GRAM) TOPICAL DAILY
Status: DISCONTINUED | OUTPATIENT
Start: 2023-03-20 | End: 2023-03-24 | Stop reason: HOSPADM

## 2023-03-20 RX ORDER — SODIUM CHLORIDE 0.9 % (FLUSH) 0.9 %
5-40 SYRINGE (ML) INJECTION EVERY 12 HOURS SCHEDULED
Status: DISCONTINUED | OUTPATIENT
Start: 2023-03-20 | End: 2023-03-24 | Stop reason: HOSPADM

## 2023-03-20 RX ADMIN — DICYCLOMINE HYDROCHLORIDE 10 MG: 10 CAPSULE ORAL at 20:49

## 2023-03-20 RX ADMIN — INSULIN LISPRO 2 UNITS: 100 INJECTION, SOLUTION INTRAVENOUS; SUBCUTANEOUS at 17:29

## 2023-03-20 RX ADMIN — OXYCODONE HYDROCHLORIDE AND ACETAMINOPHEN 1 TABLET: 7.5; 325 TABLET ORAL at 15:35

## 2023-03-20 RX ADMIN — HYDROXYZINE HYDROCHLORIDE 25 MG: 50 TABLET ORAL at 20:44

## 2023-03-20 RX ADMIN — TRAZODONE HYDROCHLORIDE 100 MG: 100 TABLET ORAL at 20:48

## 2023-03-20 RX ADMIN — SODIUM CHLORIDE 500 ML: 9 INJECTION, SOLUTION INTRAVENOUS at 05:46

## 2023-03-20 RX ADMIN — ENOXAPARIN SODIUM 40 MG: 100 INJECTION SUBCUTANEOUS at 20:49

## 2023-03-20 RX ADMIN — PANTOPRAZOLE SODIUM 40 MG: 40 INJECTION, POWDER, FOR SOLUTION INTRAVENOUS at 20:49

## 2023-03-20 RX ADMIN — NYSTATIN: 100000 CREAM TOPICAL at 20:44

## 2023-03-20 RX ADMIN — SODIUM CHLORIDE: 9 INJECTION, SOLUTION INTRAVENOUS at 12:26

## 2023-03-20 RX ADMIN — ONDANSETRON 4 MG: 2 INJECTION INTRAMUSCULAR; INTRAVENOUS at 20:49

## 2023-03-20 RX ADMIN — ACETAMINOPHEN 650 MG: 325 TABLET ORAL at 05:46

## 2023-03-20 RX ADMIN — DICYCLOMINE HYDROCHLORIDE 10 MG: 10 CAPSULE ORAL at 15:35

## 2023-03-20 RX ADMIN — INSULIN LISPRO 4 UNITS: 100 INJECTION, SOLUTION INTRAVENOUS; SUBCUTANEOUS at 12:17

## 2023-03-20 RX ADMIN — Medication 400 MG: at 15:36

## 2023-03-20 RX ADMIN — PREGABALIN 300 MG: 100 CAPSULE ORAL at 20:49

## 2023-03-20 RX ADMIN — ONDANSETRON 4 MG: 4 TABLET, ORALLY DISINTEGRATING ORAL at 12:24

## 2023-03-20 RX ADMIN — HYDROXYZINE HYDROCHLORIDE 25 MG: 50 TABLET ORAL at 15:34

## 2023-03-20 RX ADMIN — Medication 2 PUFF: at 19:45

## 2023-03-20 RX ADMIN — OXYCODONE HYDROCHLORIDE AND ACETAMINOPHEN 1 TABLET: 7.5; 325 TABLET ORAL at 20:48

## 2023-03-20 RX ADMIN — Medication 10 ML: at 12:26

## 2023-03-20 RX ADMIN — MAGNESIUM SULFATE HEPTAHYDRATE 1000 MG: 1 INJECTION, SOLUTION INTRAVENOUS at 07:36

## 2023-03-20 RX ADMIN — OXYCODONE HYDROCHLORIDE AND ACETAMINOPHEN 1 TABLET: 7.5; 325 TABLET ORAL at 07:32

## 2023-03-20 RX ADMIN — Medication 10 ML: at 20:53

## 2023-03-20 RX ADMIN — OXYCODONE HYDROCHLORIDE AND ACETAMINOPHEN 1 TABLET: 7.5; 325 TABLET ORAL at 18:18

## 2023-03-20 RX ADMIN — FERROUS SULFATE TAB 325 MG (65 MG ELEMENTAL FE) 325 MG: 325 (65 FE) TAB at 18:19

## 2023-03-20 RX ADMIN — ATORVASTATIN CALCIUM 10 MG: 10 TABLET, FILM COATED ORAL at 20:56

## 2023-03-20 ASSESSMENT — PAIN DESCRIPTION - PAIN TYPE
TYPE: ACUTE PAIN
TYPE: ACUTE PAIN

## 2023-03-20 ASSESSMENT — ENCOUNTER SYMPTOMS
NAUSEA: 0
SINUS PAIN: 0
BACK PAIN: 0
ABDOMINAL PAIN: 0
SHORTNESS OF BREATH: 1
DIARRHEA: 0
VOMITING: 0

## 2023-03-20 ASSESSMENT — PAIN - FUNCTIONAL ASSESSMENT
PAIN_FUNCTIONAL_ASSESSMENT: 0-10
PAIN_FUNCTIONAL_ASSESSMENT: PREVENTS OR INTERFERES SOME ACTIVE ACTIVITIES AND ADLS

## 2023-03-20 ASSESSMENT — PAIN DESCRIPTION - ORIENTATION
ORIENTATION: RIGHT;LEFT;LOWER
ORIENTATION: LEFT
ORIENTATION: LOWER
ORIENTATION: LOWER

## 2023-03-20 ASSESSMENT — PAIN DESCRIPTION - FREQUENCY
FREQUENCY: CONTINUOUS
FREQUENCY: INTERMITTENT

## 2023-03-20 ASSESSMENT — PAIN DESCRIPTION - DESCRIPTORS
DESCRIPTORS: ACHING
DESCRIPTORS: ACHING

## 2023-03-20 ASSESSMENT — PAIN DESCRIPTION - LOCATION
LOCATION: BACK;KNEE
LOCATION: BACK
LOCATION: HIP;SHOULDER;KNEE
LOCATION: KNEE;BACK

## 2023-03-20 ASSESSMENT — PAIN SCALES - GENERAL
PAINLEVEL_OUTOF10: 8
PAINLEVEL_OUTOF10: 5
PAINLEVEL_OUTOF10: 9
PAINLEVEL_OUTOF10: 9
PAINLEVEL_OUTOF10: 7
PAINLEVEL_OUTOF10: 0

## 2023-03-20 ASSESSMENT — LIFESTYLE VARIABLES: HOW OFTEN DO YOU HAVE A DRINK CONTAINING ALCOHOL: NEVER

## 2023-03-20 ASSESSMENT — PAIN DESCRIPTION - ONSET: ONSET: ON-GOING

## 2023-03-20 NOTE — PROGRESS NOTES
Inpatient Physical Therapy Evaluation    Unit: PCU  Date:  3/20/2023  Patient Name:    Mis Ahmadi  Admitting diagnosis:  Lactic acidosis [E87.20]  Lightheadedness [R42]  Unable to walk [R26.2]  Fall at home, initial encounter [W19. XXXA, K70.173]  Acute hip pain, left [M25.552]  Fall on same level from slipping, tripping or stumbling, initial encounter [W01. 0XXA]  Acute pain of left shoulder [M25.512]  Acute pain of both knees [M25.561, M25.562]  Chronic shortness of breath [R06.02]  Admit Date:  3/20/2023  Precautions/Restrictions/WB Status/ Lines/ Wounds/ Oxygen: Fall risk, Bed/chair alarm, and Lines (IV and Supplemental O2 (4L))    Treatment Time:  13:48 - 14:15  Treatment Number:  1  Timed Code Treatment Minutes: 17 minutes  Total Treatment Minutes:  27  minutes    Patient Stated Goals for Therapy: \" go home \"          Discharge Recommendations: Home with 24hr assistance and Home PT  DME needs for discharge: Needs Met - recommending pt use owned-RW       Therapy recommendation for EMS Transport: can transport by wheelchair    Therapy recommendations for staff:   Assist of 1 for transfers with use of rolling walker (RW) vs LUCAS STEDY, and gait belt to/from [facility]  to/from chair    History of Present Illness: The patient is a 61 y.o. female with PMHx HLD, HTN, diastolic CHF, COPD, J8PG, arthritis, anxiety, depression and severe obesity who presented to [facility] ED with complaint of fall. Patient reports shaking and limb tremors ongoing for some time, getting worse lately. States she falls often as a result. Similar event today. Was walking to chair and felt shaky, and fell down, landing on knees. Denies associated SOB, chest pain, lightheadedness or dizziness. No LOC or head injury. Now with left shoulder, left hip and bilateral knee pain prompting her ER visit.   at bedside reports she was seen by Dr. Florentino Helms at [facility] on 3/16 and supposed to be started on a medication for shaking and tremors, but hasn't gotten

## 2023-03-20 NOTE — PROGRESS NOTES
GI at bedside. Inquired with them if she could resume diet until NPO at midnight. They stated she could do full liquid diet until midnight. Pt is aware.

## 2023-03-20 NOTE — PROGRESS NOTES
Patient admitted to room 314 from ED. Patient oriented to room, call light, bed rails, phone, lights and bathroom. Patient instructed about the schedule of the day including: vital sign frequency, lab draws, possible tests, frequency of MD and staff rounds, daily weights, I &O's and prescribed diet. Yes Telemetry box in place, patient aware of placement and reason. Bed locked, in lowest position, side rails up 2/4, call light within reach. Recliner Assessment  Patient is not able to demonstrated the ability to move from a reclining position to an upright position within the recliner. however patient is alert, oriented and able to provide informed consent       4 Eyes Skin Assessment     The patient is being assess for   Admission    I agree that 2 RN's have performed a thorough Head to Toe Skin Assessment on the patient. ALL assessment sites listed below have been assessed. Areas assessed for pressure by both nurses:   [x]   Head, Face, and Ears   [x]   Shoulders, Back, and Chest, Abdomen  [x]   Arms, Elbows, and Hands   [x]   Coccyx, Sacrum, and Ischium  [x]   Legs, Feet, and Heels    Redness to back folds, Scab to front of panis, scattered bruising, redness under panis in folds. Skin Assessed Under all Medical Devices by both nurses:  O2 device tubing              All Mepilex Borders were peeled back and area peeked at by both nurses:  No: n/a  Please list where Mepilex Borders are located:  n/a             **SHARE this note so that the co-signing nurse is able to place an eSignature**    Co-signer eSignature: Electronically signed by José Manuel Collins RN on 3/23/23 at 12:21 AM EDT    Does the Patient have Skin Breakdown related to pressure?   No     (Insert Photo here)         Vishnu Prevention initiated:  NA   Wound Care Orders initiated:  NA      WOC nurse consulted for Pressure Injury (Stage 3,4, Unstageable, DTI, NWPT, Complex wounds)and New or Established Ostomies:  NA      Primary Nurse eSignature: Electronically signed by Alpesh Henderson RN on 3/20/23 at 3:02 PM EDT

## 2023-03-20 NOTE — H&P
Take 15 mg by mouth daily     Historical Provider, MD   pregabalin (LYRICA) 300 MG capsule Take 300 mg by mouth 2 times daily. Historical Provider, MD   glipiZIDE (GLUCOTROL XL) 10 MG extended release tablet Take 10 mg by mouth daily    Historical Provider, MD   albuterol sulfate  (90 BASE) MCG/ACT inhaler Inhale 2 puffs into the lungs every 6 hours as needed for Wheezing    Historical Provider, MD   nystatin (MYCOSTATIN) 399974 UNIT/GM cream Apply topically nightly Apply under breasts at night    Historical Provider, MD   OXYGEN Inhale 4 L into the lungs    Historical Provider, MD   metFORMIN (GLUCOPHAGE) 1000 MG tablet Take 1,000 mg by mouth 2 times daily (with meals)     Historical Provider, MD   aspirin 81 MG EC tablet Take 1 tablet by mouth daily 2/9/16   Kostas Awan MD   ferrous sulfate (FE TABS) 325 (65 FE) MG EC tablet Take 1 tablet by mouth 3 times daily (with meals) 2/9/16   Kostas Awan MD   Insulin Syringes, Disposable, U-100 0.3 ML MISC 1 each by Does not apply route daily 2/9/16   oKstas Awan MD   Insulin Pen Needle 29G X 12.7MM MISC 1 each by Does not apply route daily 2/9/16   Kostas Awan MD   glucose monitoring kit (FREESTYLE) monitoring kit 1 kit by Does not apply route daily as needed 2/9/16   Kostas Awan MD   dicyclomine (BENTYL) 10 MG capsule Take 10 mg by mouth 3 times daily. Historical Provider, MD       Allergies:  Hydromorphone hcl, Ace inhibitors, Azithromycin, Dilaudid [hydromorphone hcl], Lisinopril, Morphine, and Penicillins    Social History:    TOBACCO:   reports that she quit smoking about 8 years ago. Her smoking use included cigarettes. She has a 80.00 pack-year smoking history. She has never used smokeless tobacco.  ETOH:   reports no history of alcohol use.       Family History:   Positive as follows:        Problem Relation Age of Onset    Brain Cancer Mother     Lung Cancer Mother     High Cholesterol Father     Heart Disease setting. XR KNEE LEFT (3 VIEWS)   Final Result   No acute osseous abnormality identified. Advanced arthropathy in the knee.              ASSESSMENT/PLAN:  #Fall at home   -most likely mechanical, but history is unclear  -XR bilateral knees, L hip and L shoulder obtained - all negative  -CT head negative  -check orthostatic vitals   -echo pending   -pain control, ice prn  -PT/OT as able    #Tremors  -reports upper and lower extremity tremors  -was seen by Dr. Omero Marin at [facility] on 3/16  -evidentially supposed to be started on medication, but has not picked up  -states tremors/shaking contributed to fall, happens often  -no focal deficits on exam    #Chest pain  -likely 2/2 dysphagia, see below   -repeat EKG shows no acute ischemia  -serial troponin's negative   -CXR shows basilar opacities, L>R  -denies s/sx infection, procal WNLs     #New onset dysphagia  -difficulty swallowing solids since arrival  -causing nausea and vomiting/regurgitation  -NPO for now   -SLP c/s  -GI c/s    #Lactic acidosis   -LA 2.4 --> 1.7  -resolved with IVF    #HypoMg  -replacement protocol    #Chronic diastolic CHF  -holding home lasix for now  -gentle IVF  -does not appear acutely decompensated     #HLD  -continue statin    #HTN  -no home anti-hypertensive's  -BP labile here  -monitor     #COPD  -stable, baseline O2 requirement of 4L NC  -continue home regimen    #T2DM   -SSI + lantus nightly  -carb control diet  -monitor glucose     #Morbid obesity   -BMI 64.43  -contributing to acute and chronic issues  -recommend weight loss    #Chronic pain   #Arthritis  -continue home regimen     #Anxiety and depression  -continue lexapro and hydroxyzine        DVT Prophylaxis: Lovenox   Diet: Diet NPO  Code Status: Full Code    Jovanni Ring PA-C  3/20/2023  1:32 PM

## 2023-03-20 NOTE — ACP (ADVANCE CARE PLANNING)
Advance Care Planning     General Advance Care Planning (ACP) Conversation    Date of Conversation: 3/20/2023  Conducted with: Patient with Decision Making Capacity    Healthcare Decision Maker:    Primary Decision Maker: Patrice Barragan - Spouse - [phone]  Click here to complete Healthcare Decision Makers including selection of the Healthcare Decision Maker Relationship (ie \"Primary\"). Today we documented Decision Maker(s) consistent with Legal Next of Kin hierarchy.     Content/Action Overview:  DECLINED ACP Conversation - will revisit periodically  Reviewed DNR/DNI and patient elects Full Code (Attempt Resuscitation)        Length of Voluntary ACP Conversation in minutes:  <16 minutes (Non-Billable)    Felipa Antoine RN

## 2023-03-20 NOTE — PROGRESS NOTES
strategies, and safe eating environment. Assessment    Medical record review/interview: Per MD H&P: \"The patient is a 61 y.o. female with PMHx HLD, HTN, diastolic CHF, COPD, O1OT, arthritis, anxiety, depression and severe obesity who presented to [facility] ED with complaint of fall. Patient reports shaking and limb tremors ongoing for some time, getting worse lately. States she falls often as a result. Similar event today. Was walking to chair and felt shaky, and fell down, landing on knees. Denies associated SOB, chest pain, lightheadedness or dizziness. No LOC or head injury. Now with left shoulder, left hip and bilateral knee pain prompting her ER visit.  at bedside reports she was seen by Dr. Deirdre Lopes at [facility] on 3/16 and supposed to be started on a medication for shaking and tremors, but hasn't gotten this filled. Also states patient has had intermittent confusion and slurred speech over the last few days. Today since being admitted, patient is having difficulty swallowing foods. States food gets caught and she regurgitates it back up. This contributed to an episode of chest discomfort, but once getting food up, chest discomfort resolved entirely. Patient denies fever, chills, constipation, diarrhea, abdominal pain, chest pain, cough, SOB or lower extremity edema. \"    Patient Complaints:  Odynophagia: [] Yes [x] No  Globus Sensation: [x] Yes [] No  SOB with PO intake: [] Yes [x] No  Increased WOB with PO intake: [] Yes [x] No  Reflux Sx's: [x] Yes [] No  Weight loss: [] Yes [x] No  Coughing/Choking with PO intake: [x] Yes [] No  Reduced Appetite: [] Yes [x] No  Trouble with Mastication:  [] Yes [x] No  Avoidance of Certain Foods:  [] Yes [x] No  Premature Satiety:  [] Yes [x] No  Recent PNA:  [] Yes [x] No  Recurring PNA:  [] Yes [x] No  Changes in vocal quality: [] Yes [x] No    Baseline Method of Oral Meds:  [x] Whole with liquid    [] Cut with liquid    [] Whole with puree    [] Cut in puree    [] Crushed will tolerate recommended diet with no clinical s/s of aspiration 5/5  Goal 2: The patient will recall/perform recommended compensatory strategies given min cues  Goal 3: The patient will tolerate instrumental assessment if globus sensation persists     Long Term Goals:   Timeframe for Long Term Goals: (7 days 03/27/2023)  Goal 1: The patient will tolerate least restrictive diet with no clinical s/s of aspiration or worsening respiratory/pulmonary status    Treatment:  Skilled instruction completed with patient re: evidenced based practice regarding recommendations and POC, importance of oral care to reduce adverse affects in the event of aspiration, and instruction of recommended compensatory strategies developed based upon clinical exam. Pt able to recall/demonstrate compensatory strategies with min cues. Pt Education: SLP educated the patient re: Role of SLP, rationale for completion of assessment, anatomical components of swallow structures as they pertain to airway protection, results of assessment, recommendations, POC, and rationale for MBS  Pt Education Response: verbalized understanding, demonstrated understanding, and RN aware    Duration/Frequency of Tx: 1-2x/wk    Individuals Consulted:   [x]  Patient     []  NP         [x]  RN   []  RD                   []  MD      []  Family Member                        []  PA    []  Other:      Safety Devices / Report:  [x]  All fall risk precautions in place [x]  Safety handoff completed with RN  [x]  Bed alarm in place  [x]  Left in bed     []  Chair alarm in place  []  Left in chair   [x]  Call light in reach   []  Other:                         Total Treatment Time / Charges       Time in Time out Total Time / units   Swallow Eval/Tx Time  1321 1340 19 min/ 1 unit      Signature:  Manisha VILLAGOMEZ-SLP  Clinical Fellow  [id number]

## 2023-03-20 NOTE — ED NOTES
Report given to [facility] PCU nurse. All questions answered.       Jevon Feliz RN  03/20/23 1786

## 2023-03-20 NOTE — CARE COORDINATION
Case Management Assessment  Initial Evaluation    Date/Time of Evaluation: 3/20/2023 3:40 PM  Assessment Completed by: Amanda Cifuentes RN    If patient is discharged prior to next notation, then this note serves as note for discharge by case management. Patient Name: Ernesto Henderson                   YOB: 1965  Diagnosis: Lactic acidosis [E87.20]  Lightheadedness [R42]  Unable to walk [R26.2]  Fall at home, initial encounter [W19. XXXA, D01.642]  Acute hip pain, left [M25.552]  Fall on same level from slipping, tripping or stumbling, initial encounter [W01. 0XXA]  Acute pain of left shoulder [M25.512]  Acute pain of both knees [M25.561, M25.562]  Chronic shortness of breath [R06.02]                   Date / Time: 3/20/2023  5:16 AM    Patient Admission Status: Inpatient   Readmission Risk (Low < 19, Mod (19-27), High > 27): Readmission Risk Score: 12.6    Current PCP: Hanna Carvalho PA-C  PCP verified by CM? Yes (Ar[place])    Chart Reviewed: No      History Provided by: Patient  Patient Orientation: Alert and Oriented    Patient Cognition: Alert    Hospitalization in the last 30 days (Readmission):  No    If yes, Readmission Assessment in CM Navigator will be completed.     Advance Directives:      Code Status: Full Code   Patient's Primary Decision Maker is: Patient Declined (Legal Next of Kin Remains as Decision Maker)    Primary Decision MakerAllyn Frame Spouse - [phone]    Discharge Planning:    Patient lives with: Spouse/Significant Other Type of Home: Trailer/Mobile Home  Primary Care Giver: Spouse  Patient Support Systems include: Spouse/Significant Other, Family Members   Current Financial resources: None  Current community resources: None  Current services prior to admission: Oxygen Therapy, Other (Comment) (Home O2 and bipap with O2 bleed in)            Current DME:              Type of Home Care services:  None    ADLS  Prior functional level: Assistance with the following:, Y92.009]  Acute hip pain, left [M25.552]  Fall on same level from slipping, tripping or stumbling, initial encounter [W01. 0XXA]  Acute pain of left shoulder [M25.512]  Acute pain of both knees [M25.561, M25.562]  Chronic shortness of breath [Y47.55]    IF APPLICABLE: The Patient and/or patient representative Brianne and her family were provided with a choice of provider and agrees with the discharge plan. Freedom of choice list with basic dialogue that supports the patient's individualized plan of care/goals and shares the quality data associated with the providers was provided to:     Patient Representative Name:       The Patient and/or Patient Representative Agree with the Discharge Plan?       Aleida Florentino RN  Case Management Department  Ph: [phone] Fax: [phone]

## 2023-03-20 NOTE — ED NOTES
0733-Perfect Serve sent to Dr. Edwards Mt by Dr. Mark Hernadez for consult.       Dawit Silva  03/20/23 5484 4503-Consult completed admission orders placed by Dr. Zachary Ramirez  03/20/23 4797

## 2023-03-20 NOTE — ED PROVIDER NOTES
diarrhea, nausea and vomiting. Genitourinary:  Negative for difficulty urinating, dysuria, flank pain and pelvic pain. Musculoskeletal:  Positive for arthralgias (bilateral knees, left hip, left shoulder only per patient) and gait problem (due to left leg pain). Negative for back pain, neck pain and neck stiffness. Skin:  Negative for wound. Neurological:  Positive for weakness (generalized) and light-headedness (felt lightheadedness before fall). Negative for dizziness, syncope, numbness and headaches. Psychiatric/Behavioral:  Negative for confusion. The patient is not nervous/anxious. Positives and Pertinent negatives as per HPI. PASTMEDICAL HISTORY     Past Medical History:   Diagnosis Date    Anxiety     Arthritis     CHF (congestive heart failure) (HCC)     COPD (chronic obstructive pulmonary disease) (HCC)     Depression     Diabetes mellitus (HCC)     Diverticulosis     Hyperlipidemia     Hypertension     Obesities, morbid ([facility] [address]          SURGICAL HISTORY       Past Surgical History:   Procedure Laterality Date     SECTION      x3    COLONOSCOPY      ENDOMETRIAL ABLATION      ENDOSCOPY, COLON, DIAGNOSTIC           CURRENT MEDICATIONS       Current Discharge Medication List        CONTINUE these medications which have NOT CHANGED    Details   esomeprazole (NEXIUM) 40 MG delayed release capsule Take 40 mg by mouth every morning (before breakfast)      furosemide (LASIX) 20 MG tablet Take 20 mg by mouth daily      insulin detemir (LEVEMIR FLEXPEN) 100 UNIT/ML injection pen Inject 60 Units into the skin nightly      insulin aspart (NOVOLOG FLEXPEN) 100 UNIT/ML injection pen Inject 48 Units into the skin 3 times daily (before meals)      oxyCODONE-acetaminophen (PERCOCET) 7.5-325 MG per tablet Take 1 tablet by mouth in the morning, at noon, in the evening, and at bedtime.       Dulaglutide (TRULICITY) 3 TZ/8.3QV SOPN Inject 4.5 mg into the skin once a week Takes on  or stumbling, initial encounter    8. Lactic acidosis          DISPOSITION/PLAN   DISPOSITION Admitted 03/20/2023 08:44:08 AM-       PATIENT REFERRED TO:  No follow-up provider specified.     DISCHARGEMEDICATIONS:  Current Discharge Medication List          DISCONTINUED MEDICATIONS:  Current Discharge Medication List        STOP taking these medications       predniSONE (DELTASONE) 10 MG tablet Comments:   Reason for Stopping:         TRULICITY 1.5 OI/2.4XA SOPN Comments:   Reason for Stopping:                      (Please note that portions of this note were completed with a voicerecognition program.  Efforts were made to edit the dictations but occasionally words are mis-transcribed.)    Radha Verma MD (electronically signed)            Radha Verma MD  03/20/23 2048

## 2023-03-20 NOTE — PROGRESS NOTES
minute or more, then use the equivalent nebulizer order(s) with same Frequency and PRN reasons based on the score. If a patient is on this medication at home then do not decrease Frequency below that used at home. 0-3 - enter or revise RT bronchodilator order(s) to equivalent RT Bronchodilator order with Frequency of every 4 hours PRN for wheezing or increased work of breathing using Per Protocol order mode. 4-6 - enter or revise RT Bronchodilator order(s) to two equivalent RT bronchodilator orders with one order with BID Frequency and one order with Frequency of every 4 hours PRN wheezing or increased work of breathing using Per Protocol order mode. 7-10 - enter or revise RT Bronchodilator order(s) to two equivalent RT bronchodilator orders with one order with TID Frequency and one order with Frequency of every 4 hours PRN wheezing or increased work of breathing using Per Protocol order mode. 11-13 - enter or revise RT Bronchodilator order(s) to one equivalent RT bronchodilator order with QID Frequency and an Albuterol order with Frequency of every 4 hours PRN wheezing or increased work of breathing using Per Protocol order mode. Greater than 13 - enter or revise RT Bronchodilator order(s) to one equivalent RT bronchodilator order with every 4 hours Frequency and an Albuterol order with Frequency of every 2 hours PRN wheezing or increased work of breathing using Per Protocol order mode.          Electronically signed by Marie Aparicio RCP on 3/20/2023 at 7:52 PM

## 2023-03-20 NOTE — ED NOTES
Attempted to ambulate patient per order, patient unable to get out of stretcher. Dr. Adkins Player aware. Patient assisted with repositioning.       Liza Porter RN  03/20/23 5330

## 2023-03-20 NOTE — DISCHARGE INSTRUCTIONS
that may impair breathing  Pace: Slow and steady pace, never rushing! Pursed lip breathing.   Pursed Lip Breathing: \"Smell the roses, blow out the candles\"

## 2023-03-20 NOTE — Clinical Note
Discharge Plan[de-identified] Other/[facility])   Telemetry/Cardiac Monitoring Required?: No

## 2023-03-20 NOTE — PLAN OF CARE
On 4 L O2 baseline  Pt with mechanical fall at home, light-headedness and knees gave out    Left shoulder and hip pain     Xrays negative     CT head pending       PCU  PT/OT     Gentle IVF     Orthostatics pending     Will defer to provider assessing patient if needs right hip CT      Karan Lesches, PA  03/20/23  8:28 AM

## 2023-03-20 NOTE — PLAN OF CARE
SLP completed evaluation. Please refer to notes in EMR.     Crystal VILLAGOMEZ-SLP  Clinical Fellow  [id number]

## 2023-03-20 NOTE — FLOWSHEET NOTE
03/20/23 1055   Vital Signs   Temp 98.8 °F (37.1 °C)   Temp Source Oral   Heart Rate 84   Heart Rate Source Monitor   Resp 18   BP (!) 122/58   MAP (Calculated) 79   BP Location Left lower arm   BP Method Automatic   Patient Position Semi fowlers   Level of Consciousness 0   MEWS Score 1   Pain Assessment   Pain Assessment 0-10   Pain Level 5   Pain Location Knee;Back   Pain Orientation Right;Left;Lower   Pain Descriptors Aching   Functional Pain Assessment Prevents or interferes some active activities and ADLs   Pain Type Acute pain   Pain Frequency Intermittent   Pain Onset On-going   Non-Pharmaceutical Pain Intervention(s) Rest;Repositioned   Oxygen Therapy   SpO2 96 %   O2 Device Nasal cannula   O2 Flow Rate (L/min) 4 L/min     Pt brought to floor in stable condition via stretcher.  at bedside. C/o pain to bilateral knees and lower back. Assisted to bedside commode via steady. Pt did well. Currently on 4L via nc which is her baseline. Call light and bedside table within reach. Will continue to monitor.

## 2023-03-20 NOTE — CONSULTS
Historical Provider, MD   albuterol sulfate  (90 BASE) MCG/ACT inhaler Inhale 2 puffs into the lungs every 6 hours as needed for Wheezing  Historical Provider, MD   nystatin (MYCOSTATIN) 709486 UNIT/GM cream Apply topically nightly Apply under breasts at night  Historical Provider, MD   OXYGEN Inhale 4 L into the lungs  Historical Provider, MD   metFORMIN (GLUCOPHAGE) 1000 MG tablet Take 1,000 mg by mouth 2 times daily (with meals)   Historical Provider, MD   aspirin 81 MG EC tablet Take 1 tablet by mouth daily  Abdulaziz Hollingsworth MD   ferrous sulfate (FE TABS) 325 (65 FE) MG EC tablet Take 1 tablet by mouth 3 times daily (with meals)  Abdulaziz Hollingsworth MD   Insulin Syringes, Disposable, U-100 0.3 ML MISC 1 each by Does not apply route daily  Abdulaziz Hollingsworth MD   Insulin Pen Needle 29G X 12.7MM MISC 1 each by Does not apply route daily  Abdulaziz Hollingsworth MD   glucose monitoring kit (FREESTYLE) monitoring kit 1 kit by Does not apply route daily as needed  Abdulaziz Hollingsworth MD   dicyclomine (BENTYL) 10 MG capsule Take 10 mg by mouth 3 times daily.   Historical Provider, MD        [facility] Medications:  Current Facility-Administered Medications: allopurinol (ZYLOPRIM) tablet 100 mg, 100 mg, Oral, Daily  aspirin EC tablet 81 mg, 81 mg, Oral, Daily  dicyclomine (BENTYL) capsule 10 mg, 10 mg, Oral, TID  escitalopram (LEXAPRO) tablet 10 mg, 10 mg, Oral, Daily  [START ON 3/21/2023] pantoprazole (PROTONIX) tablet 40 mg, 40 mg, Oral, QAM AC  ferrous sulfate (IRON 325) tablet 325 mg, 325 mg, Oral, TID   [Held by provider] furosemide (LASIX) tablet 10 mg, 10 mg, Oral, Daily  hydrOXYzine HCl (ATARAX) tablet 25 mg, 25 mg, Oral, TID  pregabalin (LYRICA) capsule 300 mg, 300 mg, Oral, BID  atorvastatin (LIPITOR) tablet 10 mg, 10 mg, Oral, Daily  sodium chloride flush 0.9 % injection 5-40 mL, 5-40 mL, IntraVENous, 2 times per day  sodium chloride flush 0.9 % injection 10 mL, 10 mL, IntraVENous, PRN  0.9 % sodium

## 2023-03-20 NOTE — PROGRESS NOTES
Pt c/o food getting caught in throat with swallowing. Started coughing and bringing phlegm up but no vomiting noted. ST consulted. Pt stopped eating. No resp distress noted. Will monitor.

## 2023-03-20 NOTE — PROGRESS NOTES
Strength:    BUE strength WFL, but not formally assessed w/ MMT    Upper Extremity Sensation:    WFL    Upper Extremity Proprioception:  WFL    Coordination and Tone  Impaired    Balance:  Sitting:    SBA  Standing:    CGA      Bed Mobility:   Supine to Sit:    SBA  Sit to Supine:   SBA  Rolling:   Not Tested  Scooting in sitting: SBA  Scooting in supine:  SBA    Transfers:    Sit to stand:    CGA  Stand to sit:    CGA  Bed to chair:     Not Tested  Bed/ chair to standard toilet:  Not Tested  Bed/chair to BSC:   CGA and With RW and gait belt  Functional Mobility:   Not Tested    See PT note for gait analysis. ADLs:  Dressing:      UE:   Not Tested  LE:    Max A     Bathing:    UE:  Not Tested  LE:  Not Tested    Eating:   Not Tested    Toileting: Max A for pericare    Grooming/hygiene: Not Tested    Activity Tolerance:   Pt completed therapy session with pain     BP (mmHg) HR (bpm) SpO2 (%) on 4L Comments   Supine at rest       Seated on BSC  81 96    Standing       End of session         Positioning Needs:   Pt in bed, alarm set, call light provided and all needs within reach . Ther Ex / Activities Initiated:   N/A    Patient/Family Education:   Pt educated on role of inpatient OT, plan of care, importance of continued activity, DC recommendations, safety awareness, transfer techniques, and calling for assist with mobility. Assessment:    Pt seen for Occupational therapy evaluation in acute care setting. Pt demonstrated decreased Activity tolerance, ADLs, IADLs, Balance , Bathing, Bed mobility, Dressing, ROM, Safety Awareness, Strength, Transfers, and Coping Skills. Pt functioning below baseline and will likely benefit from skilled occupational therapy services to maximize safety and independence. Recommending Home 24 hr assist and with home OT upon discharge as patient functioning below baseline level and would benefit from continued therapy services    Goal(s) :    To be met in 3 Visits:  Bed to toilet/BSC:       SBA and with use of RW  Pt will complete 3/3 CHF goals     N/A    To be met in 5 Visits:  Supine to/from Sit in preparation for ADL task: Independent  Toileting        SBA  Grooming       Independent  Upper Body Dressing:      Independent  Lower Body Dressing:      SBA  Pt to demonstrate UE therapeutic exs x 15 reps with minimal cues    Rehabilitation Potential: Good  Strengths for achieving goals include: Pt motivated, PLOF, and Pt cooperative   Barriers to achieving goals include:  Complexity of condition    Plan: To be seen 3-5 x/wk while in acute care setting for therapeutic exercises, bed mobility, transfers, family/patient education, ADL/IADL retraining, and energy conservation training.     Electronically signed by Kristi Noble OT on 3/20/2023 at 2:29 PM      If patient discharges from this facility prior to next visit, this note will serve as the Discharge Summary

## 2023-03-21 ENCOUNTER — ANESTHESIA (OUTPATIENT)
Dept: ENDOSCOPY | Age: 58
End: 2023-03-21
Payer: MEDICARE

## 2023-03-21 ENCOUNTER — ANESTHESIA EVENT (OUTPATIENT)
Dept: ENDOSCOPY | Age: 58
End: 2023-03-21
Payer: MEDICARE
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
## 2023-03-21 LAB
ANION GAP SERPL CALCULATED.3IONS-SCNC: 10 MMOL/L (ref 3–16)
BASOPHILS # BLD: 0 K/UL (ref 0–0.2)
BASOPHILS NFR BLD: 0.2 %
BUN SERPL-MCNC: 13 MG/DL (ref 7–20)
CALCIUM SERPL-MCNC: 9.1 MG/DL (ref 8.3–10.6)
CHLORIDE SERPL-SCNC: 97 MMOL/L (ref 99–110)
CO2 SERPL-SCNC: 30 MMOL/L (ref 21–32)
CREAT SERPL-MCNC: 0.7 MG/DL (ref 0.6–1.1)
DEPRECATED RDW RBC AUTO: 14.1 % (ref 12.4–15.4)
EOSINOPHIL # BLD: 0.1 K/UL (ref 0–0.6)
EOSINOPHIL NFR BLD: 2 %
EST. AVERAGE GLUCOSE BLD GHB EST-MCNC: 257.5 MG/DL
GFR SERPLBLD CREATININE-BSD FMLA CKD-EPI: >60 ML/MIN/{1.73_M2}
GLUCOSE BLD-MCNC: 265 MG/DL (ref 70–99)
GLUCOSE BLD-MCNC: 272 MG/DL (ref 70–99)
GLUCOSE BLD-MCNC: 310 MG/DL (ref 70–99)
GLUCOSE BLD-MCNC: 315 MG/DL (ref 70–99)
GLUCOSE SERPL-MCNC: 262 MG/DL (ref 70–99)
HBA1C MFR BLD: 10.6 %
HCT VFR BLD AUTO: 36.3 % (ref 36–48)
HGB BLD-MCNC: 11.6 G/DL (ref 12–16)
LV EF: 55 %
LVEF MODALITY: NORMAL
LYMPHOCYTES # BLD: 1.1 K/UL (ref 1–5.1)
LYMPHOCYTES NFR BLD: 17.3 %
MCH RBC QN AUTO: 27.3 PG (ref 26–34)
MCHC RBC AUTO-ENTMCNC: 32 G/DL (ref 31–36)
MCV RBC AUTO: 85.2 FL (ref 80–100)
MONOCYTES # BLD: 0.4 K/UL (ref 0–1.3)
MONOCYTES NFR BLD: 7.2 %
NEUTROPHILS # BLD: 4.5 K/UL (ref 1.7–7.7)
NEUTROPHILS NFR BLD: 73.3 %
PERFORMED ON: ABNORMAL
PLATELET # BLD AUTO: 119 K/UL (ref 135–450)
PMV BLD AUTO: 8.9 FL (ref 5–10.5)
POTASSIUM SERPL-SCNC: 4.9 MMOL/L (ref 3.5–5.1)
RBC # BLD AUTO: 4.26 M/UL (ref 4–5.2)
SODIUM SERPL-SCNC: 137 MMOL/L (ref 136–145)
WBC # BLD AUTO: 6.2 K/UL (ref 4–11)

## 2023-03-21 PROCEDURE — 6360000002 HC RX W HCPCS: Performed by: NURSE ANESTHETIST, CERTIFIED REGISTERED

## 2023-03-21 PROCEDURE — 94640 AIRWAY INHALATION TREATMENT: CPT

## 2023-03-21 PROCEDURE — 85025 COMPLETE CBC W/AUTO DIFF WBC: CPT

## 2023-03-21 PROCEDURE — 3700000000 HC ANESTHESIA ATTENDED CARE: Performed by: INTERNAL MEDICINE

## 2023-03-21 PROCEDURE — 3609012400 HC EGD TRANSORAL BIOPSY SINGLE/MULTIPLE: Performed by: INTERNAL MEDICINE

## 2023-03-21 PROCEDURE — 99232 SBSQ HOSP IP/OBS MODERATE 35: CPT | Performed by: INTERNAL MEDICINE

## 2023-03-21 PROCEDURE — 2709999900 HC NON-CHARGEABLE SUPPLY: Performed by: INTERNAL MEDICINE

## 2023-03-21 PROCEDURE — 6370000000 HC RX 637 (ALT 250 FOR IP)

## 2023-03-21 PROCEDURE — C9113 INJ PANTOPRAZOLE SODIUM, VIA: HCPCS | Performed by: REGISTERED NURSE

## 2023-03-21 PROCEDURE — 6360000002 HC RX W HCPCS: Performed by: INTERNAL MEDICINE

## 2023-03-21 PROCEDURE — 2580000003 HC RX 258: Performed by: NURSE ANESTHETIST, CERTIFIED REGISTERED

## 2023-03-21 PROCEDURE — 0DB68ZX EXCISION OF STOMACH, VIA NATURAL OR ARTIFICIAL OPENING ENDOSCOPIC, DIAGNOSTIC: ICD-10-PCS | Performed by: INTERNAL MEDICINE

## 2023-03-21 PROCEDURE — 3700000001 HC ADD 15 MINUTES (ANESTHESIA): Performed by: INTERNAL MEDICINE

## 2023-03-21 PROCEDURE — 2060000000 HC ICU INTERMEDIATE R&B

## 2023-03-21 PROCEDURE — 88341 IMHCHEM/IMCYTCHM EA ADD ANTB: CPT

## 2023-03-21 PROCEDURE — 6360000004 HC RX CONTRAST MEDICATION

## 2023-03-21 PROCEDURE — 80048 BASIC METABOLIC PNL TOTAL CA: CPT

## 2023-03-21 PROCEDURE — 88342 IMHCHEM/IMCYTCHM 1ST ANTB: CPT

## 2023-03-21 PROCEDURE — C8929 TTE W OR WO FOL WCON,DOPPLER: HCPCS

## 2023-03-21 PROCEDURE — 94761 N-INVAS EAR/PLS OXIMETRY MLT: CPT

## 2023-03-21 PROCEDURE — 6360000002 HC RX W HCPCS: Performed by: REGISTERED NURSE

## 2023-03-21 PROCEDURE — 0DB98ZX EXCISION OF DUODENUM, VIA NATURAL OR ARTIFICIAL OPENING ENDOSCOPIC, DIAGNOSTIC: ICD-10-PCS | Performed by: INTERNAL MEDICINE

## 2023-03-21 PROCEDURE — 88305 TISSUE EXAM BY PATHOLOGIST: CPT

## 2023-03-21 PROCEDURE — 97535 SELF CARE MNGMENT TRAINING: CPT

## 2023-03-21 PROCEDURE — 2580000003 HC RX 258

## 2023-03-21 PROCEDURE — 2500000003 HC RX 250 WO HCPCS: Performed by: NURSE ANESTHETIST, CERTIFIED REGISTERED

## 2023-03-21 PROCEDURE — 36415 COLL VENOUS BLD VENIPUNCTURE: CPT

## 2023-03-21 PROCEDURE — 97530 THERAPEUTIC ACTIVITIES: CPT

## 2023-03-21 PROCEDURE — 2700000000 HC OXYGEN THERAPY PER DAY

## 2023-03-21 PROCEDURE — 7100000010 HC PHASE II RECOVERY - FIRST 15 MIN: Performed by: INTERNAL MEDICINE

## 2023-03-21 PROCEDURE — 6360000002 HC RX W HCPCS

## 2023-03-21 PROCEDURE — 0DB58ZX EXCISION OF ESOPHAGUS, VIA NATURAL OR ARTIFICIAL OPENING ENDOSCOPIC, DIAGNOSTIC: ICD-10-PCS | Performed by: INTERNAL MEDICINE

## 2023-03-21 RX ORDER — MEPERIDINE HYDROCHLORIDE 25 MG/ML
12.5 INJECTION INTRAMUSCULAR; INTRAVENOUS; SUBCUTANEOUS EVERY 5 MIN PRN
Status: CANCELLED | OUTPATIENT
Start: 2023-03-21

## 2023-03-21 RX ORDER — LIDOCAINE HYDROCHLORIDE 20 MG/ML
INJECTION, SOLUTION INFILTRATION; PERINEURAL PRN
Status: DISCONTINUED | OUTPATIENT
Start: 2023-03-21 | End: 2023-03-21 | Stop reason: SDUPTHER

## 2023-03-21 RX ORDER — DIPHENHYDRAMINE HYDROCHLORIDE 50 MG/ML
12.5 INJECTION INTRAMUSCULAR; INTRAVENOUS
Status: CANCELLED | OUTPATIENT
Start: 2023-03-21 | End: 2023-03-22

## 2023-03-21 RX ORDER — ONDANSETRON 2 MG/ML
4 INJECTION INTRAMUSCULAR; INTRAVENOUS
Status: CANCELLED | OUTPATIENT
Start: 2023-03-21

## 2023-03-21 RX ORDER — SODIUM CHLORIDE 0.9 % (FLUSH) 0.9 %
5-40 SYRINGE (ML) INJECTION EVERY 12 HOURS SCHEDULED
Status: CANCELLED | OUTPATIENT
Start: 2023-03-21

## 2023-03-21 RX ORDER — OXYCODONE HYDROCHLORIDE 5 MG/1
5 TABLET ORAL PRN
Status: CANCELLED | OUTPATIENT
Start: 2023-03-21 | End: 2023-03-21

## 2023-03-21 RX ORDER — SODIUM CHLORIDE, SODIUM LACTATE, POTASSIUM CHLORIDE, CALCIUM CHLORIDE 600; 310; 30; 20 MG/100ML; MG/100ML; MG/100ML; MG/100ML
INJECTION, SOLUTION INTRAVENOUS CONTINUOUS PRN
Status: DISCONTINUED | OUTPATIENT
Start: 2023-03-21 | End: 2023-03-21 | Stop reason: SDUPTHER

## 2023-03-21 RX ORDER — SODIUM CHLORIDE 9 MG/ML
INJECTION, SOLUTION INTRAVENOUS PRN
Status: CANCELLED | OUTPATIENT
Start: 2023-03-21

## 2023-03-21 RX ORDER — SODIUM CHLORIDE 0.9 % (FLUSH) 0.9 %
5-40 SYRINGE (ML) INJECTION PRN
Status: CANCELLED | OUTPATIENT
Start: 2023-03-21

## 2023-03-21 RX ORDER — PROPOFOL 10 MG/ML
INJECTION, EMULSION INTRAVENOUS PRN
Status: DISCONTINUED | OUTPATIENT
Start: 2023-03-21 | End: 2023-03-21 | Stop reason: SDUPTHER

## 2023-03-21 RX ORDER — LABETALOL HYDROCHLORIDE 5 MG/ML
5 INJECTION, SOLUTION INTRAVENOUS EVERY 10 MIN PRN
Status: CANCELLED | OUTPATIENT
Start: 2023-03-21

## 2023-03-21 RX ORDER — OXYCODONE HYDROCHLORIDE 5 MG/1
10 TABLET ORAL PRN
Status: CANCELLED | OUTPATIENT
Start: 2023-03-21 | End: 2023-03-21

## 2023-03-21 RX ADMIN — TIOTROPIUM BROMIDE INHALATION SPRAY 2 PUFF: 3.12 SPRAY, METERED RESPIRATORY (INHALATION) at 07:50

## 2023-03-21 RX ADMIN — OXYCODONE HYDROCHLORIDE AND ACETAMINOPHEN 1 TABLET: 7.5; 325 TABLET ORAL at 17:25

## 2023-03-21 RX ADMIN — LIDOCAINE HYDROCHLORIDE 40 MG: 20 INJECTION, SOLUTION INFILTRATION; PERINEURAL at 13:13

## 2023-03-21 RX ADMIN — HYDROXYZINE HYDROCHLORIDE 25 MG: 50 TABLET ORAL at 20:39

## 2023-03-21 RX ADMIN — SODIUM CHLORIDE: 9 INJECTION, SOLUTION INTRAVENOUS at 03:21

## 2023-03-21 RX ADMIN — ENOXAPARIN SODIUM 40 MG: 100 INJECTION SUBCUTANEOUS at 20:39

## 2023-03-21 RX ADMIN — Medication 2 PUFF: at 07:50

## 2023-03-21 RX ADMIN — DICYCLOMINE HYDROCHLORIDE 10 MG: 10 CAPSULE ORAL at 14:30

## 2023-03-21 RX ADMIN — HYDROXYZINE HYDROCHLORIDE 25 MG: 50 TABLET ORAL at 14:30

## 2023-03-21 RX ADMIN — OXYCODONE HYDROCHLORIDE AND ACETAMINOPHEN 1 TABLET: 7.5; 325 TABLET ORAL at 14:30

## 2023-03-21 RX ADMIN — Medication 10 ML: at 10:37

## 2023-03-21 RX ADMIN — PREGABALIN 300 MG: 100 CAPSULE ORAL at 20:39

## 2023-03-21 RX ADMIN — PANTOPRAZOLE SODIUM 40 MG: 40 INJECTION, POWDER, FOR SOLUTION INTRAVENOUS at 20:39

## 2023-03-21 RX ADMIN — Medication 10 ML: at 20:36

## 2023-03-21 RX ADMIN — PROPOFOL 100 MG: 10 INJECTION, EMULSION INTRAVENOUS at 13:23

## 2023-03-21 RX ADMIN — SODIUM CHLORIDE, POTASSIUM CHLORIDE, SODIUM LACTATE AND CALCIUM CHLORIDE: 600; 310; 30; 20 INJECTION, SOLUTION INTRAVENOUS at 13:13

## 2023-03-21 RX ADMIN — INSULIN LISPRO 3 UNITS: 100 INJECTION, SOLUTION INTRAVENOUS; SUBCUTANEOUS at 12:09

## 2023-03-21 RX ADMIN — PROPOFOL 200 MG: 10 INJECTION, EMULSION INTRAVENOUS at 13:13

## 2023-03-21 RX ADMIN — INSULIN LISPRO 2 UNITS: 100 INJECTION, SOLUTION INTRAVENOUS; SUBCUTANEOUS at 17:27

## 2023-03-21 RX ADMIN — NYSTATIN: 100000 CREAM TOPICAL at 20:37

## 2023-03-21 RX ADMIN — TRAZODONE HYDROCHLORIDE 100 MG: 100 TABLET ORAL at 20:39

## 2023-03-21 RX ADMIN — PANTOPRAZOLE SODIUM 40 MG: 40 INJECTION, POWDER, FOR SOLUTION INTRAVENOUS at 11:19

## 2023-03-21 RX ADMIN — DICYCLOMINE HYDROCHLORIDE 10 MG: 10 CAPSULE ORAL at 20:38

## 2023-03-21 RX ADMIN — FERROUS SULFATE TAB 325 MG (65 MG ELEMENTAL FE) 325 MG: 325 (65 FE) TAB at 17:27

## 2023-03-21 RX ADMIN — OXYCODONE HYDROCHLORIDE AND ACETAMINOPHEN 1 TABLET: 7.5; 325 TABLET ORAL at 20:37

## 2023-03-21 RX ADMIN — INSULIN LISPRO 4 UNITS: 100 INJECTION, SOLUTION INTRAVENOUS; SUBCUTANEOUS at 20:40

## 2023-03-21 RX ADMIN — INSULIN GLARGINE 60 UNITS: 100 INJECTION, SOLUTION SUBCUTANEOUS at 20:39

## 2023-03-21 RX ADMIN — PERFLUTREN 1.3 ML: 6.52 INJECTION, SUSPENSION INTRAVENOUS at 10:37

## 2023-03-21 RX ADMIN — ONDANSETRON 4 MG: 2 INJECTION INTRAMUSCULAR; INTRAVENOUS at 05:29

## 2023-03-21 RX ADMIN — Medication 2 PUFF: at 20:16

## 2023-03-21 ASSESSMENT — PAIN DESCRIPTION - DESCRIPTORS
DESCRIPTORS: ACHING;DISCOMFORT
DESCRIPTORS: ACHING
DESCRIPTORS: ACHING
DESCRIPTORS: ACHING;DISCOMFORT

## 2023-03-21 ASSESSMENT — PAIN SCALES - GENERAL
PAINLEVEL_OUTOF10: 5
PAINLEVEL_OUTOF10: 6

## 2023-03-21 ASSESSMENT — COPD QUESTIONNAIRES: CAT_SEVERITY: SEVERE

## 2023-03-21 ASSESSMENT — PAIN DESCRIPTION - ORIENTATION
ORIENTATION: RIGHT;LEFT;LOWER
ORIENTATION: RIGHT;LEFT;LOWER
ORIENTATION: LOWER

## 2023-03-21 ASSESSMENT — PAIN - FUNCTIONAL ASSESSMENT
PAIN_FUNCTIONAL_ASSESSMENT: 0-10
PAIN_FUNCTIONAL_ASSESSMENT: PREVENTS OR INTERFERES SOME ACTIVE ACTIVITIES AND ADLS
PAIN_FUNCTIONAL_ASSESSMENT: ACTIVITIES ARE NOT PREVENTED
PAIN_FUNCTIONAL_ASSESSMENT: PREVENTS OR INTERFERES SOME ACTIVE ACTIVITIES AND ADLS

## 2023-03-21 ASSESSMENT — PAIN DESCRIPTION - LOCATION
LOCATION: BACK;KNEE
LOCATION: BACK;KNEE
LOCATION: BACK

## 2023-03-21 NOTE — PROGRESS NOTES
Hand off report given to  Odalys Johnson RN. Patient is stable showing no signs of distress and has no current needs at this time. Call light is in reach and bed is in lowest position. Care is transferred at this time.

## 2023-03-21 NOTE — H&P
[address] [facility] ENDOSCOPY  Procedure H&P    Patient: Miriam Jon MRN: [id number]     YOB: 1965  Age: 61 y.o. Sex: female    Unit: [facility] ENDOSCOPY Room/Bed: ENDO/NONE Location: [address]     Procedure: Procedure(s):  EGD BIOPSY    Indication:Dysphagia  Referring  Physician:        Brief history: Anemia    Nurses past medical history notes reviewed and agreed. Medications reviewed.     Allergies: Hydromorphone hcl, Ace inhibitors, Azithromycin, Dilaudid [hydromorphone hcl], Lisinopril, Morphine, and Penicillins     Allergies noted: Yes     Past Medical History:   Past Medical History:   Diagnosis Date    Anxiety     Arthritis     CHF (congestive heart failure) (HCC)     COPD (chronic obstructive pulmonary disease) (HCC)     Depression     Diabetes mellitus (HCC)     Diverticulosis     Hyperlipidemia     Hypertension     Obesities, morbid (HCC)        Past Surgical History:   Past Surgical History:   Procedure Laterality Date     SECTION      x3    COLONOSCOPY      ENDOMETRIAL ABLATION      ENDOSCOPY, COLON, DIAGNOSTIC         Social History:   Social History     Socioeconomic History    Marital status:      Spouse name: Not on file    Number of children: Not on file    Years of education: Not on file    Highest education level: Not on file   Occupational History    Not on file   Tobacco Use    Smoking status: Former     Packs/day: 2.00     Years: 40.00     Pack years: 80.00     Types: Cigarettes     Quit date: 2014     Years since quittin.2    Smokeless tobacco: Never   Vaping Use    Vaping Use: Never used   Substance and Sexual Activity    Alcohol use: No    Drug use: No    Sexual activity: Never   Other Topics Concern    Not on file   Social History Narrative    Not on file     Social Determinants of Health     Financial Resource Strain: Not on file   Food Insecurity: Not on file   Transportation Needs: Not on file   Physical Activity: Not on file   Stress: Not on mg/dL Final    Alkaline Phosphatase 03/20/2023 92  40 - 129 U/L Final    ALT 03/20/2023 28  10 - 40 U/L Final    AST 03/20/2023 26  15 - 37 U/L Final    Lactic Acid 03/20/2023 2.4 (A)  0.4 - 2.0 mmol/L Final    Ventricular Rate 03/20/2023 98  BPM Final    Atrial Rate 03/20/2023 98  BPM Final    P-R Interval 03/20/2023 192  ms Final    QRS Duration 03/20/2023 100  ms Final    Q-T Interval 03/20/2023 380  ms Final    QTc Calculation (Bazett) 03/20/2023 485  ms Final    P Axis 03/20/2023 -1  degrees Final    R Axis 03/20/2023 34  degrees Final    T Axis 03/20/2023 71  degrees Final    Diagnosis 03/20/2023 Normal sinus rhythmLow voltage QRSNonspecific ST abnormalityAbnormal ECGWhen compared with ECG of 11-OCT-2022 19:34,Premature ventricular complexes are no longer PresentConfirmed by Cristina Perez MD, MUKUND ([id number]) on 3/20/2023 7:35:36 AM   Final    Troponin 03/20/2023 <0.01  <0.01 ng/mL Final    Magnesium 03/20/2023 1.60 (A)  1.80 - 2.40 mg/dL Final    Lactic Acid 03/20/2023 1.7  0.4 - 2.0 mmol/L Final    Procalcitonin 03/20/2023 0.12  0.00 - 0.15 ng/mL Final    Hemoglobin A1C 03/20/2023 10.6  See comment % Final    eAG 03/20/2023 257.5  mg/dL Final    Troponin 03/20/2023 <0.01  <0.01 ng/mL Final    Troponin 03/20/2023 <0.01  <0.01 ng/mL Final    Ventricular Rate 03/20/2023 89  BPM Final    Atrial Rate 03/20/2023 89  BPM Final    P-R Interval 03/20/2023 232  ms Final    QRS Duration 03/20/2023 96  ms Final    Q-T Interval 03/20/2023 408  ms Final    QTc Calculation (Bazett) 03/20/2023 496  ms Final    P Axis 03/20/2023 114  degrees Final    R Axis 03/20/2023 -59  degrees Final    T [place] 03/20/2023 77  degrees Final    Diagnosis 03/20/2023 Sinus rhythm with 1st degree A-V blockLeft axis deviationLow voltage QRSSeptal infarct , age undeterminedAbnormal ECGWhen compared with ECG of 20-MAR-2023 05:45,No significant change was foundConfirmed by MUKUND Vallejo MD ([id number]) on 3/20/2023 6:37:56 PM   Final    POC Glucose 03/20/2023

## 2023-03-21 NOTE — PLAN OF CARE
Problem: ABCDS Injury Assessment  Goal: Absence of physical injury  Outcome: Progressing   Bed alarm in place. Patient uses call light appropriately.

## 2023-03-21 NOTE — ANESTHESIA POSTPROCEDURE EVALUATION
97 (L)              03/21/2023 05:00 AM        CO2                      30                  03/21/2023 05:00 AM        BUN                      13                  03/21/2023 05:00 AM        CREATININE               0.7                 03/21/2023 05:00 AM        GLUCOSE                  262 (H)             03/21/2023 05:00 AM   COAGS  Lab Results       Component                Value               Date/Time                  PROTIME                  11.1                10/06/2016 04:55 PM        INR                      0.97                10/06/2016 04:55 PM     Intake & Output:  @53ONQX@    Nausea & Vomiting:  No    Level of Consciousness:  Awake    Pain Assessment:  Adequate analgesia    Anesthesia Complications:  No apparent anesthetic complications    SUMMARY      Vital signs stable  OK to discharge from Stage I post anesthesia care.   Care transferred from Anesthesiology department on discharge from perioperative area

## 2023-03-21 NOTE — PLAN OF CARE
HEART FAILURE CARE PLAN:    Comorbidities Reviewed: Yes   Patient has a past medical history of Anxiety, Arthritis, CHF (congestive heart failure) ([facility] COPD (chronic obstructive pulmonary disease) ([facility] [facility] Depression, [facility] Diverticulosis, Hyperlipidemia, Hypertension, and Obesities, morbid ([facility] ECHOCARDIOGRAM Reviewed: Yes   Patient's Ejection Fraction (EF) is greater than 40% from 2016. New echo in AM    Weights Reviewed:  Yes   Admission weight: (!) 325 lb (147.4 kg)   Wt Readings from Last 3 Encounters:   03/20/23 (!) 341 lb (154.7 kg)   01/16/23 (!) 330 lb (149.7 kg)   10/12/22 (!) 319 lb 3.6 oz (144.8 kg)     Intake & Output Reviewed: Yes     Intake/Output Summary (Last 24 hours) at 3/20/2023 2339  Last data filed at 3/20/2023 1945  Gross per 24 hour   Intake --   Output 1050 ml   Net -1050 ml     Medications Reviewed: Yes   SCHEDULED HOSPITAL MEDICATIONS:   allopurinol  100 mg Oral Daily    aspirin  81 mg Oral Daily    dicyclomine  10 mg Oral TID    escitalopram  10 mg Oral Daily    ferrous sulfate  325 mg Oral TID WC    [Held by provider] furosemide  10 mg Oral Daily    hydrOXYzine HCl  25 mg Oral TID    pregabalin  300 mg Oral BID    atorvastatin  10 mg Oral Daily    sodium chloride flush  5-40 mL IntraVENous 2 times per day    insulin lispro  0-4 Units SubCUTAneous TID WC    insulin lispro  0-4 Units SubCUTAneous Nightly    budesonide-formoterol  2 puff Inhalation BID    tiotropium  2 puff Inhalation Daily    insulin glargine  60 Units SubCUTAneous Nightly    magnesium oxide  400 mg Oral Daily    oxyCODONE-acetaminophen  1 tablet Oral 4x daily    nystatin   Topical Nightly    traZODone  100 mg Oral Nightly    enoxaparin  40 mg SubCUTAneous BID    pantoprazole  40 mg IntraVENous BID     ACE/ARB/ARNI is REQUIRED for EF </= 73% SYSTOLIC FAILURE:   ACE[de-identified] None  ARB[de-identified] None  ARNI[de-identified] None    Evidenced-Based Beta Blocker is REQUIRED for EF </= 10% SYSTOLIC FAILURE:   [de-identified] None    Diuretics:  [de-identified] Furosemide on hold    Diet Reviewed: Yes   Diet NPO Exceptions are: Sips of Water with Meds  ADULT DIET; Full Liquid    Goal of Care Reviewed: Yes   Patient and/or Family's stated Goal of Care this Admission: be more comfortable prior to discharge.

## 2023-03-21 NOTE — ANESTHESIA PRE PROCEDURE
Component Value Date/Time    PROTIME 11.1 10/06/2016 04:55 PM    INR 0.97 10/06/2016 04:55 PM       HCG (If Applicable): No results found for: PREGTESTUR, PREGSERUM, HCG, HCGQUANT     ABGs:   Lab Results   Component Value Date/Time    PHART 7.365 10/12/2022 06:00 AM    PO2ART 85.6 10/12/2022 06:00 AM    KUK5MYK 56.3 10/12/2022 06:00 AM    AGF7LKU 31.5 10/12/2022 06:00 AM    BEART 4.7 10/12/2022 06:00 AM    D9WYZSCV 96.0 10/12/2022 06:00 AM        Type & Screen (If Applicable):  No results found for: LABABO, LABRH    Drug/Infectious Status (If Applicable):  No results found for: HIV, HEPCAB    COVID-19 Screening (If Applicable):   Lab Results   Component Value Date/Time    COVID19 NOT DETECTED 03/20/2023 05:48 AM           Anesthesia Evaluation  Patient summary reviewed no history of anesthetic complications:   Airway: Mallampati: II  TM distance: >3 FB   Neck ROM: full  Mouth opening: > = 3 FB   Dental:          Pulmonary:normal exam  breath sounds clear to auscultation  (+) COPD: severe,      (-) asthma and sleep apnea                           Cardiovascular:  Exercise tolerance: good (>4 METS),   (+) hypertension:, CHF:,     (-) CAD,  angina and  NORIEGA      Rhythm: regular  Rate: normal                    Neuro/Psych:   (+) psychiatric history:   (-) seizures and TIA           GI/Hepatic/Renal:   (+) morbid obesity     (-) GERD, liver disease and no renal disease       Endo/Other:    (+) Diabetes, . Abdominal:             Vascular: Other Findings:           Anesthesia Plan      MAC     ASA 4 - emergent     (I discussed with the patient the risks and benefits of PIV, anesthesia, IV Narcotics, PACU. All questions were answered the patient agrees with the plan and wishes to proceed)  Induction: intravenous.                             Silver Garrison MD   3/21/2023

## 2023-03-21 NOTE — FLOWSHEET NOTE
03/21/23 0054   Vital Signs   Temp 97.4 °F (36.3 °C)   Temp Source Oral   Heart Rate 87   Heart Rate Source Monitor   Resp 18   /74   MAP (Calculated) 93   BP Location Left lower arm   BP Method Automatic   Patient Position Semi fowlers   Level of Consciousness 0   MEWS Score 1   Oxygen Therapy   SpO2 94 %   O2 Device Nasal cannula   O2 Flow Rate (L/min) 4 L/min     Vitals taken, shown above. Patient is stable with no current needs at this time. Call light in reach. Bed in lowest position.

## 2023-03-21 NOTE — PROGRESS NOTES
Admit: 3/20/2023    Name[facility]  Dayami Polk  [facility]  MRN:    [id number]    Daily Progress Note for 3/21/2023   Admitted after a fall at home.   Interval History:     Scheduled Meds:   allopurinol  100 mg Oral Daily    aspirin  81 mg Oral Daily    dicyclomine  10 mg Oral TID    escitalopram  10 mg Oral Daily    ferrous sulfate  325 mg Oral TID WC    [Held by provider] furosemide  10 mg Oral Daily    hydrOXYzine HCl  25 mg Oral TID    pregabalin  300 mg Oral BID    atorvastatin  10 mg Oral Daily    sodium chloride flush  5-40 mL IntraVENous 2 times per day    insulin lispro  0-4 Units SubCUTAneous TID WC    insulin lispro  0-4 Units SubCUTAneous Nightly    budesonide-formoterol  2 puff Inhalation BID    tiotropium  2 puff Inhalation Daily    insulin glargine  60 Units SubCUTAneous Nightly    magnesium oxide  400 mg Oral Daily    oxyCODONE-acetaminophen  1 tablet Oral 4x daily    nystatin   Topical Nightly    traZODone  100 mg Oral Nightly    enoxaparin  40 mg SubCUTAneous BID    pantoprazole  40 mg IntraVENous BID       Continuous Infusions:   sodium chloride      sodium chloride Stopped (23 0932)    dextrose         PRN Meds:  sodium chloride flush, sodium chloride, potassium chloride **OR** potassium alternative oral replacement **OR** potassium chloride, magnesium sulfate, ondansetron **OR** ondansetron, polyethylene glycol, acetaminophen **OR** acetaminophen, glucose, dextrose bolus **OR** dextrose bolus, glucagon (rDNA), dextrose, albuterol sulfate HFA                  Objective:     Temp  Av.7 °F (36.5 °C)  Min: 97.1 °F (36.2 °C)  Max: 98.6 °F (37 °C)  Pulse  Av.1  Min: 75  Max: 105  BP  Min: 132/74  Max: 149/81  SpO2  Av.7 %  Min: 90 %  Max: 97 %  Patient Vitals for the past 4 hrs:   BP Temp Temp src Pulse Resp SpO2   23 0753 -- -- -- 100 18 90 %   23 0745 (!) 149/81 97.6 °F (36.4 °C) Oral (!) 105 17 93 %         Intake/Output Summary (Last 24 hours) at 3/21/2023

## 2023-03-21 NOTE — PROGRESS NOTES
confusion and slurred speech over the last few days. Today since being admitted, patient is having difficulty swallowing foods. States food gets caught and she regurgitates it back up. This contributed to an episode of chest discomfort, but once getting food up, chest discomfort resolved entirely. Patient denies fever, chills, constipation, diarrhea, abdominal pain, chest pain, cough, SOB or lower extremity edema. \"     Home Health S4 Level Recommendation:  Level 1 Standard if going home with 24 hour assist    AM-PAC Score: AM-PAC Inpatient Daily Activity Raw Score: 14     Subjective:  Patient lying supine in bed with spouse present. Pt agreeable to this OT session with encouragement. Cognition:    A&O x4   Able to follow 1 step commands  Limited by lethargy, dozing off during questions;  reports this is very unusual for patient. RN notified.  reports patient uses BIPAP at night but did not last night, RN aware. Pain:   Yes  Location: B knees, lower back  Ratin/10  Pain Medicine Status: RN notified    Preadmission Environment:   Pt. Lives with spouse who is on disability and is available . Home environment:    one story home  Steps to enter first floor:  3 steps to enter and hand rail bilateral  Steps to second floor: N/A  Pt sleeps in a recliner. Bathroom: tub/shower unit and standard height commode  Equipment owned: SW, wc (manual), SPC, home O2 (4L) continous, pulse ox, BIPAP and reacher     Preadmission Status:  Pt. Able to drive: No  Pt Fully independent with ADLs: No - pt bathes indep (sitting on edge of tub).  does setup assist for clothing and total assist to don socks and shoes. Pt. Required assistance from family for: Cleaning, Cooking, and Laundry   Pt. independent for transfers and gait and walked within the house (recliner<>bathroom only) with No Device; she uses w/c for community mobility.  provides light physical assist up/down stairs.   History of falls with decreased independence with daily tasks this date, expressing concern about going home as she and  both fear she will fall nearly immediately upon returning home. Pt functioning below baseline and will likely benefit from continued skilled occupational therapy services to maximize safety and independence. Recommending SNF upon discharge as patient functioning well below baseline, demonstrates good rehab potential and unable to return home due to inability to negotiate stairs to enter home/bedroom/bathroom, burden of care beyond caregiver ability, and home environment not conducive to patient recovery. Goal(s) : To be met in 3 Visits:  Bed to toilet/BSC:       SBA and with use of RW  Pt will complete 3/3 CHF goals     N/A    To be met in 5 Visits:  Supine to/from Sit in preparation for ADL task: Independent  Toileting        SBA  Grooming       Independent  Upper Body Dressing:      Independent  Lower Body Dressing:      SBA  Pt to demonstrate UE therapeutic exs x 15 reps with minimal cues    Rehabilitation Potential: Good  Strengths for achieving goals include: Pt motivated, PLOF, and Pt cooperative   Barriers to achieving goals include:  Complexity of condition    Plan: To be seen 3-5 x/wk while in acute care setting for therapeutic exercises, bed mobility, transfers, family/patient education, ADL/IADL retraining, and energy conservation training.     Electronically signed by Mushtaq Norwood OT on 3/21/2023 at 11:00 AM      If patient discharges from this facility prior to next visit, this note will serve as the Discharge Summary

## 2023-03-21 NOTE — FLOWSHEET NOTE
03/20/23 2030   Vital Signs   Temp 98.6 °F (37 °C)   Temp Source Oral   Heart Rate 77   Heart Rate Source Monitor   Resp 20   BP (!) 140/77   MAP (Calculated) 98   BP Location Left lower arm   BP Method Automatic   Patient Position Semi fowlers   Level of Consciousness 0   MEWS Score 1   Oxygen Therapy   SpO2 96 %   O2 Device Nasal cannula   O2 Flow Rate (L/min) 4 L/min     Shift assessment completed see flow sheet. Patient in bed alert and oriented X4. Patient on4L O2, showing no signs of distress. Evening medications given per order. Prn zofran given per mar for c/o nausea. Patient fsbs only 195 patient refuses night lantus due to her being npo and she said she typically drops with lantus. No other needs at this time. Call light in reach.

## 2023-03-21 NOTE — PROCEDURES
EGD PROCEDURE NOTE         Esophagogastroduodenoscopy Procedure Note     Patient: Ashley De Anda MRN: [id number]   YOB: 1965 Age: 61 y.o. Sex: female   Unit: [facility] ENDOSCOPY Room/Bed: ENDO/NONE Location: [address]    Admitting Physician: Kilo Clemente     Primary Care Physician: Anuj Hboson PA-C      DATE OF PROCEDURE: 3/21/2023  PROCEDURE: Esophagogastroduodenoscopy  INDICATION: This is a 61y.o. year old female who presents today dysphagia  ENDOSCOPIST: Spring Sims DO    POSTOPERATIVE DIAGNOSIS:   -Duodenitis with an area of loss of normal villous pattern. -Diffuse gastritis with polypoid type edematous changes  -Mid esophageal ulceration (pill induced esophagitis versus infectious). PLAN: Await results of biopsies  Continue with PPI for now. INFORMED CONSENT:  Informed consent for esophagogastoduodenoscopy was obtained. The benefits and risks including adverse medicine reaction have been explained. The patient's questions were answered and the patient agreed to proceed. ASA:  ASA 2 - Patient with mild systemic disease with no functional limitations    SEDATION: MAC     The patient's vital signs, cardiac status, pulmonary status, abdominal status and mental status were stable for the procedure. The patient's vitals signs and respiratory function as monitored by oxygen saturation were stable throughout    Procedure Details: The Olympus videoendoscope was inserted into the mouth and carefully passed into the esophagus, through the stomach and to the distal duodenum. Antegrade and retrograde examination of the upper gi tract was carefully performed. Findings: The esophagus had a 10 cm (35-25 cm) area of circumferential ulceration with white pseudomembrane. Biopsies were obtained with a cold biopsy forceps. The scope easily passed into the stomach.   The mucosa of the cardia, fundus, body and antrum had a rash like erythema throughout with areas in the gastric body along the greater curve which showed edema that was almost polypoid in appearance. Biopsies were obtained with cold biopsy forceps. .  The pylorus is patent and of normal contour. The scope easily advanced into the duodenal bulb and down to the distal duodenum. In the second portion of the duodenum there was a area proximately 6 x 7 mm with loss of villous projections this was biopsied with cold biopsy forceps.     Gastric or Duodenal ulcer present: No    Estimated Blood Loss: Minimal      Signed By: Fiona Fuller,

## 2023-03-22 LAB
ANION GAP SERPL CALCULATED.3IONS-SCNC: 7 MMOL/L (ref 3–16)
BASOPHILS # BLD: 0 K/UL (ref 0–0.2)
BASOPHILS NFR BLD: 0.3 %
BUN SERPL-MCNC: 14 MG/DL (ref 7–20)
CALCIUM SERPL-MCNC: 9.5 MG/DL (ref 8.3–10.6)
CHLORIDE SERPL-SCNC: 94 MMOL/L (ref 99–110)
CO2 SERPL-SCNC: 33 MMOL/L (ref 21–32)
CREAT SERPL-MCNC: 0.7 MG/DL (ref 0.6–1.1)
DEPRECATED RDW RBC AUTO: 13.9 % (ref 12.4–15.4)
EOSINOPHIL # BLD: 0.1 K/UL (ref 0–0.6)
EOSINOPHIL NFR BLD: 1.6 %
GFR SERPLBLD CREATININE-BSD FMLA CKD-EPI: >60 ML/MIN/{1.73_M2}
GLUCOSE BLD-MCNC: 281 MG/DL (ref 70–99)
GLUCOSE BLD-MCNC: 288 MG/DL (ref 70–99)
GLUCOSE BLD-MCNC: 298 MG/DL (ref 70–99)
GLUCOSE BLD-MCNC: 306 MG/DL (ref 70–99)
GLUCOSE BLD-MCNC: 346 MG/DL (ref 70–99)
GLUCOSE SERPL-MCNC: 291 MG/DL (ref 70–99)
HCT VFR BLD AUTO: 34.1 % (ref 36–48)
HGB BLD-MCNC: 11.2 G/DL (ref 12–16)
LYMPHOCYTES # BLD: 0.9 K/UL (ref 1–5.1)
LYMPHOCYTES NFR BLD: 12 %
MCH RBC QN AUTO: 27.7 PG (ref 26–34)
MCHC RBC AUTO-ENTMCNC: 32.8 G/DL (ref 31–36)
MCV RBC AUTO: 84.4 FL (ref 80–100)
MONOCYTES # BLD: 0.5 K/UL (ref 0–1.3)
MONOCYTES NFR BLD: 6.6 %
NEUTROPHILS # BLD: 6 K/UL (ref 1.7–7.7)
NEUTROPHILS NFR BLD: 79.5 %
PERFORMED ON: ABNORMAL
PLATELET # BLD AUTO: 121 K/UL (ref 135–450)
PMV BLD AUTO: 9.1 FL (ref 5–10.5)
POTASSIUM SERPL-SCNC: 4.8 MMOL/L (ref 3.5–5.1)
RBC # BLD AUTO: 4.04 M/UL (ref 4–5.2)
SODIUM SERPL-SCNC: 134 MMOL/L (ref 136–145)
WBC # BLD AUTO: 7.6 K/UL (ref 4–11)

## 2023-03-22 PROCEDURE — 85025 COMPLETE CBC W/AUTO DIFF WBC: CPT

## 2023-03-22 PROCEDURE — 94640 AIRWAY INHALATION TREATMENT: CPT

## 2023-03-22 PROCEDURE — 2580000003 HC RX 258

## 2023-03-22 PROCEDURE — C9113 INJ PANTOPRAZOLE SODIUM, VIA: HCPCS | Performed by: REGISTERED NURSE

## 2023-03-22 PROCEDURE — 99232 SBSQ HOSP IP/OBS MODERATE 35: CPT | Performed by: INTERNAL MEDICINE

## 2023-03-22 PROCEDURE — 6370000000 HC RX 637 (ALT 250 FOR IP): Performed by: INTERNAL MEDICINE

## 2023-03-22 PROCEDURE — 36415 COLL VENOUS BLD VENIPUNCTURE: CPT

## 2023-03-22 PROCEDURE — 6360000002 HC RX W HCPCS: Performed by: INTERNAL MEDICINE

## 2023-03-22 PROCEDURE — 2700000000 HC OXYGEN THERAPY PER DAY

## 2023-03-22 PROCEDURE — 6360000002 HC RX W HCPCS: Performed by: REGISTERED NURSE

## 2023-03-22 PROCEDURE — 6370000000 HC RX 637 (ALT 250 FOR IP)

## 2023-03-22 PROCEDURE — 80048 BASIC METABOLIC PNL TOTAL CA: CPT

## 2023-03-22 PROCEDURE — 6360000002 HC RX W HCPCS

## 2023-03-22 PROCEDURE — 94761 N-INVAS EAR/PLS OXIMETRY MLT: CPT

## 2023-03-22 PROCEDURE — 2060000000 HC ICU INTERMEDIATE R&B

## 2023-03-22 RX ORDER — PRIMIDONE 50 MG/1
50 TABLET ORAL NIGHTLY
Status: DISCONTINUED | OUTPATIENT
Start: 2023-03-22 | End: 2023-03-24 | Stop reason: HOSPADM

## 2023-03-22 RX ADMIN — HYDROXYZINE HYDROCHLORIDE 25 MG: 50 TABLET ORAL at 09:39

## 2023-03-22 RX ADMIN — NYSTATIN: 100000 CREAM TOPICAL at 21:49

## 2023-03-22 RX ADMIN — FERROUS SULFATE TAB 325 MG (65 MG ELEMENTAL FE) 325 MG: 325 (65 FE) TAB at 18:58

## 2023-03-22 RX ADMIN — ENOXAPARIN SODIUM 40 MG: 100 INJECTION SUBCUTANEOUS at 21:50

## 2023-03-22 RX ADMIN — ONDANSETRON 4 MG: 4 TABLET, ORALLY DISINTEGRATING ORAL at 21:17

## 2023-03-22 RX ADMIN — DICYCLOMINE HYDROCHLORIDE 10 MG: 10 CAPSULE ORAL at 09:40

## 2023-03-22 RX ADMIN — PREGABALIN 300 MG: 100 CAPSULE ORAL at 21:50

## 2023-03-22 RX ADMIN — OXYCODONE HYDROCHLORIDE AND ACETAMINOPHEN 1 TABLET: 7.5; 325 TABLET ORAL at 09:39

## 2023-03-22 RX ADMIN — OXYCODONE HYDROCHLORIDE AND ACETAMINOPHEN 1 TABLET: 7.5; 325 TABLET ORAL at 21:51

## 2023-03-22 RX ADMIN — INSULIN LISPRO 3 UNITS: 100 INJECTION, SOLUTION INTRAVENOUS; SUBCUTANEOUS at 11:37

## 2023-03-22 RX ADMIN — PREGABALIN 300 MG: 100 CAPSULE ORAL at 09:36

## 2023-03-22 RX ADMIN — DICYCLOMINE HYDROCHLORIDE 10 MG: 10 CAPSULE ORAL at 21:51

## 2023-03-22 RX ADMIN — FERROUS SULFATE TAB 325 MG (65 MG ELEMENTAL FE) 325 MG: 325 (65 FE) TAB at 11:37

## 2023-03-22 RX ADMIN — Medication 400 MG: at 09:41

## 2023-03-22 RX ADMIN — TIOTROPIUM BROMIDE INHALATION SPRAY 2 PUFF: 3.12 SPRAY, METERED RESPIRATORY (INHALATION) at 07:01

## 2023-03-22 RX ADMIN — HYDROXYZINE HYDROCHLORIDE 25 MG: 50 TABLET ORAL at 13:56

## 2023-03-22 RX ADMIN — Medication 5 ML: at 09:42

## 2023-03-22 RX ADMIN — HYDROXYZINE HYDROCHLORIDE 25 MG: 50 TABLET ORAL at 21:51

## 2023-03-22 RX ADMIN — Medication 10 ML: at 21:54

## 2023-03-22 RX ADMIN — ENOXAPARIN SODIUM 40 MG: 100 INJECTION SUBCUTANEOUS at 09:40

## 2023-03-22 RX ADMIN — FERROUS SULFATE TAB 325 MG (65 MG ELEMENTAL FE) 325 MG: 325 (65 FE) TAB at 08:08

## 2023-03-22 RX ADMIN — PANTOPRAZOLE SODIUM 40 MG: 40 INJECTION, POWDER, FOR SOLUTION INTRAVENOUS at 09:40

## 2023-03-22 RX ADMIN — DICYCLOMINE HYDROCHLORIDE 10 MG: 10 CAPSULE ORAL at 13:56

## 2023-03-22 RX ADMIN — PANTOPRAZOLE SODIUM 40 MG: 40 INJECTION, POWDER, FOR SOLUTION INTRAVENOUS at 21:50

## 2023-03-22 RX ADMIN — ESCITALOPRAM OXALATE 10 MG: 10 TABLET ORAL at 09:38

## 2023-03-22 RX ADMIN — ASPIRIN 81 MG: 81 TABLET, COATED ORAL at 09:38

## 2023-03-22 RX ADMIN — TRAZODONE HYDROCHLORIDE 100 MG: 100 TABLET ORAL at 21:51

## 2023-03-22 RX ADMIN — ATORVASTATIN CALCIUM 10 MG: 10 TABLET, FILM COATED ORAL at 09:39

## 2023-03-22 RX ADMIN — INSULIN LISPRO 2 UNITS: 100 INJECTION, SOLUTION INTRAVENOUS; SUBCUTANEOUS at 08:07

## 2023-03-22 RX ADMIN — Medication 2 PUFF: at 19:48

## 2023-03-22 RX ADMIN — Medication 2 PUFF: at 07:01

## 2023-03-22 RX ADMIN — ALLOPURINOL 100 MG: 100 TABLET ORAL at 09:39

## 2023-03-22 RX ADMIN — ONDANSETRON 4 MG: 2 INJECTION INTRAMUSCULAR; INTRAVENOUS at 09:34

## 2023-03-22 RX ADMIN — PRIMIDONE 50 MG: 50 TABLET ORAL at 21:18

## 2023-03-22 RX ADMIN — INSULIN LISPRO 3 UNITS: 100 INJECTION, SOLUTION INTRAVENOUS; SUBCUTANEOUS at 17:30

## 2023-03-22 RX ADMIN — OXYCODONE HYDROCHLORIDE AND ACETAMINOPHEN 1 TABLET: 7.5; 325 TABLET ORAL at 13:56

## 2023-03-22 RX ADMIN — INSULIN GLARGINE 60 UNITS: 100 INJECTION, SOLUTION SUBCUTANEOUS at 21:52

## 2023-03-22 ASSESSMENT — PAIN DESCRIPTION - DESCRIPTORS: DESCRIPTORS: ACHING;DISCOMFORT

## 2023-03-22 ASSESSMENT — PAIN - FUNCTIONAL ASSESSMENT: PAIN_FUNCTIONAL_ASSESSMENT: ACTIVITIES ARE NOT PREVENTED

## 2023-03-22 ASSESSMENT — PAIN DESCRIPTION - LOCATION: LOCATION: BACK;KNEE

## 2023-03-22 ASSESSMENT — PAIN SCALES - GENERAL
PAINLEVEL_OUTOF10: 0
PAINLEVEL_OUTOF10: 9

## 2023-03-22 ASSESSMENT — PAIN DESCRIPTION - ORIENTATION: ORIENTATION: RIGHT;LEFT;LOWER

## 2023-03-22 NOTE — PROGRESS NOTES
Pt sitting up in bed this morning awake watching television. Pt A&O x 4, VSS. Shift assessment complete and patient denied any pain anywhere. Pt currently on 4L of Oxygen nasal canula. Pt repositioned in bed. All belongings in reach. Bed in lowest position, call light in hand. Student to pass medications today with instructor. All patient needs addressed.

## 2023-03-22 NOTE — PROGRESS NOTES
Patient wanted to speak with nurse. Pt concerned about her leaving the hospital too soon, and not knowing why she has tremors. Patient is asking nurse if she has Parkinson's. Pt also stated she was supposed to start a medication from her neurologist and patient didn't know the name of the medication, and no order has been placed. Nurse assisted patient with calling neuro office and requesting a page/message to the neurologist to determine the name of the medication needed. Pt satisfied. Student nurse still passing patients medications with instructor. Pt denied any further needs.

## 2023-03-22 NOTE — PLAN OF CARE
HEART FAILURE CARE PLAN:    Comorbidities Reviewed: Yes   Patient has a past medical history of Anxiety, Arthritis, CHF (congestive heart failure) ([facility] [address] [facility] Depression, Diabetes mellitus ([facility] Diverticulosis, Hyperlipidemia, Hypertension, and Obesities, morbid ([facility] ECHOCARDIOGRAM Reviewed: Yes   Patient's Ejection Fraction (EF) is greater than 40%    Weights Reviewed:  Yes   Admission weight: (!) 325 lb (147.4 kg)   Wt Readings from Last 3 Encounters:   03/21/23 (!) 341 lb 6.4 oz (154.9 kg)   01/16/23 (!) 330 lb (149.7 kg)   10/12/22 (!) 319 lb 3.6 oz (144.8 kg)     Intake & Output Reviewed: Yes     Intake/Output Summary (Last 24 hours) at 3/21/2023 2217  Last data filed at 3/21/2023 1806  Gross per 24 hour   Intake 120 ml   Output 1475 ml   Net -1355 ml     Medications Reviewed: Yes   SCHEDULED HOSPITAL MEDICATIONS:   allopurinol  100 mg Oral Daily    aspirin  81 mg Oral Daily    dicyclomine  10 mg Oral TID    escitalopram  10 mg Oral Daily    ferrous sulfate  325 mg Oral TID WC    [Held by provider] furosemide  10 mg Oral Daily    hydrOXYzine HCl  25 mg Oral TID    pregabalin  300 mg Oral BID    atorvastatin  10 mg Oral Daily    sodium chloride flush  5-40 mL IntraVENous 2 times per day    insulin lispro  0-4 Units SubCUTAneous TID WC    insulin lispro  0-4 Units SubCUTAneous Nightly    budesonide-formoterol  2 puff Inhalation BID    tiotropium  2 puff Inhalation Daily    insulin glargine  60 Units SubCUTAneous Nightly    magnesium oxide  400 mg Oral Daily    oxyCODONE-acetaminophen  1 tablet Oral 4x daily    nystatin   Topical Nightly    traZODone  100 mg Oral Nightly    enoxaparin  40 mg SubCUTAneous BID    pantoprazole  40 mg IntraVENous BID     ACE/ARB/ARNI is REQUIRED for EF </= 75% SYSTOLIC FAILURE:   ACE[de-identified] None  ARB[de-identified] None  ARNI[de-identified] None    Evidenced-Based Beta Blocker is REQUIRED for EF </= 78% SYSTOLIC FAILURE:   [de-identified] None    Diuretics:  [de-identified] Furosemide    Diet Reviewed: Yes   ADULT DIET; Dysphagia - Soft and Bite Sized; 4 carb choices (60 gm/meal)    Goal of Care Reviewed: Yes   Patient and/or Family's stated Goal of Care this Admission: reduce shortness of breath, increase activity tolerance, better understand heart failure and disease management, be more comfortable, reduce lower extremity edema, and unable to assess, will attempt again prior to discharge.

## 2023-03-22 NOTE — PROGRESS NOTES
Sending Perfect Serve to Dr. Cristina Haque. \"Pts  called nurse, medication that patient was going to be started on from her neurologist, Dr. Sabra Montes, is Primidone 50 mg nightly for 2 weeks, and then advance to 100 mg nightly if tolerated (for tremors). Patient would like this message shared with hospitalist and would like to start medication if possible. Medication was not ordered by neurologist because she left for a [place] of state, and just responded to patients message today so patient was unable to pick it up because it was never ordered. Patients  states that the NP working in the office today was going to send a prescription to their pharmacy. Also, patient would like to speak to hospitalist  to rule out possibility of Parkinson's.  stated they have been on Internet, and they believe she has a lot of symptoms and they're concerned and would like advised. \"      From PETRONA Gutierrez's Note today - plan of care  \"Tremors  -reports upper and lower extremity tremors  -was seen by Dr. Sabra Montes at [facility] on 3/16  -evidentially supposed to be started on medication, but has not picked up  -states tremors/shaking contributed to fall, happens often  -no focal deficits on exam.\"

## 2023-03-22 NOTE — PLAN OF CARE
HEART FAILURE CARE PLAN:    Comorbidities Reviewed: Yes   Patient has a past medical history of Anxiety, Arthritis, CHF (congestive heart failure) ([facility] [address] COPD (chronic obstructive pulmonary disease) ([facility] [address] Depression, Diabetes mellitus ([facility] Diverticulosis, Hyperlipidemia, Hypertension, and Obesities, morbid ([facility] ECHOCARDIOGRAM Reviewed: Yes   Patient's Ejection Fraction (EF) is greater than 40%    Weights Reviewed:  Yes   Admission weight: (!) 325 lb (147.4 kg)   Wt Readings from Last 3 Encounters:   03/22/23 (!) 337 lb 6 oz (153 kg)   01/16/23 (!) 330 lb (149.7 kg)   10/12/22 (!) 319 lb 3.6 oz (144.8 kg)     Intake & Output Reviewed: Yes     Intake/Output Summary (Last 24 hours) at 3/22/2023 1834  Last data filed at 3/22/2023 1723  Gross per 24 hour   Intake 1200 ml   Output 1450 ml   Net -250 ml     Medications Reviewed: Yes   SCHEDULED HOSPITAL MEDICATIONS:   primidone  50 mg Oral Nightly    allopurinol  100 mg Oral Daily    aspirin  81 mg Oral Daily    dicyclomine  10 mg Oral TID    escitalopram  10 mg Oral Daily    ferrous sulfate  325 mg Oral TID WC    [Held by provider] furosemide  10 mg Oral Daily    hydrOXYzine HCl  25 mg Oral TID    pregabalin  300 mg Oral BID    atorvastatin  10 mg Oral Daily    sodium chloride flush  5-40 mL IntraVENous 2 times per day    insulin lispro  0-4 Units SubCUTAneous TID WC    insulin lispro  0-4 Units SubCUTAneous Nightly    budesonide-formoterol  2 puff Inhalation BID    tiotropium  2 puff Inhalation Daily    insulin glargine  60 Units SubCUTAneous Nightly    magnesium oxide  400 mg Oral Daily    oxyCODONE-acetaminophen  1 tablet Oral 4x daily    nystatin   Topical Nightly    traZODone  100 mg Oral Nightly    enoxaparin  40 mg SubCUTAneous BID    pantoprazole  40 mg IntraVENous BID     ACE/ARB/ARNI is REQUIRED for EF </= 69% SYSTOLIC FAILURE:   ACE[de-identified] None  ARB[de-identified] N/A  ARNI[de-identified] N/A    Evidenced-Based Beta Blocker is REQUIRED for EF </= 53% SYSTOLIC FAILURE:   [de-identified] None    Diuretics:  [de-identified] Furosemide    Diet Reviewed: Yes   ADULT DIET; Dysphagia - Soft and Bite Sized; 4 carb choices (60 gm/meal)    Goal of Care Reviewed: Yes   Patient and/or Family's stated Goal of Care this Admission: reduce shortness of breath, increase activity tolerance, better understand heart failure and disease management, be more comfortable, reduce lower extremity edema, and unable to assess, will attempt again prior to discharge.   ppp

## 2023-03-22 NOTE — CARE COORDINATION
CM spoke with pt's  over telephone to request third option for skilled nursing facility as [facility] and [facility] do not have beds available.  requested referral to [facility]. CM left voicemail for Romain Andrade at [facility] to see if facility has open beds. Awaiting call back.      Elvira Ty MSW student

## 2023-03-22 NOTE — PLAN OF CARE
Problem: Discharge Planning  Goal: Discharge to home or other facility with appropriate resources  3/21/2023 2213 by Leonides Varner RN  Outcome: Progressing  3/21/2023 1638 by Nicole Weaver RN  Outcome: Progressing     Problem: Pain  Goal: Verbalizes/displays adequate comfort level or baseline comfort level  3/21/2023 2213 by Leonides Varner RN  Outcome: Progressing  3/21/2023 1638 by Nicole Weaver RN  Outcome: Progressing     Problem: Safety - Adult  Goal: Free from fall injury  3/21/2023 2213 by Leonides Varner RN  Outcome: Progressing  3/21/2023 1638 by Nicole Weaver RN  Outcome: Progressing     Problem: ABCDS Injury Assessment  Goal: Absence of physical injury  3/21/2023 2213 by Leonides Varner RN  Outcome: Progressing  3/21/2023 1638 by Nicole Weaver RN  Outcome: Progressing     Problem: Chronic Conditions and Co-morbidities  Goal: Patient's chronic conditions and co-morbidity symptoms are monitored and maintained or improved  3/21/2023 2213 by Leonides Varner RN  Outcome: Progressing  3/21/2023 1638 by Nicole Weaver RN  Outcome: Progressing     Problem: Cardiovascular - Adult  Goal: Maintains optimal cardiac output and hemodynamic stability  3/21/2023 2213 by Leonides Varner RN  Outcome: Progressing  3/21/2023 1638 by Nicole Weaver RN  Outcome: Progressing  Goal: Absence of cardiac dysrhythmias or at baseline  3/21/2023 2213 by Leonides Varner RN  Outcome: Progressing  3/21/2023 1638 by Nicole Weaver RN  Outcome: Progressing

## 2023-03-22 NOTE — PROGRESS NOTES
Admit: 3/20/2023    Name:  Gabriel Hernandez  Room:  [facility]-  MRN:    [id number]    Daily Progress Note for 3/22/2023   Admitted after a fall at home.   Interval History:   C/o nausea  Scheduled Meds:   allopurinol  100 mg Oral Daily    aspirin  81 mg Oral Daily    dicyclomine  10 mg Oral TID    escitalopram  10 mg Oral Daily    ferrous sulfate  325 mg Oral TID WC    [Held by provider] furosemide  10 mg Oral Daily    hydrOXYzine HCl  25 mg Oral TID    pregabalin  300 mg Oral BID    atorvastatin  10 mg Oral Daily    sodium chloride flush  5-40 mL IntraVENous 2 times per day    insulin lispro  0-4 Units SubCUTAneous TID WC    insulin lispro  0-4 Units SubCUTAneous Nightly    budesonide-formoterol  2 puff Inhalation BID    tiotropium  2 puff Inhalation Daily    insulin glargine  60 Units SubCUTAneous Nightly    magnesium oxide  400 mg Oral Daily    oxyCODONE-acetaminophen  1 tablet Oral 4x daily    nystatin   Topical Nightly    traZODone  100 mg Oral Nightly    enoxaparin  40 mg SubCUTAneous BID    pantoprazole  40 mg IntraVENous BID       Continuous Infusions:   sodium chloride      sodium chloride Stopped (23 0932)    dextrose         PRN Meds:  sodium chloride flush, sodium chloride, potassium chloride **OR** potassium alternative oral replacement **OR** potassium chloride, magnesium sulfate, ondansetron **OR** ondansetron, polyethylene glycol, acetaminophen **OR** acetaminophen, glucose, dextrose bolus **OR** dextrose bolus, glucagon (rDNA), dextrose, albuterol sulfate HFA                  Objective:     Temp  Av.3 °F (36.8 °C)  Min: 97.6 °F (36.4 °C)  Max: 99.2 °F (37.3 °C)  Pulse  Av.6  Min: 72  Max: 95  BP  Min: 99/86  Max: 149/75  SpO2  Av.8 %  Min: 90 %  Max: 96 %  Patient Vitals for the past 4 hrs:   BP Temp Temp src Pulse Resp SpO2   23 0712 (!) 149/75 98.4 °F (36.9 °C) Oral 92 20 94 %           Intake/Output Summary (Last 24 hours) at 3/22/2023 4471  Last data filed at 3/22/2023

## 2023-03-22 NOTE — CARE COORDINATION
PT, OT. CM made md aware.      Home O2 in place on admit: No  Pt informed of need to bring portable home O2 tank on day of discharge for nursing to connect prior to leaving:  Not Indicated  Verbalized agreement/Understanding:  Not Indicated

## 2023-03-22 NOTE — FLOWSHEET NOTE
03/21/23 2009   Vitals   Temp 97.9 °F (36.6 °C)   Temp Source Oral   Heart Rate 72   Heart Rate Source Monitor   Resp 18   BP (!) 142/80   MAP (Calculated) 101   BP Location Left lower arm   BP Upper/Lower Lower   BP Method Automatic   Patient Position High fowlers   Level of Consciousness 0   MEWS Score 1   Oxygen Therapy   SpO2 96 %   O2 Device Nasal cannula   O2 Flow Rate (L/min) 4 L/min   Shift assessment completed. Patient awake in bed. A/Ox4. See flowsheet for vitals. Scheduled PM medications given. Patient denies any further needs at this time. Call light and bedside table within reach.

## 2023-03-22 NOTE — FLOWSHEET NOTE
03/22/23 0153   Vitals   Temp 97.6 °F (36.4 °C)   Temp Source Oral   Heart Rate 95   Heart Rate Source Monitor   Resp 18   BP (!) 146/71   MAP (Calculated) 96   BP Location Left lower arm   BP Upper/Lower Upper   BP Method Automatic   Patient Position Semi fowlers   Level of Consciousness 0   MEWS Score 1   Oxygen Therapy   SpO2 90 %   O2 Device Nasal cannula   O2 Flow Rate (L/min) 4 L/min   Patient resting in bed. A/Ox4. Patient repositioned. Gown and partial linen changed and mac care completed. Pt on 4L of O2 per nasal canula. Water provided. Pt denies any further care at this time. Call light within reach.

## 2023-03-22 NOTE — PROGRESS NOTES
0-4 Units SubCUTAneous TID WC MAEVE Tristan   2 Units at 03/22/23 0807    insulin lispro (HUMALOG) injection vial 0-4 Units  0-4 Units SubCUTAneous Nightly MAEVE Tristan   4 Units at 03/21/23 2040    budesonide-formoterol (SYMBICORT) 160-4.5 MCG/ACT inhaler 2 puff  2 puff Inhalation BID MAEVE Tristan   2 puff at 03/22/23 0701    tiotropium (SPIRIVA RESPIMAT) 2.5 MCG/ACT inhaler 2 puff  2 puff Inhalation Daily MAEVE Tristan   2 puff at 03/22/23 0701    albuterol sulfate HFA (PROVENTIL;VENTOLIN;PROAIR) 108 (90 Base) MCG/ACT inhaler 2 puff  2 puff Inhalation Q4H PRN Angella Carcamo MD   2 puff at 03/21/23 0750    insulin glargine (LANTUS) injection vial 60 Units  60 Units SubCUTAneous Nightly Mera Stanley PA-C   60 Units at 03/21/23 2039    magnesium oxide (MAG-OX) tablet 400 mg  400 mg Oral Daily Mera Stanley PA-C   400 mg at 03/22/23 0941    oxyCODONE-acetaminophen (PERCOCET) 7.5-325 MG per tablet 1 tablet  1 tablet Oral 4x daily Nahomy Lopez PA-C   1 tablet at 03/22/23 8956    nystatin (MYCOSTATIN) cream   Topical Nightly Nahomy Lopez PA-C   Given at 03/21/23 2037    traZODone (DESYREL) tablet 100 mg  100 mg Oral Nightly Mera Stanley PA-C   100 mg at 03/21/23 2039    enoxaparin (LOVENOX) injection 40 mg  40 mg SubCUTAneous BID Angella Carcamo MD   40 mg at 03/22/23 0940    pantoprazole (PROTONIX) injection 40 mg  40 mg IntraVENous BID AMAYA Lowe CNP   40 mg at 03/22/23 0940         Recent Imaging:   CT HEAD WO CONTRAST  Narrative: EXAMINATION:  CT OF THE HEAD WITHOUT CONTRAST  3/20/2023 8:40 am    TECHNIQUE:  CT of the head was performed without the administration of intravenous  contrast. Automated exposure control, iterative reconstruction, and/or weight  based adjustment of the mA/kV was utilized to reduce the radiation dose to as  low as reasonably achievable. COMPARISON:  None.     HISTORY:  ORDERING SYSTEM PROVIDED HISTORY: fall, dizziness  TECHNOLOGIST

## 2023-03-23 LAB
ANION GAP SERPL CALCULATED.3IONS-SCNC: 6 MMOL/L (ref 3–16)
BASOPHILS # BLD: 0 K/UL (ref 0–0.2)
BASOPHILS NFR BLD: 0.3 %
BUN SERPL-MCNC: 16 MG/DL (ref 7–20)
CALCIUM SERPL-MCNC: 9.7 MG/DL (ref 8.3–10.6)
CHLORIDE SERPL-SCNC: 96 MMOL/L (ref 99–110)
CO2 SERPL-SCNC: 34 MMOL/L (ref 21–32)
CREAT SERPL-MCNC: 0.7 MG/DL (ref 0.6–1.1)
DEPRECATED RDW RBC AUTO: 13.7 % (ref 12.4–15.4)
EOSINOPHIL # BLD: 0.2 K/UL (ref 0–0.6)
EOSINOPHIL NFR BLD: 2.6 %
GFR SERPLBLD CREATININE-BSD FMLA CKD-EPI: >60 ML/MIN/{1.73_M2}
GLUCOSE BLD-MCNC: 214 MG/DL (ref 70–99)
GLUCOSE BLD-MCNC: 334 MG/DL (ref 70–99)
GLUCOSE BLD-MCNC: 345 MG/DL (ref 70–99)
GLUCOSE BLD-MCNC: 487 MG/DL (ref 70–99)
GLUCOSE SERPL-MCNC: 246 MG/DL (ref 70–99)
HCT VFR BLD AUTO: 33.7 % (ref 36–48)
HGB BLD-MCNC: 10.9 G/DL (ref 12–16)
LYMPHOCYTES # BLD: 1.3 K/UL (ref 1–5.1)
LYMPHOCYTES NFR BLD: 20.4 %
MCH RBC QN AUTO: 27.3 PG (ref 26–34)
MCHC RBC AUTO-ENTMCNC: 32.4 G/DL (ref 31–36)
MCV RBC AUTO: 84.3 FL (ref 80–100)
MONOCYTES # BLD: 0.6 K/UL (ref 0–1.3)
MONOCYTES NFR BLD: 8.9 %
NEUTROPHILS # BLD: 4.3 K/UL (ref 1.7–7.7)
NEUTROPHILS NFR BLD: 67.8 %
PERFORMED ON: ABNORMAL
PLATELET # BLD AUTO: 120 K/UL (ref 135–450)
PMV BLD AUTO: 9.3 FL (ref 5–10.5)
POTASSIUM SERPL-SCNC: 4.4 MMOL/L (ref 3.5–5.1)
RBC # BLD AUTO: 4 M/UL (ref 4–5.2)
SODIUM SERPL-SCNC: 136 MMOL/L (ref 136–145)
WBC # BLD AUTO: 6.3 K/UL (ref 4–11)

## 2023-03-23 PROCEDURE — 6360000002 HC RX W HCPCS: Performed by: REGISTERED NURSE

## 2023-03-23 PROCEDURE — 6370000000 HC RX 637 (ALT 250 FOR IP)

## 2023-03-23 PROCEDURE — 94640 AIRWAY INHALATION TREATMENT: CPT

## 2023-03-23 PROCEDURE — C9113 INJ PANTOPRAZOLE SODIUM, VIA: HCPCS | Performed by: REGISTERED NURSE

## 2023-03-23 PROCEDURE — 97530 THERAPEUTIC ACTIVITIES: CPT

## 2023-03-23 PROCEDURE — 99232 SBSQ HOSP IP/OBS MODERATE 35: CPT | Performed by: INTERNAL MEDICINE

## 2023-03-23 PROCEDURE — 2700000000 HC OXYGEN THERAPY PER DAY

## 2023-03-23 PROCEDURE — 36415 COLL VENOUS BLD VENIPUNCTURE: CPT

## 2023-03-23 PROCEDURE — 94761 N-INVAS EAR/PLS OXIMETRY MLT: CPT

## 2023-03-23 PROCEDURE — 6370000000 HC RX 637 (ALT 250 FOR IP): Performed by: INTERNAL MEDICINE

## 2023-03-23 PROCEDURE — 80048 BASIC METABOLIC PNL TOTAL CA: CPT

## 2023-03-23 PROCEDURE — 2580000003 HC RX 258

## 2023-03-23 PROCEDURE — 2060000000 HC ICU INTERMEDIATE R&B

## 2023-03-23 PROCEDURE — 6360000002 HC RX W HCPCS: Performed by: INTERNAL MEDICINE

## 2023-03-23 PROCEDURE — 85025 COMPLETE CBC W/AUTO DIFF WBC: CPT

## 2023-03-23 RX ORDER — AMLODIPINE BESYLATE 5 MG/1
5 TABLET ORAL DAILY
Status: DISCONTINUED | OUTPATIENT
Start: 2023-03-23 | End: 2023-03-24 | Stop reason: HOSPADM

## 2023-03-23 RX ADMIN — HYDROXYZINE HYDROCHLORIDE 25 MG: 50 TABLET ORAL at 12:43

## 2023-03-23 RX ADMIN — ATORVASTATIN CALCIUM 10 MG: 10 TABLET, FILM COATED ORAL at 08:24

## 2023-03-23 RX ADMIN — DICYCLOMINE HYDROCHLORIDE 10 MG: 10 CAPSULE ORAL at 08:24

## 2023-03-23 RX ADMIN — INSULIN LISPRO 3 UNITS: 100 INJECTION, SOLUTION INTRAVENOUS; SUBCUTANEOUS at 11:48

## 2023-03-23 RX ADMIN — PREGABALIN 300 MG: 100 CAPSULE ORAL at 22:07

## 2023-03-23 RX ADMIN — PRIMIDONE 50 MG: 50 TABLET ORAL at 22:06

## 2023-03-23 RX ADMIN — NYSTATIN: 100000 CREAM TOPICAL at 22:11

## 2023-03-23 RX ADMIN — AMLODIPINE BESYLATE 5 MG: 5 TABLET ORAL at 08:34

## 2023-03-23 RX ADMIN — OXYCODONE HYDROCHLORIDE AND ACETAMINOPHEN 1 TABLET: 7.5; 325 TABLET ORAL at 16:46

## 2023-03-23 RX ADMIN — OXYCODONE HYDROCHLORIDE AND ACETAMINOPHEN 1 TABLET: 7.5; 325 TABLET ORAL at 22:07

## 2023-03-23 RX ADMIN — TRAZODONE HYDROCHLORIDE 100 MG: 100 TABLET ORAL at 22:08

## 2023-03-23 RX ADMIN — HYDROXYZINE HYDROCHLORIDE 25 MG: 50 TABLET ORAL at 08:24

## 2023-03-23 RX ADMIN — INSULIN GLARGINE 60 UNITS: 100 INJECTION, SOLUTION SUBCUTANEOUS at 22:06

## 2023-03-23 RX ADMIN — ENOXAPARIN SODIUM 40 MG: 100 INJECTION SUBCUTANEOUS at 22:08

## 2023-03-23 RX ADMIN — Medication 400 MG: at 08:34

## 2023-03-23 RX ADMIN — PREGABALIN 300 MG: 100 CAPSULE ORAL at 08:24

## 2023-03-23 RX ADMIN — INSULIN LISPRO 4 UNITS: 100 INJECTION, SOLUTION INTRAVENOUS; SUBCUTANEOUS at 16:46

## 2023-03-23 RX ADMIN — Medication 10 ML: at 22:10

## 2023-03-23 RX ADMIN — POLYETHYLENE GLYCOL 3350 17 G: 17 POWDER, FOR SOLUTION ORAL at 12:43

## 2023-03-23 RX ADMIN — ASPIRIN 81 MG: 81 TABLET, COATED ORAL at 08:23

## 2023-03-23 RX ADMIN — FERROUS SULFATE TAB 325 MG (65 MG ELEMENTAL FE) 325 MG: 325 (65 FE) TAB at 08:23

## 2023-03-23 RX ADMIN — Medication 2 PUFF: at 08:06

## 2023-03-23 RX ADMIN — OXYCODONE HYDROCHLORIDE AND ACETAMINOPHEN 1 TABLET: 7.5; 325 TABLET ORAL at 12:43

## 2023-03-23 RX ADMIN — PANTOPRAZOLE SODIUM 40 MG: 40 INJECTION, POWDER, FOR SOLUTION INTRAVENOUS at 08:29

## 2023-03-23 RX ADMIN — HYDROXYZINE HYDROCHLORIDE 25 MG: 50 TABLET ORAL at 22:08

## 2023-03-23 RX ADMIN — ESCITALOPRAM OXALATE 10 MG: 10 TABLET ORAL at 08:24

## 2023-03-23 RX ADMIN — DICYCLOMINE HYDROCHLORIDE 10 MG: 10 CAPSULE ORAL at 12:43

## 2023-03-23 RX ADMIN — INSULIN LISPRO 4 UNITS: 100 INJECTION, SOLUTION INTRAVENOUS; SUBCUTANEOUS at 22:04

## 2023-03-23 RX ADMIN — Medication 2 PUFF: at 21:43

## 2023-03-23 RX ADMIN — PANTOPRAZOLE SODIUM 40 MG: 40 INJECTION, POWDER, FOR SOLUTION INTRAVENOUS at 22:06

## 2023-03-23 RX ADMIN — FERROUS SULFATE TAB 325 MG (65 MG ELEMENTAL FE) 325 MG: 325 (65 FE) TAB at 12:43

## 2023-03-23 RX ADMIN — TIOTROPIUM BROMIDE INHALATION SPRAY 2 PUFF: 3.12 SPRAY, METERED RESPIRATORY (INHALATION) at 08:06

## 2023-03-23 RX ADMIN — ENOXAPARIN SODIUM 40 MG: 100 INJECTION SUBCUTANEOUS at 08:24

## 2023-03-23 RX ADMIN — INSULIN LISPRO 1 UNITS: 100 INJECTION, SOLUTION INTRAVENOUS; SUBCUTANEOUS at 08:35

## 2023-03-23 RX ADMIN — DICYCLOMINE HYDROCHLORIDE 10 MG: 10 CAPSULE ORAL at 22:08

## 2023-03-23 RX ADMIN — ALLOPURINOL 100 MG: 100 TABLET ORAL at 08:24

## 2023-03-23 RX ADMIN — OXYCODONE HYDROCHLORIDE AND ACETAMINOPHEN 1 TABLET: 7.5; 325 TABLET ORAL at 08:24

## 2023-03-23 RX ADMIN — Medication 10 ML: at 08:29

## 2023-03-23 ASSESSMENT — PAIN DESCRIPTION - ORIENTATION
ORIENTATION: RIGHT;LEFT
ORIENTATION: LOWER;RIGHT;LEFT

## 2023-03-23 ASSESSMENT — PAIN DESCRIPTION - DESCRIPTORS
DESCRIPTORS: ACHING;DISCOMFORT

## 2023-03-23 ASSESSMENT — PAIN DESCRIPTION - ONSET: ONSET: ON-GOING

## 2023-03-23 ASSESSMENT — PAIN SCALES - GENERAL
PAINLEVEL_OUTOF10: 7
PAINLEVEL_OUTOF10: 5
PAINLEVEL_OUTOF10: 9
PAINLEVEL_OUTOF10: 8

## 2023-03-23 ASSESSMENT — PAIN DESCRIPTION - PAIN TYPE: TYPE: ACUTE PAIN

## 2023-03-23 ASSESSMENT — PAIN DESCRIPTION - LOCATION
LOCATION: GENERALIZED
LOCATION: GENERALIZED
LOCATION: BACK;KNEE
LOCATION: ABDOMEN;BACK

## 2023-03-23 ASSESSMENT — PAIN - FUNCTIONAL ASSESSMENT
PAIN_FUNCTIONAL_ASSESSMENT: PREVENTS OR INTERFERES SOME ACTIVE ACTIVITIES AND ADLS

## 2023-03-23 NOTE — PROGRESS NOTES
gotten this filled. Also states patient has had intermittent confusion and slurred speech over the last few days. Today since being admitted, patient is having difficulty swallowing foods. States food gets caught and she regurgitates it back up. This contributed to an episode of chest discomfort, but once getting food up, chest discomfort resolved entirely. Patient denies fever, chills, constipation, diarrhea, abdominal pain, chest pain, cough, SOB or lower extremity edema. Home Health S4 Level Recommendation:  NA    AM-PAC Mobility Score    AM-PAC Inpatient Mobility Raw Score : 10       Subjective  Patient sitting up in chair with spouse present. Pt agreeable to this PT session. Cognition    A&O x4   Able to follow 2 step commands    Pain   No  Location:   Rating: NA /10  Pain Medicine Status: Denies need    Preadmission Environment    Pt. Lives with spouse who is on disability and is available 24/7. Home environment:    one story home  Steps to enter first floor:  3 steps to enter and hand rail bilateral  Steps to second floor: N/A  Pt sleeps in a recliner. Bathroom: tub/shower unit and standard height commode  Equipment owned: SW, wc (manual), SPC, home O2 (4L) continous, pulse ox, and reacher     Preadmission Status:  Pt. Able to drive: No  Pt Fully independent with ADLs: No - pt bathes indep (sitting on edge of tub).  does setup assist for clothing and total assist to don socks and shoes. Pt. Required assistance from family for: Cleaning, Cooking, and Laundry   Pt. independent for transfers and gait and walked within the house (recliner<>bathroom only) with No Device; she uses w/c for community mobility.  provides light physical assist up/down stairs. History of falls Yes - falls at least one time per month. States her legs \"give out\"    Objective      Balance  Static Sitting:  Normal; Independent  Dynamic Sitting:  Good ; SBA   Comments: EOB    Static Standing: Poor;  Max A Dynamic Standing: Poor; Max A   Comments: in STEDY, B knees buckling, ataxia    Posture  Seated: WNL  Standing: WNL    Bed Mobility   Supine to Sit:    Not Tested  Sit to Supine:   Not Tested  Rolling:   Not Tested   Scooting in sitting: CGA back in chair  Scooting in supine:  Not Tested   Bridging:  Not Tested    Transfer Training     Sit to stand: Mod A x 2 to STEDY     Min A from STEDY paddles x 3 trials (able to stand for 30 sec, noted B knees buckling)  Stand to sit:   Mod A  x 2   Bed to Chair:   Not Tested with use of N/A    Gait gait deferred due to difficulty with transfers; pt ambulated 0 ft. Distance:      0 ft  Deviations (firm surface/linoleum):  N/A  Assistive Device Used:    N/A  Level of Assist:    Not Tested   Comment:     Stair Training deferred, pt unsafe/ not appropriate to complete stairs at this time  # of Steps:   N/A  Level of Assist:  Not Tested   UE Support:  NA  Assistive Device:  N/A  Pattern:   N/A  Comments:      Therapeutic Exercises Initiated  all completed bilaterally unless indicated  Supine:  N/A    Seated: Ankle pumps: 10 reps  LAQ: 10 reps    Standing:  N/A    Activity Tolerance   During therapy session noted pt with fatigue    Pt Position BP (mmHg) HR (bpm) SpO2 (%) on 4L  Comments   Supine at rest       Seated on BSC       Standing in STEDY  105 85%-88% Instructed on PLB, spO2 in to 90%   Seated in chair 152/78 92 89%-91%      Positioning Needs   Pt up in chair, alarm set, positioned in proper neutral alignment and pressure relief provided. Call light provided and all needs within reach  RN aware of pt position/status    Other Activities  None. Patient/Family Education   Pt educated on role of inpatient PT, POC, importance of continued activity, DC recommendations, safety awareness, transfer techniques, pacing activity, and calling for assist with mobility. Assessment  Pt seen today for physical therapy Treatment.  Pt demonstrated poor postural control and balance

## 2023-03-23 NOTE — FLOWSHEET NOTE
03/23/23 1230   Vital Signs   Temp 98.6 °F (37 °C)   Temp Source Oral   Heart Rate 78   Heart Rate Source Monitor   Resp 15   /77   MAP (Calculated) 97   BP Location Left lower arm   Patient Position Sitting   Level of Consciousness 0   MEWS Score 1   Oxygen Therapy   SpO2 97 %   O2 Device Nasal cannula   O2 Flow Rate (L/min) 4 L/min       Afternoon vitals completed. Meds given per MAR. Family at bedside; updated on care plan. Pt stable.     Jennifer Rosa RN

## 2023-03-23 NOTE — PROGRESS NOTES
Physician Progress Note      Ron Nugent  CSN #:                  [id number]  :                       1965  ADMIT DATE:       3/20/2023 5:16 AM  DISCH DATE:  RESPONDING  PROVIDER #:        Anabel Arriaga MD          QUERY TEXT:    Pt admitted with mechanical fall and noted per PT consult note on 3/20-Pt   demonstrated decreased Activity tolerance, Balance, Safety, and Strength as   well as decreased independence with Ambulation, Bed Mobility , and Transfers. If possible, please document in the progress notes and discharge summary if   you are evaluating and / or treating any of the following: The medical record reflects the following:  Risk Factors: age, tremors, dysphagia, chf, htn, copd, morbid obesity  Clinical Indicators: Fall at home -most likely mechanical, but history is   unclear -XR bilateral knees, L hip and L shoulder obtained - all negative  Treatment: CT head, imaging of knees/hip/shoulder, orthostatic VS, echo, pain   control, ice, PT/OT    Thank you,  Darrius Gonzales RN,BSN,CCDS,CRCR  Options provided:  -- Age Related Physical Debility  -- Frailty  -- Fall due to other, Please document other cause. -- Other - I will add my own diagnosis  -- Disagree - Not applicable / Not valid  -- Disagree - Clinically unable to determine / Unknown  -- Refer to Clinical Documentation Reviewer    PROVIDER RESPONSE TEXT:    This patient has frailty.     Query created by: Elvie Tony on 3/23/2023 9:56 AM      Electronically signed by:  Anabel Arriaga MD 3/23/2023 9:58 AM

## 2023-03-23 NOTE — FLOWSHEET NOTE
Shift assessment completed. Patient awake in bed. A/Ox4. See flowsheet for vitals. BP elevated. Will continue to monitor and reassess when patient is resting. Scheduled PM medications given. Pt started a new med. Educated pt on side effects. Patient denies any needs at this time. Call light and bedside table within reach.

## 2023-03-23 NOTE — PLAN OF CARE
HEART FAILURE CARE PLAN:    Comorbidities Reviewed: Yes   Patient has a past medical history of Anxiety, Arthritis, CHF (congestive heart failure) ([facility] [address] [facility] Depression, Diabetes mellitus ([facility] Diverticulosis, Hyperlipidemia, Hypertension, and Obesities, morbid ([facility] ECHOCARDIOGRAM Reviewed: Yes   Patient's Ejection Fraction (EF) is greater than 40%    Weights Reviewed:  Yes   Admission weight: (!) 325 lb (147.4 kg)   Wt Readings from Last 3 Encounters:   03/22/23 (!) 337 lb 6 oz (153 kg)   01/16/23 (!) 330 lb (149.7 kg)   10/12/22 (!) 319 lb 3.6 oz (144.8 kg)     Intake & Output Reviewed: Yes     Intake/Output Summary (Last 24 hours) at 3/23/2023 0022  Last data filed at 3/23/2023 0012  Gross per 24 hour   Intake 1200 ml   Output 1900 ml   Net -700 ml     Medications Reviewed: Yes   SCHEDULED HOSPITAL MEDICATIONS:   primidone  50 mg Oral Nightly    allopurinol  100 mg Oral Daily    aspirin  81 mg Oral Daily    dicyclomine  10 mg Oral TID    escitalopram  10 mg Oral Daily    ferrous sulfate  325 mg Oral TID WC    [Held by provider] furosemide  10 mg Oral Daily    hydrOXYzine HCl  25 mg Oral TID    pregabalin  300 mg Oral BID    atorvastatin  10 mg Oral Daily    sodium chloride flush  5-40 mL IntraVENous 2 times per day    insulin lispro  0-4 Units SubCUTAneous TID WC    insulin lispro  0-4 Units SubCUTAneous Nightly    budesonide-formoterol  2 puff Inhalation BID    tiotropium  2 puff Inhalation Daily    insulin glargine  60 Units SubCUTAneous Nightly    magnesium oxide  400 mg Oral Daily    oxyCODONE-acetaminophen  1 tablet Oral 4x daily    nystatin   Topical Nightly    traZODone  100 mg Oral Nightly    enoxaparin  40 mg SubCUTAneous BID    pantoprazole  40 mg IntraVENous BID     ACE/ARB/ARNI is REQUIRED for EF </= 42% SYSTOLIC FAILURE:   ACE[de-identified] None  ARB[de-identified] None  ARNI[de-identified] None    Evidenced-Based Beta Blocker is REQUIRED for EF </= 09% SYSTOLIC FAILURE:   [de-identified] None    Diuretics:  [de-identified] Furosemide    Diet Reviewed: Yes   ADULT DIET; Dysphagia - Soft and Bite Sized; 4 carb choices (60 gm/meal)    Goal of Care Reviewed: Yes   Patient and/or Family's stated Goal of Care this Admission: reduce shortness of breath, increase activity tolerance, better understand heart failure and disease management, be more comfortable, reduce lower extremity edema, and unable to assess, will attempt again prior to discharge.

## 2023-03-23 NOTE — PROGRESS NOTES
Admit: 3/20/2023    Name:  Maira Faith  Room:  [facility]/2549-12  MRN:    [id number]    Daily Progress Note for 3/23/2023   Admitted after a fall at home. Interval History:     Feels very weak.   Concerned about tremors  Scheduled Meds:   amLODIPine  5 mg Oral Daily    primidone  50 mg Oral Nightly    allopurinol  100 mg Oral Daily    aspirin  81 mg Oral Daily    dicyclomine  10 mg Oral TID    escitalopram  10 mg Oral Daily    ferrous sulfate  325 mg Oral TID WC    [Held by provider] furosemide  10 mg Oral Daily    hydrOXYzine HCl  25 mg Oral TID    pregabalin  300 mg Oral BID    atorvastatin  10 mg Oral Daily    sodium chloride flush  5-40 mL IntraVENous 2 times per day    insulin lispro  0-4 Units SubCUTAneous TID WC    insulin lispro  0-4 Units SubCUTAneous Nightly    budesonide-formoterol  2 puff Inhalation BID    tiotropium  2 puff Inhalation Daily    insulin glargine  60 Units SubCUTAneous Nightly    magnesium oxide  400 mg Oral Daily    oxyCODONE-acetaminophen  1 tablet Oral 4x daily    nystatin   Topical Nightly    traZODone  100 mg Oral Nightly    enoxaparin  40 mg SubCUTAneous BID    pantoprazole  40 mg IntraVENous BID       Continuous Infusions:   sodium chloride      dextrose         PRN Meds:  sodium chloride flush, sodium chloride, potassium chloride **OR** potassium alternative oral replacement **OR** potassium chloride, magnesium sulfate, ondansetron **OR** ondansetron, polyethylene glycol, acetaminophen **OR** acetaminophen, glucose, dextrose bolus **OR** dextrose bolus, glucagon (rDNA), dextrose, albuterol sulfate HFA                  Objective:     Temp  Av.6 °F (37 °C)  Min: 98.2 °F (36.8 °C)  Max: 99.3 °F (37.4 °C)  Pulse  Av.8  Min: 75  Max: 88  BP  Min: 143/82  Max: 183/93  SpO2  Av %  Min: 93 %  Max: 98 %  Patient Vitals for the past 4 hrs:   BP Temp Temp src Pulse Resp SpO2 Height Weight   23 0854 -- -- -- -- 17 -- -- --   23 0810 -- -- -- -- -- 97 % -- --   23 0804 (!) 155/94 98.7 °F (37.1 °C) Oral 88 17 98 % -- --   03/23/23 0543 (!) 167/84 98.6 °F (37 °C) Axillary 82 18 97 % 5' 1\" (1.549 m) (!) 350 lb 5 oz (158.9 kg)           Intake/Output Summary (Last 24 hours) at 3/23/2023 0905  Last data filed at 3/23/2023 0858  Gross per 24 hour   Intake 900 ml   Output 1175 ml   Net -275 ml         Physical Exam:  Gen: No distress. Alert. +Morbidly obese. Ill-appearing. Eyes: PERRL. No sclera icterus. No conjunctival injection. ENT: No discharge. Pharynx clear. Neck: No JVD. No Carotid Bruit. Trachea midline. Resp: No accessory muscle use. No crackles. No wheezes. No rhonchi. CV: Regular rate. Regular rhythm. No murmur. No rub. No edema. Peripheral Pulses: +2 palpable, equal bilaterally   GI: Mildly-tender to left quadrants. Non-distended. No masses. No organomegaly. Normal bowel sounds. No hernia. Skin: Warm and dry. No nodule on exposed extremities. No rash on exposed extremities. M/S: No cyanosis. No joint deformity. No clubbing. Mild TTP to left hip. Inability to walk 2/2 pain. Moves about bed without difficulty. Neuro: Awake. Grossly non-focal.   Psych: Oriented x 3. No anxiety or agitation. Mild tremors    Lab Data:  CBC:   Recent Labs     03/21/23  0500 03/22/23  0526 03/23/23  0447   WBC 6.2 7.6 6.3   RBC 4.26 4.04 4.00   HGB 11.6* 11.2* 10.9*   HCT 36.3 34.1* 33.7*   MCV 85.2 84.4 84.3   RDW 14.1 13.9 13.7   * 121* 120*       BMP:   Recent Labs     03/21/23  0500 03/22/23  0526 03/23/23  0447    134* 136   K 4.9 4.8 4.4   CL 97* 94* 96*   CO2 30 33* 34*   BUN 13 14 16   CREATININE 0.7 0.7 0.7       BNP: No results for input(s): BNP in the last 72 hours. PT/INR: No results for input(s): PROTIME, INR in the last 72 hours. APTT:No results for input(s): APTT in the last 72 hours.   CARDIAC ENZYMES:   Recent Labs     03/20/23  1137 03/20/23  1509   TROPONINI <0.01 <0.01       FASTING LIPID PANEL:  Lab Results   Component Value Date/Time    CHOL

## 2023-03-23 NOTE — PLAN OF CARE
Problem: Discharge Planning  Goal: Discharge to home or other facility with appropriate resources  3/23/2023 0018 by Flavio Callahan RN  Outcome: Progressing  3/22/2023 1830 by Elpidio Moody RN  Outcome: Progressing     Problem: Pain  Goal: Verbalizes/displays adequate comfort level or baseline comfort level  3/23/2023 0018 by Flavio Callahan RN  Outcome: Progressing  3/22/2023 1830 by Elpidio Moody RN  Outcome: Progressing     Problem: Safety - Adult  Goal: Free from fall injury  Outcome: Progressing     Problem: ABCDS Injury Assessment  Goal: Absence of physical injury  Outcome: Progressing     Problem: Chronic Conditions and Co-morbidities  Goal: Patient's chronic conditions and co-morbidity symptoms are monitored and maintained or improved  Outcome: Progressing     Problem: Cardiovascular - Adult  Goal: Maintains optimal cardiac output and hemodynamic stability  Outcome: Progressing  Goal: Absence of cardiac dysrhythmias or at baseline  Outcome: Progressing     Problem: Skin/Tissue Integrity  Goal: Absence of new skin breakdown  Description: 1. Monitor for areas of redness and/or skin breakdown  2. Assess vascular access sites hourly  3. Every 4-6 hours minimum:  Change oxygen saturation probe site  4. Every 4-6 hours:  If on nasal continuous positive airway pressure, respiratory therapy assess nares and determine need for appliance change or resting period.   Outcome: Progressing

## 2023-03-23 NOTE — FLOWSHEET NOTE
03/23/23 0804   Vital Signs   Temp 98.7 °F (37.1 °C)   Temp Source Oral   Heart Rate 88   Heart Rate Source Monitor   Resp 17   BP (!) 155/94   MAP (Calculated) 114   BP Location Left lower arm   BP Method Automatic   Patient Position Semi fowlers   Level of Consciousness 0   MEWS Score 1   Pain Assessment   Pain Assessment 0-10   Pain Level 9   Pain Location Generalized   Pain Descriptors Aching;Discomfort   Functional Pain Assessment Prevents or interferes some active activities and ADLs   Pain Type Acute pain   Pain Onset On-going   Oxygen Therapy   SpO2 98 %   O2 Device Nasal cannula   O2 Flow Rate (L/min) 4 L/min       Pt assessment complete; see flow sheets. Vitals completed. Elevated BP; MD made aware; Norvasc 5mg added. Given this morning. No s/s of distress. Meds given per MAR. Repositioned up to the chair Pt denies any other care needs at this time. Pt stable.     Reema Jay RN

## 2023-03-23 NOTE — PROGRESS NOTES
Bedside report and transfer of care given to [facility]. Pt currently resting in bed with the call light within reach. Pt denies any other care needs at this time. Pt stable at this time.       Melanie Greenberg RN

## 2023-03-23 NOTE — PROGRESS NOTES
Speech Language Pathology    0830- SLP attempted dysphagia f/u, spoke with RN. Pt currently using bedside commode. ST to re-attempt f/u at a later time. 1030- Attempt x2. Pt seen upright in chair, PCA at bedside providing patient care. ST to continue to follow.     Leonie Paulino M.S. Jaida Brown  Speech-language pathologist  [phone]

## 2023-03-23 NOTE — CARE COORDINATION
INTERDISCIPLINARY PLAN OF CARE CONFERENCE    Date/Time: 3/23/2023 10:12 AM  Completed by: Jennifer Carter RN, Case Management      Patient Name:  Anabel Ibarra  YOB: 1965  Admitting Diagnosis: Lactic acidosis [E87.20]  Lightheadedness [R42]  Unable to walk [R26.2]  Fall at home, initial encounter [W19. XXXA, O35.229]  Acute hip pain, left [M25.552]  Fall on same level from slipping, tripping or stumbling, initial encounter [W01. 0XXA]  Acute pain of left shoulder [M25.512]  Acute pain of both knees [M25.561, M25.562]  Chronic shortness of breath [R06.02]     Admit Date/Time:  3/20/2023  5:16 AM    Chart reviewed. Interdisciplinary team contacted or reviewed plan related to patient progress and discharge plans. Disciplines included Case Management, Nursing, and Dietitian. Current Status: IP 03/20/2023  PT/OT recommendation for discharge plan of care:  [facility]  Discharge Recommendations: SNF  DME needs for discharge: Defer to facility     Expected D/C Disposition:  Skilled nursing facility    Discharge Plan Comments:   Re-eval per PT/OT   Referral called to [facility]. Faxed face sheet, H/P, MAR and therapy notes for review: Yes  Bed available?: Accepted (ready for DC 3/24)  Insurance precertification required? Pre-cert submitted 2/87  Continuity of Care Form (BOY) initiated? Yes   Hospital Exemption Notification (HENS) initiated? No  Initiate Level of Care (LOC), if Medicaid is the primary payer?  Not Indicated     Home O2 in place on admit: No  Pt informed of need to bring portable home O2 tank on day of discharge for nursing to connect prior to leaving:  No  Verbalized agreement/Understanding:  Not Indicated

## 2023-03-23 NOTE — PLAN OF CARE
HEART FAILURE CARE PLAN:    Comorbidities Reviewed: Yes   Patient has a past medical history of Anxiety, Arthritis, CHF (congestive heart failure) ([facility] obstructive [facility] disease[facility] Depression, Diabetes mellitus ([facility] Diverticulosis, Hyperlipidemia, Hypertension, and Obesities, morbid ([facility] ECHOCARDIOGRAM Reviewed: Yes   Patient's Ejection Fraction (EF) is greater than 40%    Weights Reviewed:  Yes   Admission weight: (!) 325 lb (147.4 kg)   Wt Readings from Last 3 Encounters:   03/23/23 (!) 350 lb 5 oz (158.9 kg)   01/16/23 (!) 330 lb (149.7 kg)   10/12/22 (!) 319 lb 3.6 oz (144.8 kg)     Intake & Output Reviewed: Yes     Intake/Output Summary (Last 24 hours) at 3/23/2023 1249  Last data filed at 3/23/2023 8897  Gross per 24 hour   Intake 480 ml   Output 900 ml   Net -420 ml     Medications Reviewed: Yes   SCHEDULED HOSPITAL MEDICATIONS:   amLODIPine  5 mg Oral Daily    primidone  50 mg Oral Nightly    allopurinol  100 mg Oral Daily    aspirin  81 mg Oral Daily    dicyclomine  10 mg Oral TID    escitalopram  10 mg Oral Daily    ferrous sulfate  325 mg Oral TID WC    [Held by provider] furosemide  10 mg Oral Daily    hydrOXYzine HCl  25 mg Oral TID    pregabalin  300 mg Oral BID    atorvastatin  10 mg Oral Daily    sodium chloride flush  5-40 mL IntraVENous 2 times per day    insulin lispro  0-4 Units SubCUTAneous TID WC    insulin lispro  0-4 Units SubCUTAneous Nightly    budesonide-formoterol  2 puff Inhalation BID    tiotropium  2 puff Inhalation Daily    insulin glargine  60 Units SubCUTAneous Nightly    magnesium oxide  400 mg Oral Daily    oxyCODONE-acetaminophen  1 tablet Oral 4x daily    nystatin   Topical Nightly    traZODone  100 mg Oral Nightly    enoxaparin  40 mg SubCUTAneous BID    pantoprazole  40 mg IntraVENous BID     ACE/ARB/ARNI is REQUIRED for EF </= 34% SYSTOLIC FAILURE:   ACE[de-identified] None  ARB[de-identified] None  ARNI[de-identified] None    Evidenced-Based Beta Blocker is REQUIRED for EF </= 40% SYSTOLIC FAILURE:   [de-identified] None    Diuretics:  [de-identified] None    Diet Reviewed: Yes   ADULT DIET; Dysphagia - Soft and Bite Sized; 4 carb choices (60 gm/meal)    Goal of Care Reviewed: Yes   Patient and/or Family's stated Goal of Care this Admission: reduce shortness of breath, increase activity tolerance, better understand heart failure and disease management, be more comfortable, and reduce lower extremity edema prior to discharge.

## 2023-03-23 NOTE — PROGRESS NOTES
Inpatient Occupational Therapy Evaluation and Treatment    Unit: PCU  Date:  3/23/2023  Patient Name:    Megan Runner  Admitting diagnosis:  Lactic acidosis [E87.20]  Lightheadedness [R42]  Unable to walk [R26.2]  Fall at home, initial encounter [W19. XXXA, T00.130]  Acute hip pain, left [M25.552]  Fall on same level from slipping, tripping or stumbling, initial encounter [W01. 0XXA]  Acute pain of left shoulder [M25.512]  Acute pain of both knees [M25.561, M25.562]  Chronic shortness of breath [R06.02]  Admit Date:  3/20/2023  Precautions/Restrictions/WB Status/ Lines/ Wounds/ Oxygen: Fall risk, Bed/chair alarm, and Lines (IV and Supplemental O2 (4L))    Treatment Time: 8:55-9:30  Treatment Number:  3  Timed Code Treatment Minutes: 55 minutes  Total Treatment Minutes:   55 minutes    Patient Goals for Therapy: \" I want to go to rehab \"          Discharge Recommendations: SNF  DME needs for discharge: Defer to facility       Therapy recommendations for staff:   Assist of 2 for transfers with use of LUCAS STEDY and gait belt to/from [facility]  to/from chair, use pillow at shin plate for comfort    History of Present Illness:   Per ADA MCFARLANE H&P 3/20/23:  \"The patient is a 61 y.o. female with PMHx HLD, HTN, diastolic CHF, COPD, O0YG, arthritis, anxiety, depression and severe obesity who presented to [facility] ED with complaint of fall. Patient reports shaking and limb tremors ongoing for some time, getting worse lately. States she falls often as a result. Similar event today. Was walking to chair and felt shaky, and fell down, landing on knees. Denies associated SOB, chest pain, lightheadedness or dizziness. No LOC or head injury. Now with left shoulder, left hip and bilateral knee pain prompting her ER visit.  at bedside reports she was seen by Dr. Jessica Barron at [facility] on 3/16 and supposed to be started on a medication for shaking and tremors, but hasn't gotten this filled.  Also states patient has had intermittent confusion for assist with mobility. Assessment:    Pt seen for Occupational therapy followup in acute care setting. Pt with decreased independence with daily tasks this date, expressing concern about going home as she and  both fear she will fall nearly immediately upon returning home. Pt functioning below baseline and will likely benefit from continued skilled occupational therapy services to maximize safety and independence. Recommending SNF upon discharge as patient functioning well below baseline, demonstrates good rehab potential and unable to return home due to inability to negotiate stairs to enter home/bedroom/bathroom, burden of care beyond caregiver ability, and home environment not conducive to patient recovery. Goal(s) : To be met in 3 Visits:  Bed to toilet/BSC:       SBA and with use of RW  Pt will complete 3/3 CHF goals     N/A    To be met in 5 Visits:  Supine to/from Sit in preparation for ADL task: Independent  Toileting        SBA  Grooming       Independent  Upper Body Dressing:      Independent  Lower Body Dressing:      SBA  Pt to demonstrate UE therapeutic exs x 15 reps with minimal cues    Rehabilitation Potential: Good  Strengths for achieving goals include: Pt motivated, PLOF, and Pt cooperative   Barriers to achieving goals include:  Complexity of condition    Plan: To be seen 3-5 x/wk while in acute care setting for therapeutic exercises, bed mobility, transfers, family/patient education, ADL/IADL retraining, and energy conservation training.     Electronically signed by Speedy Johnson OT on 3/23/2023 at 9:36 AM      If patient discharges from this facility prior to next visit, this note will serve as the Discharge Summary

## 2023-03-23 NOTE — DISCHARGE INSTR - COC
10/13/2021, 10/05/2022    TDaP, ADACEL (age 10y-63y), BOOSTRIX (age 10y+), IM, 0.5mL 08/16/2019    Zoster Live (Zostavax) 09/02/2016       Active Problems:  Patient Active Problem List   Diagnosis Code    Acute respiratory failure ([facility]) J96.00    Fluid overload E87.70    Acidosis E87.20    Panniculitis M79.3    Chronic obstructive pulmonary disease with acute exacerbation ([facility]) J44.1    General weakness R53.1    PNA (pneumonia) J18.9    Elevated blood pressure reading R03.0    Morbid obesity ([facility]) E66.01    Hyperlipidemia E78.5    DM (diabetes mellitus) ([facility]) E11.9    CHF (congestive heart failure) ([facility]) I50.9    Tremor R25.1    Hypomagnesemia E83.42    Tremors of nervous system R25.1    Generalized weakness R53.1    Hyperkalemia E87.5    Acute respiratory failure with hypoxemia ([facility]) J96.01    Hypercapnia R06.89    Fall at home, initial encounter W19. XXXA, Y92.009    Unable to walk R26.2    Acute hip pain, left M25.552    Acute pain of both knees M25.561, M25.562    Acute pain of left shoulder M25.512    Dysphagia R13.10    Lactic acidosis E87.20    Fall on same level from slipping, tripping, or stumbling W01. 0XXA       Isolation/Infection:   Isolation            No Isolation          Patient Infection Status       Infection Onset Added Last Indicated Last Indicated By Review Planned Expiration Resolved Resolved By    None active    Resolved    COVID-19 (Rule Out) 03/20/23 03/20/23 03/20/23 COVID-19 & Influenza Combo (Ordered)   03/20/23 Rule-Out Test Resulted    COVID-19 (Rule Out) 09/08/22 09/08/22 09/08/22 COVID-19, Rapid (Ordered)   09/08/22 Rule-Out Test Resulted    COVID-19 (Rule Out) 08/10/21 08/10/21 08/10/21 COVID-19 & Influenza Combo (Ordered)   08/10/21 Rule-Out Test Resulted    COVID-19 (Rule Out) 06/15/21 06/15/21 06/15/21 COVID-19, Rapid (Ordered)   06/15/21 Rule-Out Test Resulted            Nurse Assessment:  Last Vital Signs: BP (!) 155/94   Pulse 88   Temp 98.6 °F (37 °C) (Axillary)   Resp 17 WORK SECTION    Inpatient Status Date: 03/20/2023    Readmission Risk Assessment Score:  Readmission Risk              Risk of Unplanned Readmission:  19           Discharging to Facility/ Agency   Name: [facility]  Address: [address] [place] [address], [facility]  Phone: [phone]  Fax: [phone] and [phone]     Dialysis Facility (if applicable)   Name:  Address:  Dialysis Schedule:  Phone:  Fax:    / signature: Electronically signed by Azul Muse RN on 3/24/23 at 10:06 AM EDT    PHYSICIAN SECTION    Prognosis: Good    Condition at Discharge: Stable    Rehab Potential (if transferring to Rehab): Good    Recommended Labs or Other Treatments After Discharge: none    Physician Certification: I certify the above information and transfer of Natacha Reinoso  is necessary for the continuing treatment of the diagnosis listed and that she requires Skilled nursing facility for less 30 days. Update Admission H&P: No change in H&P    PHYSICIAN SIGNATURE: MILO Wu.

## 2023-03-24 VITALS
OXYGEN SATURATION: 97 % | BODY MASS INDEX: 55.32 KG/M2 | HEART RATE: 80 BPM | SYSTOLIC BLOOD PRESSURE: 154 MMHG | WEIGHT: 293 LBS | DIASTOLIC BLOOD PRESSURE: 90 MMHG | HEIGHT: 61 IN | TEMPERATURE: 97.9 F | RESPIRATION RATE: 18 BRPM

## 2023-03-24 LAB
GLUCOSE BLD-MCNC: 209 MG/DL (ref 70–99)
GLUCOSE BLD-MCNC: 244 MG/DL (ref 70–99)
GLUCOSE BLD-MCNC: 287 MG/DL (ref 70–99)
PERFORMED ON: ABNORMAL

## 2023-03-24 PROCEDURE — 2580000003 HC RX 258

## 2023-03-24 PROCEDURE — 6370000000 HC RX 637 (ALT 250 FOR IP)

## 2023-03-24 PROCEDURE — 6370000000 HC RX 637 (ALT 250 FOR IP): Performed by: INTERNAL MEDICINE

## 2023-03-24 PROCEDURE — 6360000002 HC RX W HCPCS: Performed by: REGISTERED NURSE

## 2023-03-24 PROCEDURE — C9113 INJ PANTOPRAZOLE SODIUM, VIA: HCPCS | Performed by: REGISTERED NURSE

## 2023-03-24 PROCEDURE — 94761 N-INVAS EAR/PLS OXIMETRY MLT: CPT

## 2023-03-24 PROCEDURE — 2700000000 HC OXYGEN THERAPY PER DAY

## 2023-03-24 PROCEDURE — 94640 AIRWAY INHALATION TREATMENT: CPT

## 2023-03-24 PROCEDURE — 99239 HOSP IP/OBS DSCHRG MGMT >30: CPT | Performed by: INTERNAL MEDICINE

## 2023-03-24 PROCEDURE — 6360000002 HC RX W HCPCS: Performed by: INTERNAL MEDICINE

## 2023-03-24 RX ORDER — OXYCODONE AND ACETAMINOPHEN 7.5; 325 MG/1; MG/1
1 TABLET ORAL EVERY 6 HOURS PRN
Qty: 8 TABLET | Refills: 0 | Status: SHIPPED | OUTPATIENT
Start: 2023-03-24 | End: 2023-03-26

## 2023-03-24 RX ORDER — AMLODIPINE BESYLATE 5 MG/1
5 TABLET ORAL DAILY
Qty: 30 TABLET | Refills: 0
Start: 2023-03-25

## 2023-03-24 RX ORDER — PANTOPRAZOLE SODIUM 40 MG/1
40 TABLET, DELAYED RELEASE ORAL
Qty: 60 TABLET | Refills: 1 | DISCHARGE
Start: 2023-03-24

## 2023-03-24 RX ORDER — PRIMIDONE 50 MG/1
50 TABLET ORAL 2 TIMES DAILY
Qty: 60 TABLET | Refills: 0 | DISCHARGE
Start: 2023-03-24

## 2023-03-24 RX ORDER — PREGABALIN 300 MG/1
300 CAPSULE ORAL 2 TIMES DAILY
Qty: 4 CAPSULE | Refills: 0 | Status: SHIPPED | OUTPATIENT
Start: 2023-03-24 | End: 2023-03-26

## 2023-03-24 RX ADMIN — HYDROXYZINE HYDROCHLORIDE 25 MG: 50 TABLET ORAL at 08:55

## 2023-03-24 RX ADMIN — INSULIN LISPRO 1 UNITS: 100 INJECTION, SOLUTION INTRAVENOUS; SUBCUTANEOUS at 08:58

## 2023-03-24 RX ADMIN — Medication 2 PUFF: at 07:36

## 2023-03-24 RX ADMIN — INSULIN LISPRO 2 UNITS: 100 INJECTION, SOLUTION INTRAVENOUS; SUBCUTANEOUS at 11:25

## 2023-03-24 RX ADMIN — Medication 10 ML: at 08:57

## 2023-03-24 RX ADMIN — ALLOPURINOL 100 MG: 100 TABLET ORAL at 08:54

## 2023-03-24 RX ADMIN — Medication 400 MG: at 08:55

## 2023-03-24 RX ADMIN — ENOXAPARIN SODIUM 40 MG: 100 INJECTION SUBCUTANEOUS at 08:56

## 2023-03-24 RX ADMIN — DICYCLOMINE HYDROCHLORIDE 10 MG: 10 CAPSULE ORAL at 14:06

## 2023-03-24 RX ADMIN — AMLODIPINE BESYLATE 5 MG: 5 TABLET ORAL at 08:55

## 2023-03-24 RX ADMIN — ESCITALOPRAM OXALATE 10 MG: 10 TABLET ORAL at 08:54

## 2023-03-24 RX ADMIN — PANTOPRAZOLE SODIUM 40 MG: 40 INJECTION, POWDER, FOR SOLUTION INTRAVENOUS at 08:56

## 2023-03-24 RX ADMIN — OXYCODONE HYDROCHLORIDE AND ACETAMINOPHEN 1 TABLET: 7.5; 325 TABLET ORAL at 08:51

## 2023-03-24 RX ADMIN — DICYCLOMINE HYDROCHLORIDE 10 MG: 10 CAPSULE ORAL at 08:54

## 2023-03-24 RX ADMIN — ATORVASTATIN CALCIUM 10 MG: 10 TABLET, FILM COATED ORAL at 08:55

## 2023-03-24 RX ADMIN — HYDROXYZINE HYDROCHLORIDE 25 MG: 50 TABLET ORAL at 14:06

## 2023-03-24 RX ADMIN — ASPIRIN 81 MG: 81 TABLET, COATED ORAL at 08:54

## 2023-03-24 RX ADMIN — TIOTROPIUM BROMIDE INHALATION SPRAY 2 PUFF: 3.12 SPRAY, METERED RESPIRATORY (INHALATION) at 07:36

## 2023-03-24 RX ADMIN — PREGABALIN 300 MG: 100 CAPSULE ORAL at 08:54

## 2023-03-24 RX ADMIN — OXYCODONE HYDROCHLORIDE AND ACETAMINOPHEN 1 TABLET: 7.5; 325 TABLET ORAL at 14:06

## 2023-03-24 ASSESSMENT — PAIN SCALES - GENERAL
PAINLEVEL_OUTOF10: 3
PAINLEVEL_OUTOF10: 8

## 2023-03-24 ASSESSMENT — PAIN - FUNCTIONAL ASSESSMENT
PAIN_FUNCTIONAL_ASSESSMENT: PREVENTS OR INTERFERES SOME ACTIVE ACTIVITIES AND ADLS
PAIN_FUNCTIONAL_ASSESSMENT: ACTIVITIES ARE NOT PREVENTED

## 2023-03-24 ASSESSMENT — PAIN DESCRIPTION - DESCRIPTORS
DESCRIPTORS: SORE;SHARP
DESCRIPTORS: ACHING;DISCOMFORT

## 2023-03-24 ASSESSMENT — PAIN DESCRIPTION - LOCATION
LOCATION: BACK
LOCATION: BACK

## 2023-03-24 ASSESSMENT — PAIN DESCRIPTION - ORIENTATION
ORIENTATION: LOWER;MID
ORIENTATION: LOWER

## 2023-03-24 NOTE — PROGRESS NOTES
Report called to Amira Chaudhary at [facility] this time. Transport to be here to pick patient up at 1530. Patient currently sleeping soundly. Will continue to monitor.

## 2023-03-24 NOTE — PLAN OF CARE
Problem: Discharge Planning  Goal: Discharge to home or other facility with appropriate resources  Outcome: Progressing     Problem: Pain  Goal: Verbalizes/displays adequate comfort level or baseline comfort level  Outcome: Progressing     Problem: Safety - Adult  Goal: Free from fall injury  Outcome: Progressing     Problem: ABCDS Injury Assessment  Goal: Absence of physical injury  Outcome: Progressing     Problem: Chronic Conditions and Co-morbidities  Goal: Patient's chronic conditions and co-morbidity symptoms are monitored and maintained or improved  Outcome: Progressing     Problem: Cardiovascular - Adult  Goal: Maintains optimal cardiac output and hemodynamic stability  Outcome: Progressing  Goal: Absence of cardiac dysrhythmias or at baseline  Outcome: Progressing     Problem: Skin/Tissue Integrity  Goal: Absence of new skin breakdown  Description: 1. Monitor for areas of redness and/or skin breakdown  2. Assess vascular access sites hourly  3. Every 4-6 hours minimum:  Change oxygen saturation probe site  4. Every 4-6 hours:  If on nasal continuous positive airway pressure, respiratory therapy assess nares and determine need for appliance change or resting period.   Outcome: Progressing

## 2023-03-24 NOTE — CARE COORDINATION
CM delivered 2nd IMM and provided verbal explanation at bedside by CM assistant DAHIANA Montoya. Pt voiced understanding of discharge MCR rights and is agreeable to discharge within 4 hours of delivery of the IMM notice.

## 2023-03-24 NOTE — PROGRESS NOTES
Patient's IV and monitor removed at this time. Patient assisted to stretcher from [facility] on stedy. Patient connected to O2 at 4L. Patient taken out per stretcher to [facility] in stable condition.

## 2023-03-24 NOTE — CARE COORDINATION
DISCHARGE ORDER  Date/Time 3/24/2023 9:43 AM  Completed by: Karen Samaniego RN, Case Management    Patient Name: Laura Lynn    : 1965      Admit order Date and Status: IP 2023  Noted discharge order. (verify MD's last order for status of admission/Traditional Medicare 3 [place] Inpatient qualifying stay required for SNF)    Confirmed discharge plan with:              Patient:  Yes, Spoke with spouse, update provided                                 Discharge to Facility:   Name: [facility]  Address: [address] [place] [address]  Phone: [phone]  Fax: [phone] and [phone]      Facility phone number for staff giving report:  see above   Pre-certification completed: Nancy Dian #[id number]   Approved for 7 days beginning 3/24-3/30  Hospital Exemption Notification (HENS) completed: JANETH   Discharge orders and Continuity of Care faxed to facility:  [facility]      Transportation:               Medical Transport explained with choice list offered to pt/family. Choice:(no preference)  Agency used: [address] [place] [address] Road up time:   15:30PM      Pt/family/Nursing/Facility aware of  time:   Patient, spouse  King Robertson ([facility] admissions)  [facility])      Ambulance form completed: YES     Date Last IMM Given: 2023    Comments:    Pt is being d/c'd to [facility] (skilled) today. Pt's O2 sats are 97% on 4 liters. Discharge timeout done with [facility], [facility].. All discharge needs and concerns addressed. Discharging nurse to complete BOY, reconcile AVS, and place final copy with patient's discharge packet. Discharging RN to ensure that written prescriptions for  Level II medications are sent with patient to the facility as per protocol.

## 2023-03-24 NOTE — DISCHARGE SUMMARY
arthropathy in the knee. Discharge Medications     Medication List        START taking these medications      amLODIPine 5 MG tablet  Commonly known as: NORVASC  Take 1 tablet by mouth daily  Start taking on: March 25, 2023     pantoprazole 40 MG tablet  Commonly known as: PROTONIX  Take 1 tablet by mouth 2 times daily (before meals)     primidone 50 MG tablet  Commonly known as: MYSOLINE  Take 1 tablet by mouth in the morning and at bedtime            CHANGE how you take these medications      oxyCODONE-acetaminophen 7.5-325 MG per tablet  Commonly known as: PERCOCET  Take 1 tablet by mouth every 6 hours as needed for Pain for up to 2 days.  Max Daily Amount: 4 tablets  What changed:   when to take this  reasons to take this            CONTINUE taking these medications      albuterol sulfate  (90 Base) MCG/ACT inhaler  Commonly known as: PROVENTIL;VENTOLIN;PROAIR     allopurinol 100 MG tablet  Commonly known as: ZYLOPRIM     aspirin 81 MG EC tablet  Take 1 tablet by mouth daily     dicyclomine 10 MG capsule  Commonly known as: BENTYL     EPINEPHrine 0.3 MG/0.3ML Soaj injection  Commonly known as: EPIPEN     escitalopram 10 MG tablet  Commonly known as: LEXAPRO  Take 1 tablet by mouth daily     ferrous sulfate 325 (65 Fe) MG EC tablet  Commonly known as: Fe Tabs  Take 1 tablet by mouth 3 times daily (with meals)     furosemide 20 MG tablet  Commonly known as: LASIX     glipiZIDE 10 MG extended release tablet  Commonly known as: GLUCOTROL XL     glucose monitoring kit  1 kit by Does not apply route daily as needed     hydrOXYzine pamoate 25 MG capsule  Commonly known as: VISTARIL     Insulin Pen Needle 29G X 12.7MM Misc  1 each by Does not apply route daily     Insulin Syringes (Disposable) U-100 0.3 ML Misc  1 each by Does not apply route daily     Levemir FlexPen 100 UNIT/ML injection pen  Generic drug: insulin detemir     magnesium oxide 400 MG tablet  Commonly known as: MAG-OX     metFORMIN 1000 MG tablet  Commonly known as: GLUCOPHAGE     NovoLOG FlexPen 100 UNIT/ML injection pen  Generic drug: insulin aspart     nystatin 408977 UNIT/GM cream  Commonly known as: MYCOSTATIN     OXYGEN     pregabalin 300 MG capsule  Commonly known as: LYRICA  Take 1 capsule by mouth 2 times daily for 2 days. Max Daily Amount: 600 mg     simvastatin 20 MG tablet  Commonly known as: ZOCOR     traZODone 100 MG tablet  Commonly known as: DESYREL     Trelegy Ellipta 100-62.5-25 MCG/ACT Aepb inhaler  Generic drug: fluticasone-umeclidin-vilant  Inhale 1 puff into the lungs daily     Trulicity 3 [place]/4.3YH Sopn  Generic drug: Dulaglutide            STOP taking these medications      esomeprazole 40 MG delayed release capsule  Commonly known as: NEXIUM     meloxicam 15 MG tablet  Commonly known as: MOBIC               Where to Get Your Medications        You can get these medications from any pharmacy    Bring a paper prescription for each of these medications  oxyCODONE-acetaminophen 7.5-325 MG per tablet  pregabalin 300 MG capsule       Information about where to get these medications is not yet available    Ask your nurse or doctor about these medications  amLODIPine 5 MG tablet  pantoprazole 40 MG tablet  primidone 50 MG tablet           Discharge Condition/Location: Stable to SNF     Follow Up: Follow up with PCP.     Total time spent on discharge is 35 minutes      Zuleika Anton MD 3/24/2023 9:26 AM

## 2023-03-24 NOTE — PROGRESS NOTES
Patient resting in bed, AM assessment completed. Lungs decreased. BS+. INT intact. Patient assisted x 2 by karen to [facility]. No acute distress noted. Call light in reach. Will continue to monitor. IOL for polyhydramnios and macrosomia

## 2023-03-24 NOTE — PLAN OF CARE
SYSTOLIC FAILURE:   [de-identified] None    Diuretics:  [de-identified] Furosemide    Diet Reviewed: Yes   ADULT DIET; Dysphagia - Soft and Bite Sized; 4 carb choices (60 gm/meal)    Goal of Care Reviewed: Yes   Patient and/or Family's stated Goal of Care this Admission: reduce shortness of breath, increase activity tolerance, better understand heart failure and disease management, be more comfortable, reduce lower extremity edema, and unable to assess, will attempt again prior to discharge.

## 2023-03-24 NOTE — FLOWSHEET NOTE
Shift assessment completed. Patient awake in bed. A/Ox4. VSS. Scheduled PM medications given. Patient denies any needs at this time. Call light and bedside table within reach.

## 2023-03-24 NOTE — PROGRESS NOTES
Admit: 3/20/2023    Name:  Katie Magana  Room:  /0389-61  MRN:    [id number]    Daily Progress Note for 3/24/2023   Admitted after a fall at home. Interval History:     Feels very weak.   Concerned about tremors  Scheduled Meds:   amLODIPine  5 mg Oral Daily    primidone  50 mg Oral Nightly    allopurinol  100 mg Oral Daily    aspirin  81 mg Oral Daily    dicyclomine  10 mg Oral TID    escitalopram  10 mg Oral Daily    ferrous sulfate  325 mg Oral TID WC    [Held by provider] furosemide  10 mg Oral Daily    hydrOXYzine HCl  25 mg Oral TID    pregabalin  300 mg Oral BID    atorvastatin  10 mg Oral Daily    sodium chloride flush  5-40 mL IntraVENous 2 times per day    insulin lispro  0-4 Units SubCUTAneous TID WC    insulin lispro  0-4 Units SubCUTAneous Nightly    budesonide-formoterol  2 puff Inhalation BID    tiotropium  2 puff Inhalation Daily    insulin glargine  60 Units SubCUTAneous Nightly    magnesium oxide  400 mg Oral Daily    oxyCODONE-acetaminophen  1 tablet Oral 4x daily    nystatin   Topical Nightly    traZODone  100 mg Oral Nightly    enoxaparin  40 mg SubCUTAneous BID    pantoprazole  40 mg IntraVENous BID       Continuous Infusions:   sodium chloride      dextrose         PRN Meds:  sodium chloride flush, sodium chloride, potassium chloride **OR** potassium alternative oral replacement **OR** potassium chloride, magnesium sulfate, ondansetron **OR** ondansetron, polyethylene glycol, acetaminophen **OR** acetaminophen, glucose, dextrose bolus **OR** dextrose bolus, glucagon (rDNA), dextrose, albuterol sulfate HFA                  Objective:     Temp  Av.7 °F (37.1 °C)  Min: 97.6 °F (36.4 °C)  Max: 99.6 °F (37.6 °C)  Pulse  Av.4  Min: 75  Max: 90  BP  Min: 126/65  Max: 154/75  SpO2  Av.3 %  Min: 92 %  Max: 97 %  Patient Vitals for the past 4 hrs:   BP Temp Temp src Pulse Resp SpO2   23 0853 (!) 145/86 99.6 °F (37.6 °C) Oral 84 18 97 %   23 0851 -- -- -- -- 18 -- input(s): AST, ALT, ALB, BILIDIR, BILITOT, ALKPHOS in the last 72 hours. CT HEAD WO CONTRAST   Final Result   No acute intracranial abnormality. XR SHOULDER LEFT (MIN 2 VIEWS)   Final Result   No acute abnormality or malalignment identified. XR KNEE RIGHT (3 VIEWS)   Final Result   No acute osseous abnormality identified. Advanced arthropathy. XR HIP 2-3 VW W PELVIS LEFT   Final Result   No acute osseous abnormality appreciated. If there remains concern for occult fracture, further evaluation may be   warranted with CT or MRI. XR CHEST PORTABLE   Final Result   Basilar opacities, left greater than right. This may in part represent   atelectasis, however developing consolidation should be considered in the   appropriate clinical setting. XR KNEE LEFT (3 VIEWS)   Final Result   No acute osseous abnormality identified. Advanced arthropathy in the knee.                Assessment & Plan:     Patient Active Problem List    Diagnosis Date Noted    Fall on same level from slipping, tripping, or stumbling 03/21/2023    Fall at home, initial encounter 03/20/2023    Unable to walk 03/20/2023    Acute hip pain, left 03/20/2023    Acute pain of both knees 03/20/2023    Acute pain of left shoulder 03/20/2023    Dysphagia 03/20/2023    Lactic acidosis 03/20/2023    Hypercapnia 10/13/2022    Acute respiratory failure with hypoxemia ([address] 10/12/2022    Hyperkalemia 09/09/2022    Generalized weakness 09/08/2022    Morbid obesity ([address] 07/26/2019    Hyperlipidemia 07/26/2019    DM (diabetes mellitus) ([address] 07/26/2019    CHF (congestive heart failure) ([address] 07/26/2019    Tremor 07/26/2019    Hypomagnesemia 07/26/2019    Tremors of nervous system 07/26/2019    Elevated blood pressure reading 01/15/2019    General weakness 10/08/2016    PNA (pneumonia) 10/08/2016    Chronic obstructive pulmonary disease with acute exacerbation (HCC)     Acute respiratory failure (HCC)     Fluid

## 2023-03-24 NOTE — PROGRESS NOTES
Pt fearful and tearful about her health. Offered listening presence, words or encouragement and prayed with pt at the end of visit. Spiritual care available to follow prn.

## 2023-05-15 ENCOUNTER — OFFICE VISIT (OUTPATIENT)
Age: 58
End: 2023-05-15
Payer: MEDICARE

## 2023-05-15 VITALS
BODY MASS INDEX: 55.32 KG/M2 | HEART RATE: 72 BPM | HEIGHT: 61 IN | SYSTOLIC BLOOD PRESSURE: 120 MMHG | DIASTOLIC BLOOD PRESSURE: 68 MMHG | WEIGHT: 293 LBS | OXYGEN SATURATION: 95 %

## 2023-05-15 DIAGNOSIS — R42 SPELL OF DIZZINESS: Primary | ICD-10-CM

## 2023-05-15 DIAGNOSIS — G62.9 POLYNEUROPATHY: ICD-10-CM

## 2023-05-15 DIAGNOSIS — R29.6 FREQUENT FALLS: ICD-10-CM

## 2023-05-15 PROCEDURE — 99204 OFFICE O/P NEW MOD 45 MIN: CPT | Performed by: PSYCHIATRY & NEUROLOGY

## 2023-05-15 RX ORDER — OXYCODONE AND ACETAMINOPHEN 7.5; 325 MG/1; MG/1
TABLET ORAL
COMMUNITY
Start: 2023-05-01

## 2023-05-15 RX ORDER — PREGABALIN 150 MG/1
1 CAPSULE ORAL 2 TIMES DAILY
COMMUNITY

## 2023-05-15 RX ORDER — DIAZEPAM 5 MG/1
5 TABLET ORAL EVERY 8 HOURS PRN
Qty: 5 TABLET | Refills: 0 | Status: SHIPPED | OUTPATIENT
Start: 2023-05-15 | End: 2023-05-25

## 2023-05-15 NOTE — PATIENT INSTRUCTIONS
Please call [facility] Scheduling to set up your Brain MRI without contrast and an EEG study. Their phone # is [phone]. When you call, ask if you can do them both on the same day. You can also ask which [facility] have the Open MRI option.

## 2023-05-15 NOTE — PROGRESS NOTES
Neurology outpatient new visit    Patient name: Lonnie Orozco      Chief Complaint:  Recurrent spell with lightheadedness and unsteadiness    History of present illness: This is a 61years old right-handed female. The patient is here for evaluation of the spell. The onset was 3 to 4 years ago. The patient describes the spell as sudden onset of lightheadedness and unsteadiness. The patient also reports multiple falls from her spell. Per family, the patient also develops slurred speech during the spell. The patient had multiple visits in the ER from the spells. Per family, the patient was found to have multiple etiologies, especially with the metabolic derangements with those spells. However, the work-up could not reveal the etiology at times with the spell. The patient is noted for chronic diabetes with diabetic neuropathy. The patient is currently on pregabalin for painful neuropathy. The patient is not aware the side effects from pregabalin with weight gain. Past medical history:    Past Medical History:   Diagnosis Date    Anxiety     Arthritis     CHF (congestive heart failure) (HCC)     COPD (chronic obstructive pulmonary disease) (HCC)     Depression     Diabetes mellitus ([address]     Diverticulosis     Hyperlipidemia     Hypertension     Obesities, morbid ([address]        Past surgical history:    Past Surgical History:   Procedure Laterality Date     SECTION      x3    COLONOSCOPY      ENDOMETRIAL ABLATION      ENDOSCOPY, COLON, DIAGNOSTIC      UPPER GASTROINTESTINAL ENDOSCOPY N/A 3/21/2023    EGD BIOPSY performed by Jose Juan Dean DO at [facility]        Medication:    Current Outpatient Medications   Medication Sig Dispense Refill    oxyCODONE-acetaminophen (PERCOCET) 7.5-325 MG per tablet       pregabalin (LYRICA) 150 MG capsule Take 1 capsule by mouth in the morning and at bedtime.  300 mg BID      amLODIPine (NORVASC) 5 MG tablet Take 1 tablet by mouth daily 30 tablet 0

## 2023-06-05 ENCOUNTER — HOSPITAL ENCOUNTER (OUTPATIENT)
Dept: MRI IMAGING | Age: 58
Discharge: HOME | End: 2023-06-05
Payer: MEDICARE

## 2023-06-05 ENCOUNTER — HOSPITAL ENCOUNTER (OUTPATIENT)
Dept: NEUROLOGY | Age: 58
Discharge: HOME | End: 2023-06-05
Payer: MEDICARE

## 2023-06-05 DIAGNOSIS — R42 SPELL OF DIZZINESS: ICD-10-CM

## 2023-06-05 DIAGNOSIS — R29.6 FREQUENT FALLS: ICD-10-CM

## 2023-06-05 DIAGNOSIS — G62.9 POLYNEUROPATHY: ICD-10-CM

## 2023-06-05 PROCEDURE — 95813 EEG EXTND MNTR 61-119 MIN: CPT

## 2023-06-05 PROCEDURE — 95813 EEG EXTND MNTR 61-119 MIN: CPT | Performed by: PSYCHIATRY & NEUROLOGY

## 2023-06-05 NOTE — PROCEDURES
INTERPRETATION:  This 61-minute, computer-assisted video EEG recording is abnormal due to the followings. 1.  Frequent generalized rhythmic delta activity (GRDA). 2.  Moderate degree of focal slowing over bilateral occipital head regions. The EEG findings were consistent with moderate degree of nonspecific generalized cerebral dysfunction with maximal over the bilateral occipital head regions. The finding of GRDA was associated with nonspecific encephalopathy, however, a small portion of this pattern was with seizure tendency. CLASSIFICATION:  Dysrhythmia grade 3, generalized rhythmic delta activity. Sleep - unsuccessful. EKG channel. DESCRIPTION:    BACKGROUND:  The awake recording revealed 6-7 hz theta activity over the posterior head region. The background EEG also showed intermittent frequent 2 to 4 Hz delta activity, maximal over the occipital head regions (O1/O2). The EEG also showed occasional generalized rhythmic delta activity at frequency 1 to 2 Hz without fluctuation or evolution. The pattern lasted between 3 to 5 seconds without clinical correlate. Given the extensive study, the patient still did not sleep. The EEG showed normal V waves during drowsiness. There was no significant change on the EEG background with photic stimulation. Hyperventilation was omitted due to old age. INTERICTAL DISCHARGES: None    CLINICAL EVENTS:  None    The EKG channel was unremarkable.

## 2023-06-07 ENCOUNTER — OFFICE VISIT (OUTPATIENT)
Age: 58
End: 2023-06-07
Payer: MEDICARE

## 2023-06-07 VITALS
DIASTOLIC BLOOD PRESSURE: 70 MMHG | BODY MASS INDEX: 55.32 KG/M2 | SYSTOLIC BLOOD PRESSURE: 124 MMHG | HEART RATE: 76 BPM | WEIGHT: 293 LBS | OXYGEN SATURATION: 94 % | HEIGHT: 61 IN

## 2023-06-07 DIAGNOSIS — R42 SPELL OF DIZZINESS: ICD-10-CM

## 2023-06-07 DIAGNOSIS — G45.9 TIA (TRANSIENT ISCHEMIC ATTACK): Primary | ICD-10-CM

## 2023-06-07 PROCEDURE — 99213 OFFICE O/P EST LOW 20 MIN: CPT | Performed by: PSYCHIATRY & NEUROLOGY

## 2023-06-07 NOTE — PROGRESS NOTES
Medication:    Current Outpatient Medications   Medication Sig Dispense Refill    oxyCODONE-acetaminophen (PERCOCET) 7.5-325 MG per tablet       pregabalin (LYRICA) 150 MG capsule Take 1 capsule by mouth in the morning and at bedtime.  300 mg BID      amLODIPine (NORVASC) 5 MG tablet Take 1 tablet by mouth daily 30 tablet 0    pantoprazole (PROTONIX) 40 MG tablet Take 1 tablet by mouth 2 times daily (before meals) 60 tablet 1    furosemide (LASIX) 40 MG tablet Take 1 tablet by mouth daily      insulin detemir (LEVEMIR) 100 UNIT/ML injection pen Inject 60 Units into the skin nightly      insulin aspart (NOVOLOG FLEXPEN) 100 UNIT/ML injection pen Inject 48 Units into the skin 3 times daily (before meals)      Dulaglutide (TRULICITY) 3 TO/0.2MK SOPN Inject 4.5 mg into the skin once a week Takes on Fridays      fluticasone-umeclidin-vilant (TRELEGY ELLIPTA) 100-62.5-25 MCG/ACT AEPB inhaler Inhale 1 puff into the lungs daily 1 each 3    escitalopram (LEXAPRO) 10 MG tablet Take 1 tablet by mouth daily 30 tablet 3    allopurinol (ZYLOPRIM) 100 MG tablet Take 1 tablet by mouth daily      simvastatin (ZOCOR) 20 MG tablet Take 1 tablet by mouth nightly      hydrOXYzine pamoate (VISTARIL) 25 MG capsule Take 1 capsule by mouth 3 times daily      magnesium oxide (MAG-OX) 400 MG tablet Take 1 tablet by mouth daily      EPINEPHrine (EPIPEN) 0.3 MG/0.3ML SOAJ injection Inject 0.3 mLs into the muscle Allergy of unknown origin      traZODone (DESYREL) 100 MG tablet Take 1 tablet by mouth nightly      glipiZIDE (GLUCOTROL XL) 10 MG extended release tablet Take 1 tablet by mouth daily      albuterol sulfate  (90 BASE) MCG/ACT inhaler Inhale 2 puffs into the lungs every 6 hours as needed for Wheezing      nystatin (MYCOSTATIN) 188436 UNIT/GM cream Apply topically nightly Apply under breasts at night      OXYGEN Inhale 4 L into the lungs      metFORMIN (GLUCOPHAGE) 1000 MG tablet Take 1 tablet by mouth 2 times daily (with

## 2023-06-19 ENCOUNTER — ANESTHESIA EVENT (OUTPATIENT)
Dept: MRI IMAGING | Age: 58
End: 2023-06-19

## 2023-06-19 ENCOUNTER — ANESTHESIA (OUTPATIENT)
Dept: MRI IMAGING | Age: 58
End: 2023-06-19

## 2023-06-19 ENCOUNTER — ANESTHESIA EVENT (OUTPATIENT)
Dept: ENDOSCOPY | Age: 58
End: 2023-06-19
Payer: MEDICARE

## 2023-06-19 NOTE — ANESTHESIA PRE PROCEDURE
Department of Anesthesiology  Preprocedure Note       Name:  Kuldip Simeon   Age:  61 y.o.  :  1965                                          MRN:  [id number]         Date:  2023      Surgeon: * No surgeons listed *    Procedure: * No procedures listed *    Medications prior to admission:   Prior to Admission medications    Medication Sig Start Date End Date Taking? Authorizing Provider   TRUDY ELLIPTA 100-62.5-25 MCG/ACT AEPB inhaler INHALE ONE (1) PUFF INTO THE LUNGS DAILY 23   Santa Huang MD   oxyCODONE-acetaminophen (PERCOCET) 7.5-325 MG per tablet Take 1 tablet by mouth every 6 hours as needed. 23   Historical Provider, MD   pregabalin (LYRICA) 150 MG capsule Take 1 capsule by mouth in the morning and at bedtime.  300 mg BID    Historical Provider, MD   amLODIPine (NORVASC) 5 MG tablet Take 1 tablet by mouth daily 3/25/23   Tarun Pathak MD   pantoprazole (PROTONIX) 40 MG tablet Take 1 tablet by mouth 2 times daily (before meals) 3/24/23   Tarun Pathak MD   furosemide (LASIX) 40 MG tablet Take 1 tablet by mouth daily    Historical Provider, MD   insulin detemir (LEVEMIR) 100 UNIT/ML injection pen Inject 60 Units into the skin nightly    Historical Provider, MD   insulin aspart (NOVOLOG FLEXPEN) 100 UNIT/ML injection pen Inject 48 Units into the skin 3 times daily (before meals)    Historical Provider, MD   Dulaglutide (TRULICITY) 3 EZ/5.3XA SOPN Inject 4.5 mg into the skin once a week Takes on     Historical Provider, MD   escitalopram (LEXAPRO) 10 MG tablet Take 1 tablet by mouth daily 10/14/22   Petar Martinez MD   allopurinol (ZYLOPRIM) 100 MG tablet Take 1 tablet by mouth daily    Historical Provider, MD   simvastatin (ZOCOR) 20 MG tablet Take 1 tablet by mouth nightly    Historical Provider, MD   hydrOXYzine pamoate (VISTARIL) 25 MG capsule Take 1 capsule by mouth 3 times daily 18   Historical Provider, MD   magnesium oxide (MAG-OX)

## 2023-06-20 ENCOUNTER — ANESTHESIA (OUTPATIENT)
Dept: ENDOSCOPY | Age: 58
End: 2023-06-20
Payer: MEDICARE

## 2023-06-20 ENCOUNTER — HOSPITAL ENCOUNTER (OUTPATIENT)
Age: 58
Setting detail: OUTPATIENT SURGERY
Discharge: HOME | End: 2023-06-20
Attending: INTERNAL MEDICINE | Admitting: INTERNAL MEDICINE
Payer: MEDICARE

## 2023-06-20 VITALS
TEMPERATURE: 97.1 F | RESPIRATION RATE: 18 BRPM | WEIGHT: 293 LBS | OXYGEN SATURATION: 98 % | HEART RATE: 69 BPM | BODY MASS INDEX: 55.32 KG/M2 | HEIGHT: 61 IN | SYSTOLIC BLOOD PRESSURE: 148 MMHG | DIASTOLIC BLOOD PRESSURE: 81 MMHG

## 2023-06-20 DIAGNOSIS — K20.90 ESOPHAGITIS: ICD-10-CM

## 2023-06-20 LAB
GLUCOSE BLD-MCNC: 259 MG/DL (ref 70–99)
GLUCOSE BLD-MCNC: 273 MG/DL (ref 70–99)
PERFORMED ON: ABNORMAL
PERFORMED ON: ABNORMAL

## 2023-06-20 PROCEDURE — 6360000002 HC RX W HCPCS

## 2023-06-20 PROCEDURE — 3700000001 HC ADD 15 MINUTES (ANESTHESIA): Performed by: INTERNAL MEDICINE

## 2023-06-20 PROCEDURE — 2580000003 HC RX 258: Performed by: NURSE ANESTHETIST, CERTIFIED REGISTERED

## 2023-06-20 PROCEDURE — 88305 TISSUE EXAM BY PATHOLOGIST: CPT

## 2023-06-20 PROCEDURE — 2500000003 HC RX 250 WO HCPCS

## 2023-06-20 PROCEDURE — 2709999900 HC NON-CHARGEABLE SUPPLY: Performed by: INTERNAL MEDICINE

## 2023-06-20 PROCEDURE — 7100000010 HC PHASE II RECOVERY - FIRST 15 MIN: Performed by: INTERNAL MEDICINE

## 2023-06-20 PROCEDURE — 3700000000 HC ANESTHESIA ATTENDED CARE: Performed by: INTERNAL MEDICINE

## 2023-06-20 PROCEDURE — 7100000011 HC PHASE II RECOVERY - ADDTL 15 MIN: Performed by: INTERNAL MEDICINE

## 2023-06-20 PROCEDURE — 88342 IMHCHEM/IMCYTCHM 1ST ANTB: CPT

## 2023-06-20 PROCEDURE — 3609012400 HC EGD TRANSORAL BIOPSY SINGLE/MULTIPLE: Performed by: INTERNAL MEDICINE

## 2023-06-20 RX ORDER — SODIUM CHLORIDE 0.9 % (FLUSH) 0.9 %
5-40 SYRINGE (ML) INJECTION EVERY 12 HOURS SCHEDULED
Status: DISCONTINUED | OUTPATIENT
Start: 2023-06-20 | End: 2023-06-20 | Stop reason: HOSPADM

## 2023-06-20 RX ORDER — SODIUM CHLORIDE, SODIUM LACTATE, POTASSIUM CHLORIDE, CALCIUM CHLORIDE 600; 310; 30; 20 MG/100ML; MG/100ML; MG/100ML; MG/100ML
INJECTION, SOLUTION INTRAVENOUS CONTINUOUS PRN
Status: DISCONTINUED | OUTPATIENT
Start: 2023-06-20 | End: 2023-06-20 | Stop reason: SDUPTHER

## 2023-06-20 RX ORDER — LIDOCAINE HYDROCHLORIDE 20 MG/ML
INJECTION, SOLUTION INFILTRATION; PERINEURAL PRN
Status: DISCONTINUED | OUTPATIENT
Start: 2023-06-20 | End: 2023-06-20 | Stop reason: SDUPTHER

## 2023-06-20 RX ORDER — OXYCODONE HYDROCHLORIDE 5 MG/1
5 TABLET ORAL PRN
Status: CANCELLED | OUTPATIENT
Start: 2023-06-20 | End: 2023-06-20

## 2023-06-20 RX ORDER — KETOROLAC TROMETHAMINE 30 MG/ML
INJECTION, SOLUTION INTRAMUSCULAR; INTRAVENOUS PRN
Status: DISCONTINUED | OUTPATIENT
Start: 2023-06-20 | End: 2023-06-20 | Stop reason: SDUPTHER

## 2023-06-20 RX ORDER — SODIUM CHLORIDE 9 MG/ML
INJECTION, SOLUTION INTRAVENOUS PRN
Status: DISCONTINUED | OUTPATIENT
Start: 2023-06-20 | End: 2023-06-20 | Stop reason: HOSPADM

## 2023-06-20 RX ORDER — SODIUM CHLORIDE 9 MG/ML
INJECTION, SOLUTION INTRAVENOUS PRN
Status: CANCELLED | OUTPATIENT
Start: 2023-06-20

## 2023-06-20 RX ORDER — MEPERIDINE HYDROCHLORIDE 25 MG/ML
12.5 INJECTION INTRAMUSCULAR; INTRAVENOUS; SUBCUTANEOUS EVERY 5 MIN PRN
Status: CANCELLED | OUTPATIENT
Start: 2023-06-20

## 2023-06-20 RX ORDER — DIPHENHYDRAMINE HYDROCHLORIDE 50 MG/ML
12.5 INJECTION INTRAMUSCULAR; INTRAVENOUS
Status: CANCELLED | OUTPATIENT
Start: 2023-06-20 | End: 2023-06-21

## 2023-06-20 RX ORDER — LIDOCAINE HYDROCHLORIDE 10 MG/ML
1 INJECTION, SOLUTION EPIDURAL; INFILTRATION; INTRACAUDAL; PERINEURAL
Status: DISCONTINUED | OUTPATIENT
Start: 2023-06-20 | End: 2023-06-20 | Stop reason: HOSPADM

## 2023-06-20 RX ORDER — OXYCODONE HYDROCHLORIDE 5 MG/1
10 TABLET ORAL PRN
Status: CANCELLED | OUTPATIENT
Start: 2023-06-20 | End: 2023-06-20

## 2023-06-20 RX ORDER — PROPOFOL 10 MG/ML
INJECTION, EMULSION INTRAVENOUS PRN
Status: DISCONTINUED | OUTPATIENT
Start: 2023-06-20 | End: 2023-06-20 | Stop reason: SDUPTHER

## 2023-06-20 RX ORDER — LABETALOL HYDROCHLORIDE 5 MG/ML
10 INJECTION, SOLUTION INTRAVENOUS
Status: CANCELLED | OUTPATIENT
Start: 2023-06-20

## 2023-06-20 RX ORDER — SODIUM CHLORIDE 0.9 % (FLUSH) 0.9 %
5-40 SYRINGE (ML) INJECTION PRN
Status: DISCONTINUED | OUTPATIENT
Start: 2023-06-20 | End: 2023-06-20 | Stop reason: HOSPADM

## 2023-06-20 RX ORDER — SODIUM CHLORIDE 0.9 % (FLUSH) 0.9 %
5-40 SYRINGE (ML) INJECTION PRN
Status: CANCELLED | OUTPATIENT
Start: 2023-06-20

## 2023-06-20 RX ORDER — SODIUM CHLORIDE 0.9 % (FLUSH) 0.9 %
5-40 SYRINGE (ML) INJECTION EVERY 12 HOURS SCHEDULED
Status: CANCELLED | OUTPATIENT
Start: 2023-06-20

## 2023-06-20 RX ORDER — ONDANSETRON 2 MG/ML
4 INJECTION INTRAMUSCULAR; INTRAVENOUS
Status: CANCELLED | OUTPATIENT
Start: 2023-06-20 | End: 2023-06-21

## 2023-06-20 RX ORDER — SODIUM CHLORIDE, SODIUM LACTATE, POTASSIUM CHLORIDE, CALCIUM CHLORIDE 600; 310; 30; 20 MG/100ML; MG/100ML; MG/100ML; MG/100ML
INJECTION, SOLUTION INTRAVENOUS CONTINUOUS
Status: DISCONTINUED | OUTPATIENT
Start: 2023-06-20 | End: 2023-06-20 | Stop reason: HOSPADM

## 2023-06-20 RX ADMIN — PROPOFOL 100 MG: 10 INJECTION, EMULSION INTRAVENOUS at 11:30

## 2023-06-20 RX ADMIN — SODIUM CHLORIDE, POTASSIUM CHLORIDE, SODIUM LACTATE AND CALCIUM CHLORIDE: 600; 310; 30; 20 INJECTION, SOLUTION INTRAVENOUS at 11:19

## 2023-06-20 RX ADMIN — PROPOFOL 30 MG: 10 INJECTION, EMULSION INTRAVENOUS at 11:31

## 2023-06-20 RX ADMIN — LIDOCAINE HYDROCHLORIDE 100 MG: 20 INJECTION, SOLUTION INFILTRATION; PERINEURAL at 11:30

## 2023-06-20 RX ADMIN — PROPOFOL 30 MG: 10 INJECTION, EMULSION INTRAVENOUS at 11:32

## 2023-06-20 RX ADMIN — KETOROLAC TROMETHAMINE 30 MG: 30 INJECTION, SOLUTION INTRAMUSCULAR at 11:32

## 2023-06-20 RX ADMIN — PROPOFOL 20 MG: 10 INJECTION, EMULSION INTRAVENOUS at 11:34

## 2023-06-20 ASSESSMENT — PAIN DESCRIPTION - DESCRIPTORS
DESCRIPTORS: ACHING;SORE;SHOOTING
DESCRIPTORS: ACHING

## 2023-06-20 ASSESSMENT — PAIN - FUNCTIONAL ASSESSMENT
PAIN_FUNCTIONAL_ASSESSMENT: ACTIVITIES ARE NOT PREVENTED
PAIN_FUNCTIONAL_ASSESSMENT: 0-10

## 2023-06-20 ASSESSMENT — PAIN DESCRIPTION - ORIENTATION: ORIENTATION: RIGHT

## 2023-06-20 ASSESSMENT — PAIN DESCRIPTION - LOCATION: LOCATION: JAW

## 2023-06-20 ASSESSMENT — PAIN DESCRIPTION - PAIN TYPE: TYPE: ACUTE PAIN

## 2023-06-20 NOTE — PROCEDURES
EGD PROCEDURE NOTE         Esophagogastroduodenoscopy Procedure Note     Patient: Petra Huertas MRN: [id number]   YOB: 1965 Age: 61 y.o. Sex: female   Unit: [facility] ENDOSCOPY Room/Bed: ENDO/NONE Location: [address]    Admitting Physician: Reji Lewis     Primary Care Physician: Kevyn Acosta PA-C      DATE OF PROCEDURE: 6/20/2023  PROCEDURE: Esophagogastroduodenoscopy  INDICATION: This is a 61y.o. year old female who presents today for follow-up of duodenal polyps  ENDOSCOPIST: Ranjit Sims DO    POSTOPERATIVE DIAGNOSIS: Some areas of villous atrophy that were biopsied and removed    PLAN: Await results of biopsies    INFORMED CONSENT:  Informed consent for esophagogastoduodenoscopy was obtained. The benefits and risks including adverse medicine reaction have been explained. The patient's questions were answered and the patient agreed to proceed. ASA:  ASA 3 - Patient with moderate systemic disease with functional limitations    SEDATION: MAC     The patient's vital signs, cardiac status, pulmonary status, abdominal status and mental status were stable for the procedure. The patient's vitals signs and respiratory function as monitored by oxygen saturation were stable throughout    Procedure Details: The Olympus videoendoscope was inserted into the mouth and carefully passed into the esophagus, through the stomach and to the distal duodenum. Antegrade and retrograde examination of the upper gi tract was carefully performed. Findings: The esophagus is normal.  The z-line is distinct without lesions or irregularities. There is no evidence of Zhang's esophagus. The scope easily passed into the stomach. The mucosa of the cardia, fundus, body and antrum of the stomach is normal.  The pylorus is patent and of normal contour. The scope easily advanced into the duodenal bulb and down to the distal duodenum.   In the duodenal sweep there was noted to

## 2023-06-20 NOTE — PROGRESS NOTES
Discharge instructions given to patient's  Denia Villalobosron at this time, he states understanding and denies any questions.

## 2023-06-20 NOTE — DISCHARGE INSTRUCTIONS
PATIENT INSTRUCTIONS  POST-SEDATION    Brianne Gunn          IMMEDIATELY FOLLOWING PROCEDURE:    Do not drive or operate machinery for the first twenty four hours after surgery. Do not make any important decisions for twenty four hours after surgery or while taking narcotic pain medications or sedatives. You should NOT BE LEFT UNATTENDED OR ALONE. A responsible adult should be with you for the rest of the day of your procedure and also during the night for your protection and safety. You may experience some light headedness. Rest at home with activity as tolerated. You may not need to go to bed, but it is important to rest for the next 24 hours. You should not engage in athletic sports such as basketball, volleyball, jogging, skating, or activities requiring refined motor skills for 24 hours. If you develop intractable nausea and vomiting or a severe headache please notify your doctor immediately. You are not expected to have any fever, but if you feel warm, take your temperature. If you have a fever 101 degrees or higher, call your doctor. If you have had an Endoscopy:   *Eat lightly for your first meal and gradually resume your normal / prescribed diet. DO NOT eat or drink until your gag reflex returns. *If you have a sore throat you may use lozenges, or salt water gargles. *If you have had a colonoscopy, do not expect a normal bowel movement for approximately three days due to the cleansing of the large intestine prior to colonoscopy. ONCE YOU ARE HOME, IF YOU SHOULD HAVE:  Difficulty in breathing, persistent nausea or vomiting, bleeding you feel is excessive, or pain that is unusual, increased abdominal bloating, or any swelling, fever / chills, call your physician. If you cannot contact your physician, but feel that your signs and symptoms need a physician's attention, go to the Emergency Department.       FOLLOW-UP:    Please follow up with Annabelle Pineda PA-C as scheduled or

## 2023-06-20 NOTE — H&P
[facility] ENDOSCOPY  Outpatient Procedure H&P    Patient: Alyssa Mcmullen MRN: [id number]     YOB: 1965  Age: 61 y.o. Sex: female    Unit: [facility] ENDOSCOPY Room/Bed: ENDO/NONE Location: [address]     Procedure: Procedure(s):  EGD W/ANES. (10:45)    Indication: Recheck duodenal adenoma  Referring  Physician:        Brief history: Adenomatous polyp noted in the duodenum on previous endoscopy    Nurses past medical history notes reviewed and agreed. Medications reviewed.     Allergies: Hydromorphone hcl, Ace inhibitors, Azithromycin, Dilaudid [hydromorphone hcl], Lisinopril, Morphine, and Penicillins     Allergies noted: Yes     Past Medical History:   Past Medical History:   Diagnosis Date    Anxiety     Arthritis     CHF (congestive heart failure) (HCC)     COPD (chronic obstructive pulmonary disease) (HCC)     Depression     Diabetes mellitus (HCC)     Diverticulosis     Hyperlipidemia     Hypertension     Obesities, morbid ([address]        Past Surgical History:   Past Surgical History:   Procedure Laterality Date     SECTION      x3    COLONOSCOPY      ENDOMETRIAL ABLATION      ENDOSCOPY, COLON, DIAGNOSTIC      UPPER GASTROINTESTINAL ENDOSCOPY N/A 3/21/2023    EGD BIOPSY performed by Ines Corral DO at [facility]. [facility] History:   Social History     Socioeconomic History    Marital status:      Spouse name: Not on file    Number of children: Not on file    Years of education: Not on file    Highest education level: Not on file   Occupational History    Not on file   Tobacco Use    Smoking status: Former     Packs/day: 2.00     Years: 40.00     Pack years: 80.00     Types: Cigarettes     Quit date: 2014     Years since quittin.4    Smokeless tobacco: Never   Vaping Use    Vaping Use: Never used   Substance and Sexual Activity    Alcohol use: No    Drug use: No    Sexual activity: Never   Other Topics Concern    Not on file   Social History

## 2023-06-20 NOTE — ANESTHESIA PRE PROCEDURE
Department of Anesthesiology  Preprocedure Note       Name:  Shyla Pitts   Age:  61 y.o.  :  1965                                          MRN:  [id number]         Date:  2023      Surgeon: Eugenia Solomon):  Loli Huggins DO    Procedure: Procedure(s):  EGD W/ANES. (10:45)    Medications prior to admission:   Prior to Admission medications    Medication Sig Start Date End Date Taking? Authorizing Provider   TRUDY ELLIPTA 100-62.5-25 MCG/ACT AEPB inhaler INHALE ONE (1) PUFF INTO THE LUNGS DAILY 23   Santa Hui MD   oxyCODONE-acetaminophen (PERCOCET) 7.5-325 MG per tablet Take 1 tablet by mouth every 6 hours as needed. 23   Historical Provider, MD   pregabalin (LYRICA) 150 MG capsule Take 1 capsule by mouth in the morning and at bedtime.  300 mg BID    Historical Provider, MD   amLODIPine (NORVASC) 5 MG tablet Take 1 tablet by mouth daily 3/25/23   Laurence Collins MD   pantoprazole (PROTONIX) 40 MG tablet Take 1 tablet by mouth 2 times daily (before meals) 3/24/23   Laurence Collins MD   furosemide (LASIX) 40 MG tablet Take 1 tablet by mouth daily    Historical Provider, MD   insulin detemir (LEVEMIR) 100 UNIT/ML injection pen Inject 60 Units into the skin nightly    Historical Provider, MD   insulin aspart (NOVOLOG FLEXPEN) 100 UNIT/ML injection pen Inject 48 Units into the skin 3 times daily (before meals)    Historical Provider, MD   Dulaglutide (TRULICITY) 3 CE/7.0HP SOPN Inject 4.5 mg into the skin once a week Takes on     Historical Provider, MD   escitalopram (LEXAPRO) 10 MG tablet Take 1 tablet by mouth daily 10/14/22   Varinder Smith MD   allopurinol (ZYLOPRIM) 100 MG tablet Take 1 tablet by mouth daily    Historical Provider, MD   simvastatin (ZOCOR) 20 MG tablet Take 1 tablet by mouth nightly    Historical Provider, MD   hydrOXYzine pamoate (VISTARIL) 25 MG capsule Take 1 capsule by mouth 3 times daily 18   Historical Provider, MD

## 2023-06-20 NOTE — ANESTHESIA POSTPROCEDURE EVALUATION
Department of Anesthesiology  Postprocedure Note    Patient: Daquan Goodrich  MRN: [id number]  YOB: 1965  Date of evaluation: 6/20/2023      Procedure Summary     Date: 06/20/23 Room / Location: [facility] ENDO 01 / [facility]    Anesthesia Start: 1122 Anesthesia Stop: 0700    Procedure: EGD BIOPSY Diagnosis:       Esophagitis      (Esophagitis [K20.90])    Surgeons: Blanquita Mathis DO Responsible Provider: Lea Jordan MD    Anesthesia Type: General ASA Status: 3          Anesthesia Type: General    Mackenzie Phase I: Mackenzie Score: 9    Mackenzie Phase II: Mackenzie Score: 9      Anesthesia Post Evaluation    Patient location during evaluation: PACU  Patient participation: complete - patient participated  Level of consciousness: awake and alert  Airway patency: patent  Nausea & Vomiting: no nausea and no vomiting  Complications: no  Cardiovascular status: blood pressure returned to baseline  Respiratory status: acceptable  Hydration status: euvolemic  Comments: VSS on transfer to phase 2 recovery. No anesthetic complications.

## 2023-07-07 RX ORDER — PRIMIDONE 50 MG/1
TABLET ORAL
Qty: 60 TABLET | Refills: 2 | OUTPATIENT
Start: 2023-07-07

## 2023-07-15 ENCOUNTER — HOSPITAL ENCOUNTER (EMERGENCY)
Age: 58
Discharge: HOME | End: 2023-07-16
Attending: STUDENT IN AN ORGANIZED HEALTH CARE EDUCATION/TRAINING PROGRAM
Payer: MEDICARE

## 2023-07-15 ENCOUNTER — APPOINTMENT (OUTPATIENT)
Dept: GENERAL RADIOLOGY | Age: 58
End: 2023-07-15
Payer: MEDICARE

## 2023-07-15 ENCOUNTER — APPOINTMENT (OUTPATIENT)
Dept: CT IMAGING | Age: 58
End: 2023-07-15
Payer: MEDICARE

## 2023-07-15 DIAGNOSIS — N39.0 URINARY TRACT INFECTION IN FEMALE: ICD-10-CM

## 2023-07-15 DIAGNOSIS — M25.561 ACUTE PAIN OF RIGHT KNEE: ICD-10-CM

## 2023-07-15 DIAGNOSIS — R40.4 TRANSIENT ALTERATION OF AWARENESS: Primary | ICD-10-CM

## 2023-07-15 LAB
ALBUMIN SERPL-MCNC: 3.9 G/DL (ref 3.4–5)
ALBUMIN/GLOB SERPL: 1.3 {RATIO} (ref 1.1–2.2)
ALP SERPL-CCNC: 92 U/L (ref 40–129)
ALT SERPL-CCNC: 51 U/L (ref 10–40)
ANION GAP SERPL CALCULATED.3IONS-SCNC: 12 MMOL/L (ref 3–16)
AST SERPL-CCNC: 37 U/L (ref 15–37)
BASOPHILS # BLD: 0.1 K/UL (ref 0–0.2)
BASOPHILS NFR BLD: 1.6 %
BILIRUB SERPL-MCNC: <0.2 MG/DL (ref 0–1)
BUN SERPL-MCNC: 22 MG/DL (ref 7–20)
CALCIUM SERPL-MCNC: 9.5 MG/DL (ref 8.3–10.6)
CHLORIDE SERPL-SCNC: 95 MMOL/L (ref 99–110)
CO2 SERPL-SCNC: 31 MMOL/L (ref 21–32)
CREAT SERPL-MCNC: 1 MG/DL (ref 0.6–1.1)
DEPRECATED RDW RBC AUTO: 14.2 % (ref 12.4–15.4)
EOSINOPHIL # BLD: 0.1 K/UL (ref 0–0.6)
EOSINOPHIL NFR BLD: 1.3 %
ETHANOLAMINE SERPL-MCNC: NORMAL MG/DL (ref 0–0.08)
GFR SERPLBLD CREATININE-BSD FMLA CKD-EPI: >60 ML/MIN/{1.73_M2}
GLUCOSE BLD-MCNC: 258 MG/DL (ref 70–99)
GLUCOSE SERPL-MCNC: 311 MG/DL (ref 70–99)
HCT VFR BLD AUTO: 36 % (ref 36–48)
HGB BLD-MCNC: 11.6 G/DL (ref 12–16)
LACTATE BLDV-SCNC: 2.2 MMOL/L (ref 0.4–2)
LYMPHOCYTES # BLD: 0.9 K/UL (ref 1–5.1)
LYMPHOCYTES NFR BLD: 18 %
MCH RBC QN AUTO: 27.1 PG (ref 26–34)
MCHC RBC AUTO-ENTMCNC: 32.1 G/DL (ref 31–36)
MCV RBC AUTO: 84.4 FL (ref 80–100)
MONOCYTES # BLD: 0.3 K/UL (ref 0–1.3)
MONOCYTES NFR BLD: 5.2 %
NEUTROPHILS # BLD: 3.7 K/UL (ref 1.7–7.7)
NEUTROPHILS NFR BLD: 73.9 %
PERFORMED ON: ABNORMAL
PLATELET # BLD AUTO: 122 K/UL (ref 135–450)
PMV BLD AUTO: 9.1 FL (ref 5–10.5)
POTASSIUM SERPL-SCNC: 4.2 MMOL/L (ref 3.5–5.1)
PROT SERPL-MCNC: 7 G/DL (ref 6.4–8.2)
RBC # BLD AUTO: 4.26 M/UL (ref 4–5.2)
SODIUM SERPL-SCNC: 138 MMOL/L (ref 136–145)
TSH SERPL DL<=0.005 MIU/L-ACNC: 1.7 UIU/ML (ref 0.27–4.2)
WBC # BLD AUTO: 5 K/UL (ref 4–11)

## 2023-07-15 PROCEDURE — 93005 ELECTROCARDIOGRAM TRACING: CPT | Performed by: STUDENT IN AN ORGANIZED HEALTH CARE EDUCATION/TRAINING PROGRAM

## 2023-07-15 PROCEDURE — 85025 COMPLETE CBC W/AUTO DIFF WBC: CPT

## 2023-07-15 PROCEDURE — 99285 EMERGENCY DEPT VISIT HI MDM: CPT

## 2023-07-15 PROCEDURE — 71045 X-RAY EXAM CHEST 1 VIEW: CPT

## 2023-07-15 PROCEDURE — 83605 ASSAY OF LACTIC ACID: CPT

## 2023-07-15 PROCEDURE — 36415 COLL VENOUS BLD VENIPUNCTURE: CPT

## 2023-07-15 PROCEDURE — 70450 CT HEAD/BRAIN W/O DYE: CPT

## 2023-07-15 PROCEDURE — 84443 ASSAY THYROID STIM HORMONE: CPT

## 2023-07-15 PROCEDURE — 82077 ASSAY SPEC XCP UR&BREATH IA: CPT

## 2023-07-15 PROCEDURE — 96365 THER/PROPH/DIAG IV INF INIT: CPT

## 2023-07-15 PROCEDURE — 73562 X-RAY EXAM OF KNEE 3: CPT

## 2023-07-15 PROCEDURE — 80053 COMPREHEN METABOLIC PANEL: CPT

## 2023-07-15 ASSESSMENT — LIFESTYLE VARIABLES: HOW OFTEN DO YOU HAVE A DRINK CONTAINING ALCOHOL: NEVER

## 2023-07-16 VITALS
OXYGEN SATURATION: 97 % | HEIGHT: 61 IN | BODY MASS INDEX: 61.41 KG/M2 | DIASTOLIC BLOOD PRESSURE: 76 MMHG | HEART RATE: 79 BPM | RESPIRATION RATE: 18 BRPM | SYSTOLIC BLOOD PRESSURE: 142 MMHG | TEMPERATURE: 98.2 F

## 2023-07-16 LAB
AMPHETAMINES UR QL SCN>1000 NG/ML: ABNORMAL
BACTERIA URNS QL MICRO: ABNORMAL /HPF
BARBITURATES UR QL SCN>200 NG/ML: ABNORMAL
BASE EXCESS BLDV CALC-SCNC: 4.5 MMOL/L (ref -3–3)
BENZODIAZ UR QL SCN>200 NG/ML: ABNORMAL
BILIRUB UR QL STRIP.AUTO: NEGATIVE
CANNABINOIDS UR QL SCN>50 NG/ML: ABNORMAL
CLARITY UR: CLEAR
CO2 BLDV-SCNC: 34 MMOL/L
COCAINE UR QL SCN: ABNORMAL
COHGB MFR BLDV: 1 % (ref 0–1.5)
COLOR UR: YELLOW
DRUG SCREEN COMMENT UR-IMP: ABNORMAL
EKG ATRIAL RATE: 72 BPM
EKG DIAGNOSIS: NORMAL
EKG P-R INTERVAL: 194 MS
EKG Q-T INTERVAL: 468 MS
EKG QRS DURATION: 100 MS
EKG QTC CALCULATION (BAZETT): 512 MS
EKG R AXIS: 6 DEGREES
EKG T AXIS: 34 DEGREES
EKG VENTRICULAR RATE: 72 BPM
EPI CELLS #/AREA URNS HPF: ABNORMAL /HPF (ref 0–5)
FENTANYL SCREEN, URINE: ABNORMAL
GLUCOSE UR STRIP.AUTO-MCNC: 500 MG/DL
HCO3 BLDV-SCNC: 32.1 MMOL/L (ref 23–29)
HGB UR QL STRIP.AUTO: NEGATIVE
KETONES UR STRIP.AUTO-MCNC: NEGATIVE MG/DL
LACTATE BLDV-SCNC: 1.3 MMOL/L (ref 0.4–2)
LEUKOCYTE ESTERASE UR QL STRIP.AUTO: ABNORMAL
METHADONE UR QL SCN>300 NG/ML: ABNORMAL
METHGB MFR BLDV: 0.1 %
MUCOUS THREADS #/AREA URNS LPF: ABNORMAL /LPF
NITRITE UR QL STRIP.AUTO: NEGATIVE
O2 THERAPY: ABNORMAL
OPIATES UR QL SCN>300 NG/ML: ABNORMAL
OXYCODONE UR QL SCN: POSITIVE
PCO2 BLDV: 62.5 MMHG (ref 40–50)
PCP UR QL SCN>25 NG/ML: ABNORMAL
PH BLDV: 7.33 [PH] (ref 7.35–7.45)
PH UR STRIP.AUTO: 5.5 [PH] (ref 5–8)
PH UR STRIP: 5.5 [PH]
PO2 BLDV: 132.1 MMHG (ref 25–40)
PROT UR STRIP.AUTO-MCNC: NEGATIVE MG/DL
RBC #/AREA URNS HPF: ABNORMAL /HPF (ref 0–4)
SAO2 % BLDV: 98 %
SP GR UR STRIP.AUTO: 1.02 (ref 1–1.03)
UA COMPLETE W REFLEX CULTURE PNL UR: ABNORMAL
UA DIPSTICK W REFLEX MICRO PNL UR: YES
URN SPEC COLLECT METH UR: ABNORMAL
UROBILINOGEN UR STRIP-ACNC: 0.2 E.U./DL
WBC #/AREA URNS HPF: ABNORMAL /HPF (ref 0–5)

## 2023-07-16 PROCEDURE — 82803 BLOOD GASES ANY COMBINATION: CPT

## 2023-07-16 PROCEDURE — 36415 COLL VENOUS BLD VENIPUNCTURE: CPT

## 2023-07-16 PROCEDURE — 96365 THER/PROPH/DIAG IV INF INIT: CPT

## 2023-07-16 PROCEDURE — 93010 ELECTROCARDIOGRAM REPORT: CPT | Performed by: INTERNAL MEDICINE

## 2023-07-16 PROCEDURE — 6360000002 HC RX W HCPCS: Performed by: STUDENT IN AN ORGANIZED HEALTH CARE EDUCATION/TRAINING PROGRAM

## 2023-07-16 PROCEDURE — 83605 ASSAY OF LACTIC ACID: CPT

## 2023-07-16 PROCEDURE — 2580000003 HC RX 258: Performed by: STUDENT IN AN ORGANIZED HEALTH CARE EDUCATION/TRAINING PROGRAM

## 2023-07-16 PROCEDURE — 81001 URINALYSIS AUTO W/SCOPE: CPT

## 2023-07-16 PROCEDURE — 80307 DRUG TEST PRSMV CHEM ANLYZR: CPT

## 2023-07-16 PROCEDURE — 6370000000 HC RX 637 (ALT 250 FOR IP): Performed by: STUDENT IN AN ORGANIZED HEALTH CARE EDUCATION/TRAINING PROGRAM

## 2023-07-16 RX ORDER — CEPHALEXIN 500 MG/1
500 CAPSULE ORAL 2 TIMES DAILY
Qty: 14 CAPSULE | Refills: 0 | Status: SHIPPED | OUTPATIENT
Start: 2023-07-16 | End: 2023-07-23

## 2023-07-16 RX ORDER — 0.9 % SODIUM CHLORIDE 0.9 %
500 INTRAVENOUS SOLUTION INTRAVENOUS ONCE
Status: COMPLETED | OUTPATIENT
Start: 2023-07-16 | End: 2023-07-16

## 2023-07-16 RX ORDER — IPRATROPIUM BROMIDE AND ALBUTEROL SULFATE 2.5; .5 MG/3ML; MG/3ML
1 SOLUTION RESPIRATORY (INHALATION) ONCE
Status: COMPLETED | OUTPATIENT
Start: 2023-07-16 | End: 2023-07-16

## 2023-07-16 RX ADMIN — CEFTRIAXONE SODIUM 1000 MG: 1 INJECTION, POWDER, FOR SOLUTION INTRAMUSCULAR; INTRAVENOUS at 02:09

## 2023-07-16 RX ADMIN — IPRATROPIUM BROMIDE AND ALBUTEROL SULFATE 1 DOSE: .5; 2.5 SOLUTION RESPIRATORY (INHALATION) at 01:59

## 2023-07-16 RX ADMIN — SODIUM CHLORIDE 500 ML: 9 INJECTION, SOLUTION INTRAVENOUS at 01:23

## 2023-07-16 ASSESSMENT — PAIN - FUNCTIONAL ASSESSMENT: PAIN_FUNCTIONAL_ASSESSMENT: NONE - DENIES PAIN

## 2023-08-25 ENCOUNTER — APPOINTMENT (OUTPATIENT)
Dept: GENERAL RADIOLOGY | Age: 58
End: 2023-08-25
Payer: MEDICARE

## 2023-08-25 ENCOUNTER — HOSPITAL ENCOUNTER (EMERGENCY)
Age: 58
Discharge: HOME | End: 2023-08-25
Payer: MEDICARE

## 2023-08-25 VITALS
RESPIRATION RATE: 22 BRPM | SYSTOLIC BLOOD PRESSURE: 108 MMHG | OXYGEN SATURATION: 94 % | DIASTOLIC BLOOD PRESSURE: 70 MMHG | HEART RATE: 91 BPM | TEMPERATURE: 99.2 F

## 2023-08-25 DIAGNOSIS — U07.1 COVID-19 VIRUS INFECTION: Primary | ICD-10-CM

## 2023-08-25 LAB
ALBUMIN SERPL-MCNC: 3.8 G/DL (ref 3.4–5)
ALBUMIN/GLOB SERPL: 1 {RATIO} (ref 1.1–2.2)
ALP SERPL-CCNC: 82 U/L (ref 40–129)
ALT SERPL-CCNC: 23 U/L (ref 10–40)
ANION GAP SERPL CALCULATED.3IONS-SCNC: 10 MMOL/L (ref 3–16)
AST SERPL-CCNC: 36 U/L (ref 15–37)
BASOPHILS # BLD: 0.1 K/UL (ref 0–0.2)
BASOPHILS NFR BLD: 0.8 %
BILIRUB SERPL-MCNC: <0.2 MG/DL (ref 0–1)
BUN SERPL-MCNC: 21 MG/DL (ref 7–20)
CALCIUM SERPL-MCNC: 9.1 MG/DL (ref 8.3–10.6)
CHLORIDE SERPL-SCNC: 97 MMOL/L (ref 99–110)
CO2 SERPL-SCNC: 27 MMOL/L (ref 21–32)
CREAT SERPL-MCNC: 1 MG/DL (ref 0.6–1.1)
DEPRECATED RDW RBC AUTO: 14.5 % (ref 12.4–15.4)
EOSINOPHIL # BLD: 0.1 K/UL (ref 0–0.6)
EOSINOPHIL NFR BLD: 1.8 %
GFR SERPLBLD CREATININE-BSD FMLA CKD-EPI: >60 ML/MIN/{1.73_M2}
GLUCOSE SERPL-MCNC: 252 MG/DL (ref 70–99)
HCT VFR BLD AUTO: 37.3 % (ref 36–48)
HGB BLD-MCNC: 12 G/DL (ref 12–16)
LACTATE BLDV-SCNC: 0.9 MMOL/L (ref 0.4–1.9)
LYMPHOCYTES # BLD: 0.9 K/UL (ref 1–5.1)
LYMPHOCYTES NFR BLD: 12.1 %
MCH RBC QN AUTO: 27.2 PG (ref 26–34)
MCHC RBC AUTO-ENTMCNC: 32.2 G/DL (ref 31–36)
MCV RBC AUTO: 84.4 FL (ref 80–100)
MONOCYTES # BLD: 0.7 K/UL (ref 0–1.3)
MONOCYTES NFR BLD: 9.2 %
NEUTROPHILS # BLD: 5.7 K/UL (ref 1.7–7.7)
NEUTROPHILS NFR BLD: 76.1 %
PLATELET # BLD AUTO: 130 K/UL (ref 135–450)
PLATELET BLD QL SMEAR: ABNORMAL
PMV BLD AUTO: 9.4 FL (ref 5–10.5)
POTASSIUM SERPL-SCNC: 5.4 MMOL/L (ref 3.5–5.1)
POTASSIUM SERPL-SCNC: 5.5 MMOL/L (ref 3.5–5.1)
POTASSIUM SERPL-SCNC: 5.6 MMOL/L (ref 3.5–5.1)
PROT SERPL-MCNC: 7.8 G/DL (ref 6.4–8.2)
RBC # BLD AUTO: 4.42 M/UL (ref 4–5.2)
SLIDE REVIEW: ABNORMAL
SODIUM SERPL-SCNC: 134 MMOL/L (ref 136–145)
WBC # BLD AUTO: 7.5 K/UL (ref 4–11)

## 2023-08-25 PROCEDURE — 36415 COLL VENOUS BLD VENIPUNCTURE: CPT

## 2023-08-25 PROCEDURE — 71045 X-RAY EXAM CHEST 1 VIEW: CPT

## 2023-08-25 PROCEDURE — 83605 ASSAY OF LACTIC ACID: CPT

## 2023-08-25 PROCEDURE — 85025 COMPLETE CBC W/AUTO DIFF WBC: CPT

## 2023-08-25 PROCEDURE — 2580000003 HC RX 258: Performed by: PHYSICIAN ASSISTANT

## 2023-08-25 PROCEDURE — 6360000002 HC RX W HCPCS: Performed by: PHYSICIAN ASSISTANT

## 2023-08-25 PROCEDURE — 84132 ASSAY OF SERUM POTASSIUM: CPT

## 2023-08-25 PROCEDURE — 80053 COMPREHEN METABOLIC PANEL: CPT

## 2023-08-25 PROCEDURE — 96374 THER/PROPH/DIAG INJ IV PUSH: CPT

## 2023-08-25 PROCEDURE — 6370000000 HC RX 637 (ALT 250 FOR IP): Performed by: PHYSICIAN ASSISTANT

## 2023-08-25 PROCEDURE — 99284 EMERGENCY DEPT VISIT MOD MDM: CPT

## 2023-08-25 RX ORDER — ONDANSETRON 2 MG/ML
4 INJECTION INTRAMUSCULAR; INTRAVENOUS ONCE
Status: COMPLETED | OUTPATIENT
Start: 2023-08-25 | End: 2023-08-25

## 2023-08-25 RX ORDER — 0.9 % SODIUM CHLORIDE 0.9 %
1000 INTRAVENOUS SOLUTION INTRAVENOUS ONCE
Status: COMPLETED | OUTPATIENT
Start: 2023-08-25 | End: 2023-08-25

## 2023-08-25 RX ORDER — ACETAMINOPHEN 500 MG
1000 TABLET ORAL ONCE
Status: COMPLETED | OUTPATIENT
Start: 2023-08-25 | End: 2023-08-25

## 2023-08-25 RX ORDER — ONDANSETRON 4 MG/1
4 TABLET, FILM COATED ORAL 3 TIMES DAILY PRN
Qty: 10 TABLET | Refills: 0 | Status: SHIPPED | OUTPATIENT
Start: 2023-08-25

## 2023-08-25 RX ADMIN — ACETAMINOPHEN 1000 MG: 500 TABLET ORAL at 12:07

## 2023-08-25 RX ADMIN — SODIUM CHLORIDE 1000 ML: 9 INJECTION, SOLUTION INTRAVENOUS at 12:08

## 2023-08-25 RX ADMIN — ONDANSETRON 4 MG: 2 INJECTION INTRAMUSCULAR; INTRAVENOUS at 13:42

## 2023-08-25 ASSESSMENT — PAIN - FUNCTIONAL ASSESSMENT: PAIN_FUNCTIONAL_ASSESSMENT: NONE - DENIES PAIN

## 2023-08-25 ASSESSMENT — LIFESTYLE VARIABLES: HOW MANY STANDARD DRINKS CONTAINING ALCOHOL DO YOU HAVE ON A TYPICAL DAY: PATIENT DOES NOT DRINK

## 2023-08-25 NOTE — ED PROVIDER NOTES
g/dL    Albumin 3.8 3.4 - 5.0 g/dL    Albumin/Globulin Ratio 1.0 (L) 1.1 - 2.2    Total Bilirubin <0.2 0.0 - 1.0 mg/dL    Alkaline Phosphatase 82 40 - 129 U/L    ALT 23 10 - 40 U/L    AST 36 15 - 37 U/L   Lactate, Sepsis   Result Value Ref Range    Lactic Acid, Sepsis 0.9 0.4 - 1.9 mmol/L   Potassium   Result Value Ref Range    Potassium 5.5 (H) 3.5 - 5.1 mmol/L   Potassium   Result Value Ref Range    Potassium 5.4 (H) 3.5 - 5.1 mmol/L         RADIOLOGY:  All x-ray studies are viewed/reviewed by me. Formal interpretations per the radiologist are as follows:      XR CHEST PORTABLE    Result Date: 8/25/2023  EXAMINATION: ONE XRAY VIEW OF THE CHEST 8/25/2023 12:00 pm COMPARISON: Chest x-ray dated July 15, 2023 HISTORY: ORDERING SYSTEM PROVIDED HISTORY: SOB, known COVID TECHNOLOGIST PROVIDED HISTORY: Reason for exam:->SOB, known COVID Reason for Exam: SOB, known COVID FINDINGS: Small left pleural effusion. Mild bibasilar airspace opacities. No pneumothorax. Stable cardiomediastinal silhouette     Mild bibasilar airspace opacities and small left pleural effusion. This could represent atelectasis versus pneumonia in the appropriate clinical settings. PROCEDURES:   N/A      CRITICAL CARE TIME:     Due to the immediate potential for life-threatening deterioration due to COVID-19, fever and tachycardia on arrival with concerns for worsening respiratory condition or sepsis, I spent 32 minutes providing critical care. This time is excluding time spent performing procedures.         EMERGENCY DEPARTMENT COURSE and DIFFERENTIAL DIAGNOSIS/MDM:   Vitals:    Vitals:    08/25/23 1330 08/25/23 1400 08/25/23 1430 08/25/23 1500   BP: (!) 141/80 (!) 102/91 116/66 108/70   Pulse: (!) 104 92 89 91   Resp: 24 15 15 22   Temp:       SpO2: 91% 95% 96% 94%       Patient was given the following medications:  Medications   sodium chloride 0.9 % bolus 1,000 mL (0 mLs IntraVENous Stopped 8/25/23 1339)   acetaminophen (TYLENOL) the mid 90s on her chronic 4 L nasal cannula oxygen. Due to her abnormal vital signs concern for sepsis work-up that included lactate and blood cultures was done. She is given 1 L of normal saline and Tylenol. She ultimately is a normal white count with normal lactate. She is hyperglycemic to 46 but tells me \"that is normal for me\". Her K was just slightly elevated but I expect it is actually down now due to the fluids given. The patient wants to go home. I do not see an indication that she needs to be hospitalized. She does not appear septic/toxic and we do have blood cultures in process should she end up feeling worsening to come back. I will give her prescription for Zofran. She has follow-up scheduled with her doctor next week. She is given return precautions. Employed shared decision making. FINAL IMPRESSION:      1.  COVID-19 virus infection          DISPOSITION/PLAN:   DISPOSITION Decision To Discharge      PATIENT REFERRED TO:  Sd Sanchez PA-C  [facility]    Go on 8/31/2023  Go to Follow-up Appointment on 8/31/23 at 09:30 AM      DISCHARGE MEDICATIONS:  New Prescriptions    ONDANSETRON (ZOFRAN) 4 MG TABLET    Take 1 tablet by mouth 3 times daily as needed for Nausea or Vomiting                  (Please note thatportions of this note were completed with a voice recognition program.  Efforts were made to edit the dictations, but occasionally words are mis-transcribed.)    Kalee Hernandez PA-C (electronicallysigned)              Mei Solorzano  08/25/23 4217

## 2023-08-26 ENCOUNTER — APPOINTMENT (OUTPATIENT)
Dept: GENERAL RADIOLOGY | Age: 58
End: 2023-08-26
Payer: MEDICARE

## 2023-08-26 ENCOUNTER — HOSPITAL ENCOUNTER (EMERGENCY)
Age: 58
Discharge: HOME | End: 2023-08-26
Attending: EMERGENCY MEDICINE
Payer: MEDICARE

## 2023-08-26 VITALS
DIASTOLIC BLOOD PRESSURE: 78 MMHG | OXYGEN SATURATION: 91 % | HEART RATE: 88 BPM | SYSTOLIC BLOOD PRESSURE: 138 MMHG | TEMPERATURE: 99.1 F | RESPIRATION RATE: 17 BRPM

## 2023-08-26 DIAGNOSIS — U07.1 COVID-19: Primary | ICD-10-CM

## 2023-08-26 LAB
ALBUMIN SERPL-MCNC: 3.8 G/DL (ref 3.4–5)
ALBUMIN/GLOB SERPL: 1 {RATIO} (ref 1.1–2.2)
ALP SERPL-CCNC: 70 U/L (ref 40–129)
ALT SERPL-CCNC: 22 U/L (ref 10–40)
ANION GAP SERPL CALCULATED.3IONS-SCNC: 8 MMOL/L (ref 3–16)
AST SERPL-CCNC: 32 U/L (ref 15–37)
BASOPHILS # BLD: 0 K/UL (ref 0–0.2)
BASOPHILS NFR BLD: 0.8 %
BILIRUB SERPL-MCNC: <0.2 MG/DL (ref 0–1)
BUN SERPL-MCNC: 21 MG/DL (ref 7–20)
CALCIUM SERPL-MCNC: 8.8 MG/DL (ref 8.3–10.6)
CHLORIDE SERPL-SCNC: 100 MMOL/L (ref 99–110)
CO2 SERPL-SCNC: 28 MMOL/L (ref 21–32)
CREAT SERPL-MCNC: 1.1 MG/DL (ref 0.6–1.1)
DEPRECATED RDW RBC AUTO: 14.3 % (ref 12.4–15.4)
EOSINOPHIL # BLD: 0.1 K/UL (ref 0–0.6)
EOSINOPHIL NFR BLD: 2.1 %
GFR SERPLBLD CREATININE-BSD FMLA CKD-EPI: 58 ML/MIN/{1.73_M2}
GLUCOSE SERPL-MCNC: 247 MG/DL (ref 70–99)
HCT VFR BLD AUTO: 35.2 % (ref 36–48)
HGB BLD-MCNC: 11.2 G/DL (ref 12–16)
LYMPHOCYTES # BLD: 1.1 K/UL (ref 1–5.1)
LYMPHOCYTES NFR BLD: 18.9 %
MCH RBC QN AUTO: 27.1 PG (ref 26–34)
MCHC RBC AUTO-ENTMCNC: 31.9 G/DL (ref 31–36)
MCV RBC AUTO: 85 FL (ref 80–100)
MONOCYTES # BLD: 0.5 K/UL (ref 0–1.3)
MONOCYTES NFR BLD: 8.3 %
NEUTROPHILS # BLD: 3.9 K/UL (ref 1.7–7.7)
NEUTROPHILS NFR BLD: 69.9 %
PLATELET # BLD AUTO: 130 K/UL (ref 135–450)
PMV BLD AUTO: 9.1 FL (ref 5–10.5)
POTASSIUM SERPL-SCNC: 5.6 MMOL/L (ref 3.5–5.1)
PROT SERPL-MCNC: 7.5 G/DL (ref 6.4–8.2)
RBC # BLD AUTO: 4.15 M/UL (ref 4–5.2)
SODIUM SERPL-SCNC: 136 MMOL/L (ref 136–145)
WBC # BLD AUTO: 5.6 K/UL (ref 4–11)

## 2023-08-26 PROCEDURE — 99284 EMERGENCY DEPT VISIT MOD MDM: CPT

## 2023-08-26 PROCEDURE — 80053 COMPREHEN METABOLIC PANEL: CPT

## 2023-08-26 PROCEDURE — 71045 X-RAY EXAM CHEST 1 VIEW: CPT

## 2023-08-26 PROCEDURE — 85025 COMPLETE CBC W/AUTO DIFF WBC: CPT

## 2023-08-26 PROCEDURE — 36415 COLL VENOUS BLD VENIPUNCTURE: CPT

## 2023-08-26 ASSESSMENT — PAIN - FUNCTIONAL ASSESSMENT: PAIN_FUNCTIONAL_ASSESSMENT: NONE - DENIES PAIN

## 2023-08-26 NOTE — DISCHARGE INSTRUCTIONS
Your lab work and chest x-ray were stable today. Your oxygen levels here were normal on your home 4 L of oxygen. You were feeling better once you had your oxygen on. I do not think you need to be admitted and we discussed this. Continue to wear your oxygen at home. If you notice you are requiring more than 4 L or have increased shortness of breath you should return to the ER immediately.

## 2023-08-28 NOTE — ED PROVIDER NOTES
Value Ref Range    WBC 5.6 4.0 - 11.0 K/uL    RBC 4.15 4.00 - 5.20 M/uL    Hemoglobin 11.2 (L) 12.0 - 16.0 g/dL    Hematocrit 35.2 (L) 36.0 - 48.0 %    MCV 85.0 80.0 - 100.0 fL    MCH 27.1 26.0 - 34.0 pg    MCHC 31.9 31.0 - 36.0 g/dL    RDW 14.3 12.4 - 15.4 %    Platelets 025 (L) 872 - 450 K/uL    MPV 9.1 5.0 - 10.5 fL    Neutrophils % 69.9 %    Lymphocytes % 18.9 %    Monocytes % 8.3 %    Eosinophils % 2.1 %    Basophils % 0.8 %    Neutrophils Absolute 3.9 1.7 - 7.7 K/uL    Lymphocytes Absolute 1.1 1.0 - 5.1 K/uL    Monocytes Absolute 0.5 0.0 - 1.3 K/uL    Eosinophils Absolute 0.1 0.0 - 0.6 K/uL    Basophils Absolute 0.0 0.0 - 0.2 K/uL           RADIOLOGY  X-RAYS: ALL IMAGES INCLUDING PLAIN FILMS, CT, ULTRASOUND AND MRI HAVE BEEN READ BY THE RADIOLOGIST. XR CHEST PORTABLE   Final Result   1. Stable left basilar lung infiltrate which is likely related to pleural   effusion and/or atelectasis. Superimposed pneumonia cannot be excluded. I have personally reviewed Xray and Ultrasound images and radiology confirms the interpretation:  Chest x-ray with left basilar atelectasis. Medications administered. (Dose and Route):  None    Sepsis:  Is this patient to be included in the SEP-1 Core Measure due to severe sepsis or septic shock? No   Exclusion criteria - the patient is NOT to be included for SEP-1 Core Measure due to:  Viral etiology found or highly suspected (including COVID-19) without concomitant bacterial infection             ED COURSE/MDM:  Patient seen and evaluated. Here the patient is afebrile with normal vitals signs. Old records reviewed: None    Diagnoses affirmatively addressed, consideration of tests, treatments & admission, including shared decision making:    Here the patient is well-appearing. She is on 4 L nasal cannula oxygen but this is her baseline. She is satting 98% on her home oxygen.   She has diminished breath sounds at bilateral bases but this is likely due to

## 2023-10-25 ENCOUNTER — APPOINTMENT (OUTPATIENT)
Dept: CARDIOLOGY | Facility: HOSPITAL | Age: 58
End: 2023-10-25
Payer: MEDICARE

## 2023-10-25 ENCOUNTER — APPOINTMENT (OUTPATIENT)
Dept: CT IMAGING | Facility: HOSPITAL | Age: 58
End: 2023-10-25
Payer: MEDICARE

## 2023-10-25 ENCOUNTER — APPOINTMENT (OUTPATIENT)
Dept: OTHER | Facility: HOSPITAL | Age: 58
End: 2023-10-25
Payer: MEDICARE

## 2023-10-25 ENCOUNTER — HOSPITAL ENCOUNTER (OUTPATIENT)
Facility: HOSPITAL | Age: 58
Setting detail: OBSERVATION
Discharge: HOME | End: 2023-10-26
Attending: STUDENT IN AN ORGANIZED HEALTH CARE EDUCATION/TRAINING PROGRAM | Admitting: INTERNAL MEDICINE
Payer: MEDICARE

## 2023-10-25 PROBLEM — J44.9 CHRONIC OBSTRUCTIVE PULMONARY DISEASE, UNSPECIFIED: Status: ACTIVE | Noted: 2023-03-24

## 2023-10-25 PROBLEM — I10 ESSENTIAL (PRIMARY) HYPERTENSION: Status: ACTIVE | Noted: 2023-03-24

## 2023-10-25 PROBLEM — E11.9 DM (DIABETES MELLITUS): Status: ACTIVE | Noted: 2019-07-26

## 2023-10-25 PROBLEM — I50.9 CHF (CONGESTIVE HEART FAILURE): Status: ACTIVE | Noted: 2019-07-26

## 2023-10-25 PROBLEM — E78.5 HYPERLIPIDEMIA: Status: ACTIVE | Noted: 2019-07-26

## 2023-10-25 PROBLEM — R47.81 SLURRED SPEECH: Status: ACTIVE | Noted: 2023-10-25

## 2023-10-25 LAB
ALBUMIN SERPL-MCNC: 4.1 G/DL (ref 3.5–5.2)
ALBUMIN/GLOB SERPL: 1.1 G/DL
ALP SERPL-CCNC: 89 U/L (ref 39–117)
ALT SERPL W P-5'-P-CCNC: 24 U/L (ref 1–33)
ANION GAP SERPL CALCULATED.3IONS-SCNC: 10 MMOL/L (ref 5–15)
ASCENDING AORTA: 3.2 CM
AST SERPL-CCNC: 27 U/L (ref 1–32)
BH CV ECHO MEAS - AO MAX PG: 7.2 MMHG
BH CV ECHO MEAS - AO MEAN PG: 4 MMHG
BH CV ECHO MEAS - AO ROOT DIAM: 3.3 CM
BH CV ECHO MEAS - AO V2 MAX: 134 CM/SEC
BH CV ECHO MEAS - AO V2 VTI: 28.4 CM
BH CV ECHO MEAS - AVA(I,D): 2.7 CM2
BH CV ECHO MEAS - EDV(CUBED): 227 ML
BH CV ECHO MEAS - ESV(CUBED): 117.6 ML
BH CV ECHO MEAS - FS: 19.7 %
BH CV ECHO MEAS - IVS/LVPW: 0.79 CM
BH CV ECHO MEAS - IVSD: 1 CM
BH CV ECHO MEAS - LA DIMENSION: 4.7 CM
BH CV ECHO MEAS - LAT PEAK E' VEL: 7.7 CM/SEC
BH CV ECHO MEAS - LV MASS(C)D: 341 GRAMS
BH CV ECHO MEAS - LV MAX PG: 1.96 MMHG
BH CV ECHO MEAS - LV MEAN PG: 1 MMHG
BH CV ECHO MEAS - LV V1 MAX: 70 CM/SEC
BH CV ECHO MEAS - LV V1 VTI: 17 CM
BH CV ECHO MEAS - LVIDD: 6.1 CM
BH CV ECHO MEAS - LVIDS: 4.9 CM
BH CV ECHO MEAS - LVOT AREA: 4.5 CM2
BH CV ECHO MEAS - LVOT DIAM: 2.4 CM
BH CV ECHO MEAS - LVPWD: 1.4 CM
BH CV ECHO MEAS - MED PEAK E' VEL: 6.2 CM/SEC
BH CV ECHO MEAS - MV A MAX VEL: 93.3 CM/SEC
BH CV ECHO MEAS - MV DEC SLOPE: 626 CM/SEC2
BH CV ECHO MEAS - MV DEC TIME: 0.16 SEC
BH CV ECHO MEAS - MV E MAX VEL: 98 CM/SEC
BH CV ECHO MEAS - MV E/A: 1.05
BH CV ECHO MEAS - MV MAX PG: 6.8 MMHG
BH CV ECHO MEAS - MV MEAN PG: 3 MMHG
BH CV ECHO MEAS - MV V2 VTI: 29 CM
BH CV ECHO MEAS - MVA(VTI): 2.7 CM2
BH CV ECHO MEAS - PA ACC TIME: 0.1 SEC
BH CV ECHO MEAS - PA V2 MAX: 117.5 CM/SEC
BH CV ECHO MEAS - RAP SYSTOLE: 3 MMHG
BH CV ECHO MEAS - RVSP: 32 MMHG
BH CV ECHO MEAS - SV(LVOT): 76.9 ML
BH CV ECHO MEAS - TAPSE (>1.6): 2.6 CM
BH CV ECHO MEAS - TR MAX PG: 28.9 MMHG
BH CV ECHO MEAS - TR MAX VEL: 269 CM/SEC
BH CV ECHO MEASUREMENTS AVERAGE E/E' RATIO: 14.1
BH CV ECHO SHUNT ASSESSMENT PERFORMED (HIDDEN SCRIPTING): 1
BH CV VAS BP LEFT ARM: NORMAL MMHG
BH CV VAS TCD LEFT DISTAL M1: 94 CM/SEC
BH CV VAS TCD LEFT MID M1: 105 CM/SEC
BH CV VAS TCD LEFT PROXIMAL M1: 102 CM/SEC
BH CV VAS TCD LEFT TERMINAL ICA: 55 CM/SEC
BH CV VAS TCD RIGHT DISTAL M1: 97 CM/SEC
BH CV VAS TCD RIGHT MID M1: 107 CM/SEC
BH CV VAS TCD RIGHT PROXIMAL M1: 108 CM/SEC
BH CV VAS TCD RIGHT TERMINAL ICA: 35 CM/SEC
BH CV XLRA - RV BASE: 3.6 CM
BH CV XLRA - RV LENGTH: 9.1 CM
BH CV XLRA - RV MID: 3.2 CM
BH CV XLRA - TDI S': 15 CM/SEC
BH CV XLRA MEAS LEFT DIST CCA EDV: 41.6 CM/SEC
BH CV XLRA MEAS LEFT DIST CCA PSV: 118 CM/SEC
BH CV XLRA MEAS LEFT DIST ICA EDV: 75.6 CM/SEC
BH CV XLRA MEAS LEFT DIST ICA PSV: 171 CM/SEC
BH CV XLRA MEAS LEFT ICA/CCA RATIO: 1.4
BH CV XLRA MEAS LEFT MID CCA EDV: 30.3 CM/SEC
BH CV XLRA MEAS LEFT MID CCA PSV: 123 CM/SEC
BH CV XLRA MEAS LEFT MID ICA EDV: 69.5 CM/SEC
BH CV XLRA MEAS LEFT MID ICA PSV: 174 CM/SEC
BH CV XLRA MEAS LEFT PROX CCA EDV: 43.3 CM/SEC
BH CV XLRA MEAS LEFT PROX CCA PSV: 148 CM/SEC
BH CV XLRA MEAS LEFT PROX ECA EDV: 26.1 CM/SEC
BH CV XLRA MEAS LEFT PROX ECA PSV: 114 CM/SEC
BH CV XLRA MEAS LEFT PROX ICA EDV: 41.6 CM/SEC
BH CV XLRA MEAS LEFT PROX ICA PSV: 91.8 CM/SEC
BH CV XLRA MEAS LEFT PROX SCLA PSV: 174 CM/SEC
BH CV XLRA MEAS LEFT VERTEBRAL A EDV: 30.6 CM/SEC
BH CV XLRA MEAS LEFT VERTEBRAL A PSV: 76.8 CM/SEC
BH CV XLRA MEAS RIGHT DIST CCA EDV: 26.9 CM/SEC
BH CV XLRA MEAS RIGHT DIST CCA PSV: 98.8 CM/SEC
BH CV XLRA MEAS RIGHT DIST ICA EDV: 59.8 CM/SEC
BH CV XLRA MEAS RIGHT DIST ICA PSV: 179 CM/SEC
BH CV XLRA MEAS RIGHT ICA/CCA RATIO: 1.6
BH CV XLRA MEAS RIGHT MID CCA EDV: 32.9 CM/SEC
BH CV XLRA MEAS RIGHT MID CCA PSV: 113 CM/SEC
BH CV XLRA MEAS RIGHT MID ICA EDV: 61.5 CM/SEC
BH CV XLRA MEAS RIGHT MID ICA PSV: 140 CM/SEC
BH CV XLRA MEAS RIGHT PROX CCA EDV: 37.3 CM/SEC
BH CV XLRA MEAS RIGHT PROX CCA PSV: 156 CM/SEC
BH CV XLRA MEAS RIGHT PROX ECA EDV: 20.3 CM/SEC
BH CV XLRA MEAS RIGHT PROX ECA PSV: 128 CM/SEC
BH CV XLRA MEAS RIGHT PROX ICA EDV: 52 CM/SEC
BH CV XLRA MEAS RIGHT PROX ICA PSV: 120 CM/SEC
BH CV XLRA MEAS RIGHT PROX SCLA PSV: 188 CM/SEC
BH CV XLRA MEAS RIGHT VERTEBRAL A EDV: 13.7 CM/SEC
BH CV XLRA MEAS RIGHT VERTEBRAL A PSV: 39.5 CM/SEC
BILIRUB SERPL-MCNC: 0.2 MG/DL (ref 0–1.2)
BUN SERPL-MCNC: 24 MG/DL (ref 6–20)
BUN/CREAT SERPL: 21.8 (ref 7–25)
CALCIUM SPEC-SCNC: 9.4 MG/DL (ref 8.6–10.5)
CHLORIDE SERPL-SCNC: 100 MMOL/L (ref 98–107)
CHOLEST SERPL-MCNC: 186 MG/DL (ref 0–200)
CO2 SERPL-SCNC: 32 MMOL/L (ref 22–29)
CREAT SERPL-MCNC: 1.1 MG/DL (ref 0.57–1)
DEPRECATED RDW RBC AUTO: 42.9 FL (ref 37–54)
EGFRCR SERPLBLD CKD-EPI 2021: 58 ML/MIN/1.73
ERYTHROCYTE [DISTWIDTH] IN BLOOD BY AUTOMATED COUNT: 13.2 % (ref 12.3–15.4)
GLOBULIN UR ELPH-MCNC: 3.7 GM/DL
GLUCOSE BLDC GLUCOMTR-MCNC: 234 MG/DL (ref 70–130)
GLUCOSE BLDC GLUCOMTR-MCNC: 250 MG/DL (ref 70–130)
GLUCOSE BLDC GLUCOMTR-MCNC: 250 MG/DL (ref 70–130)
GLUCOSE BLDC GLUCOMTR-MCNC: 271 MG/DL (ref 70–130)
GLUCOSE BLDC GLUCOMTR-MCNC: 279 MG/DL (ref 70–130)
GLUCOSE SERPL-MCNC: 254 MG/DL (ref 65–99)
HBA1C MFR BLD: 8.7 % (ref 4.8–5.6)
HCT VFR BLD AUTO: 37.1 % (ref 34–46.6)
HDLC SERPL-MCNC: 60 MG/DL (ref 40–60)
HGB BLD-MCNC: 11.3 G/DL (ref 12–15.9)
LDLC SERPL CALC-MCNC: 91 MG/DL (ref 0–100)
LDLC/HDLC SERPL: 1.4 {RATIO}
MCH RBC QN AUTO: 27 PG (ref 26.6–33)
MCHC RBC AUTO-ENTMCNC: 30.5 G/DL (ref 31.5–35.7)
MCV RBC AUTO: 88.5 FL (ref 79–97)
PLATELET # BLD AUTO: 156 10*3/MM3 (ref 140–450)
PMV BLD AUTO: 11.3 FL (ref 6–12)
POTASSIUM SERPL-SCNC: 4.6 MMOL/L (ref 3.5–5.2)
PROT SERPL-MCNC: 7.8 G/DL (ref 6–8.5)
RBC # BLD AUTO: 4.19 10*6/MM3 (ref 3.77–5.28)
SODIUM SERPL-SCNC: 142 MMOL/L (ref 136–145)
TRIGL SERPL-MCNC: 209 MG/DL (ref 0–150)
VLDLC SERPL-MCNC: 35 MG/DL (ref 5–40)
WBC NRBC COR # BLD: 6.92 10*3/MM3 (ref 3.4–10.8)

## 2023-10-25 PROCEDURE — 93306 TTE W/DOPPLER COMPLETE: CPT | Performed by: INTERNAL MEDICINE

## 2023-10-25 PROCEDURE — 82948 REAGENT STRIP/BLOOD GLUCOSE: CPT

## 2023-10-25 PROCEDURE — 99232 SBSQ HOSP IP/OBS MODERATE 35: CPT | Performed by: STUDENT IN AN ORGANIZED HEALTH CARE EDUCATION/TRAINING PROGRAM

## 2023-10-25 PROCEDURE — 97165 OT EVAL LOW COMPLEX 30 MIN: CPT

## 2023-10-25 PROCEDURE — 99204 OFFICE O/P NEW MOD 45 MIN: CPT

## 2023-10-25 PROCEDURE — 83036 HEMOGLOBIN GLYCOSYLATED A1C: CPT | Performed by: PEDIATRICS

## 2023-10-25 PROCEDURE — 25510000001 IOPAMIDOL PER 1 ML: Performed by: INTERNAL MEDICINE

## 2023-10-25 PROCEDURE — 63710000001 INSULIN LISPRO (HUMAN) PER 5 UNITS: Performed by: PEDIATRICS

## 2023-10-25 PROCEDURE — 97161 PT EVAL LOW COMPLEX 20 MIN: CPT

## 2023-10-25 PROCEDURE — 80061 LIPID PANEL: CPT | Performed by: PEDIATRICS

## 2023-10-25 PROCEDURE — 70496 CT ANGIOGRAPHY HEAD: CPT

## 2023-10-25 PROCEDURE — 92523 SPEECH SOUND LANG COMPREHEN: CPT

## 2023-10-25 PROCEDURE — 93893 TCD STD ICR ART VEN-ART SHNT: CPT | Performed by: PSYCHIATRY & NEUROLOGY

## 2023-10-25 PROCEDURE — G0378 HOSPITAL OBSERVATION PER HR: HCPCS

## 2023-10-25 PROCEDURE — 97530 THERAPEUTIC ACTIVITIES: CPT

## 2023-10-25 PROCEDURE — G0379 DIRECT REFER HOSPITAL OBSERV: HCPCS

## 2023-10-25 PROCEDURE — 93306 TTE W/DOPPLER COMPLETE: CPT

## 2023-10-25 PROCEDURE — 93893 TCD STD ICR ART VEN-ART SHNT: CPT

## 2023-10-25 PROCEDURE — 70498 CT ANGIOGRAPHY NECK: CPT

## 2023-10-25 PROCEDURE — 85027 COMPLETE CBC AUTOMATED: CPT | Performed by: PEDIATRICS

## 2023-10-25 PROCEDURE — 63710000001 INSULIN DETEMIR PER 5 UNITS: Performed by: PEDIATRICS

## 2023-10-25 PROCEDURE — 93880 EXTRACRANIAL BILAT STUDY: CPT

## 2023-10-25 PROCEDURE — 80053 COMPREHEN METABOLIC PANEL: CPT | Performed by: PEDIATRICS

## 2023-10-25 PROCEDURE — 25510000001 IOPAMIDOL PER 1 ML: Performed by: PEDIATRICS

## 2023-10-25 PROCEDURE — 93880 EXTRACRANIAL BILAT STUDY: CPT | Performed by: INTERNAL MEDICINE

## 2023-10-25 RX ORDER — DULAGLUTIDE 3 MG/.5ML
4.5 INJECTION, SOLUTION SUBCUTANEOUS WEEKLY
COMMUNITY

## 2023-10-25 RX ORDER — NICOTINE POLACRILEX 4 MG
15 LOZENGE BUCCAL
Status: DISCONTINUED | OUTPATIENT
Start: 2023-10-25 | End: 2023-10-26 | Stop reason: HOSPADM

## 2023-10-25 RX ORDER — FUROSEMIDE 40 MG/1
40 TABLET ORAL DAILY
COMMUNITY

## 2023-10-25 RX ORDER — ESOMEPRAZOLE MAGNESIUM 40 MG/1
40 CAPSULE, DELAYED RELEASE ORAL
COMMUNITY
Start: 2023-09-26

## 2023-10-25 RX ORDER — AMLODIPINE BESYLATE 5 MG/1
5 TABLET ORAL DAILY
COMMUNITY

## 2023-10-25 RX ORDER — AMLODIPINE BESYLATE 5 MG/1
5 TABLET ORAL DAILY
Status: DISCONTINUED | OUTPATIENT
Start: 2023-10-25 | End: 2023-10-26 | Stop reason: HOSPADM

## 2023-10-25 RX ORDER — DICYCLOMINE HYDROCHLORIDE 10 MG/1
10 CAPSULE ORAL 3 TIMES DAILY
Status: DISCONTINUED | OUTPATIENT
Start: 2023-10-25 | End: 2023-10-26 | Stop reason: HOSPADM

## 2023-10-25 RX ORDER — AMOXICILLIN 250 MG
2 CAPSULE ORAL 2 TIMES DAILY
Status: DISCONTINUED | OUTPATIENT
Start: 2023-10-25 | End: 2023-10-26 | Stop reason: HOSPADM

## 2023-10-25 RX ORDER — SODIUM CHLORIDE 0.9 % (FLUSH) 0.9 %
10 SYRINGE (ML) INJECTION AS NEEDED
Status: DISCONTINUED | OUTPATIENT
Start: 2023-10-25 | End: 2023-10-26 | Stop reason: HOSPADM

## 2023-10-25 RX ORDER — NYSTATIN 100000 U/G
1 CREAM TOPICAL NIGHTLY
COMMUNITY

## 2023-10-25 RX ORDER — SODIUM CHLORIDE 0.9 % (FLUSH) 0.9 %
10 SYRINGE (ML) INJECTION EVERY 12 HOURS SCHEDULED
Status: DISCONTINUED | OUTPATIENT
Start: 2023-10-25 | End: 2023-10-26 | Stop reason: HOSPADM

## 2023-10-25 RX ORDER — SIMVASTATIN 20 MG
20 TABLET ORAL NIGHTLY
COMMUNITY
End: 2023-10-26 | Stop reason: HOSPADM

## 2023-10-25 RX ORDER — GLIPIZIDE 5 MG/1
5 TABLET, FILM COATED, EXTENDED RELEASE ORAL DAILY
COMMUNITY
Start: 2023-08-28

## 2023-10-25 RX ORDER — INSULIN LISPRO 100 [IU]/ML
1-200 INJECTION, SOLUTION INTRAVENOUS; SUBCUTANEOUS AS NEEDED
Status: DISCONTINUED | OUTPATIENT
Start: 2023-10-25 | End: 2023-10-26 | Stop reason: HOSPADM

## 2023-10-25 RX ORDER — LANOLIN ALCOHOL/MO/W.PET/CERES
400 CREAM (GRAM) TOPICAL DAILY
COMMUNITY
Start: 2023-09-26

## 2023-10-25 RX ORDER — PREGABALIN 150 MG/1
150 CAPSULE ORAL 2 TIMES DAILY
Status: DISCONTINUED | OUTPATIENT
Start: 2023-10-25 | End: 2023-10-26 | Stop reason: HOSPADM

## 2023-10-25 RX ORDER — ESCITALOPRAM OXALATE 10 MG/1
10 TABLET ORAL DAILY
COMMUNITY
Start: 2023-09-26

## 2023-10-25 RX ORDER — ASPIRIN 300 MG/1
300 SUPPOSITORY RECTAL DAILY
Status: DISCONTINUED | OUTPATIENT
Start: 2023-10-25 | End: 2023-10-25

## 2023-10-25 RX ORDER — ALBUTEROL SULFATE 2.5 MG/3ML
2.5 SOLUTION RESPIRATORY (INHALATION) EVERY 6 HOURS PRN
Status: DISCONTINUED | OUTPATIENT
Start: 2023-10-25 | End: 2023-10-26 | Stop reason: HOSPADM

## 2023-10-25 RX ORDER — SODIUM CHLORIDE 9 MG/ML
40 INJECTION, SOLUTION INTRAVENOUS AS NEEDED
Status: DISCONTINUED | OUTPATIENT
Start: 2023-10-25 | End: 2023-10-26 | Stop reason: HOSPADM

## 2023-10-25 RX ORDER — DICYCLOMINE HYDROCHLORIDE 10 MG/1
1 CAPSULE ORAL 3 TIMES DAILY
COMMUNITY

## 2023-10-25 RX ORDER — FERROUS SULFATE 325(65) MG
325 TABLET ORAL
COMMUNITY
Start: 2023-09-26

## 2023-10-25 RX ORDER — BISACODYL 10 MG
10 SUPPOSITORY, RECTAL RECTAL DAILY PRN
Status: DISCONTINUED | OUTPATIENT
Start: 2023-10-25 | End: 2023-10-26 | Stop reason: HOSPADM

## 2023-10-25 RX ORDER — PANTOPRAZOLE SODIUM 40 MG/1
40 TABLET, DELAYED RELEASE ORAL
Status: DISCONTINUED | OUTPATIENT
Start: 2023-10-25 | End: 2023-10-26 | Stop reason: HOSPADM

## 2023-10-25 RX ORDER — ESCITALOPRAM OXALATE 10 MG/1
10 TABLET ORAL DAILY
Status: DISCONTINUED | OUTPATIENT
Start: 2023-10-25 | End: 2023-10-26 | Stop reason: HOSPADM

## 2023-10-25 RX ORDER — PREGABALIN 150 MG/1
150 CAPSULE ORAL 2 TIMES DAILY
COMMUNITY

## 2023-10-25 RX ORDER — OXYCODONE AND ACETAMINOPHEN 7.5; 325 MG/1; MG/1
1 TABLET ORAL EVERY 6 HOURS PRN
COMMUNITY
Start: 2023-05-01

## 2023-10-25 RX ORDER — ASPIRIN 81 MG/1
81 TABLET ORAL DAILY
Status: ON HOLD | COMMUNITY
End: 2023-10-26 | Stop reason: SDUPTHER

## 2023-10-25 RX ORDER — TRAZODONE HYDROCHLORIDE 100 MG/1
100 TABLET ORAL NIGHTLY
COMMUNITY
Start: 2023-09-26

## 2023-10-25 RX ORDER — DEXTROSE MONOHYDRATE 25 G/50ML
10-50 INJECTION, SOLUTION INTRAVENOUS
Status: DISCONTINUED | OUTPATIENT
Start: 2023-10-25 | End: 2023-10-26 | Stop reason: HOSPADM

## 2023-10-25 RX ORDER — ALBUTEROL SULFATE 90 UG/1
2 AEROSOL, METERED RESPIRATORY (INHALATION) EVERY 6 HOURS PRN
COMMUNITY

## 2023-10-25 RX ORDER — PANTOPRAZOLE SODIUM 40 MG/1
40 TABLET, DELAYED RELEASE ORAL
COMMUNITY

## 2023-10-25 RX ORDER — METFORMIN HYDROCHLORIDE 500 MG/1
500 TABLET, EXTENDED RELEASE ORAL
COMMUNITY
Start: 2023-09-26

## 2023-10-25 RX ORDER — HYDROXYZINE PAMOATE 25 MG/1
25 CAPSULE ORAL 3 TIMES DAILY
COMMUNITY
Start: 2023-09-26

## 2023-10-25 RX ORDER — IBUPROFEN 600 MG/1
1 TABLET ORAL
Status: DISCONTINUED | OUTPATIENT
Start: 2023-10-25 | End: 2023-10-26 | Stop reason: HOSPADM

## 2023-10-25 RX ORDER — TRAZODONE HYDROCHLORIDE 100 MG/1
100 TABLET ORAL NIGHTLY
Status: DISCONTINUED | OUTPATIENT
Start: 2023-10-25 | End: 2023-10-26 | Stop reason: HOSPADM

## 2023-10-25 RX ORDER — ASPIRIN 81 MG/1
81 TABLET, CHEWABLE ORAL DAILY
Status: DISCONTINUED | OUTPATIENT
Start: 2023-10-25 | End: 2023-10-26 | Stop reason: HOSPADM

## 2023-10-25 RX ORDER — ONDANSETRON 4 MG/1
4 TABLET, FILM COATED ORAL EVERY 8 HOURS PRN
COMMUNITY
Start: 2023-08-25

## 2023-10-25 RX ORDER — INSULIN ASPART 100 [IU]/ML
48 INJECTION, SOLUTION INTRAVENOUS; SUBCUTANEOUS
COMMUNITY

## 2023-10-25 RX ORDER — ATORVASTATIN CALCIUM 40 MG/1
80 TABLET, FILM COATED ORAL NIGHTLY
Status: DISCONTINUED | OUTPATIENT
Start: 2023-10-25 | End: 2023-10-26 | Stop reason: HOSPADM

## 2023-10-25 RX ORDER — FLUTICASONE FUROATE, UMECLIDINIUM BROMIDE AND VILANTEROL TRIFENATATE 100; 62.5; 25 UG/1; UG/1; UG/1
1 POWDER RESPIRATORY (INHALATION)
COMMUNITY
Start: 2023-06-14

## 2023-10-25 RX ORDER — ALLOPURINOL 100 MG/1
1 TABLET ORAL DAILY
COMMUNITY

## 2023-10-25 RX ORDER — BISACODYL 5 MG/1
5 TABLET, DELAYED RELEASE ORAL DAILY PRN
Status: DISCONTINUED | OUTPATIENT
Start: 2023-10-25 | End: 2023-10-26 | Stop reason: HOSPADM

## 2023-10-25 RX ORDER — OXYCODONE AND ACETAMINOPHEN 7.5; 325 MG/1; MG/1
1 TABLET ORAL EVERY 6 HOURS PRN
Status: DISCONTINUED | OUTPATIENT
Start: 2023-10-25 | End: 2023-10-26 | Stop reason: HOSPADM

## 2023-10-25 RX ORDER — ALLOPURINOL 100 MG/1
100 TABLET ORAL DAILY
Status: DISCONTINUED | OUTPATIENT
Start: 2023-10-25 | End: 2023-10-26 | Stop reason: HOSPADM

## 2023-10-25 RX ORDER — NITROGLYCERIN 0.4 MG/1
0.4 TABLET SUBLINGUAL
Status: DISCONTINUED | OUTPATIENT
Start: 2023-10-25 | End: 2023-10-26 | Stop reason: HOSPADM

## 2023-10-25 RX ORDER — INSULIN LISPRO 100 [IU]/ML
1-200 INJECTION, SOLUTION INTRAVENOUS; SUBCUTANEOUS
Status: DISCONTINUED | OUTPATIENT
Start: 2023-10-25 | End: 2023-10-26 | Stop reason: HOSPADM

## 2023-10-25 RX ORDER — POLYETHYLENE GLYCOL 3350 17 G/17G
17 POWDER, FOR SOLUTION ORAL DAILY PRN
Status: DISCONTINUED | OUTPATIENT
Start: 2023-10-25 | End: 2023-10-26 | Stop reason: HOSPADM

## 2023-10-25 RX ADMIN — INSULIN LISPRO 7 UNITS: 100 INJECTION, SOLUTION INTRAVENOUS; SUBCUTANEOUS at 21:00

## 2023-10-25 RX ADMIN — PREGABALIN 150 MG: 150 CAPSULE ORAL at 21:55

## 2023-10-25 RX ADMIN — PANTOPRAZOLE SODIUM 40 MG: 40 TABLET, DELAYED RELEASE ORAL at 08:11

## 2023-10-25 RX ADMIN — IOPAMIDOL 75 ML: 755 INJECTION, SOLUTION INTRAVENOUS at 03:27

## 2023-10-25 RX ADMIN — IOPAMIDOL 75 ML: 755 INJECTION, SOLUTION INTRAVENOUS at 12:04

## 2023-10-25 RX ADMIN — DICYCLOMINE HYDROCHLORIDE 10 MG: 10 CAPSULE ORAL at 17:22

## 2023-10-25 RX ADMIN — PREGABALIN 150 MG: 150 CAPSULE ORAL at 08:11

## 2023-10-25 RX ADMIN — INSULIN DETEMIR 52 UNITS: 100 INJECTION, SOLUTION SUBCUTANEOUS at 21:50

## 2023-10-25 RX ADMIN — MICONAZOLE NITRATE 1 APPLICATION: 20 POWDER TOPICAL at 08:11

## 2023-10-25 RX ADMIN — OXYCODONE HYDROCHLORIDE AND ACETAMINOPHEN 1 TABLET: 7.5; 325 TABLET ORAL at 22:08

## 2023-10-25 RX ADMIN — Medication 10 ML: at 21:30

## 2023-10-25 RX ADMIN — DICYCLOMINE HYDROCHLORIDE 10 MG: 10 CAPSULE ORAL at 08:11

## 2023-10-25 RX ADMIN — SENNOSIDES AND DOCUSATE SODIUM 2 TABLET: 8.6; 5 TABLET ORAL at 21:56

## 2023-10-25 RX ADMIN — OXYCODONE HYDROCHLORIDE AND ACETAMINOPHEN 1 TABLET: 7.5; 325 TABLET ORAL at 12:16

## 2023-10-25 RX ADMIN — ESCITALOPRAM OXALATE 10 MG: 10 TABLET ORAL at 08:11

## 2023-10-25 RX ADMIN — TRAZODONE HYDROCHLORIDE 100 MG: 100 TABLET ORAL at 21:56

## 2023-10-25 RX ADMIN — Medication 10 ML: at 08:12

## 2023-10-25 RX ADMIN — ALLOPURINOL 100 MG: 100 TABLET ORAL at 08:11

## 2023-10-25 RX ADMIN — INSULIN LISPRO 22 UNITS: 100 INJECTION, SOLUTION INTRAVENOUS; SUBCUTANEOUS at 17:22

## 2023-10-25 RX ADMIN — ATORVASTATIN CALCIUM 80 MG: 40 TABLET, FILM COATED ORAL at 04:04

## 2023-10-25 RX ADMIN — ASPIRIN 81 MG CHEWABLE TABLET 81 MG: 81 TABLET CHEWABLE at 08:11

## 2023-10-25 RX ADMIN — PANTOPRAZOLE SODIUM 40 MG: 40 TABLET, DELAYED RELEASE ORAL at 17:22

## 2023-10-25 RX ADMIN — AMLODIPINE BESYLATE 5 MG: 5 TABLET ORAL at 08:12

## 2023-10-25 RX ADMIN — INSULIN LISPRO 22 UNITS: 100 INJECTION, SOLUTION INTRAVENOUS; SUBCUTANEOUS at 12:00

## 2023-10-25 RX ADMIN — DICYCLOMINE HYDROCHLORIDE 10 MG: 10 CAPSULE ORAL at 21:56

## 2023-10-25 RX ADMIN — INSULIN LISPRO 24 UNITS: 100 INJECTION, SOLUTION INTRAVENOUS; SUBCUTANEOUS at 08:14

## 2023-10-25 NOTE — CONSULTS
Stroke Consult Note    Patient Name: Jackelyn Verdugo   MRN: [id number]  Age: 58 y.o.  Sex: female  : 1965    Primary Care Physician: Provider, No Known  Referring Physician: [facility] ER physician    TIME STROKE TEAM CALLED: 0201 EST     TIME PATIENT SEEN: 0203 EST    Handedness: Right  Race: White     Chief Complaint/Reason for Consultation: Speech difficulty, tremors to all extremities    HPI: Ms. Verdugo is a 58-year-old female with PMH of arthritis, asthma, CHF, COPD (home O2 4 L), DM, diverticulosis, GERD and TIA.  She is a transfer from [facility] for further evaluation and stroke work-up.  [facility] ER physician reports patient presented with new onset of slurred speech and transient episodes of tremors.  Patient reports she has been seen and evaluated by a neurologist in the past for her tremors.  She is unable to complete an ordered MRI due to anxiety and machine complications.  Ms. Verdugo states presenting symptoms are not new. However, they seem to be more pronounced today.    On arrival to [facility] 1.  Blood pressure 148/84.  On exam patient is able to answer questions appropriately and follow two-step commands.  Denies any sensory deficits at this time.  No headache, nausea, falls or recent trauma.  Mild dysarthria noted during conversation.  Mild tremors to all extremities.  Strength in all extremities 5/5. At baseline patient uses a wheelchair for mobility during long distances.  Former smoker and no EtOH use.  Takes prescribed medications routinely.    Last Known Normal Date/Time: 10/20/2023 at 1600 EST     Review of Systems   Constitutional:  Negative for fever.   HENT:  Negative for trouble swallowing and voice change.    Eyes:  Negative for visual disturbance.   Respiratory:  Negative for shortness of breath.    Cardiovascular:  Negative for chest pain.   Gastrointestinal:  Negative for abdominal pain and nausea.   Neurological:  Positive for tremors and speech difficulty. Negative for facial  asymmetry, weakness, numbness and headaches.   Psychiatric/Behavioral:  Negative for confusion.       Past Medical History:   Diagnosis Date    COPD (chronic obstructive pulmonary disease)     Diabetes mellitus     Hypertension     Obesity      Past Surgical History:   Procedure Laterality Date     SECTION      HERNIA REPAIR       History reviewed. No pertinent family history.  Social History     Socioeconomic History    Marital status:    Tobacco Use    Smoking status: Former     Types: Cigarettes   Substance and Sexual Activity    Alcohol use: Not Currently    Drug use: Not Currently     Allergies   Allergen Reactions    Hydromorphone Hcl Other (See Comments)     Respiratory distress    Other reaction(s): respiratory distress, respiratory distress (severe)    Ace Inhibitors Swelling     Swelling of eyes.    Erythromycin Other (See Comments)     Raises blood sugar    Morphine Itching    Penicillins Hives     Prior to Admission medications    Not on File            Neurological Exam  Mental Status  Alert. Oriented to person, place and time. Oriented to person, place, and time. Mild dysarthria present. Language is fluent with no aphasia.    Cranial Nerves  CN II: Visual fields full to confrontation.  CN III, IV, VI: Extraocular movements intact bilaterally. Pupils equal round and reactive to light bilaterally.  CN V: Facial sensation is normal.  CN VII: Full and symmetric facial movement.  CN VIII: Hearing intact.  CN XI: Shoulder shrug strength is normal.  CN XII: Tongue midline without atrophy or fasciculations.    Motor  Normal muscle bulk throughout. Normal muscle tone. Strength is 5/5 throughout all four extremities.    Sensory  Light touch is normal in upper and lower extremities.     Coordination  Right: Finger-to-nose normal.Left: Finger-to-nose normal.    Gait    Unable to assess.      Physical Exam  Constitutional:       General: She is not in acute distress.     Appearance: Normal  appearance. She is not ill-appearing.   HENT:      Head: Normocephalic and atraumatic.      Nose: Nose normal.      Mouth/Throat:      Mouth: Mucous membranes are dry.   Eyes:      Extraocular Movements: Extraocular movements intact.      Pupils: Pupils are equal, round, and reactive to light.   Cardiovascular:      Pulses: Normal pulses.   Pulmonary:      Effort: Pulmonary effort is normal.   Abdominal:      Comments: Round, obese   Musculoskeletal:         General: Normal range of motion.      Cervical back: Normal range of motion.   Skin:     General: Skin is warm and dry.   Neurological:      Mental Status: She is alert and oriented to person, place, and time.      Cranial Nerves: Dysarthria present. No cranial nerve deficit.      Sensory: No sensory deficit.      Motor: Motor strength is normal.No weakness.      Coordination: Coordination normal.   Psychiatric:         Mood and Affect: Mood normal.         Behavior: Behavior normal.         Acute Stroke Data    Thrombolytic Inclusion / Exclusion Criteria    Time: 02:30 EDT  Person Administering Scale: FILIPE Bailey    Inclusion Criteria  [x]   18 years of age or greater   []   Onset of symptoms < 4.5 hours before beginning treatment (stroke onset = time patient was last seen well or without symptoms).   []   Diagnosis of acute ischemic stroke causing measurable disabling deficit (Complete Hemianopia, Any Aphasia, Visual or Sensory Extinction, Any weakness limiting sustained effort against gravity)   []   Any remaining deficit considered potentially disabling in view of patient and practitioner   Exclusion criteria (Do not proceed with Alteplase if any are checked under exclusion criteria)  [x]   Onset unknown or GREATER than 4.5 hours   []   ICH on CT/MRI   []   CT demonstrates hypodensity representing acute or subacute infarct   []   Significant head trauma or prior stroke in the previous 3 months   []   Symptoms suggestive of subarachnoid hemorrhage    []   History of un-ruptured intracranial aneurysm GREATER than 10 mm   []   Recent intracranial or intraspinal surgery within the last 3 months   []   Arterial puncture at a non-compressible site in the previous 7 days   []   Active internal bleeding   []   Acute bleeding tendency   []   Platelet count LESS than 100,000 for known hematological diseases such as leukemia, thrombocytopenia or chronic cirrhosis   []   Current use of anticoagulant with INR GREATER than 1.7 or PT GREATER than 15 seconds, aPTT GREATER than 40 seconds   []   Heparin received within 48 hours, resulting in abnormally elevated aPTT GREATER than upper limit of normal   []   Current use of direct thrombin inhibitors or direct factor Xa inhibitors in the past 48 hours   []   Elevated blood pressure refractory to treatment (systolic GREATER than 185 mm/Hg or diastolic  GREATER than 110 mm/Hg   []   Suspected infective endocarditis and aortic arch dissection   []   Current use of therapeutic treatment dose of low-molecular-weight heparin (LMWH) within the previous 24 hours   []   Structural GI malignancy or bleed   Relative exclusion for all patients  []   Only minor non-disabling symptoms   []   Pregnancy   []   Seizure at onset with postictal residual neurological impairments   []   Major surgery or previous trauma within past 14 days   []   History of previous spontaneous ICH, intracranial neoplasm, or AV malformation   []   Postpartum (within previous 14 days)   []   Recent GI or urinary tract hemorrhage (within previous 21 days)   []   Recent acute MI (within previous 3 months)   []   History of un-ruptured intracranial aneurysm LESS than 10 mm   []   History of ruptured intracranial aneurysm   []   Blood glucose LESS than 50 mg/dL (2.7 mmol/L)   []   Dural puncture within the last 7 days   []   Known GREATER than 10 cerebral microbleeds   Additional exclusions for patients with symptoms onset between 3 and 4.5 hours.  []   Age > 80.   []    On any anticoagulants regardless of INR  >>> Warfarin (Coumadin), Heparin, Enoxaparin (Lovenox), fondaparinux (Arixtra), bivalirudin (Angiomax), Argatroban, dabigatran (Pradaxa), rivaroxaban (Xarelto), or apixaban (Eliquis)   []   Severe stroke (NIHSS > 25).   []   History of BOTH diabetes and previous ischemic stroke.   []   The risks and benefits have been discussed with the patient or family related to the administration of IV thrombolytic therapy for stroke symptoms.   []   I have discussed and reviewed the patient's case and imaging with the attending prior to IV thrombolytic therapy.   NA Time IV thrombolytic administered       Hospital Meds:  Scheduled- aspirin, 81 mg, Oral, Daily   Or  aspirin, 300 mg, Rectal, Daily  atorvastatin, 80 mg, Oral, Nightly  sodium chloride, 10 mL, Intravenous, Q12H      Infusions-     PRNs-   sodium chloride    sodium chloride    Functional Status Prior to Current Stroke/[place] Score:   MODIFIED ANGELA SCALE (to be assessed for each patient having history of stroke) []Stroke history but not assessed  []0: No symptoms at all  []1: No significant disability despite symptoms  [x]2: Slight disability  []3: Moderate disability  []4: Moderately severe disability  []5: Severe disability  []6: Death        NIH Stroke Scale  Time: 02:30 EDT  Person Administering Scale: FILIPE Bailey  Interval: baseline  1a. Level of Consciousness: 0-->Alert, keenly responsive  1b. LOC Questions: 0-->Answers both questions correctly  1c. LOC Commands: 0-->Performs both tasks correctly  2. Best Gaze: 0-->Normal  3. Visual: 0-->No visual loss  4. Facial Palsy: 0-->Normal symmetrical movements  5a. Motor Arm, Left: 0-->No drift, limb holds 90 (or 45) degrees for full 10 secs  5b. Motor Arm, Right: 0-->No drift, limb holds 90 (or 45) degrees for full 10 secs  6a. Motor Leg, Left: 0-->No drift, leg holds 30 degree position for full 5 secs  6b. Motor Leg, Right: 0-->No drift, leg holds 30 degree position  for full 5 secs  7. Limb Ataxia: 0-->Absent  8. Sensory: 0-->Normal, no sensory loss  9. Best Language: 0-->No aphasia, normal  10. Dysarthria: 1-->Mild-to-moderate dysarthria, patient slurs at least some words and, at worst, can be understood with some difficulty  11. Extinction and Inattention (formerly Neglect): 0-->No abnormality    Total (NIH Stroke Scale): 1       Results Reviewed:  I have personally reviewed current lab, radiology, and data and agree with results.    CBC 10/24/2023   WBC 6.5  RBC 4.10  Hemoglobin 11.2  Hematocrit 36.6  Platelet 134    CMP 10/24/2023    K 4.6  Chloride 100  CO2 34  Glucose 282  Bun 23  Creatinine 1.24  AST 24  ALT 35     CT head without contrast  Impression: No acute intracranial process.    Assessment/Plan:  Ms. Verdugo is a 58-year-old female with PMH of arthritis, asthma, CHF, COPD (home O2 4 L), DM, diverticulosis, GERD and TIA.  She is a transfer from [facility] for further evaluation and stroke work-up.  [facility] ER physician reports patient presented with new onset of slurred speech and transient episodes of tremors.  Patient reports she has been seen and evaluated by a neurologist in the past for her tremors.  She is unable to complete an ordered MRI due to anxiety and machine complications.  Ms. Verdugo states presenting symptoms are not new. However, they seem to be more pronounced today. Patient is not a candidate for IV thrombolytic therapy due to last known well greater than 4.5 hours.  Patient is not a candidate for endovascular therapy at this time due to risks outweighing benefits.    Antiplatelet PTA: Aspirin  Anticoagulant PTA: None        Speech difficulty, tremors to all extremities  Differentials to include TIA, CVA, metabolic encephalopathy  Initiate TIA/CVA without IV thrombolytic therapy order set  N.p.o. until bedside dysphagia screening complete by RN  Activity as tolerated  Aspirin 81 mg p.o. daily  Atorvastatin 80 mg p.o. nightly  CTA head  and neck routine  MRI brain without contrast routine  TTE routine  A1c, lipid panel routine  Blood pressure goals, SBP less than 220  Diabetes educator to see if appropriate  PT/OT/SLP eval and treat  Case management to follow    Plan of care discussed with Dr. Anderson, patient and bedside RN.  Stroke neurology will continue to follow.  Thank you for this consult.  Call with any questions or concerns.      FILIPE Bailey  October 25, 2023  02:30 EDT

## 2023-10-25 NOTE — THERAPY EVALUATION
Acute Care - Speech Language Pathology Initial Evaluation  [facility]  Cognitive-Communication Evaluation      Patient Name: Jackelyn Verdugo  : 1965  MRN: [id number]  Today's Date: 10/25/2023               Admit Date: 10/25/2023     Visit Dx:  No diagnosis found.  Patient Active Problem List   Diagnosis    Slurred speech    Chronic obstructive pulmonary disease, unspecified    CHF (congestive heart failure)    DM (diabetes mellitus)    Essential (primary) hypertension    Hyperlipidemia     Past Medical History:   Diagnosis Date    COPD (chronic obstructive pulmonary disease)     Diabetes mellitus     Hypertension     Obesity      Past Surgical History:   Procedure Laterality Date     SECTION      HERNIA REPAIR         SLP Recommendation and Plan  SLP Diagnosis: functional speech/language skills, functional cognitive-linguistic skills (10/25/23 1310)           [facility] [place] Interventions Met: no problems identified which require skilled intervention, baseline status (10/25/23 1310)  Anticipated Discharge Disposition (SLP): home, No further SLP services warranted (10/25/23 1310)        Therapy Frequency (SLP SLC): evaluation only (10/25/23 1310)                                Plan of Care Reviewed With: patient, spouse (10/25/23 3886)      SLP EVALUATION (last 72 hours)       SLP SLC Evaluation       Row Name 10/25/23 1310                   Communication Assessment/Intervention    Document Type evaluation  -MP        Subjective Information no complaints  -MP        Patient Observations alert;cooperative  -MP        Patient/Family/Caregiver Comments/Observations S/o present  -MP        Patient Effort good  -MP           General Information    Patient Profile Reviewed yes  -MP        Pertinent History Of Current Problem Adm 10/25 w/ slurred speech, CVA w/u. CTA w/ no acute. No MRI. Passed RN dysphagia screen. Hx CHF, DM, COPD, HTN, HLD.  -MP        Precautions/Limitations, Vision WFL  with corrective lenses  -MP        Precautions/Limitations, Hearing WFL  -MP        Prior Level of Function-Communication WFL  -MP        Plans/Goals Discussed with patient;spouse/S.O.;agreed upon  -MP        Barriers to Rehab none identified  -MP        Patient's Goals for Discharge patient did not state  -MP        Family Goals for Discharge family did not state  -MP           Pain    Additional Documentation Pain Scale: FACES Pre/Post-Treatment (Group)  -MP           Pain Scale: FACES Pre/Post-Treatment    Pain: FACES Scale, Pretreatment 0-->no hurt  -MP        Posttreatment Pain Rating 0-->no hurt  -MP           Comprehension Assessment/Intervention    Comprehension Assessment/Intervention Auditory Comprehension;Reading Comprehension  -MP           Auditory Comprehension Assessment/Intervention    Auditory Comprehension (Communication) WFL  -MP           Reading Comprehension Assessment/Intervention    Reading Comprehension (Communication) WFL  -MP           Expression Assessment/Intervention    Expression Assessment/Intervention verbal expression  -MP           Verbal Expression Assessment/Intervention    Verbal Expression WFL  -MP           Motor Speech Assessment/Intervention    Motor Speech Function WFL  -MP           Cognitive Assessment Intervention- SLP    Cognitive Function (Cognition) WFL;other (see comments)  some mild delayed memory deficits, but pt reported she felt this was baseline  -MP           SLP Evaluation Clinical Impressions    SLP Diagnosis functional speech/language skills;functional cognitive-linguistic skills  -MP        SLC Criteria for Skilled Therapy Interventions Met no problems identified which require skilled intervention;baseline status  -MP           Recommendations    Therapy Frequency (SLP SLC) evaluation only  -MP        Anticipated Discharge Disposition (SLP) home;No further SLP services warranted  -MP                  User Key  (r) = Recorded By, (t) = Taken By, (c) =  Cosigned By      Initials Name Effective Dates    Jesse Skinner MS CCC-SLP 12/28/21 -                        EDUCATION  The patient has been educated in the following areas:     Cognitive Impairment Communication Impairment.                      Time Calculation:      Time Calculation- SLP       Row Name 10/25/23 1358             Time Calculation- SLP    SLP Start Time 1310  -MP      SLP Received On 10/25/23  -MP         Untimed Charges    14674-SF Eval Speech and Production w/ Language Minutes 40  -MP         Total Minutes    Untimed Charges Total Minutes 40  -MP       Total Minutes 40  -MP                User Key  (r) = Recorded By, (t) = Taken By, (c) = Cosigned By      Initials Name Provider Type    Jesse Skinner MS CCC-SLP Speech and Language Pathologist                    Therapy Charges for Today       Code Description Service Date Service Provider Modifiers Qty    [id number] HC ST EVAL SPEECH AND PROD W LANG  3 10/25/2023 Jesse Aguilar MS CCC-SLP GN 1                       Jesse Bravo MS CCC-GURINDER  10/25/2023

## 2023-10-25 NOTE — PROGRESS NOTES
Stroke Progress Note       Chief Complaint: Dysarthria    Subjective     Subjective:  Jackelyn Verdugo was seen and examined at bedside.  The patient's  was present for the encounter.  The patient reports persistent dysarthria which is noted on exam all questions and concerns were answered.       Objective      Temp:  [98.2 °F (36.8 °C)-98.7 °F (37.1 °C)] 98.4 °F (36.9 °C)  Heart Rate:  [69-82] 81  Resp:  [16] 16  BP: (127-148)/(82-85) 147/82            Objective    Physical Exam:  General Appearance: Alert  Eyes: Anicteric sclera  HEENT: no scleral injection  Lungs: respirations appear comfortable, no obvious increased work of breathing  Extremities: No cyanosis or fingernail clubbing   Skin: No rashes in exposed skin areas     Neurological Examination:   Mental status: Alert and oriented to person, place, and time. Speech with mild dysarthria, able to name and repeat with no difficulty.    Cranial Nerves: Visual fields intact. Extraocular movements are intact with no nystagmus. Facial sensation intact. Face symmetrical. Hearing grossly intact. Palate movement is symmetric. Full shoulder shrug bilaterally. Tongue protrudes midline.   Sensory: Sensory exam to light touch in all four extremities distally is normal.   Motor: Normal tone throughout. Normal bulk. Pronator drift is absent.  No tremor noted.  Left upper extremity: 5/5 deltoid, tricep, bicep, interosseous, hand .   Right upper extremity: 5/5 deltoid, tricep, bicep, interosseous, hand .   Left lower extremity: 3/5 iliopsoas limited due to body habitus and leg positioning, 5/5 knee extension/flexion, foot dorsi/plantarflexion.  Right lower extremity: 3/5 iliopsoas limited due to body habitus and bed positioning, 5/5 knee extension/flexion, foot dorsi/plantarflexion.        Results Review:      CT head 10/24/2023 at 9:39 PM normal per my review.  No final read available.    CT angiogram head and neck 10/25/2023.  Impression: Extremely limited study  felt not to be diagnostic with respect to the intracranial structures.  There was no definite carotid arterial stenosis.      Labs:  Lab Results   Component Value Date    HGBA1C 8.70 (H) 10/25/2023      Lab Results   Component Value Date    CHOL 186 10/25/2023    TRIG 209 (H) 10/25/2023    HDL 60 10/25/2023    LDL 91 10/25/2023     LIVER FUNCTION TESTS:      Lab 10/25/23  0328   TOTAL PROTEIN 7.8   ALBUMIN 4.1   GLOBULIN 3.7   ALT (SGPT) 24   AST (SGOT) 27   BILIRUBIN 0.2   ALK PHOS 89                 Assessment/Plan     Assessment:  Jackelyn Verdugo is a 58-year-old female with a past medical history of arthritis, asthma, congestive heart failure, COPD on 2 L home oxygen, diabetes mellitus type 2 (hemoglobin A1c 8.7%), diverticulosis, GERD, and tremors who presented to [facility] on 10/24/2023 with dysarthria who was transferred to [facility] Emergency Department for higher level of care.  Stroke neurology was consulted for further evaluation.    # Intermittent dysarthria  The patient reports a longstanding history of intermittent dysarthria which was worse 10/24/2023 and prompted her visit to the [facility] Emergency Department.  The dysarthria is persistent and present on examination 10/25/2023.  Previously, the patient was taking aspirin 81 mg daily for her congestive heart failure, however this has been stopped for approximately 1 month since per her report the pharmacy did not fill this medication.  She takes simvastatin 20 mg daily at home.  The patient would require conscious sedation for MRI and at this point, it would not change her management, therefore shared decision made with the patient and her  not to pursue MRI on this admission.  Differential diagnosis includes small stroke, transient ischemic attack, or spell.  However, in any case the patient should be on stroke preventative medications with aspirin 81 mg daily for her congestive  heart failure and a cholesterol lowering medication for her hyperlipidemia.  The patient stroke and TIA risk factors include BMI 59, hypertension, hyperlipidemia (LDL 91), former smoking, congestive heart failure, and diabetes mellitus type 2 with hemoglobin A1c 8.7%.    -Patient passed her bedside swallow  -Continue aspirin 81 mg daily indefinite for stroke and transient ischemic attack prevention.  It was emphasized to the patient and her  that this will be a lifelong medication and she should not discontinue unless notable side effects for which she should call clinic prior to discontinuation.  -Continue atorvastatin 80 mg daily indefinite (LDL 91; home medication with simvastatin 20 mg daily)  -Transthoracic echocardiogram ordered to rule out left atrial clot  -Carotid ultrasound ordered to rule out carotid artery stenoses  -PT/OT evaluations pending  -Patient prefers to see her established outpatient neurologist for stroke or TIA follow-up  -Neuro-checks: every 4 hours while inpatient  -Blood pressure goals (from stroke perspective): Normotensive  -Sequential compression devices in place for deep vein thrombosis prophylaxis   -Discussed with patient that unfortunately, even with the best medical management, repeat stroke or transient ischemic attack risk will never be zero.   -Follow-up with primary care physician to address any stroke risk factors of diabetes, high cholesterol, elevated BMI, smoking cessation, or high blood pressure.   -For stroke prevention: long term goal hemoglobin A1c less than 7.0%, goal BMI less than 24, goal LDL less than 70, and goal blood pressure less than 120/80.  -Stroke education counseling provided: common manifestations of stroke include unilateral face, arm, or leg weakness, numbness, or paresthesias, unilateral facial droop, speech deficits (dysarthria, aphasia), acute unremitting dizziness with nausea, vomiting, nystagmus, and incoordination, visual deficits (amarosis  fugax or hemianopsia), or severe onset headache    # Intermittent full body tremors  Patient currently undergoing work-up with her outpatient neurologist who she prefers to keep seeing.  Based on the description, nonepileptic spells are likely, however this is a diagnosis of exclusion.  We will defer further work-up and management to her outpatient neurologist.    -If the patient develops full body tremors while inpatient, we will order an EEG  -EEG not ordered at this time since the patient is not having full body tremors and they occur approximately monthly so EEG likely would not capture the events.        Discharge Planning: Stroke neurology will follow the results of the carotid ultrasound, transthoracic echocardiogram, and PT/OT evaluation    Mylene Rubio MD  [facility] STROKE NEURO  10/25/23  11:49 EDT

## 2023-10-25 NOTE — PLAN OF CARE
Goal Outcome Evaluation:  Plan of Care Reviewed With: patient, spouse           Outcome Evaluation: Pt presents near baseline for functional mobility tasks. No deficits identified requiring PT services. Rec home with assist upon dc.      Anticipated Discharge Disposition (PT): home with assist

## 2023-10-25 NOTE — PROGRESS NOTES
[facility] Medicine Services  ADMISSION FOLLOW-UP NOTE          Patient admitted after midnight, H&P by my partner performed earlier on today's date reviewed.  Interim findings, labs, and charting also reviewed.        The [facility] Hospital Problem List has been managed and updated to include any new diagnoses:  Active Hospital Problems    Diagnosis  POA    **Slurred speech [R47.81]  Yes    Chronic obstructive pulmonary disease, unspecified [J44.9]  Yes    Essential (primary) hypertension [I10]  Yes    CHF (congestive heart failure) [I50.9]  Yes    DM (diabetes mellitus) [E11.9]  Yes    Hyperlipidemia [E78.5]  Yes      Resolved Hospital Problems   No resolved problems to display.         ADDITIONAL PLAN:  - detailed assessment and plan from admission reviewed  Jackelyn Verdugo is a 58 y.o. F with a history of CHF, diabetes, COPD and hypertension that was transferred here for further evaluation of her slurred speech with concern for possible stroke.  Symptoms at this time seem to be minimal/resolved.     Slurred Speech  Concern for TIA/CVA  --CT imaging negative, CTA pending  --MRI not necessary per Neurology, patient refusing to do w/out sedation  --Stroke team to follow  --Echo   --started ASA and lipitor 80mg  --PT/OT/ST     DM  --HgA1c 8.7  --Per prior med list-- She is on Levemir 60units qhs and Novolog 48units TID.  Will place patient on glucomannder for better control.    --Has a dexcom device     COPD, on Home O2 4L  --Pt on Trelegy.  --No evidence of COPD exac  --Duonebs prn     CHF  -No prior ECHOs in record.   -Doesn't seem to be on any goal directed therapy.  ECHO pending     HTN  -Monitor BP     HLD  --statin     Morbid Obesity  --complicates all aspects of care     Expected Discharge Home, today vs. Tomorrow pending further work-up.  Expected Discharge Date: 10/26/2023; Expected Discharge Time:      Nicole Urrutia DO  10/25/23

## 2023-10-25 NOTE — THERAPY DISCHARGE NOTE
Acute Care - Occupational Therapy Discharge  [facility]    Patient Name: Jackelyn Verdugo  : 1965    MRN: [id number]                              Today's Date: 10/25/2023       Admit Date: 10/25/2023    Visit Dx: No diagnosis found.  Patient Active Problem List   Diagnosis    Slurred speech    Chronic obstructive pulmonary disease, unspecified    CHF (congestive heart failure)    DM (diabetes mellitus)    Essential (primary) hypertension    Hyperlipidemia     Past Medical History:   Diagnosis Date    COPD (chronic obstructive pulmonary disease)     Diabetes mellitus     Hypertension     Obesity      Past Surgical History:   Procedure Laterality Date     SECTION      HERNIA REPAIR        General Information       Row Name 10/25/23 1502          OT Time and Intention    Document Type discharge evaluation/summary  -JY     Mode of Treatment occupational therapy  -JY       Row Name 10/25/23 1502          General Information    Patient Profile Reviewed yes  -JY     Prior Level of Function independent:;gait;transfer;feeding;grooming;min assist:;dressing;bathing;dependent:;w/c or scooter;community mobility;home management;cooking;cleaning  pt requires A for LB ADLs (sits on edge of tub to bathe vs immersion in tub or shower), spouse completes gross home mgmt and cooking tasks; pt only completes household distances w/ o AD, uses w/c and dependent pushing in community, denies falls  -JY     Existing Precautions/Restrictions other (see comments);oxygen therapy device and L/min  chronic SOA on 4L at home  -JY     Barriers to Rehab physical barrier  -JY       Row Name 10/25/23 1502          Occupational Profile    Environmental Supports and Barriers (Occupational Profile) step over tub/shower where pt sits on EOB w/ feet in water and bathes self, does not sit in bottom of tub or stand for shower; standard toilet height; DME: not AD used w/ in home mobility, is pushed in w/c in community when MD appointments are  scheduled  [facility] 10/25/23 [facility]          Living Environment    People in Home spouse;grandchild(rach);other (see comments)  spouse available 24/7 and grandson able to help as needed  -[employer]       [facility] Name 10/25/23 1502          Home Main Entrance    Number of Stairs, Main Entrance three  -     Stair Railings, Main Entrance railings on both sides of stairs  -[employer]       [facility] Name 10/25/23 1502          Stairs Within Home, Primary    Number of Stairs, Within Home, Primary none  -[employer]       [facility] Name 10/25/23 1502          Cognition    Orientation Status (Cognition) oriented x 4  [facility] Name 10/25/23 1502          Safety Issues, Functional Mobility    Impairments Affecting Function (Mobility) balance;endurance/activity tolerance  -     Comment, Safety Issues/Impairments (Mobility) pt alert and able to follow commands; pt presented with SOA rated at 6/10 on perceived exertion scale however pt reported need to rest (in sitting) and facilitate PLB with similar presentation at baseline per pt report  -               User Key  (r) = Recorded By, (t) = Taken By, (c) = Cosigned By      Initials Name Provider Type    Autumn Solis OT Occupational Therapist                   Mobility/ADL's       [facility] Name 10/25/23 1512          Bed Mobility    Comment, (Bed Mobility) pt received in bathroom, defer to PT for specifics regarding bed mobility  -[employer]       Row Name 10/25/23 1512          Transfers    Transfers sit-stand transfer;stand-sit transfer;toilet transfer  -     Comment, (Transfers) pt demonstrated good safety awareness and hand placement; baseline presentation for large gross body movements and some observed repeated lateral weight shifting/ sway d/t body habitus during fxl mobility w/ no overt LOB and no AD used  -[employer]       [facility] Name 10/25/23 1512          Sit-Stand Transfer    Sit-Stand [place] (Transfers) contact guard  -     Assistive Device (Sit-Stand Transfers) other (see comments)  no AD used   [facility] 10[facility]          Stand-Sit Transfer    Stand-Sit [place] (Transfers) contact guard  -     Assistive Device (Stand-Sit Transfers) other (see comments)  no AD used  -JY       Row Name 10/25/23 1512          Toilet Transfer    [place] Level (Toilet Transfer) contact guard;verbal cues  -J     Assistive Device (Toilet Transfer) commode;grab bars/safety frame;other (see comments)  no AD used for mobility  -JY     Comment, (Toilet Transfer) increased effort, momentum to ascend from toilet given lower height; no LOB noted and similar CGA provided w/ pt using grab bar to assist  -[facility] Name 10/25/23 1512          Functional Mobility    Functional Mobility- Ind. Level contact guard assist;verbal cues required  -     Functional Mobility- Device other (see comments)  no AD used  -     Functional Mobility-Distance (Feet) --  in room ADL relatead distances  -     Functional Mobility- Comment defer to PT for specifics however during in room ADL related mobility pt req'd gross CGA without AD, able to turn in multi directions, step backward w/ no overt LOB; presents with frequent lateral weight shifting and postural sway d/t body habitus  -[facility] Name 10/25/23 1512          Activities of Daily Living    BADL Assessment/Intervention upper body dressing;lower body dressing;grooming;toileting  -JY       Row Name 10/25/23 1512          Upper Body Dressing Assessment/Training    [place] Level (Upper Body Dressing) doff;don;pajama/robe;minimum assist (75% patient effort);contact guard assist;verbal cues  -     Position (Upper Body Dressing) edge of bed sitting  -     Comment, (Upper Body Dressing) min A For posterior and proximal mgmt of gown, pt able to thread and unthread distally and help manage it upward toward shoulders, able to grossly don posterior gown with CGA for mgmt  -JY       Row Name 10/25/23 1512          Lower Body Dressing Assessment/Training     [place] Level (Lower Body Dressing) doff;don;socks;dependent (less than 25% patient effort)  -JY     Position (Lower Body Dressing) edge of bed sitting  -JY     Comment, (Lower Body Dressing) pt reports dep A for sock d/d and management at baseline, pt declined any attempt this date seeking OT A  -JY       Row Name 10/25/23 1512          Grooming Assessment/Training    [place] Level (Grooming) wash face, hands;supervision;set up  -JY     Position (Grooming) supported standing;sink side  -JY     Comment, (Grooming) no physical A, set up A and spv for ensured safety and tolerance toward more dyanamic task  -JY       Row Name 10/25/23 1512          Toileting Assessment/Training    [place] Level (Toileting) perform perineal hygiene;standby assist;adjust/manage clothing;contact guard assist;verbal cues  -JY     Assistive Devices (Toileting) commode;commode, bedside without drop arms  -JY     Position (Toileting) supported standing  -JY     Comment, (Toileting) CGA for gown mgmt largely posterior gown d/t mild difiiculty in maintaining balance and reaching away from MA[place]NA; mild LOB when managing gown forward and pt abruptly sat  -JY               User Key  (r) = Recorded By, (t) = Taken By, (c) = Cosigned By      Initials Name Provider Type    Autumn Solis, FRED Occupational Therapist                   Obj/Interventions       Row Name 10/25/23 1524          Sensory Assessment (Somatosensory)    Sensory Assessment (Somatosensory) bilateral UE;sensation intact  -JY     Bilateral UE Sensory Assessment general sensation;light touch awareness;intact  -JY     Sensory Assessment denies any numbness and able to recognize LT stimuli as intact and symmetrical at BUEs  -JY       Row Name 10/25/23 1524          Vision Assessment/Intervention    Visual Impairment/Limitations corrective lenses full-time  -JY     Vision Assessment Comment denies any acute changes to vision and able to track L <> R, up/down, responds to  divergence/convergence and identified peripheral input at symmetrical points of vision mittal  -JY       Row Name 10/25/23 1524          Range of Motion Comprehensive    General Range of Motion bilateral upper extremity ROM WFL  [facility]          Strength Comprehensive (MMT)    General Manual Muscle Testing (MMT) Assessment no strength deficits identified  -JY     Comment, General Manual Muscle Testing (MMT) Assessment gross 5/5 MMS at BUEs  -JY       Row Name 10/25/23 1524          Motor Skills    Motor Skills functional endurance;coordination  -JY     Coordination bilateral;upper extremity;finger to nose;other (see comments);WFL  finger thumb opposition  -JY     Functional Endurance mild decrease in activity tolerance toward more dynamic tasks, rated perceived exertion following toileting and sinkside g/h at 6/ 10 and sat EOB and required seated rest break following and later returned to bed fs sitting UIC  -JY       Row Name 10/25/23 1524          Balance    Balance Assessment sitting static balance;sitting dynamic balance;standing static balance;standing dynamic balance  -JY     Static Sitting Balance independent  -JY     Dynamic Sitting Balance independent  -JY     Position, Sitting Balance unsupported;other (see comments)  sitting on toilet  -JY     Static Standing Balance standby assist  -JY     Dynamic Standing Balance contact guard  -JY     Position/Device Used, Standing Balance unsupported  -JY     Balance Interventions sitting;standing;static;dynamic;sit to stand;supported;occupation based/functional task  -JY     Comment, Balance no overt LOB During seated or standing tasks, no AD used during standing mobility  -JY               User Key  (r) = Recorded By, (t) = Taken By, (c) = Cosigned By      Initials Name Provider Type    Autumn Solis OT Occupational Therapist                   Goals/Plan       Row Name 10/25/23 1536          Problem Specific Goal 1 (OT)    Problem  Specific Goal 1 (OT) PT to verbalize/demonstrate understanding/competency in recognizing s/s of CVA and home safety in prep for return home w/ family A at d/c when medically ready  -JY     Time Frame (Problem Specific Goal 1, OT) long term goal (LTG);by discharge  -JY     Progress/Outcome (Problem Specific Goal 1, OT) new goal  -JY       Row Name 10/25/23 1536          Therapy Assessment/Plan (OT)    Planned Therapy Interventions (OT) patient/caregiver education/training  -JY               User Key  (r) = Recorded By, (t) = Taken By, (c) = Cosigned By      Initials Name Provider Type    Autumn Solis, FRED Occupational Therapist                   Clinical Impression       Row Name 10/25/23 1528          Pain Assessment    Pretreatment Pain Rating 0/10 - no pain  -JY     Posttreatment Pain Rating 1/10  -JY     Pain Location lower  -JY     Pain Location - back  -JY     Pre/Posttreatment Pain Comment initially denied any pain, reported 1/10 severity (back)  at conclusion of OT interventions  -JY     Pain Intervention(s) Ambulation/increased activity;Rest;Nursing Notified  -JY       Row Name 10/25/23 1528          Plan of Care Review    Plan of Care Reviewed With patient;spouse  -JY     Progress improving  -JY     Outcome Evaluation OT evaluation completed. Pt presents near occupational performance baseline with demonstration of fxl strength at BUEs, AROM, no coordination, sensory or vision deficits and grossly able to complete fxl transfers with SBA to CGA, fxl mobility with CGA, UBD with CGA (largely situational d/t garment type), no physical A for g/h and same dep care for d/d socks. No overt LOB and denied any dizziness or feeling LH and only mild pain (not limiting). Pt has supportive spouse and grandchild that can assist as needed. OT education pt and family on s/s of CVA and seeking timely care in prep for anticipated return home. No further IPOT POC and recommend home with A at d/c when medically ready.  -JY        Row Name 10/25/23 1528          Therapy Assessment/Plan (OT)    Patient/Family Therapy Goal Statement (OT) to maximize I in ADLs, related t/fs, mobility, return to PLOF  -JY     Rehab Potential (OT) good, to achieve stated therapy goals  -JY     Criteria for Skilled Therapeutic Interventions Met (OT) yes;skilled treatment is necessary  -JY     Therapy Frequency (OT) evaluation only  -JY       Row Name 10/25/23 1528          Therapy Plan Review/Discharge Plan (OT)    Anticipated Discharge Disposition (OT) home with assist  -JY       Row Name 10/25/23 1528          Vital Signs    Pre Systolic BP Rehab 147  -JY     Pre Treatment Diastolic BP 82  -JY     Post Systolic BP Rehab 157  -JY     Post Treatment Diastolic BP 66  -JY     Pretreatment Heart Rate (beats/min) 96  -JY     Posttreatment Heart Rate (beats/min) 90  -JY     Pre SpO2 (%) 94  -JY     O2 Delivery Pre Treatment supplemental O2  -JY     O2 Delivery Intra Treatment supplemental O2  -JY     Post SpO2 (%) 95  -JY     O2 Delivery Post Treatment supplemental O2  -JY     Pre Patient Position Sitting  -JY     Intra Patient Position Standing  -JY     Post Patient Position Sitting  -JY       Row Name 10/25/23 1528          Positioning and Restraints    Pre-Treatment Position bathroom  w/ PT  -JY     Post Treatment Position chair  -JY     In Chair notified nsg;reclined;call light within reach;encouraged to call for assist;exit alarm on;with family/caregiver;waffle cushion;legs elevated  -JY               User Key  (r) = Recorded By, (t) = Taken By, (c) = Cosigned By      Initials Name Provider Type    Autumn Solis OT Occupational Therapist                   Outcome Measures       Row Name 10/25/23 1537          How much help from another is currently needed...    Putting on and taking off regular lower body clothing? 1  -JY     Bathing (including washing, rinsing, and drying) 2  -JY     Toileting (which includes using toilet bed pan or urinal) 3  -JY      Putting on and taking off regular upper body clothing 3  -JY     Taking care of personal grooming (such as brushing teeth) 3  -JY     Eating meals 3  -JY     AM-PAC 6 Clicks Score (OT) 15  -JY       Row Name 10/25/23 1456 10/25/23 0800       How much help from another person do you currently need...    Turning from your back to your side while in flat bed without using bedrails? 3  -KR 3  -JH    Moving from lying on back to sitting on the side of a flat bed without bedrails? 3  -KR 3  -JH    Moving to and from a bed to a chair (including a wheelchair)? 3  -KR 4  -JH    Standing up from a chair using your arms (e.g., wheelchair, bedside chair)? 3  -KR 4  -JH    Climbing 3-5 steps with a railing? 3  -KR 2  -JH    To walk in hospital room? 3  -KR 3  -JH    AM-PAC 6 Clicks Score (PT) 18  -KR 19  -JH    Highest level of mobility 6 --> Walked 10 steps or more  -KR 6 --> Walked 10 steps or more  -      Row Name 10/25/23 1537 10/25/23 1456       Modified McLean Scale    Pre-Stroke Modified [place]Lean Scale 6 - Unable to determine (UTD) from the medical record documentation  -JY 6 - Unable to determine (UTD) from the medical record documentation  -KR    Modified [place] Scale 1 - No significant disability despite symptoms.  Able to carry out all usual duties and activities.  -JY 0 - No Symptoms at all.  -KR      Row Name 10/25/23 1537 10/25/23 1456       Functional Assessment    Outcome Measure Options AM-PAC 6 Clicks Daily Activity (OT);Modified McLean  -JY Modified McLean  -KR              User Key  (r) = Recorded By, (t) = Taken By, (c) = Cosigned By      Initials Name Provider Type    Autumn Solis OT Occupational Therapist    Angeline Crawford, RN Registered Nurse    Louise Clarke, PT Physical Therapist                  Occupational Therapy Education       Title: PT OT SLP Therapies (In Progress)       Topic: Occupational Therapy (In Progress)       Point: ADL training (In Progress)       Description:   Instruct  learner(s) on proper safety adaptation and remediation techniques during self care or transfers.   Instruct in proper use of assistive devices.                  Learning Progress Summary             Patient Acceptance, E,D, NR by PARVEZ at 10/25/2023 1356   Family Acceptance, E,D, NR by PARVEZ at 10/25/2023 1356                         Point: Home exercise program (Not Started)       Description:   Instruct learner(s) on appropriate technique for monitoring, assisting and/or progressing therapeutic exercises/activities.                  Learner Progress:  Not documented in this visit.              Point: Precautions (In Progress)       Description:   Instruct learner(s) on prescribed precautions during self-care and functional transfers.                  Learning Progress Summary             Patient Acceptance, E,D, NR by PARVEZ at 10/25/2023 1356   Family Acceptance, E,D, NR by PARVEZ at 10/25/2023 1356                         Point: Body mechanics (In Progress)       Description:   Instruct learner(s) on proper positioning and spine alignment during self-care, functional mobility activities and/or exercises.                  Learning Progress Summary             Patient Acceptance, E,D, NR by PARVEZ at 10/25/2023 1356   Family Acceptance, E,D, NR by PARVEZ at 10/25/2023 1356                                         User Key       Initials Effective Dates Name Provider Type Discipline    LEE 06/16/21 -  Autumn Yang OT Occupational Therapist OT                  OT Recommendation and Plan  Planned Therapy Interventions (OT): patient/caregiver education/training  Therapy Frequency (OT): evaluation only  Plan of Care Review  Plan of Care Reviewed With: patient, spouse  Progress: improving  Outcome Evaluation: OT evaluation completed. Pt presents near occupational performance baseline with demonstration of fxl strength at BUEs, AROM, no coordination, sensory or vision deficits and grossly able to complete fxl transfers with SBA to CGA, fxl  mobility with CGA, UBD with CGA (largely situational d/t garment type), no physical A for g/h and same dep care for d/d socks. No overt LOB and denied any dizziness or feeling LH and only mild pain (not limiting). Pt has supportive spouse and grandchild that can assist as needed. OT education pt and family on s/s of CVA and seeking timely care in prep for anticipated return home. No further IPOT POC and recommend home with A at d/c when medically ready.  Plan of Care Reviewed With: patient, spouse  Outcome Evaluation: OT evaluation completed. Pt presents near occupational performance baseline with demonstration of fxl strength at BUEs, AROM, no coordination, sensory or vision deficits and grossly able to complete fxl transfers with SBA to CGA, fxl mobility with CGA, UBD with CGA (largely situational d/t garment type), no physical A for g/h and same dep care for d/d socks. No overt LOB and denied any dizziness or feeling LH and only mild pain (not limiting). Pt has supportive spouse and grandchild that can assist as needed. OT education pt and family on s/s of CVA and seeking timely care in prep for anticipated return home. No further IPOT POC and recommend home with A at d/c when medically ready.     Time Calculation:   Evaluation Complexity (OT)  Review Occupational Profile/Medical/Therapy History Complexity: brief/low complexity  Assessment, Occupational Performance/Identification of Deficit Complexity: 1-3 performance deficits  Clinical Decision Making Complexity (OT): problem focused assessment/low complexity  Overall Complexity of Evaluation (OT): low complexity     Time Calculation- OT       Row Name 10/25/23 1539             Time Calculation- OT    OT Start Time 1356  -JY      OT Received On 10/25/23  -JY         Timed Charges    10734 - OT Therapeutic Activity Minutes 9  -JY         Untimed Charges    OT Eval/Re-eval Minutes 50  -JY         Total Minutes    Timed Charges Total Minutes 9  -JY      Untimed  Charges Total Minutes 50  -JY       Total Minutes 59  -JY                User Key  (r) = Recorded By, (t) = Taken By, (c) = Cosigned By      Initials Name Provider Type    Autumn Solis OT Occupational Therapist                  Therapy Charges for Today       Code Description Service Date Service Provider Modifiers Qty    [id number]  OT THERAPEUTIC ACT EA 15 MIN 10/25/2023 Autumn Yang OT GO 1    [id number]  OT EVAL LOW COMPLEXITY 4 10/25/2023 Autumn Yang OT GO 1               OT Discharge Summary  Anticipated Discharge Disposition (OT): home with assist  Reason for Discharge: All goals achieved, At baseline function  Outcomes Achieved: Able to achieve all goals within established timeline, Refer to plan of care for updates on goals achieved  Discharge Destination: Home with assist    Autumn Yang OT  10/25/2023

## 2023-10-25 NOTE — THERAPY DISCHARGE NOTE
Patient Name: Jackelyn Verdugo  : 1965    MRN: [id number]                              Today's Date: 10/25/2023       Admit Date: 10/25/2023    Visit Dx: No diagnosis found.  Patient Active Problem List   Diagnosis    Slurred speech    Chronic obstructive pulmonary disease, unspecified    CHF (congestive heart failure)    DM (diabetes mellitus)    Essential (primary) hypertension    Hyperlipidemia     Past Medical History:   Diagnosis Date    COPD (chronic obstructive pulmonary disease)     Diabetes mellitus     Hypertension     Obesity      Past Surgical History:   Procedure Laterality Date     SECTION      HERNIA REPAIR        General Information       Row Name 10/25/23 1435          Physical Therapy Time and Intention    Document Type discharge evaluation/summary  -KR     Mode of Treatment physical therapy  -KR       Row Name 10/25/23 1435          General Information    Patient Profile Reviewed yes  -KR     Prior Level of Function independent:;gait;transfer;dependent:;w/c or scooter  pt sleeps in a recliner, is independent with ambulation within the home. Pt ambulates only household distances and utilizes wc for community mobility.  -KR     Existing Precautions/Restrictions no known precautions/restrictions  -KR     Barriers to Rehab physical barrier  -KR       Row Name 10/25/23 1435          Living Environment    People in Home spouse  -KR       Row Name 10/25/23 1435          Home Main Entrance    Number of Stairs, Main Entrance three  -KR     Stair Railings, Main Entrance railings on both sides of stairs  -KR       Row Name 10/25/23 1435          Stairs Within Home, Primary    Number of Stairs, Within Home, Primary none  -KR       Row Name 10/25/23 1435          Cognition    Orientation Status (Cognition) oriented x 4  -KR       Row Name 10/25/23 1435          Safety Issues, Functional Mobility    Impairments Affecting Function (Mobility) balance;endurance/activity tolerance  -KR                User Key  (r) = Recorded By, (t) = Taken By, (c) = Cosigned By      Initials Name Provider Type    Louise Clarke PT Physical Therapist                   Mobility       Row Name 10/25/23 1445          Bed Mobility    Bed Mobility supine-sit  -KR     Supine-Sit [place] (Bed Mobility) modified independence  -KR     Assistive Device (Bed Mobility) head of bed elevated  -KR       Row Name 10/25/23 1445          Sit-Stand Transfer    Sit-Stand [place] (Transfers) contact guard  -KR     Comment, (Sit-Stand Transfer) 2x from bed  -KR       Row Name 10/25/23 1445          Gait/Stairs (Locomotion)    [place] Level (Gait) contact guard  -KR     Distance in Feet (Gait) 20  -KR     Deviations/Abnormal Patterns (Gait) yue decreased;gait speed decreased;stride length decreased;weight shifting decreased  -KR     Bilateral Gait Deviations lateral trunk flexion  -KR     Comment, (Gait/Stairs) pt ambulates with increased lateral trunk sway d/t body habitus. Farther distance limited by fatigue.  -KR               User Key  (r) = Recorded By, (t) = Taken By, (c) = Cosigned By      Initials Name Provider Type    Louise Clarke PT Physical Therapist                   Obj/Interventions       Row Name 10/25/23 1454          Range of Motion Comprehensive    General Range of Motion bilateral lower extremity ROM WNL  -KR       Row Name 10/25/23 1452          Strength Comprehensive (MMT)    General Manual Muscle Testing (MMT) Assessment no strength deficits identified  -KR       Row Name 10/25/23 145          Balance    Balance Assessment sitting static balance;sitting dynamic balance;standing static balance;standing dynamic balance  -KR     Static Sitting Balance independent  -KR     Dynamic Sitting Balance independent  -KR     Position, Sitting Balance unsupported;sitting edge of bed  -KR     Static Standing Balance standby assist  -KR     Dynamic Standing Balance contact guard  -KR     Position/Device Used,  Standing Balance unsupported  -KR       Row Name 10/25/23 1454          Sensory Assessment (Somatosensory)    Sensory Assessment (Somatosensory) LE sensation intact  -KR               User Key  (r) = Recorded By, (t) = Taken By, (c) = Cosigned By      Initials Name Provider Type    Louise Clarke PT Physical Therapist                   Goals/Plan    No documentation.                  Clinical Impression       Row Name 10/25/23 1454          Pain    Pretreatment Pain Rating 4/10  -KR     Posttreatment Pain Rating 1/10  -KR     Pain Location lower  -KR     Pain Location - back  -KR     Pain Intervention(s) Repositioned;Ambulation/increased activity  -KR       Row Name 10/25/23 1454          Plan of Care Review    Plan of Care Reviewed With patient;spouse  -KR     Outcome Evaluation Pt presents near baseline for functional mobility tasks. No deficits identified requiring PT services. Rec home with assist upon dc.  -KR       Row Name 10/25/23 1454          Therapy Assessment/Plan (PT)    Criteria for Skilled Interventions Met (PT) no;no problems identified which require skilled intervention  -KR       Row Name 10/25/23 1454          Vital Signs    Pre Systolic BP Rehab 147  -KR     Pre Treatment Diastolic BP 82  -KR     Post Systolic BP Rehab 157  -KR     Post Treatment Diastolic BP 66  -KR     Post SpO2 (%) 94  -KR     O2 Delivery Post Treatment nasal cannula  -KR     Pre Patient Position Supine  -KR     Intra Patient Position Standing  -KR     Post Patient Position Sitting  -KR       Row Name 10/25/23 1454          Positioning and Restraints    Pre-Treatment Position in bed  -KR     Post Treatment Position bathroom  -KR     Bathroom sitting;with OT  -KR               User Key  (r) = Recorded By, (t) = Taken By, (c) = Cosigned By      Initials Name Provider Type    Louise Clarke PT Physical Therapist                   Outcome Measures       Row Name 10/25/23 1456 10/25/23 0800       How much help from  another person do you currently need...    Turning from your back to your side while in flat bed without using bedrails? 3  -KR 3  -JH    Moving from lying on back to sitting on the side of a flat bed without bedrails? 3  -KR 3  -JH    Moving to and from a bed to a chair (including a wheelchair)? 3  -KR 4  -JH    Standing up from a chair using your arms (e.g., wheelchair, bedside chair)? 3  -KR 4  -JH    Climbing 3-5 steps with a railing? 3  -KR 2  -JH    To walk in hospital room? 3  -KR 3  -JH    AM-PAC 6 Clicks Score (PT) 18  -KR 19  -JH    Highest level of mobility 6 --> Walked 10 steps or more  -KR 6 --> Walked 10 steps or more  -      Row Name 10/25/23 1456          Modified Petr Scale    Pre-Stroke Modified [place] Scale 6 - Unable to determine (UTD) from the medical record documentation  -KR     Modified Petr Scale 0 - No Symptoms at all.  -       Row Name 10/25/23 1456          Functional Assessment    Outcome Measure Options Modified [place]  -KR               User Key  (r) = Recorded By, (t) = Taken By, (c) = Cosigned By      Initials Name Provider Type    Angeline Crawford, RN Registered Nurse    Louise Clarke, PT Physical Therapist                  Physical Therapy Education       Title: PT OT SLP Therapies (In Progress)       Topic: Physical Therapy (In Progress)       Point: Mobility training (Done)       Learning Progress Summary             Patient Acceptance, E, VU by FELIZ at 10/25/2023 1459   Family Acceptance, E, VU by FELIZ at 10/25/2023 1459                         Point: Home exercise program (Not Started)       Learner Progress:  Not documented in this visit.              Point: Body mechanics (Done)       Learning Progress Summary             Patient Acceptance, E, VU by FELIZ at 10/25/2023 1459   Family Acceptance, E, VU by FELIZ at 10/25/2023 1459                         Point: Precautions (Done)       Learning Progress Summary             Patient Acceptance, E, VU by FELIZ at 10/25/2023 1459    Family Acceptance, E, VU by KR at 10/25/2023 1459                                         User Key       Initials Effective Dates Name Provider Type Discipline     12/30/22 -  Louise Jerez, PT Physical Therapist PT                  PT Recommendation and Plan     Plan of Care Reviewed With: patient, spouse  Outcome Evaluation: Pt presents near baseline for functional mobility tasks. No deficits identified requiring PT services. Rec home with assist upon dc.     Time Calculation:   PT Evaluation Complexity  History, PT Evaluation Complexity: 3 or more personal factors and/or comorbidities  Examination of Body Systems (PT Eval Complexity): total of 4 or more elements  Clinical Presentation (PT Evaluation Complexity): stable  Clinical Decision Making (PT Evaluation Complexity): low complexity  Overall Complexity (PT Evaluation Complexity): low complexity     PT Charges       Row Name 10/25/23 1459             Time Calculation    Start Time 1400  -KR      PT Received On 10/25/23  -KR         Untimed Charges    PT Eval/Re-eval Minutes 47  -KR         Total Minutes    Untimed Charges Total Minutes 47  -KR       Total Minutes 47  -KR                User Key  (r) = Recorded By, (t) = Taken By, (c) = Cosigned By      Initials Name Provider Type    KR Louise Jerez PT Physical Therapist                  Therapy Charges for Today       Code Description Service Date Service Provider Modifiers Qty    60057732588 HC PT EVAL LOW COMPLEXITY 4 10/25/2023 Louise Jerez, PT GP 1            PT G-Codes  Outcome Measure Options: Modified [place]  AM-PAC 6 Clicks Score (PT): 18  Modified Petr Scale: 0 - No Symptoms at all.    PT Discharge Summary  Anticipated Discharge Disposition (PT): home with assist    Louise Jerez PT  10/25/2023

## 2023-10-25 NOTE — H&P
[facility] Medicine Services  HISTORY AND PHYSICAL    Patient Name: Jackelyn Verdugo  : 1965  MRN: [id number]  Primary Care Physician: Provider, No Known  Date of admission: 10/25/2023      Subjective   Subjective     Chief Complaint:  Slurred speech    HPI:  Jackelyn Verdugo is a 58 y.o. female with a history of CHF, diabetes, COPD on 4 L O2 at home, hypertension and hyperlipidemia was transferred here from [facility] with concerns of slurred speech with concern for possible stroke.  Patient states that she has been having the symptoms on and off for a long time, but today was a little more worrisome because they did not resolve like they typically do.  She describes her speech as being more slurred and at times not able to get out the right words.  This started about 2 days ago and seems to be intermittent.  Feels like it is better now but not completely resolved.  She also describes bilateral arm and leg spasms or jerking.  In the past when these have happened she has been found to have abnormal magnesium or potassium once they fix the level/replace it her symptoms improved.  She denies any weakness or numbness in her extremities.  She does have some dizziness if she is to get up too quickly from her recliner.  Patient's baseline activity level is very minimal as she mostly just walks from her recliner to the bathroom.  Has chronic shortness of breath but feels that she is at baseline.  Denies any chest pain, nausea or vomiting.  Patient has intermittent diarrhea but this seems to be chronic in nature as well.  Patient did have COVID approximately 6 weeks ago but seemed to have improved from that standpoint.          Personal History     Past Medical History:   Diagnosis Date    COPD (chronic obstructive pulmonary disease)     Diabetes mellitus     Hypertension     Obesity              Past Surgical History:   Procedure Laterality Date     SECTION      HERNIA REPAIR          Family History: family history is not on file.     Social History:  reports that she has quit smoking. Her smoking use included cigarettes. She does not have any smokeless tobacco history on file. She reports that she does not currently use alcohol. She reports that she does not currently use drugs.  Social History     Social History Narrative    Lives with  and grandson.         Medications:  Available home medication information reviewed.  Medications Prior to Admission   Medication Sig Dispense Refill Last Dose    escitalopram (LEXAPRO) 10 MG tablet Take 1 tablet by mouth Daily.       esomeprazole (nexIUM) 40 MG capsule Take 1 capsule by mouth Every Morning Before Breakfast.       glipizide (GLUCOTROL XL) 5 MG ER tablet Take 1 tablet by mouth Daily.       metFORMIN ER (GLUCOPHAGE-XR) 500 MG 24 hr tablet Take 1 tablet by mouth Daily With Breakfast.       oxyCODONE-acetaminophen (PERCOCET) 7.5-325 MG per tablet Take 1 tablet by mouth Every 6 (Six) Hours As Needed for Moderate Pain or Severe Pain.       traZODone (DESYREL) 100 MG tablet Take 1 tablet by mouth Every Night.       Trelegy Ellipta 100-62.5-25 MCG/ACT inhaler Inhale 1 puff Daily.       albuterol sulfate  (90 Base) MCG/ACT inhaler Inhale 2 puffs Every 6 (Six) Hours As Needed for Shortness of Air or Wheezing.       allopurinol (ZYLOPRIM) 100 MG tablet Take 1 tablet by mouth Daily.       dicyclomine (BENTYL) 10 MG capsule Take 1 capsule by mouth 3 (Three) Times a Day.       furosemide (LASIX) 40 MG tablet Take 1 tablet by mouth Daily.       insulin detemir (LEVEMIR) 100 UNIT/ML injection Inject 60 Units under the skin into the appropriate area as directed Every Night.       NovoLOG FlexPen 100 UNIT/ML solution pen-injector sc pen Inject 48 Units under the skin into the appropriate area as directed 3 (Three) Times a Day With Meals.       pregabalin (LYRICA) 150 MG capsule Take 1 capsule by mouth 2 (Two) Times a Day.       simvastatin  (ZOCOR) 20 MG tablet Take 1 tablet by mouth Every Night.       Trulicity 3 MG/0.5ML solution pen-injector Inject 0.75 mL under the skin into the appropriate area as directed 1 (One) Time Per Week.          Allergies   Allergen Reactions    Hydromorphone Hcl Other (See Comments)     Respiratory distress    Other reaction(s): respiratory distress, respiratory distress (severe)    Ace Inhibitors Swelling     Swelling of eyes.    Erythromycin Other (See Comments)     Raises blood sugar    Morphine Itching    Penicillins Hives       Objective   Objective     Vital Signs:   Heart Rate:  [69] 69  BP: (148)/(84) 148/84  Total (NIH Stroke Scale): 1    Physical Exam   Constitutional: No acute distress, awake, alert, morbidly obese  HENT: NCAT, mucous membranes moist  Respiratory: Clear to auscultation bilaterally, respiratory effort normal   Cardiovascular: RRR, no murmurs, rubs, or gallops  Gastrointestinal: Positive bowel sounds, soft, nontender, nondistended  Musculoskeletal: No bilateral ankle edema  Psychiatric: Appropriate affect, cooperative  Neurologic: Oriented x 3, strength symmetric in all extremities, Cranial Nerves grossly intact to confrontation, speech clear with slight dysarthria.  Skin: No rashes      Result Review:  I have personally reviewed the results from the time of this admission to 10/25/2023 02:56 EDT and agree with these findings:  []  Laboratory list / accordion  []  Microbiology  []  Radiology  []  EKG/Telemetry   []  Cardiology/Vascular   []  Pathology  []  Old records  []  Other:          LAB RESULTS:                              UA          7/16/2023    01:23   Urinalysis   Specific Gravity, UA 1.020       Blood, UA Negative       Leukocytes, UA SMALL       Nitrite, UA Negative       RBC, UA 0-2       Bacteria, UA 1+          Details          This result is from an external source.               Microbiology Results (last 10 days)       ** No results found for the last 240 hours. **             No radiology results from the last 24 hrs        Assessment & Plan   Assessment & Plan     Active Hospital Problems    Diagnosis  POA    **Slurred speech [R47.81]  Yes    Chronic obstructive pulmonary disease, unspecified [J44.9]  Yes    Essential (primary) hypertension [I10]  Yes    CHF (congestive heart failure) [I50.9]  Yes    DM (diabetes mellitus) [E11.9]  Yes    Hyperlipidemia [E78.5]  Yes       Jackelyn Verdugo is a 58 y.o. F with a history of CHF, diabetes, COPD and hypertension that was transferred here for further evaluation of her slurred speech with concern for possible stroke.  Symptoms at this time seem to be minimal/resolved.    Slurred Speech  Concern for TIA/CVA  --Obtain CTA and CT perfusion  --MRI ordered, but pt states that she will need sedation due to claustrophobia.  --Check HgA1c, lipid panel  --Stroke team to follow  --ECHO in AM  --Give ASA and start lipitor 80mg  --PT/OT/ST    DM  --HgA1c pending  --Per prior med list--We are awaiting her med list as her  is who manages her meds.  She is on Levemir 60units qhs and Novolog 48units TID.  Will place patient on glucomannder for better control.    --Has a dexcom device    COPD, on Home O2 4L  --Pt on Trelegy.  --No evidence of COPD exac  --Duonebs prn    CHF  -No prior ECHOs in record.   -Doesn't seem to be on any goal directed therapy.  ECHO for AM.    HTN  -Monitor BP    HLD  --statin    Morbid Obesity  --complicates all aspects of care      DVT prophylaxis:  SCDs      CODE STATUS:    Code Status and Medical Interventions:   Ordered at: 10/25/23 0240     Code Status (Patient has no pulse and is not breathing):    CPR (Attempt to Resuscitate)     Medical Interventions (Patient has pulse or is breathing):    Full Support       Expected Discharge   Expected Discharge Date: 10/26/2023; Expected Discharge Time:      Cuca Gómez MD  10/25/23

## 2023-10-25 NOTE — PLAN OF CARE
Goal Outcome Evaluation:  Plan of Care Reviewed With: patient, spouse            SLP evaluation completed. Will sign-off. Please see note for further details and recommendations.

## 2023-10-25 NOTE — CASE MANAGEMENT/SOCIAL WORK
Discharge Planning Assessment  [facility]     Patient Name: Jackelyn Verdugo  MRN: [id number]  Today's Date: 10/25/2023    Admit Date: 10/25/2023    Plan: Home with spouse   Discharge Needs Assessment       Row Name 10/25/23 0801       Living Environment    People in Home spouse    Name(s) of People in Home Tejinder (spouse) [phone]    Current Living Arrangements home    Potentially Unsafe Housing Conditions none    Primary Care Provided by self    Provides Primary Care For no one, unable/limited ability to care for self    Family Caregiver if Needed spouse    Family Caregiver Names --    Quality of Family Relationships helpful;involved;supportive    Able to Return to Prior Arrangements yes       Resource/Environmental Concerns    Resource/Environmental Concerns none    Transportation Concerns none       Food Insecurity    Within the past 12 months, you worried that your food would run out before you got the money to buy more. Sometimes    Within the past 12 months, the food you bought just didn't last and you didn't have money to get more. Sometimes       Transition Planning    Patient/Family Anticipates Transition to home with family    Patient/Family Anticipated Services at Transition none    Transportation Anticipated family or friend will provide       Discharge Needs Assessment    Readmission Within the Last 30 Days no previous admission in last 30 days    Equipment Currently Used at Home wheelchair;walker, rolling;rollator;commode    Concerns to be Addressed denies needs/concerns at this time    Anticipated Changes Related to Illness none    Equipment Needed After Discharge none                   Discharge Plan       Row Name 10/25/23 0806       Plan    Plan Home with spouse    Patient/Family in Agreement with Plan yes    Plan Comments Spoke to patient and spouse at bedside. Lives with Tejinder (spouse) [phone] in [place], [place]. Is dependent with ADL's. No problems with [employer] Medicare/[place] Medicaid or  medications. Uses a rolling walker, Rollator and wheelchair at home. No advanced directives. PCP is Pat Blackman. Plan is home with spouse. Spouse will transport. CM will continue to follow.    Final Discharge Disposition Code 01 - home or self-care                  Continued Care and Services - Admitted Since 10/25/2023    Coordination has not been started for this encounter.       Expected Discharge Date and Time       Expected Discharge Date Expected Discharge Time    Oct 26, 2023            Demographic Summary       Row Name 10/25/23 0800       General Information    Admission Type observation    Arrived From hospital    Referral Source admission list    Reason for Consult discharge planning    Preferred Language English       Contact Information    Permission Granted to Share Info With     Contact Information Obtained for                    Functional Status       Row Name 10/25/23 0801       Functional Status    Usual Activity Tolerance moderate    Current Activity Tolerance moderate       Functional Status, IADL    Medications assistive person    Meal Preparation assistive person    Housekeeping completely dependent    Laundry completely dependent    Shopping completely dependent       Mental Status    General Appearance WDL WDL       Mental Status Summary    Recent Changes in Mental Status/Cognitive Functioning no changes       Employment/    Employment Status disabled                   Psychosocial    No documentation.                  Abuse/Neglect    No documentation.                  Legal    No documentation.                  Substance Abuse    No documentation.                  Patient Forms    No documentation.                     Levi Garcia, RN

## 2023-10-25 NOTE — PLAN OF CARE
Goal Outcome Evaluation:  Plan of Care Reviewed With: patient, spouse        Progress: improving  Outcome Evaluation: OT evaluation completed. Pt presents near occupational performance baseline with demonstration of fxl strength at BUEs, AROM, no coordination, sensory or vision deficits and grossly able to complete fxl transfers with SBA to CGA, fxl mobility with CGA, UBD with CGA (largely situational d/t garment type), no physical A for g/h and same dep care for d/d socks. No overt LOB and denied any dizziness or feeling LH and only mild pain (not limiting). Pt has supportive spouse and grandchild that can assist as needed. OT education pt and family on s/s of CVA and seeking timely care in prep for anticipated return home. No further IPOT POC and recommend home with A at d/c when medically ready.      Anticipated Discharge Disposition (OT): home with assist

## 2023-10-26 VITALS
HEART RATE: 77 BPM | OXYGEN SATURATION: 96 % | RESPIRATION RATE: 18 BRPM | TEMPERATURE: 98.2 F | DIASTOLIC BLOOD PRESSURE: 76 MMHG | SYSTOLIC BLOOD PRESSURE: 145 MMHG | HEIGHT: 62 IN | BODY MASS INDEX: 53.92 KG/M2 | WEIGHT: 293 LBS

## 2023-10-26 LAB — GLUCOSE BLDC GLUCOMTR-MCNC: 257 MG/DL (ref 70–130)

## 2023-10-26 PROCEDURE — G0378 HOSPITAL OBSERVATION PER HR: HCPCS

## 2023-10-26 PROCEDURE — 99232 SBSQ HOSP IP/OBS MODERATE 35: CPT | Performed by: STUDENT IN AN ORGANIZED HEALTH CARE EDUCATION/TRAINING PROGRAM

## 2023-10-26 PROCEDURE — 63710000001 INSULIN LISPRO (HUMAN) PER 5 UNITS: Performed by: PEDIATRICS

## 2023-10-26 PROCEDURE — 82948 REAGENT STRIP/BLOOD GLUCOSE: CPT

## 2023-10-26 RX ORDER — ASPIRIN 81 MG/1
81 TABLET ORAL DAILY
Qty: 90 TABLET | Refills: 0 | Status: SHIPPED | OUTPATIENT
Start: 2023-10-26

## 2023-10-26 RX ORDER — ATORVASTATIN CALCIUM 80 MG/1
80 TABLET, FILM COATED ORAL NIGHTLY
Qty: 90 TABLET | Refills: 0 | Status: SHIPPED | OUTPATIENT
Start: 2023-10-26

## 2023-10-26 RX ADMIN — PANTOPRAZOLE SODIUM 40 MG: 40 TABLET, DELAYED RELEASE ORAL at 08:25

## 2023-10-26 RX ADMIN — ALLOPURINOL 100 MG: 100 TABLET ORAL at 08:23

## 2023-10-26 RX ADMIN — Medication 10 ML: at 08:25

## 2023-10-26 RX ADMIN — ESCITALOPRAM OXALATE 10 MG: 10 TABLET ORAL at 08:23

## 2023-10-26 RX ADMIN — PREGABALIN 150 MG: 150 CAPSULE ORAL at 08:23

## 2023-10-26 RX ADMIN — INSULIN LISPRO 27 UNITS: 100 INJECTION, SOLUTION INTRAVENOUS; SUBCUTANEOUS at 08:24

## 2023-10-26 RX ADMIN — AMLODIPINE BESYLATE 5 MG: 5 TABLET ORAL at 08:22

## 2023-10-26 RX ADMIN — DICYCLOMINE HYDROCHLORIDE 10 MG: 10 CAPSULE ORAL at 08:23

## 2023-10-26 RX ADMIN — ASPIRIN 81 MG CHEWABLE TABLET 81 MG: 81 TABLET CHEWABLE at 08:23

## 2023-10-26 RX ADMIN — OXYCODONE HYDROCHLORIDE AND ACETAMINOPHEN 1 TABLET: 7.5; 325 TABLET ORAL at 08:23

## 2023-10-26 NOTE — PROGRESS NOTES
Stroke Progress Note       Chief Complaint: Dysarthria    Subjective     Subjective:  Jackelyn Verdugo was seen and examined at bedside.  The patient's  was present for the encounter.  The patient reports improved dysarthria today.  No apparent tremors.  All questions and concerns were answered.       Objective      Temp:  [97.9 °F (36.6 °C)-98.2 °F (36.8 °C)] 98.2 °F (36.8 °C)  Heart Rate:  [75-86] 77  Resp:  [16-20] 18  BP: (145-157)/(66-84) 145/76            Objective    Physical Exam:  General Appearance: Alert  Eyes: Anicteric sclera  HEENT: no scleral injection  Lungs: respirations appear comfortable, no obvious increased work of breathing  Extremities: No cyanosis or fingernail clubbing   Skin: No rashes in exposed skin areas     Neurological Examination:   Mental status: Alert and oriented to person, place, and time. Speech with mild dysarthria, able to name and repeat with no difficulty.    Cranial Nerves: Visual fields intact. Extraocular movements are intact with no nystagmus. Facial sensation intact. Face symmetrical. Hearing grossly intact. Palate movement is symmetric. Full shoulder shrug bilaterally. Tongue protrudes midline.   Sensory: Sensory exam to light touch in all four extremities distally is normal.   Motor: Normal tone throughout. Normal bulk. Pronator drift is absent.  No tremor noted.  Left upper extremity: 5/5 deltoid, tricep, bicep, interosseous, hand .   Right upper extremity: 5/5 deltoid, tricep, bicep, interosseous, hand .   Left lower extremity: 3/5 iliopsoas limited due to body habitus and leg positioning, 5/5 knee extension/flexion, foot dorsi/plantarflexion.  Right lower extremity: 3/5 iliopsoas limited due to body habitus and bed positioning, 5/5 knee extension/flexion, foot dorsi/plantarflexion.        Results Review:      CT head 10/24/2023 at 9:39 PM normal per my review.  No final read available.    CT angiogram head and neck 10/25/2023.  Impression: Extremely  limited study felt not to be diagnostic with respect to the intracranial structures.  There was no definite carotid arterial stenosis.    Transthoracic echocardiogram 10/25/2023 shows left ventricular ejection fraction 55-60%, grade 1 diastolic dysfunction, trivial pericardial effusion, agitated saline study inconclusive due to poor visualization, left atrial cavity moderately dilated.    Carotid ultrasound 10/25/2023 less than 50% stenoses of internal carotid arteries bilaterally.    Transcranial Doppler 10/25/2023 negative bubble study.    Labs:  Lab Results   Component Value Date    HGBA1C 8.70 (H) 10/25/2023      Lab Results   Component Value Date    CHOL 186 10/25/2023    TRIG 209 (H) 10/25/2023    HDL 60 10/25/2023    LDL 91 10/25/2023     LIVER FUNCTION TESTS:      Lab 10/25/23  0328   TOTAL PROTEIN 7.8   ALBUMIN 4.1   GLOBULIN 3.7   ALT (SGPT) 24   AST (SGOT) 27   BILIRUBIN 0.2   ALK PHOS 89       Assessment/Plan     Assessment:  Jackelyn Verdugo is a 58-year-old female with a past medical history of arthritis, asthma, congestive heart failure, COPD on 2 L home oxygen, diabetes mellitus type 2 (hemoglobin A1c 8.7%), diverticulosis, GERD, and tremors who presented to [facility] on 10/24/2023 with dysarthria who was transferred to [facility] Emergency Department for higher level of care.  Stroke neurology was consulted for further evaluation.  CT head 10/24/2023 normal.  CTA head and neck 10/25/2023 non diagnostic but no definite carotid artery stenosis.  Carotid ultrasound 10/25/2023 less than 50% stenoses of bilateral internal carotid arteries.  TTE 10/25/2023 LVEF 55-60%, moderately dilated left atrium, inconclusive for PFO.  Transcranial Doppler 10/25/2023 negative bubble study without patent foramen ovale.    # Intermittent dysarthria  The patient reports a longstanding history of intermittent dysarthria which was worse 10/24/2023 and prompted her visit to the [place]  [facility] Emergency Department.  The dysarthria is persistent and present on examination 10/25/2023.  Previously, the patient was taking aspirin 81 mg daily for her congestive heart failure, however this has been stopped for approximately 1 month since per her report the pharmacy did not fill this medication.  She takes simvastatin 20 mg daily at home.  The patient would require conscious sedation for MRI and at this point, it would not change her management, therefore shared decision made with the patient and her  not to pursue MRI on this admission.  Differential diagnosis includes small stroke, transient ischemic attack, or spell.  However, in any case the patient should be on stroke preventative medications with aspirin 81 mg daily for her congestive heart failure and a cholesterol lowering medication for her hyperlipidemia.  The patient stroke and TIA risk factors include BMI 59, hypertension, hyperlipidemia (LDL 91), former smoking, congestive heart failure, and diabetes mellitus type 2 with hemoglobin A1c 8.7%.    -Patient passed her bedside swallow  -Continue aspirin 81 mg daily indefinite for stroke and transient ischemic attack prevention.  It was emphasized to the patient and her  that this will be a lifelong medication and she should not discontinue unless notable side effects for which she should call clinic prior to discontinuation.  -Continue atorvastatin 80 mg daily indefinite (LDL 91; home medication with simvastatin 20 mg daily)  -PT/OT recommend home  -Patient prefers to see her established outpatient neurologist for stroke or TIA follow-up  -Neuro-checks: every 4 hours while inpatient  -Blood pressure goals (from stroke perspective): Normotensive  -Sequential compression devices in place for deep vein thrombosis prophylaxis   -Discussed with patient that unfortunately, even with the best medical management, repeat stroke or transient ischemic attack risk will never be  zero.   -Follow-up with primary care physician to address any stroke risk factors of diabetes, high cholesterol, elevated BMI, smoking cessation, or high blood pressure.   -For stroke prevention: long term goal hemoglobin A1c less than 7.0%, goal BMI less than 24, goal LDL less than 70, and goal blood pressure less than 120/80.  -Stroke education counseling provided: common manifestations of stroke include unilateral face, arm, or leg weakness, numbness, or paresthesias, unilateral facial droop, speech deficits (dysarthria, aphasia), acute unremitting dizziness with nausea, vomiting, nystagmus, and incoordination, visual deficits (amarosis fugax or hemianopsia), or severe onset headache    # Intermittent full body tremors  Patient currently undergoing work-up with her outpatient neurologist who she prefers to keep seeing.  Based on the description, nonepileptic spells are likely, however this is a diagnosis of exclusion.  We will defer further work-up and management to her outpatient neurologist.    -If the patient develops full body tremors while inpatient, we will order an EEG  -EEG not ordered at this time since the patient is not having full body tremors and they occur approximately monthly so EEG likely would not capture the events.        Discharge Planning: The patient is cleared to discharge home from the stroke neurology perspective    Mylene Rubio MD  [facility] STROKE NEURO  10/26/23  10:51 EDT

## 2023-10-26 NOTE — CONSULTS
Diabetes Education    Patient Name:  Jackelyn Verdugo  YOB: 1965  MRN: [id number]  Admit Date:  10/25/2023        Since patient is not a resident of [place], we are unable to provide telehealth education to her with a stroke class.  Pertinent stroke information has been mailed.      Electronically signed by:  Alicia Mae RN  10/26/23 13:20 EDT

## 2023-10-26 NOTE — DISCHARGE SUMMARY
[facility] Medicine Services  DISCHARGE SUMMARY    Patient Name: Jackelyn Verdugo  : 1965  MRN: [id number]    Date of Admission: 10/25/2023  2:01 AM  Date of Discharge:  10/26/2023  Primary Care Physician: Provider, No Known    Consults       No orders found for last 30 day(s).            Hospital Course     Presenting Problem: Slurred Speech    Active Hospital Problems    Diagnosis  POA    **Slurred speech [R47.81]  Yes    Chronic obstructive pulmonary disease, unspecified [J44.9]  Yes    Essential (primary) hypertension [I10]  Yes    CHF (congestive heart failure) [I50.9]  Yes    DM (diabetes mellitus) [E11.9]  Yes    Hyperlipidemia [E78.5]  Yes      Resolved Hospital Problems   No resolved problems to display.          Hospital Course:  Jackelyn Verdugo is a 58 y.o. female with a history of CHF, diabetes, COPD and hypertension that was transferred here for further evaluation of her slurred speech with concern for possible stroke.  Symptoms at this time seem to be minimal/resolved.     Slurred Speech  Concern for TIA  --CT imaging negative, CTA without LVO, unable to tolerate MRI w/out sedation, neurology did not feel it would  so cancelled  --Echo with normal EF. TCD w/negative bubble study  --carotid duplex w/ <50% stenosis bilaterally  --continue ASA and lipitor 80mg  --PT/OT/ST-cleared for discharge home     DM  --HgA1c 8.7, resume home regimen     COPD, on Home O2 4L  --continue home Trelegy.  --No evidence of COPD exac    Hx of CHF  -Echo with normal EF, no prior echo on record here     HTN  -resume home regimen     HLD  --statin, increased to high intensity     Morbid Obesity  --complicates all aspects of care     Discharge Follow Up Recommendations for outpatient labs/diagnostics:  - f/u with PCP in 1-2 weeks  - f/u with Neurology closer to home for TIA f/u in 1-2 months    Day of Discharge     HPI:   Patient resting in bed. No issues overnight. Slurred speech  improved. Wants to go home.    Review of Systems  Gen- No fevers, chills  CV- No chest pain, palpitations  Resp- No cough, dyspnea  GI- No N/V/D, abd pain    Vital Signs:   Temp:  [97.9 °F (36.6 °C)-98.4 °F (36.9 °C)] 98.2 °F (36.8 °C)  Heart Rate:  [75-86] 77  Resp:  [16-20] 18  BP: (145-157)/(66-84) 145/76  Flow (L/min):  [4] 4      Physical Exam:  Constitutional: No acute distress, awake, alert, morbidly obese  HENT: NCAT, mucous membranes moist  Respiratory: Clear to auscultation bilaterally, respiratory effort normal   Cardiovascular: RRR, no murmurs, rubs, or gallops  Gastrointestinal: soft, nontender, nondistended  Musculoskeletal: No bilateral ankle edema  Psychiatric: Appropriate affect, cooperative  Neurologic: Oriented x 3, speech clear, no focal deficits  Skin: No rashes      Pertinent  and/or Most Recent Results     LAB RESULTS:      Lab 10/25/23  0328   WBC 6.92   HEMOGLOBIN 11.3*   HEMATOCRIT 37.1   PLATELETS 156   MCV 88.5         Lab 10/25/23  0328   SODIUM 142   POTASSIUM 4.6   CHLORIDE 100   CO2 32.0*   ANION GAP 10.0   BUN 24*   CREATININE 1.10*   EGFR 58.0*   GLUCOSE 254*   CALCIUM 9.4   HEMOGLOBIN A1C 8.70*         Lab 10/25/23  0328   TOTAL PROTEIN 7.8   ALBUMIN 4.1   GLOBULIN 3.7   ALT (SGPT) 24   AST (SGOT) 27   BILIRUBIN 0.2   ALK PHOS 89             Lab 10/25/23  0328   CHOLESTEROL 186   LDL CHOL 91   HDL CHOL 60   TRIGLYCERIDES 209*             Brief Urine Lab Results  (Last result in the past 365 days)        Color   Clarity   Blood   Leuk Est   Nitrite   Protein   CREAT   Urine HCG        07/16/23 0123 Yellow   Clear   Negative   SMALL   Negative                   Microbiology Results (last 10 days)       ** No results found for the last 240 hours. **            Doppler Transcranial Microbubble Injection CAR    Result Date: 10/25/2023  Normal mean velocities and waveforms are seen in the right and left MCA and terminal ICA Following injection of agitated saline, both before and after  Valsalva, no abnormal signals are seen in either the right or left MCA Negative bubble study                       Duplex Carotid Ultrasound CAR    Result Date: 10/25/2023    Right internal carotid artery demonstrates a less than 50% stenosis.   Left internal carotid artery demonstrates a less than 50% stenosis.   Vertebral artery flow is antegrade bilaterally     Adult Transthoracic Echo Complete W/ Cont if Necessary Per Protocol (With Agitated Saline)    Result Date: 10/25/2023    Technically difficult study due to poor acoustic windows   Left ventricular systolic function appears to be normal with an estimated ejection fraction of 55 to 60%   Left ventricular diastolic function is consistent with (grade I) impaired relaxation.   Normal right ventricular systolic function   No significant valvular disease   Agitated saline study is inconclusive due to poor visualization   There is a trivial circumferential pericardial effusion.   No prior echocardiogram for comparison     CT Angiogram Head w AI Analysis of LVO    Result Date: 10/25/2023  CT ANGIOGRAM HEAD W AI ANALYSIS OF LVO, CT ANGIOGRAM NECK Date of Exam: 10/25/2023 11:40 AM EDT Indication: Stroke, follow up. Comparison: Same-day CTA head and neck Technique: CTA of the head and neck was performed after the uneventful intravenous administration of 75 mL Isovue-370. Reconstructed coronal and sagittal images were also obtained. In addition, a 3-D volume rendered image was created for interpretation. Automated exposure control and iterative reconstruction methods were used. Findings: CTA head: No abrupt cut off/large vessel occlusion, flow-limiting stenosis, dissection, or aneurysm. The cerebral veins and dural venous sinuses are grossly patent. No abnormal intracranial enhancement. CTA neck: No abrupt cut off/large vessel occlusion, flow-limiting stenosis, dissection, or aneurysm. There is trace atherosclerosis of the bilateral carotid bifurcations without luminal  irregularity or flow-limiting stenosis. Extravascular findings: The lung apices are clear. Homogeneous attenuation of the thyroid gland. No pharyngeal or laryngeal mass lesion. No cervical adenopathy. No acute or suspicious bony findings. There is periodontal disease and numerous missing teeth.     Impression: No abrupt cut off/large vessel occlusion, flow-limiting stenosis, dissection, or aneurysm. Electronically Signed: Isaias Jay MD  10/25/2023 12:35 PM EDT  Workstation ID: [id number]    CT Angiogram Neck    Result Date: 10/25/2023  CT ANGIOGRAM HEAD W AI ANALYSIS OF LVO, CT ANGIOGRAM NECK Date of Exam: 10/25/2023 11:40 AM EDT Indication: Stroke, follow up. Comparison: Same-day CTA head and neck Technique: CTA of the head and neck was performed after the uneventful intravenous administration of 75 mL Isovue-370. Reconstructed coronal and sagittal images were also obtained. In addition, a 3-D volume rendered image was created for interpretation. Automated exposure control and iterative reconstruction methods were used. Findings: CTA head: No abrupt cut off/large vessel occlusion, flow-limiting stenosis, dissection, or aneurysm. The cerebral veins and dural venous sinuses are grossly patent. No abnormal intracranial enhancement. CTA neck: No abrupt cut off/large vessel occlusion, flow-limiting stenosis, dissection, or aneurysm. There is trace atherosclerosis of the bilateral carotid bifurcations without luminal irregularity or flow-limiting stenosis. Extravascular findings: The lung apices are clear. Homogeneous attenuation of the thyroid gland. No pharyngeal or laryngeal mass lesion. No cervical adenopathy. No acute or suspicious bony findings. There is periodontal disease and numerous missing teeth.     Impression: No abrupt cut off/large vessel occlusion, flow-limiting stenosis, dissection, or aneurysm. Electronically Signed: Isaias Jay MD  10/25/2023 12:35 PM EDT  Workstation ID: [id number]    CT  Angiogram Head    Result Date: 10/25/2023  CT ANGIOGRAM NECK, CT ANGIOGRAM HEAD Date of Exam: 10/25/2023 3:05 AM EDT Indication: Neuro deficit, acute, stroke suspected. Comparison: None available. Technique: CTA of the neck was performed before and after the uneventful intravenous administration of 75 mL Isovue-370. Reconstructed coronal and sagittal images were also obtained. In addition, a 3-D volume rendered image was created for interpretation. Automated exposure control and iterative reconstruction methods were used. Findings: This exam is significantly compromised by low arterial contrast enhancement and significant venous contamination. The reconstructed images have been reconstructed based upon the jugular veins. Patient has a type I aortic arch. There is bilateral carotid bifurcation calcific atherosclerosis without evidence of significant stenosis. Cervical portions of the internal carotid artery appear normal. Evaluation of the vertebral arteries is suboptimal from venous contamination and poor arterial enhancement. There is no gross intracranial aneurysm or occlusion.     Impression: Extremely limited study not felt to be diagnostic with respect to the intracranial structures. There is no definite carotid arterial stenosis. Electronically Signed: Gallo Churchill MD  10/25/2023 3:47 AM EDT  Workstation ID: [id number]    CT Angiogram Neck    Result Date: 10/25/2023  CT ANGIOGRAM NECK, CT ANGIOGRAM HEAD Date of Exam: 10/25/2023 3:05 AM EDT Indication: Neuro deficit, acute, stroke suspected. Comparison: None available. Technique: CTA of the neck was performed before and after the uneventful intravenous administration of 75 mL Isovue-370. Reconstructed coronal and sagittal images were also obtained. In addition, a 3-D volume rendered image was created for interpretation. Automated exposure control and iterative reconstruction methods were used. Findings: This exam is significantly compromised by low arterial  contrast enhancement and significant venous contamination. The reconstructed images have been reconstructed based upon the jugular veins. Patient has a type I aortic arch. There is bilateral carotid bifurcation calcific atherosclerosis without evidence of significant stenosis. Cervical portions of the internal carotid artery appear normal. Evaluation of the vertebral arteries is suboptimal from venous contamination and poor arterial enhancement. There is no gross intracranial aneurysm or occlusion.     Impression: Extremely limited study not felt to be diagnostic with respect to the intracranial structures. There is no definite carotid arterial stenosis. Electronically Signed: Gallo Churchill MD  10/25/2023 3:47 AM EDT  Workstation ID: [id number]    CT Outside Films    Result Date: 10/25/2023  This procedure was auto-finalized with no dictation required.     Results for orders placed during the hospital encounter of 10/25/23    Doppler Transcranial Microbubble Injection CAR    Interpretation Summary  Normal mean velocities and waveforms are seen in the right and left MCA and terminal ICA    Following injection of agitated saline, both before and after Valsalva, no abnormal signals are seen in either the right or left MCA    Negative bubble study      Results for orders placed during the hospital encounter of 10/25/23    Doppler Transcranial Microbubble Injection CAR    Interpretation Summary  Normal mean velocities and waveforms are seen in the right and left MCA and terminal ICA    Following injection of agitated saline, both before and after Valsalva, no abnormal signals are seen in either the right or left MCA    Negative bubble study      Results for orders placed during the hospital encounter of 10/25/23    Adult Transthoracic Echo Complete W/ Cont if Necessary Per Protocol (With Agitated Saline)    Interpretation Summary    Technically difficult study due to poor acoustic windows    Left ventricular systolic  function appears to be normal with an estimated ejection fraction of 55 to 60%    Left ventricular diastolic function is consistent with (grade I) impaired relaxation.    Normal right ventricular systolic function    No significant valvular disease    Agitated saline study is inconclusive due to poor visualization    There is a trivial circumferential pericardial effusion.    No prior echocardiogram for comparison      Plan for Follow-up of Pending Labs/Results:     Discharge Details        Discharge Medications        New Medications        Instructions Start Date   atorvastatin 80 MG tablet  Commonly known as: LIPITOR   80 mg, Oral, Nightly             Continue These Medications        Instructions Start Date   albuterol sulfate  (90 Base) MCG/ACT inhaler  Commonly known as: PROVENTIL HFA;VENTOLIN HFA;PROAIR HFA   2 puffs, Inhalation, Every 6 Hours PRN      allopurinol 100 MG tablet  Commonly known as: ZYLOPRIM   1 tablet, Oral, Daily      amLODIPine 5 MG tablet  Commonly known as: NORVASC   5 mg, Oral, Daily      aspirin 81 MG EC tablet   81 mg, Oral, Daily      dicyclomine 10 MG capsule  Commonly known as: BENTYL   1 capsule, Oral, 3 Times Daily      escitalopram 10 MG tablet  Commonly known as: LEXAPRO   10 mg, Oral, Daily      esomeprazole 40 MG capsule  Commonly known as: nexIUM   40 mg, Oral, Every Morning Before Breakfast      FeroSul 325 (65 Fe) MG tablet  Generic drug: ferrous sulfate   325 mg, Oral, 3 Times Daily With Meals      furosemide 40 MG tablet  Commonly known as: LASIX   40 mg, Oral, Daily      glipizide 5 MG ER tablet  Commonly known as: GLUCOTROL XL   5 mg, Oral, Daily      hydrOXYzine pamoate 25 MG capsule  Commonly known as: VISTARIL   25 mg, Oral, 3 times daily      insulin detemir 100 UNIT/ML injection  Commonly known as: LEVEMIR   60 Units, Subcutaneous, Nightly      Magnesium Oxide -Mg Supplement 400 (240 Mg) MG tablet   400 mg, Oral, Daily      metFORMIN 1000 MG tablet  Commonly  known as: GLUCOPHAGE   1,000 mg, Oral, 2 Times Daily With Meals      metFORMIN  MG 24 hr tablet  Commonly known as: GLUCOPHAGE-XR   500 mg, Oral, Daily With Breakfast      NovoLOG FlexPen 100 UNIT/ML solution pen-injector sc pen  Generic drug: insulin aspart   48 Units, Subcutaneous, 3 Times Daily With Meals      nystatin [id number] UNIT/GM cream  Commonly known as: MYCOSTATIN   1 application , Topical, Nightly      ondansetron 4 MG tablet  Commonly known as: ZOFRAN   4 mg, Oral, Every 8 Hours PRN      oxyCODONE-acetaminophen 7.5-325 MG per tablet  Commonly known as: PERCOCET   1 tablet, Oral, Every 6 Hours PRN      pantoprazole 40 MG EC tablet  Commonly known as: PROTONIX   40 mg, Oral, 2 Times Daily Before Meals      pregabalin 150 MG capsule  Commonly known as: LYRICA   150 mg, Oral, 2 Times Daily      traZODone 100 MG tablet  Commonly known as: DESYREL   100 mg, Oral, Nightly      Trelegy Ellipta 100-62.5-25 MCG/ACT inhaler  Generic drug: Fluticasone-Umeclidin-Vilant   1 puff, Inhalation, Daily - RT      Trulicity 3 MG/0.5ML solution pen-injector  Generic drug: Dulaglutide   4.5 mg, Subcutaneous, Weekly             Stop These Medications      simvastatin 20 MG tablet  Commonly known as: ZOCOR              Allergies   Allergen Reactions    Hydromorphone Hcl Other (See Comments)     Respiratory distress    Other reaction(s): respiratory distress, respiratory distress (severe)    Ace Inhibitors Swelling     Swelling of eyes.    Erythromycin Other (See Comments)     Raises blood sugar    Morphine Itching    Penicillins Hives         Discharge Disposition:  Home or Self Care    Diet:  Hospital:  Diet Order   Procedures    Diet: Regular/House Diet, Cardiac Diets, Diabetic Diets; Healthy Heart (2-3 Na+); Consistent Carbohydrate; Texture: Regular Texture (IDDSI 7); Fluid Consistency: Thin (IDDSI 0)            Activity:  - as tolerated    Restrictions or Other Recommendations:  - none       CODE STATUS:    Code Status  and Medical Interventions:   Ordered at: 10/25/23 0240     Code Status (Patient has no pulse and is not breathing):    CPR (Attempt to Resuscitate)     Medical Interventions (Patient has pulse or is breathing):    Full Support       No future appointments.              Nicole Urrutia,   10/26/23      Time Spent on Discharge:  I spent  35 minutes on this discharge activity which included: face-to-face encounter with the patient, reviewing the data in the system, coordination of the care with the nursing staff as well as consultants, documentation, and entering orders.

## 2023-10-27 ENCOUNTER — READMISSION MANAGEMENT (OUTPATIENT)
Dept: CALL CENTER | Facility: HOSPITAL | Age: 58
End: 2023-10-27
Payer: MEDICARE

## 2023-10-27 ENCOUNTER — TELEPHONE (OUTPATIENT)
Dept: PULMONOLOGY | Age: 58
End: 2023-10-27

## 2023-10-27 NOTE — TELEPHONE ENCOUNTER
Called [phone] (home)   [facility] for pt to call office. Pt needs scheduled for appt-ins denied coverage for home vent because pt has not been seen every three months. Notice scanned to chart.

## 2023-10-27 NOTE — OUTREACH NOTE
Prep Survey      Flowsheet Row Responses   [facility] facility patient discharged from? [facility]   Is LACE score < 7 ? Yes   Eligibility Readm Mgmt   Discharge diagnosis dysarthria   Does the patient have one of the following disease processes/diagnoses(primary or secondary)? Other   Does the patient have Home health ordered? No   Is there a DME ordered? No   Prep survey completed? Yes            Monika MENDOZA - Registered Nurse

## 2023-11-28 RX ORDER — FLUTICASONE FUROATE, UMECLIDINIUM BROMIDE AND VILANTEROL TRIFENATATE 100; 62.5; 25 UG/1; UG/1; UG/1
POWDER RESPIRATORY (INHALATION)
Qty: 1 EACH | Refills: 5 | Status: SHIPPED | OUTPATIENT
Start: 2023-11-28

## 2023-11-28 NOTE — TELEPHONE ENCOUNTER
LOV 01/12/2023    NOV none    LFD 06/14/2023    Plan:       *on NIMV   Will proceed with following work up  Will need CT scan r/o nodule/cancer  Will get follow up PFT  Will start Trelegy  Eosinophilic count  Ige  Advise to lose weight  Need to continue home O2 and wean gradually

## 2024-02-05 DIAGNOSIS — G89.4 CHRONIC PAIN SYNDROME: Primary | ICD-10-CM

## 2024-02-05 DIAGNOSIS — E13.49 OTHER DIABETIC NEUROLOGICAL COMPLICATION ASSOCIATED WITH OTHER SPECIFIED DIABETES MELLITUS (HCC): ICD-10-CM

## 2024-02-05 RX ORDER — PREGABALIN 300 MG/1
300 CAPSULE ORAL 2 TIMES DAILY
Qty: 60 CAPSULE | Refills: 0 | Status: SHIPPED | OUTPATIENT
Start: 2024-02-05 | End: 2024-03-06

## 2024-02-05 RX ORDER — OXYCODONE AND ACETAMINOPHEN 7.5; 325 MG/1; MG/1
1 TABLET ORAL EVERY 6 HOURS PRN
Qty: 56 TABLET | Refills: 0 | Status: SHIPPED | OUTPATIENT
Start: 2024-02-05 | End: 2024-02-19

## 2024-02-06 NOTE — PROGRESS NOTES
Pt admitted to [facility] from . Orders for the Lyrica and Percocet given on transfer orders but no rxs sent.  Rxs sent for stay at SNF

## 2024-04-08 ENCOUNTER — HOSPITAL ENCOUNTER (OUTPATIENT)
Age: 59
Discharge: HOME | End: 2024-04-08
Payer: MEDICARE

## 2024-04-08 ENCOUNTER — OFFICE VISIT (OUTPATIENT)
Dept: PULMONOLOGY | Age: 59
End: 2024-04-08
Payer: MEDICARE

## 2024-04-08 VITALS
HEART RATE: 85 BPM | HEIGHT: 61 IN | RESPIRATION RATE: 18 BRPM | SYSTOLIC BLOOD PRESSURE: 134 MMHG | BODY MASS INDEX: 55.32 KG/M2 | OXYGEN SATURATION: 94 % | WEIGHT: 293 LBS | DIASTOLIC BLOOD PRESSURE: 68 MMHG

## 2024-04-08 DIAGNOSIS — J44.9 CHRONIC OBSTRUCTIVE PULMONARY DISEASE, UNSPECIFIED COPD TYPE (HCC): Primary | ICD-10-CM

## 2024-04-08 DIAGNOSIS — J44.9 CHRONIC OBSTRUCTIVE PULMONARY DISEASE, UNSPECIFIED COPD TYPE (HCC): ICD-10-CM

## 2024-04-08 PROCEDURE — 99204 OFFICE O/P NEW MOD 45 MIN: CPT | Performed by: INTERNAL MEDICINE

## 2024-04-08 PROCEDURE — 86003 ALLG SPEC IGE CRUDE XTRC EA: CPT

## 2024-04-08 PROCEDURE — 36415 COLL VENOUS BLD VENIPUNCTURE: CPT

## 2024-04-08 PROCEDURE — 82785 ASSAY OF IGE: CPT

## 2024-04-08 PROCEDURE — 82803 BLOOD GASES ANY COMBINATION: CPT

## 2024-04-08 RX ORDER — FLUTICASONE FUROATE, UMECLIDINIUM BROMIDE AND VILANTEROL TRIFENATATE 100; 62.5; 25 UG/1; UG/1; UG/1
1 POWDER RESPIRATORY (INHALATION) DAILY
Qty: 1 EACH | Refills: 3 | Status: SHIPPED | OUTPATIENT
Start: 2024-04-08

## 2024-04-08 NOTE — PROGRESS NOTES
[facility]                                 Pulmonary Consult /Progress Note :                                                                   hospital follow up for hypercapnic RF    HPI:   Patient is 57-year-old female with a 50-60 pack -year smoking history.   quit smoking few years ago presented to the hospital with worsening mental status and worsening shortness of breath that has been going on for the last few days and get worse in the last 24 hours before admission    Patient is known COPD and she supposed to follow-up with me in the office in January    She is also known history of congestive heart failure    She is on diuresis at home    She is not on any type of noninvasive mechanical ventilation, she states she only take albuterol  Today visit  She was discharged home on Trilogy   She is on 4 L   Still over weight   No cough   Does not ambulate much       Past Medical History:   Diagnosis Date    Anxiety     Arthritis     CHF (congestive heart failure) (HCC)     COPD (chronic obstructive pulmonary disease) (HCC)     Depression     Diabetes mellitus (HCC)     Diverticulosis     Hyperlipidemia     Hypertension     Obesities, morbid (HCC)        Past Surgical History:        Procedure Laterality Date     SECTION      x3    COLONOSCOPY      ENDOMETRIAL ABLATION      ENDOSCOPY, COLON, DIAGNOSTIC      OTHER SURGICAL HISTORY  2023    EGD BIOPSY    UPPER GASTROINTESTINAL ENDOSCOPY N/A 2023    EGD BIOPSY performed by Kirby Sims DO at [facility] ENDOSCOPY    UPPER GASTROINTESTINAL ENDOSCOPY N/A 2023    EGD BIOPSY performed by Kirby Sims DO at [facility] ENDOSCOPY       Allergies:  is allergic to hydromorphone hcl, ace inhibitors, azithromycin, dilaudid [hydromorphone hcl], lisinopril, morphine, and penicillins.  Social History:    TOBACCO:   reports that she quit smoking about 9 years ago. Her smoking use

## 2024-04-09 LAB
BASE EXCESS BLDA CALC-SCNC: 3 MMOL/L (ref -3–3)
CO2 BLDA-SCNC: 31.8 MMOL/L
COHGB MFR BLDA: 0.3 % (ref 0–1.5)
HCO3 BLDA-SCNC: 30 MMOL/L (ref 21–29)
HGB BLDA-MCNC: 11.4 G/DL (ref 12–16)
METHGB MFR BLDA: 0.1 %
O2 THERAPY: ABNORMAL
PCO2 BLDA: 57.8 MMHG (ref 35–45)
PH BLDA: 7.33 [PH] (ref 7.35–7.45)
PO2 BLDA: 69.8 MMHG (ref 75–108)
SAO2 % BLDA: 92.6 %

## 2024-04-10 LAB — IGE SERPL-ACNC: 324 KU/L

## 2024-04-14 LAB
A ALTERNATA IGE QN: <0.1 KU/L (ref 0–0.34)
A FUMIGATUS IGE QN: <0.1 KU/L (ref 0–0.34)
AMER SYCAMORE IGE QN: <0.1 KU/L (ref 0–0.34)
BERMUDA GRASS IGE QN: <0.1 KU/L (ref 0–0.34)
BOXELDER IGE QN: <0.1 KU/L (ref 0–0.34)
C SPHAEROSPERMUM IGE QN: <0.1 KUL/L (ref 0–0.34)
CALIF WALNUT IGE QN: <0.1 KU/L (ref 0–0.34)
CAT DANDER IGE QN: <0.1 KU/L (ref 0–0.34)
CMN PIGWEED IGE QN: <0.1 KU/L (ref 0–0.34)
COMMON RAGWEED IGE QN: <0.1 KU/L (ref 0–0.34)
COTTONWOOD IGE QN: <0.1 KU/L (ref 0–0.34)
D FARINAE IGE QN: <0.1 KU/L (ref 0–0.34)
D PTERONYSS IGE QN: <0.1 KU/L (ref 0–0.34)
DOG DANDER IGE QN: <0.1 KU/L (ref 0–0.34)
IGE SERPL-ACNC: 313 IU/ML (ref 0–100)
M RACEMOSUS IGE QN: <0.1 KU/L (ref 0–0.34)
MOUSE EPITH IGE QN: <0.1 KU/L (ref 0–0.34)
P NOTATUM IGE QN: <0.1 KU/L (ref 0–0.34)
PECAN/HICK TREE IGE QN: <0.1 KU/L (ref 0–0.34)
RED CEDAR IGE QN: <0.1 KU/L (ref 0–0.34)
ROACH IGE QN: 1.92 KU/L (ref 0–0.34)
SALTWORT IGE QN: <0.1 KU/L (ref 0–0.34)
SHEEP SORREL IGE QN: <0.1 KU/L (ref 0–0.34)
SILVER BIRCH IGE QN: <0.1 KU/L (ref 0–0.34)
TIMOTHY IGE QN: <0.1 KU/L (ref 0–0.34)
WHITE ASH IGE QN: <0.1 KU/L (ref 0–0.34)
WHITE ELM IGE QN: <0.1 KU/L (ref 0–0.34)
WHITE MULBERRY IGE QN: <0.1 KU/L (ref 0–0.34)
WHITE OAK IGE QN: <0.1 KU/L (ref 0–0.34)

## 2024-06-16 ENCOUNTER — HOSPITAL ENCOUNTER (INPATIENT)
Age: 59
LOS: 4 days | Discharge: HOME HEALTH | DRG: 417 | End: 2024-06-20
Attending: INTERNAL MEDICINE | Admitting: INTERNAL MEDICINE
Payer: MEDICARE

## 2024-06-16 ENCOUNTER — APPOINTMENT (OUTPATIENT)
Dept: CT IMAGING | Age: 59
DRG: 417 | End: 2024-06-16
Payer: MEDICARE

## 2024-06-16 ENCOUNTER — APPOINTMENT (OUTPATIENT)
Dept: GENERAL RADIOLOGY | Age: 59
DRG: 417 | End: 2024-06-16
Payer: MEDICARE

## 2024-06-16 DIAGNOSIS — K81.0 ACUTE CHOLECYSTITIS: ICD-10-CM

## 2024-06-16 DIAGNOSIS — J96.22 ACUTE ON CHRONIC RESPIRATORY FAILURE WITH HYPERCAPNIA (HCC): Primary | ICD-10-CM

## 2024-06-16 DIAGNOSIS — K81.9 CHOLECYSTITIS: ICD-10-CM

## 2024-06-16 DIAGNOSIS — I50.9 ACUTE CONGESTIVE HEART FAILURE, UNSPECIFIED HEART FAILURE TYPE (HCC): ICD-10-CM

## 2024-06-16 LAB
ALBUMIN SERPL-MCNC: 3.7 G/DL (ref 3.4–5)
ALBUMIN/GLOB SERPL: 1.1 {RATIO} (ref 1.1–2.2)
ALP SERPL-CCNC: 107 U/L (ref 40–129)
ALT SERPL-CCNC: 19 U/L (ref 10–40)
AMMONIA PLAS-SCNC: 28 UMOL/L (ref 11–51)
ANION GAP SERPL CALCULATED.3IONS-SCNC: 8 MMOL/L (ref 3–16)
AST SERPL-CCNC: 23 U/L (ref 15–37)
BACTERIA URNS QL MICRO: ABNORMAL /HPF
BASE EXCESS BLDV CALC-SCNC: 2.6 MMOL/L (ref -3–3)
BASE EXCESS BLDV CALC-SCNC: 2.9 MMOL/L (ref -3–3)
BASOPHILS # BLD: 0 K/UL (ref 0–0.2)
BASOPHILS NFR BLD: 0.6 %
BILIRUB SERPL-MCNC: <0.2 MG/DL (ref 0–1)
BILIRUB UR QL STRIP.AUTO: NEGATIVE
BUN SERPL-MCNC: 28 MG/DL (ref 7–20)
CALCIUM SERPL-MCNC: 8.9 MG/DL (ref 8.3–10.6)
CHLORIDE SERPL-SCNC: 100 MMOL/L (ref 99–110)
CLARITY UR: ABNORMAL
CO2 BLDV-SCNC: 31 MMOL/L
CO2 BLDV-SCNC: 34 MMOL/L
CO2 SERPL-SCNC: 33 MMOL/L (ref 21–32)
COHGB MFR BLDV: 1 % (ref 0–1.5)
COHGB MFR BLDV: 1.6 % (ref 0–1.5)
COLOR UR: YELLOW
CREAT SERPL-MCNC: 1.2 MG/DL (ref 0.6–1.1)
DEPRECATED RDW RBC AUTO: 14.9 % (ref 12.4–15.4)
EKG ATRIAL RATE: 81 BPM
EKG DIAGNOSIS: NORMAL
EKG P AXIS: 128 DEGREES
EKG P-R INTERVAL: 208 MS
EKG Q-T INTERVAL: 424 MS
EKG QRS DURATION: 96 MS
EKG QTC CALCULATION (BAZETT): 492 MS
EKG R AXIS: 21 DEGREES
EKG T AXIS: 39 DEGREES
EKG VENTRICULAR RATE: 81 BPM
EOSINOPHIL # BLD: 0.2 K/UL (ref 0–0.6)
EOSINOPHIL NFR BLD: 3.4 %
EPI CELLS #/AREA URNS HPF: ABNORMAL /HPF (ref 0–5)
FLUAV RNA UPPER RESP QL NAA+PROBE: NEGATIVE
FLUBV AG NPH QL: NEGATIVE
GFR SERPLBLD CREATININE-BSD FMLA CKD-EPI: 52 ML/MIN/{1.73_M2}
GLUCOSE BLD-MCNC: 277 MG/DL (ref 70–99)
GLUCOSE SERPL-MCNC: 396 MG/DL (ref 70–99)
GLUCOSE UR STRIP.AUTO-MCNC: >=1000 MG/DL
HCO3 BLDV-SCNC: 29.4 MMOL/L (ref 23–29)
HCO3 BLDV-SCNC: 31.5 MMOL/L (ref 23–29)
HCT VFR BLD AUTO: 32.4 % (ref 36–48)
HGB BLD-MCNC: 10.2 G/DL (ref 12–16)
HGB UR QL STRIP.AUTO: NEGATIVE
KETONES UR STRIP.AUTO-MCNC: NEGATIVE MG/DL
LACTATE BLDV-SCNC: 2.3 MMOL/L (ref 0.4–1.9)
LACTATE BLDV-SCNC: 3 MMOL/L (ref 0.4–1.9)
LEUKOCYTE ESTERASE UR QL STRIP.AUTO: ABNORMAL
LIPASE SERPL-CCNC: 59 U/L (ref 13–60)
LYMPHOCYTES # BLD: 1.2 K/UL (ref 1–5.1)
LYMPHOCYTES NFR BLD: 25.4 %
MCH RBC QN AUTO: 25.8 PG (ref 26–34)
MCHC RBC AUTO-ENTMCNC: 31.5 G/DL (ref 31–36)
MCV RBC AUTO: 81.9 FL (ref 80–100)
METHGB MFR BLDV: 0.3 %
METHGB MFR BLDV: 0.3 %
MONOCYTES # BLD: 0.3 K/UL (ref 0–1.3)
MONOCYTES NFR BLD: 7 %
NEUTROPHILS # BLD: 3 K/UL (ref 1.7–7.7)
NEUTROPHILS NFR BLD: 63.6 %
NITRITE UR QL STRIP.AUTO: NEGATIVE
NT-PROBNP SERPL-MCNC: 1485 PG/ML (ref 0–124)
O2 CT VFR BLDV CALC: 14 VOL %
O2 CT VFR BLDV CALC: 14 VOL %
O2 THERAPY: ABNORMAL
O2 THERAPY: ABNORMAL
PCO2 BLDV: 54.9 MMHG (ref 40–50)
PCO2 BLDV: 73.1 MMHG (ref 40–50)
PERFORMED ON: ABNORMAL
PH BLDV: 7.25 [PH] (ref 7.35–7.45)
PH BLDV: 7.35 [PH] (ref 7.35–7.45)
PH UR STRIP.AUTO: 6 [PH] (ref 5–8)
PLATELET # BLD AUTO: 129 K/UL (ref 135–450)
PMV BLD AUTO: 8.9 FL (ref 5–10.5)
PO2 BLDV: 56.9 MMHG (ref 25–40)
PO2 BLDV: 64.1 MMHG (ref 25–40)
POTASSIUM SERPL-SCNC: 5.4 MMOL/L (ref 3.5–5.1)
PROT SERPL-MCNC: 7.2 G/DL (ref 6.4–8.2)
PROT UR STRIP.AUTO-MCNC: NEGATIVE MG/DL
RBC # BLD AUTO: 3.95 M/UL (ref 4–5.2)
RBC #/AREA URNS HPF: ABNORMAL /HPF (ref 0–4)
SAO2 % BLDV: 84 %
SAO2 % BLDV: 91 %
SARS-COV-2 RDRP RESP QL NAA+PROBE: NOT DETECTED
SODIUM SERPL-SCNC: 141 MMOL/L (ref 136–145)
SP GR UR STRIP.AUTO: 1.02 (ref 1–1.03)
TROPONIN, HIGH SENSITIVITY: 25 NG/L (ref 0–14)
TROPONIN, HIGH SENSITIVITY: 25 NG/L (ref 0–14)
UA COMPLETE W REFLEX CULTURE PNL UR: ABNORMAL
UA DIPSTICK W REFLEX MICRO PNL UR: YES
URN SPEC COLLECT METH UR: ABNORMAL
UROBILINOGEN UR STRIP-ACNC: 0.2 E.U./DL
WBC # BLD AUTO: 4.8 K/UL (ref 4–11)
WBC #/AREA URNS HPF: ABNORMAL /HPF (ref 0–5)

## 2024-06-16 PROCEDURE — 6360000002 HC RX W HCPCS: Performed by: STUDENT IN AN ORGANIZED HEALTH CARE EDUCATION/TRAINING PROGRAM

## 2024-06-16 PROCEDURE — 84443 ASSAY THYROID STIM HORMONE: CPT

## 2024-06-16 PROCEDURE — 82140 ASSAY OF AMMONIA: CPT

## 2024-06-16 PROCEDURE — 87804 INFLUENZA ASSAY W/OPTIC: CPT

## 2024-06-16 PROCEDURE — 6360000002 HC RX W HCPCS: Performed by: NURSE PRACTITIONER

## 2024-06-16 PROCEDURE — 94761 N-INVAS EAR/PLS OXIMETRY MLT: CPT

## 2024-06-16 PROCEDURE — 83605 ASSAY OF LACTIC ACID: CPT

## 2024-06-16 PROCEDURE — 87086 URINE CULTURE/COLONY COUNT: CPT

## 2024-06-16 PROCEDURE — 84484 ASSAY OF TROPONIN QUANT: CPT

## 2024-06-16 PROCEDURE — 83690 ASSAY OF LIPASE: CPT

## 2024-06-16 PROCEDURE — 87150 DNA/RNA AMPLIFIED PROBE: CPT

## 2024-06-16 PROCEDURE — 6370000000 HC RX 637 (ALT 250 FOR IP): Performed by: PHYSICIAN ASSISTANT

## 2024-06-16 PROCEDURE — 83540 ASSAY OF IRON: CPT

## 2024-06-16 PROCEDURE — 99285 EMERGENCY DEPT VISIT HI MDM: CPT

## 2024-06-16 PROCEDURE — 80053 COMPREHEN METABOLIC PANEL: CPT

## 2024-06-16 PROCEDURE — 94660 CPAP INITIATION&MGMT: CPT

## 2024-06-16 PROCEDURE — 87186 SC STD MICRODIL/AGAR DIL: CPT

## 2024-06-16 PROCEDURE — 2700000000 HC OXYGEN THERAPY PER DAY

## 2024-06-16 PROCEDURE — 71045 X-RAY EXAM CHEST 1 VIEW: CPT

## 2024-06-16 PROCEDURE — 36415 COLL VENOUS BLD VENIPUNCTURE: CPT

## 2024-06-16 PROCEDURE — 83880 ASSAY OF NATRIURETIC PEPTIDE: CPT

## 2024-06-16 PROCEDURE — 87040 BLOOD CULTURE FOR BACTERIA: CPT

## 2024-06-16 PROCEDURE — 93005 ELECTROCARDIOGRAM TRACING: CPT | Performed by: PHYSICIAN ASSISTANT

## 2024-06-16 PROCEDURE — 87077 CULTURE AEROBIC IDENTIFY: CPT

## 2024-06-16 PROCEDURE — 93010 ELECTROCARDIOGRAM REPORT: CPT | Performed by: INTERNAL MEDICINE

## 2024-06-16 PROCEDURE — 83550 IRON BINDING TEST: CPT

## 2024-06-16 PROCEDURE — 2580000003 HC RX 258: Performed by: NURSE PRACTITIONER

## 2024-06-16 PROCEDURE — 6370000000 HC RX 637 (ALT 250 FOR IP): Performed by: NURSE PRACTITIONER

## 2024-06-16 PROCEDURE — 6360000002 HC RX W HCPCS: Performed by: PHYSICIAN ASSISTANT

## 2024-06-16 PROCEDURE — 82728 ASSAY OF FERRITIN: CPT

## 2024-06-16 PROCEDURE — 81001 URINALYSIS AUTO W/SCOPE: CPT

## 2024-06-16 PROCEDURE — 6360000004 HC RX CONTRAST MEDICATION: Performed by: INTERNAL MEDICINE

## 2024-06-16 PROCEDURE — 96365 THER/PROPH/DIAG IV INF INIT: CPT

## 2024-06-16 PROCEDURE — 82803 BLOOD GASES ANY COMBINATION: CPT

## 2024-06-16 PROCEDURE — 2580000003 HC RX 258: Performed by: PHYSICIAN ASSISTANT

## 2024-06-16 PROCEDURE — 94640 AIRWAY INHALATION TREATMENT: CPT

## 2024-06-16 PROCEDURE — 6370000000 HC RX 637 (ALT 250 FOR IP): Performed by: INTERNAL MEDICINE

## 2024-06-16 PROCEDURE — 87635 SARS-COV-2 COVID-19 AMP PRB: CPT

## 2024-06-16 PROCEDURE — 6370000000 HC RX 637 (ALT 250 FOR IP): Performed by: STUDENT IN AN ORGANIZED HEALTH CARE EDUCATION/TRAINING PROGRAM

## 2024-06-16 PROCEDURE — 2060000000 HC ICU INTERMEDIATE R&B

## 2024-06-16 PROCEDURE — 96375 TX/PRO/DX INJ NEW DRUG ADDON: CPT

## 2024-06-16 PROCEDURE — 85025 COMPLETE CBC W/AUTO DIFF WBC: CPT

## 2024-06-16 PROCEDURE — 74177 CT ABD & PELVIS W/CONTRAST: CPT

## 2024-06-16 RX ORDER — DEXTROSE MONOHYDRATE 100 MG/ML
INJECTION, SOLUTION INTRAVENOUS CONTINUOUS PRN
Status: DISCONTINUED | OUTPATIENT
Start: 2024-06-16 | End: 2024-06-20 | Stop reason: HOSPADM

## 2024-06-16 RX ORDER — BUDESONIDE AND FORMOTEROL FUMARATE DIHYDRATE 160; 4.5 UG/1; UG/1
2 AEROSOL RESPIRATORY (INHALATION)
Status: DISCONTINUED | OUTPATIENT
Start: 2024-06-16 | End: 2024-06-20 | Stop reason: HOSPADM

## 2024-06-16 RX ORDER — ASPIRIN 81 MG/1
81 TABLET ORAL DAILY
Status: DISCONTINUED | OUTPATIENT
Start: 2024-06-17 | End: 2024-06-20 | Stop reason: HOSPADM

## 2024-06-16 RX ORDER — ALBUTEROL SULFATE 90 UG/1
2 AEROSOL, METERED RESPIRATORY (INHALATION) EVERY 6 HOURS PRN
Status: DISCONTINUED | OUTPATIENT
Start: 2024-06-16 | End: 2024-06-20 | Stop reason: HOSPADM

## 2024-06-16 RX ORDER — ACETAMINOPHEN 650 MG/1
650 SUPPOSITORY RECTAL EVERY 6 HOURS PRN
Status: DISCONTINUED | OUTPATIENT
Start: 2024-06-16 | End: 2024-06-16

## 2024-06-16 RX ORDER — SODIUM CHLORIDE 9 MG/ML
INJECTION, SOLUTION INTRAVENOUS PRN
Status: DISCONTINUED | OUTPATIENT
Start: 2024-06-16 | End: 2024-06-20 | Stop reason: HOSPADM

## 2024-06-16 RX ORDER — NYSTATIN 100000 [USP'U]/G
POWDER TOPICAL
COMMUNITY

## 2024-06-16 RX ORDER — POTASSIUM CHLORIDE 20 MEQ/1
20 TABLET, EXTENDED RELEASE ORAL DAILY
COMMUNITY

## 2024-06-16 RX ORDER — INSULIN LISPRO 100 [IU]/ML
0-4 INJECTION, SOLUTION INTRAVENOUS; SUBCUTANEOUS NIGHTLY
Status: DISCONTINUED | OUTPATIENT
Start: 2024-06-16 | End: 2024-06-17

## 2024-06-16 RX ORDER — ESCITALOPRAM OXALATE 10 MG/1
10 TABLET ORAL DAILY
Status: DISCONTINUED | OUTPATIENT
Start: 2024-06-17 | End: 2024-06-20 | Stop reason: HOSPADM

## 2024-06-16 RX ORDER — TRAZODONE HYDROCHLORIDE 100 MG/1
100 TABLET ORAL NIGHTLY PRN
Status: DISCONTINUED | OUTPATIENT
Start: 2024-06-16 | End: 2024-06-20 | Stop reason: HOSPADM

## 2024-06-16 RX ORDER — IPRATROPIUM BROMIDE AND ALBUTEROL SULFATE 2.5; .5 MG/3ML; MG/3ML
1 SOLUTION RESPIRATORY (INHALATION)
Status: DISCONTINUED | OUTPATIENT
Start: 2024-06-17 | End: 2024-06-20 | Stop reason: HOSPADM

## 2024-06-16 RX ORDER — DICYCLOMINE HYDROCHLORIDE 10 MG/1
10 CAPSULE ORAL 3 TIMES DAILY
Status: DISCONTINUED | OUTPATIENT
Start: 2024-06-16 | End: 2024-06-16

## 2024-06-16 RX ORDER — GLUCAGON 1 MG/ML
1 KIT INJECTION PRN
Status: DISCONTINUED | OUTPATIENT
Start: 2024-06-16 | End: 2024-06-20 | Stop reason: HOSPADM

## 2024-06-16 RX ORDER — TRAZODONE HYDROCHLORIDE 100 MG/1
100 TABLET ORAL NIGHTLY
Status: DISCONTINUED | OUTPATIENT
Start: 2024-06-16 | End: 2024-06-16

## 2024-06-16 RX ORDER — MAGNESIUM OXIDE 400 MG/1
400 TABLET ORAL DAILY
Status: DISCONTINUED | OUTPATIENT
Start: 2024-06-16 | End: 2024-06-17

## 2024-06-16 RX ORDER — ESOMEPRAZOLE MAGNESIUM 40 MG/1
40 CAPSULE, DELAYED RELEASE ORAL
COMMUNITY

## 2024-06-16 RX ORDER — PANTOPRAZOLE SODIUM 40 MG/1
40 TABLET, DELAYED RELEASE ORAL
Status: DISCONTINUED | OUTPATIENT
Start: 2024-06-17 | End: 2024-06-20 | Stop reason: HOSPADM

## 2024-06-16 RX ORDER — MAGNESIUM SULFATE IN WATER 40 MG/ML
2000 INJECTION, SOLUTION INTRAVENOUS PRN
Status: DISCONTINUED | OUTPATIENT
Start: 2024-06-16 | End: 2024-06-20 | Stop reason: HOSPADM

## 2024-06-16 RX ORDER — IPRATROPIUM BROMIDE AND ALBUTEROL SULFATE 2.5; .5 MG/3ML; MG/3ML
1 SOLUTION RESPIRATORY (INHALATION) EVERY 4 HOURS PRN
Status: DISCONTINUED | OUTPATIENT
Start: 2024-06-16 | End: 2024-06-16

## 2024-06-16 RX ORDER — OXYCODONE AND ACETAMINOPHEN 7.5; 325 MG/1; MG/1
1 TABLET ORAL EVERY 6 HOURS PRN
COMMUNITY

## 2024-06-16 RX ORDER — POTASSIUM CHLORIDE 20 MEQ/1
40 TABLET, EXTENDED RELEASE ORAL PRN
Status: DISCONTINUED | OUTPATIENT
Start: 2024-06-16 | End: 2024-06-20 | Stop reason: HOSPADM

## 2024-06-16 RX ORDER — POTASSIUM CHLORIDE 7.45 MG/ML
10 INJECTION INTRAVENOUS PRN
Status: DISCONTINUED | OUTPATIENT
Start: 2024-06-16 | End: 2024-06-20 | Stop reason: HOSPADM

## 2024-06-16 RX ORDER — IPRATROPIUM BROMIDE AND ALBUTEROL SULFATE 2.5; .5 MG/3ML; MG/3ML
1 SOLUTION RESPIRATORY (INHALATION) ONCE
Status: COMPLETED | OUTPATIENT
Start: 2024-06-16 | End: 2024-06-16

## 2024-06-16 RX ORDER — ACETAMINOPHEN 325 MG/1
650 TABLET ORAL EVERY 6 HOURS PRN
Status: DISCONTINUED | OUTPATIENT
Start: 2024-06-16 | End: 2024-06-16

## 2024-06-16 RX ORDER — INSULIN LISPRO 100 [IU]/ML
0-4 INJECTION, SOLUTION INTRAVENOUS; SUBCUTANEOUS
Status: DISCONTINUED | OUTPATIENT
Start: 2024-06-17 | End: 2024-06-17

## 2024-06-16 RX ORDER — POLYETHYLENE GLYCOL 3350 17 G/17G
17 POWDER, FOR SOLUTION ORAL DAILY PRN
Status: DISCONTINUED | OUTPATIENT
Start: 2024-06-16 | End: 2024-06-20 | Stop reason: HOSPADM

## 2024-06-16 RX ORDER — PREGABALIN 100 MG/1
100 CAPSULE ORAL 2 TIMES DAILY
Status: DISCONTINUED | OUTPATIENT
Start: 2024-06-16 | End: 2024-06-19

## 2024-06-16 RX ORDER — INSULIN GLARGINE 100 [IU]/ML
60 INJECTION, SOLUTION SUBCUTANEOUS NIGHTLY
Status: DISCONTINUED | OUTPATIENT
Start: 2024-06-16 | End: 2024-06-16

## 2024-06-16 RX ORDER — HYDROXYZINE 50 MG/1
25 TABLET, FILM COATED ORAL 3 TIMES DAILY
Status: DISCONTINUED | OUTPATIENT
Start: 2024-06-16 | End: 2024-06-16

## 2024-06-16 RX ORDER — FUROSEMIDE 10 MG/ML
20 INJECTION INTRAMUSCULAR; INTRAVENOUS ONCE
Status: COMPLETED | OUTPATIENT
Start: 2024-06-16 | End: 2024-06-16

## 2024-06-16 RX ORDER — HYDROCODONE BITARTRATE AND ACETAMINOPHEN 7.5; 325 MG/1; MG/1
1 TABLET ORAL EVERY 6 HOURS PRN
Status: DISCONTINUED | OUTPATIENT
Start: 2024-06-16 | End: 2024-06-17

## 2024-06-16 RX ORDER — AMLODIPINE BESYLATE 5 MG/1
5 TABLET ORAL DAILY
Status: DISCONTINUED | OUTPATIENT
Start: 2024-06-16 | End: 2024-06-20 | Stop reason: HOSPADM

## 2024-06-16 RX ORDER — INSULIN GLARGINE 100 [IU]/ML
12 INJECTION, SOLUTION SUBCUTANEOUS NIGHTLY
Status: DISCONTINUED | OUTPATIENT
Start: 2024-06-16 | End: 2024-06-20 | Stop reason: HOSPADM

## 2024-06-16 RX ORDER — INSULIN LISPRO 100 [IU]/ML
48 INJECTION, SOLUTION INTRAVENOUS; SUBCUTANEOUS
Status: DISCONTINUED | OUTPATIENT
Start: 2024-06-16 | End: 2024-06-16

## 2024-06-16 RX ORDER — FLUCONAZOLE 150 MG/1
150 TABLET ORAL WEEKLY
Status: ON HOLD | COMMUNITY
End: 2024-06-20 | Stop reason: ALTCHOICE

## 2024-06-16 RX ORDER — ONDANSETRON 2 MG/ML
4 INJECTION INTRAMUSCULAR; INTRAVENOUS EVERY 6 HOURS PRN
Status: DISCONTINUED | OUTPATIENT
Start: 2024-06-16 | End: 2024-06-20 | Stop reason: HOSPADM

## 2024-06-16 RX ORDER — METRONIDAZOLE 500 MG/100ML
500 INJECTION, SOLUTION INTRAVENOUS EVERY 8 HOURS
Status: DISCONTINUED | OUTPATIENT
Start: 2024-06-16 | End: 2024-06-20 | Stop reason: HOSPADM

## 2024-06-16 RX ORDER — FUROSEMIDE 40 MG/1
40 TABLET ORAL DAILY
Status: DISCONTINUED | OUTPATIENT
Start: 2024-06-16 | End: 2024-06-16

## 2024-06-16 RX ORDER — MELOXICAM 15 MG/1
15 TABLET ORAL DAILY
COMMUNITY

## 2024-06-16 RX ORDER — ALLOPURINOL 100 MG/1
100 TABLET ORAL DAILY
Status: DISCONTINUED | OUTPATIENT
Start: 2024-06-17 | End: 2024-06-20 | Stop reason: HOSPADM

## 2024-06-16 RX ORDER — HYDROXYZINE HYDROCHLORIDE 25 MG/1
25 TABLET, FILM COATED ORAL 3 TIMES DAILY PRN
Status: DISCONTINUED | OUTPATIENT
Start: 2024-06-16 | End: 2024-06-20 | Stop reason: HOSPADM

## 2024-06-16 RX ORDER — ENOXAPARIN SODIUM 100 MG/ML
40 INJECTION SUBCUTANEOUS 2 TIMES DAILY
Status: DISCONTINUED | OUTPATIENT
Start: 2024-06-16 | End: 2024-06-20 | Stop reason: HOSPADM

## 2024-06-16 RX ORDER — SODIUM CHLORIDE 0.9 % (FLUSH) 0.9 %
5-40 SYRINGE (ML) INJECTION PRN
Status: DISCONTINUED | OUTPATIENT
Start: 2024-06-16 | End: 2024-06-20 | Stop reason: HOSPADM

## 2024-06-16 RX ORDER — ONDANSETRON 4 MG/1
4 TABLET, ORALLY DISINTEGRATING ORAL EVERY 8 HOURS PRN
Status: DISCONTINUED | OUTPATIENT
Start: 2024-06-16 | End: 2024-06-20 | Stop reason: HOSPADM

## 2024-06-16 RX ORDER — FUROSEMIDE 10 MG/ML
20 INJECTION INTRAMUSCULAR; INTRAVENOUS 2 TIMES DAILY
Status: DISCONTINUED | OUTPATIENT
Start: 2024-06-17 | End: 2024-06-20

## 2024-06-16 RX ORDER — METFORMIN HYDROCHLORIDE 500 MG/1
500 TABLET, EXTENDED RELEASE ORAL
COMMUNITY

## 2024-06-16 RX ORDER — SODIUM CHLORIDE 0.9 % (FLUSH) 0.9 %
5-40 SYRINGE (ML) INJECTION EVERY 12 HOURS SCHEDULED
Status: DISCONTINUED | OUTPATIENT
Start: 2024-06-16 | End: 2024-06-20 | Stop reason: HOSPADM

## 2024-06-16 RX ADMIN — ENOXAPARIN SODIUM 40 MG: 100 INJECTION SUBCUTANEOUS at 22:01

## 2024-06-16 RX ADMIN — INSULIN GLARGINE 12 UNITS: 100 INJECTION, SOLUTION SUBCUTANEOUS at 22:01

## 2024-06-16 RX ADMIN — FUROSEMIDE 20 MG: 10 INJECTION, SOLUTION INTRAMUSCULAR; INTRAVENOUS at 22:01

## 2024-06-16 RX ADMIN — Medication 10 ML: at 22:02

## 2024-06-16 RX ADMIN — PREGABALIN 100 MG: 100 CAPSULE ORAL at 22:01

## 2024-06-16 RX ADMIN — Medication 2 PUFF: at 22:31

## 2024-06-16 RX ADMIN — IPRATROPIUM BROMIDE AND ALBUTEROL SULFATE 1 DOSE: 2.5; .5 SOLUTION RESPIRATORY (INHALATION) at 22:31

## 2024-06-16 RX ADMIN — FUROSEMIDE 20 MG: 10 INJECTION, SOLUTION INTRAMUSCULAR; INTRAVENOUS at 14:36

## 2024-06-16 RX ADMIN — MEROPENEM 1000 MG: 1 INJECTION, POWDER, FOR SOLUTION INTRAVENOUS at 15:35

## 2024-06-16 RX ADMIN — IOMEPROL INJECTION 75 ML: 714 INJECTION, SOLUTION INTRAVASCULAR at 14:29

## 2024-06-16 RX ADMIN — IPRATROPIUM BROMIDE AND ALBUTEROL SULFATE 1 DOSE: 2.5; .5 SOLUTION RESPIRATORY (INHALATION) at 13:41

## 2024-06-16 RX ADMIN — MICONAZOLE NITRATE: 20 CREAM TOPICAL at 22:55

## 2024-06-16 RX ADMIN — METRONIDAZOLE 500 MG: 500 INJECTION, SOLUTION INTRAVENOUS at 22:59

## 2024-06-16 ASSESSMENT — PAIN DESCRIPTION - FREQUENCY
FREQUENCY: INTERMITTENT
FREQUENCY: CONTINUOUS
FREQUENCY: INTERMITTENT

## 2024-06-16 ASSESSMENT — PAIN DESCRIPTION - DIRECTION
RADIATING_TOWARDS: DENIES
RADIATING_TOWARDS: DENIES

## 2024-06-16 ASSESSMENT — LIFESTYLE VARIABLES
HOW MANY STANDARD DRINKS CONTAINING ALCOHOL DO YOU HAVE ON A TYPICAL DAY: PATIENT DOES NOT DRINK
HOW OFTEN DO YOU HAVE A DRINK CONTAINING ALCOHOL: NEVER

## 2024-06-16 ASSESSMENT — PAIN SCALES - GENERAL
PAINLEVEL_OUTOF10: 0
PAINLEVEL_OUTOF10: 8
PAINLEVEL_OUTOF10: 5
PAINLEVEL_OUTOF10: 8
PAINLEVEL_OUTOF10: 0

## 2024-06-16 ASSESSMENT — PAIN DESCRIPTION - PAIN TYPE
TYPE: CHRONIC PAIN

## 2024-06-16 ASSESSMENT — PAIN DESCRIPTION - LOCATION
LOCATION: ABDOMEN
LOCATION: BACK
LOCATION: BACK

## 2024-06-16 ASSESSMENT — PAIN - FUNCTIONAL ASSESSMENT
PAIN_FUNCTIONAL_ASSESSMENT: PREVENTS OR INTERFERES SOME ACTIVE ACTIVITIES AND ADLS
PAIN_FUNCTIONAL_ASSESSMENT: PREVENTS OR INTERFERES SOME ACTIVE ACTIVITIES AND ADLS
PAIN_FUNCTIONAL_ASSESSMENT: 0-10

## 2024-06-16 ASSESSMENT — PAIN DESCRIPTION - ONSET
ONSET: GRADUAL
ONSET: ON-GOING
ONSET: ON-GOING

## 2024-06-16 ASSESSMENT — PAIN DESCRIPTION - ORIENTATION
ORIENTATION: LOWER
ORIENTATION: LOWER

## 2024-06-16 ASSESSMENT — PAIN DESCRIPTION - DESCRIPTORS
DESCRIPTORS: OTHER (COMMENT)
DESCRIPTORS: SHARP

## 2024-06-16 NOTE — ED PROVIDER NOTES
In addition to the advanced practice provider, I personally saw Brianne Gunn and performed a substantive portion of the visit including all aspects of the medical decision making.    Medical Decision Making  58-year-old female comes in today with abdominal distention.  Patient does have a history of heart failure and is on Lasix.  Patient recently increased her Lasix and lost 7 pounds but feels as though her abdomen is not getting smaller.  She is on 4 L of oxygen via nasal cannula continuously.  Patient is having diffuse pain of the knee with ambulation.  She also has a history of hypercapnia and is becoming more confused.  She is concerned that the CO2 is building up into her system.  Patient also mention occasional jerking of her arms.  Patient denies fever and chills.  Patient denies cough.  Patient's potassium was found to be mildly elevated at 5.4.  Bicarb on the BMP was 33.  Creatinine is elevated at 1.2.  pH was initially 7.252 and improved to 7.347.  On the VBG the patient's CO2 was 73.1 and improved to 54.9.  Lactic acid was elevated at 3.0 and decreased to 2.3 on the second blood draw.  BNP is elevated 1485 and the previous BNP was 378 and around 300 which appears to be baseline.  Troponin is elevated at 25 and stable at 25 from second blood draw.  Previous troponins were not elevated.  Chest x-ray does show evidence of CHF.  Patient was given 20 mg of Lasix and the dose was low since the patient's blood pressure at the time was soft.  CT of the abdomen shows mild gallbladder wall thickening with mild to moderate pericolic cystic fluid and fat stranding.  Liver enzymes are within normal limits.  Patient was given a DuoNeb breathing treatment in the ER.  She was also given meropenem.  Patient is going to be admitted for further evaluation and treatment.  I agree with the assessment and plan.  Condition is critical.    Critical care time: 39 minutes.  This excludes separately billable  procedures.        EKG  The Ekg interpreted by me in the absence of a cardiologist shows.  normal sinus rhythm with a rate of 81  Axis is   Normal  QTc is  normal  Intervals and Durations are unremarkable.      No specific ST-T wave changes appreciated.  No evidence of acute ischemia.   No significant change from prior EKG dated 7/15/2023    SEP-1  Is this patient to be included in the SEP-1 Core Measure due to severe sepsis or septic shock?   NO    Screenings     Khushi Coma Scale  Eye Opening: Spontaneous  Best Verbal Response: Oriented  Best Motor Response: Obeys commands  Khushi Coma Scale Score: 15             Patient Referrals:  No follow-up provider specified.    Discharge Medications:  New Prescriptions    No medications on file       FINAL IMPRESSION  1. Acute on chronic respiratory failure with hypercapnia (HCC)    2. Acute congestive heart failure, unspecified heart failure type (HCC)    3. Cholecystitis        Blood pressure 130/70, pulse 79, temperature 98.1 °F (36.7 °C), temperature source Oral, resp. rate 24, height 1.549 m (5' 1\"), weight (!) 151.5 kg (334 lb), SpO2 96 %, not currently breastfeeding.     I appreciate the [facility]'s collaboration on this case.    For further details of Brianne AIYANA Gunn's emergency department encounter, please see documentation by advanced practice provider, Negin Badillo.        Steven Greco,   06/16/24 [id number]

## 2024-06-16 NOTE — PROGRESS NOTES
Patient from [facility] ED.   Presented with abdominal bloating, knee pain, arm jerking.  Has a h/o CO2 retention.   Admitted to PCU on tele  Acute cholecystitis.  VBG stable.   Possible CHF exacerbation.   Home medications need verified.    Paty Scott FNP-C  6/16/2024

## 2024-06-16 NOTE — H&P
History and Physical      Name:  Brianne Gunn /Age/Sex: 1965  (58 y.o. female)   MRN & CSN:  [id number] & [id number] Encounter Date/Time: 2024 7:50 PM EDT   Location:  /0317-01 PCP: Ani White PA-C       Assessment and Plan:   Brianne Gunn is a 58 y.o. female with COPD/chronic respiratory failure on 4L NC O2, chronic diastolic heart failure, T2DM w/ insulin dependence, GERD, Morbid obesity/JOSE presented as a transfer from [facility] due to abdominal pain and respiratory concerns.    Acute on chronic Respiratory failure w/ Hypercapnia  Likely secondary to Acute on chronic diastolic heart failure  Pro BNP 1485, CXR reviewed, cardiomegaly pulmonary vascular congestion bibasilar atelectasis.  Echo 10/2023 reviewed EF 55 to 60% grade 1 diastolic dysfunction normal RVSP  IV Lasix 20 mg twice daily, monitor intake and output, measure daily weight  Consider initiating SGLT2 upon discharge  Utilize BiPAP to target normalization of pH    Myoclonic jerk  Probably related to hypercapnia, RFEDRICK opioid use  Hypercapnia management as above  Diuresis for mild FREDRICK    Mild FREDRICK, probably cardiorenal  UA negative for protein or hematuria  Lasix to treat hyperkalemia  Monitor BMP    Concern for acute cholecystitis vs ?Findings of fluid overload  CT abdomen and pelvis reviewed, gallbladder wall thickening mild to moderate pericholecystic fluid and fat stranding  IV Rocephin and Flagyl  Gallbladder ultrasound pending  Clear liquid diet for now, pain control and antiemetic as needed    Morbid obesity/BMI 63.11  Obstructive sleep apnea  Resume Home CPAP    Chronic Thrombocytopenia  Stable Platelet count no active bleeding, monitor CBC    Chronic pain with opioid dependence  PDMP reviewed- Norco 7.5-325 mg QID prn, Lyrica 300 mg BID  Resume home Norco, low-dose of Lyrica due to fluid overload    T2DM with hyperglycemia  Lantus and sliding scale insulin, hypoglycemia protocol    GERD  Continue home Protonix    Inpatient  lobe. Lung bases are otherwise clear.  There is mild chronic four-chamber cardiac enlargement.  There is coronary atherosclerosis. Organs: The gallbladder appears mildly thick walled with a mild-to-moderate amount of pericholecystic fat stranding and fluid noted.  These findings are concerning for cholecystitis.  The liver appears low in attenuation, suggestive of fatty infiltration.  The liver is otherwise unremarkable.  The portal vein is patent.  The spleen is unremarkable.  Pancreas is unremarkable.  Adrenal glands are normal bilaterally.  The bilateral kidneys are unremarkable without evidence of inflammatory change, renal/ureteral calculus, or hydronephrosis. GI/Bowel: Evaluation of the hollow GI tract demonstrates no evidence of abnormal bowel wall thickening, dilatation, or obstruction.  Appendix is normal.  There is colonic diverticulosis, though no evidence of diverticulitis. Pelvis: The urinary bladder is unremarkable.  Uterus and bilateral adnexa are unremarkable.  There is no free pelvic fluid.  No pathologic pelvic lymphadenopathy is identified. Peritoneum/Retroperitoneum: No intraperitoneal free air or free fluid is identified.  There is the aforementioned pericholecystic fluid and fat stranding.  No walled-off fluid collection or abscess is identified.  No pathologic lymphadenopathy is evident.  Nonspecific upper abdominal varices are noted.  There is atherosclerotic disease of the abdominal aorta without evidence of aneurysm.  Diastasis recti is noted. Bones/Soft Tissues: There is nonspecific subcutaneous edema and fat stranding throughout the patient's lower abdominal pannus, representing either mild anasarca or possibly cellulitis.  No subcutaneous fluid collection or abscess is identified.  Skeletal structures demonstrate mild degenerative change throughout the visualized thoracolumbar spine.  There is bilateral hip osteoarthritis.  Scattered benign bone islands are noted.  No osteolytic or

## 2024-06-16 NOTE — ED PROVIDER NOTES
Given 6/16/24 1436)   iomeprol (IOMERON 350) 71.44 % injection 75 mL (75 mLs IntraVENous Given 6/16/24 1429)   meropenem (MERREM) 1,000 mg in sodium chloride 0.9 % 100 mL IVPB ([id number]) (0 mg IntraVENous Stopped 6/16/24 1611)             Is this patient to be included in the SEP-1 Core Measure due to severe sepsis or septic shock?   No   Exclusion criteria - the patient is NOT to be included for SEP-1 Core Measure due to:  May have criteria for sepsis, but does not meet criteria for severe sepsis or septic shock    Chronic Conditions affecting care:    has a past medical history of Anxiety, Arthritis, CHF (congestive heart failure) ([facility]), COPD (chronic obstructive pulmonary disease) ([facility]), Depression, Diabetes mellitus ([facility]), Diverticulosis, Hyperlipidemia, Hypertension, and Obesities, morbid ([facility]).    CONSULTS: (Who and What was discussed)  IP CONSULT TO HEART FAILURE NURSE/COORDINATOR  IP CONSULT TO DIETITIAN      Records Reviewed (External and Source)   Inpatient admission [facility] 3/26/2024 for acute on chronic respiratory failure with hypercapnia improved on BiPAP. Discharged on Trilogy.     10/25/2023 transthoracic echo EF 55 to 60%      CC/HPI Summary, DDx, ED Course, and Reassessment:         History obtained from patient and family came in for evaluation of the lower abdominal bloating and pain symptoms for the past 1.5 weeks. Family states also she has had some confusion the past 3 days states that is typical for her when she starts retaining CO2.  She has chronic respiratory failure on 4 L nasal cannula oxygen all the time and is at her baseline here.  Has diminished breath sounds on exam.  She is a drowsy appearing here becomes more alert when I talk to her and she answers all questions appropriately oriented x 3.  Moving all extremities intact sensation equal strength no head injury.  Workup will be obtained to evaluate her symptoms including blood work with repeat, EKG, chest x-ray CT

## 2024-06-16 NOTE — PROGRESS NOTES
Transportation not arranged for patient as of 1824.  Was in chart to assess lab values to receive report on patient from ER.  Night shift to assume care of patient.  Electronically signed by Lonnie Fuentes RN on 6/16/24 at 6:26 PM EDT

## 2024-06-16 NOTE — PROGRESS NOTES
VTE Prophylaxis Monitoring    Recent Labs     06/16/24  1252   CREATININE 1.2*     Recent Labs     06/16/24  1252   HGB 10.2*   HCT 32.4*   *     Estimated Creatinine Clearance: 71 mL/min (A) (based on SCr of 1.2 mg/dL (H)).  Wt Readings from Last 1 Encounters:   06/16/24 (!) 151.5 kg (334 lb)       The guidance below is to provide initial recommendations for dosing. If recommended dose does not align well with patient's current clinical picture, communications with the care team will occur to determine most appropriate medication and dose.    Do not exceed enoxaparin 40mg daily or UFH 5000 units SUBQ TID in patients with epidurals, lumbar drains, or external ventricular drains    TABLE 1.  ENOXAPARIN ROUTINE PROPHYLAXIS DOSING (Medically ill, routine surgery)   Patient Weight (kg)     50.9 and below 51 - 100.9 101 - 150.9 151 - 174.9 175 or greater         Estimated CrCl  (ml/min) 30 or greater   30 mg SUBQ daily   40 mg SUBQ daily 30 mg SUBQ BID  40 mg SUBQ BID 60mg SUBQ BID      15-29 UFH 5000 units SUBQ BID   30 mg SUBQ daily 30 mg SUBQ daily 40 mg SUBQ daily   60 mg SUBQ daily      Less than 15 or Dialysis UFH 5000 units SUBQ BID   UFH 5000 units SUBQ TID UFH 7500 units SUBQ TID       Martha Julian RPH 6/16/2024 7:47 PM

## 2024-06-16 NOTE — ED NOTES
Report given to Lonnie DARLING PCU at [facility]. [facility] transported [place] to [facility].

## 2024-06-17 ENCOUNTER — APPOINTMENT (OUTPATIENT)
Dept: ULTRASOUND IMAGING | Age: 59
DRG: 417 | End: 2024-06-17
Payer: MEDICARE

## 2024-06-17 PROBLEM — I50.33 ACUTE ON CHRONIC DIASTOLIC CHF (CONGESTIVE HEART FAILURE) (HCC): Status: ACTIVE | Noted: 2024-06-17

## 2024-06-17 PROBLEM — J96.12 CHRONIC RESPIRATORY FAILURE WITH HYPOXIA AND HYPERCAPNIA (HCC): Status: ACTIVE | Noted: 2024-06-17

## 2024-06-17 PROBLEM — Z01.818 PRE-OP EVALUATION: Status: ACTIVE | Noted: 2024-06-17

## 2024-06-17 PROBLEM — J96.22 ACUTE ON CHRONIC RESPIRATORY FAILURE WITH HYPERCAPNIA (HCC): Status: ACTIVE | Noted: 2024-06-17

## 2024-06-17 PROBLEM — J96.11 CHRONIC RESPIRATORY FAILURE WITH HYPOXIA AND HYPERCAPNIA (HCC): Status: ACTIVE | Noted: 2024-06-17

## 2024-06-17 LAB
ANION GAP SERPL CALCULATED.3IONS-SCNC: 4 MMOL/L (ref 3–16)
ANION GAP SERPL CALCULATED.3IONS-SCNC: 5 MMOL/L (ref 3–16)
ANION GAP SERPL CALCULATED.3IONS-SCNC: 7 MMOL/L (ref 3–16)
BASE EXCESS BLDV CALC-SCNC: 7.1 MMOL/L (ref -3–3)
BASOPHILS # BLD: 0 K/UL (ref 0–0.2)
BASOPHILS NFR BLD: 0.8 %
BUN SERPL-MCNC: 23 MG/DL (ref 7–20)
BUN SERPL-MCNC: 24 MG/DL (ref 7–20)
BUN SERPL-MCNC: 28 MG/DL (ref 7–20)
CALCIUM SERPL-MCNC: 9.1 MG/DL (ref 8.3–10.6)
CALCIUM SERPL-MCNC: 9.3 MG/DL (ref 8.3–10.6)
CALCIUM SERPL-MCNC: 9.6 MG/DL (ref 8.3–10.6)
CHLORIDE SERPL-SCNC: 95 MMOL/L (ref 99–110)
CHLORIDE SERPL-SCNC: 95 MMOL/L (ref 99–110)
CHLORIDE SERPL-SCNC: 96 MMOL/L (ref 99–110)
CO2 BLDV-SCNC: 35 MMOL/L
CO2 SERPL-SCNC: 32 MMOL/L (ref 21–32)
CO2 SERPL-SCNC: 35 MMOL/L (ref 21–32)
CO2 SERPL-SCNC: 37 MMOL/L (ref 21–32)
COHGB MFR BLDV: 1.1 % (ref 0–1.5)
CREAT SERPL-MCNC: 0.9 MG/DL (ref 0.6–1.1)
CREAT SERPL-MCNC: 1 MG/DL (ref 0.6–1.1)
CREAT SERPL-MCNC: 1.1 MG/DL (ref 0.6–1.1)
DEPRECATED RDW RBC AUTO: 15.1 % (ref 12.4–15.4)
EOSINOPHIL # BLD: 0.2 K/UL (ref 0–0.6)
EOSINOPHIL NFR BLD: 3.3 %
FERRITIN SERPL IA-MCNC: 117.3 NG/ML (ref 15–150)
GFR SERPLBLD CREATININE-BSD FMLA CKD-EPI: 58 ML/MIN/{1.73_M2}
GFR SERPLBLD CREATININE-BSD FMLA CKD-EPI: 65 ML/MIN/{1.73_M2}
GFR SERPLBLD CREATININE-BSD FMLA CKD-EPI: 73 ML/MIN/{1.73_M2}
GLUCOSE BLD-MCNC: 252 MG/DL (ref 70–99)
GLUCOSE BLD-MCNC: 281 MG/DL (ref 70–99)
GLUCOSE BLD-MCNC: 287 MG/DL (ref 70–99)
GLUCOSE BLD-MCNC: 327 MG/DL (ref 70–99)
GLUCOSE BLD-MCNC: 331 MG/DL (ref 70–99)
GLUCOSE SERPL-MCNC: 276 MG/DL (ref 70–99)
GLUCOSE SERPL-MCNC: 289 MG/DL (ref 70–99)
GLUCOSE SERPL-MCNC: 308 MG/DL (ref 70–99)
HCO3 BLDV-SCNC: 33.6 MMOL/L (ref 23–29)
HCT VFR BLD AUTO: 31.1 % (ref 36–48)
HGB BLD-MCNC: 10 G/DL (ref 12–16)
IRON SATN MFR SERPL: 12 % (ref 15–50)
IRON SERPL-MCNC: 36 UG/DL (ref 37–145)
LYMPHOCYTES # BLD: 1 K/UL (ref 1–5.1)
LYMPHOCYTES NFR BLD: 19 %
MAGNESIUM SERPL-MCNC: 1.9 MG/DL (ref 1.8–2.4)
MCH RBC QN AUTO: 26.4 PG (ref 26–34)
MCHC RBC AUTO-ENTMCNC: 32.2 G/DL (ref 31–36)
MCV RBC AUTO: 81.8 FL (ref 80–100)
METHGB MFR BLDV: 0.3 %
MONOCYTES # BLD: 0.4 K/UL (ref 0–1.3)
MONOCYTES NFR BLD: 7.8 %
NEUTROPHILS # BLD: 3.8 K/UL (ref 1.7–7.7)
NEUTROPHILS NFR BLD: 69.1 %
O2 CT VFR BLDV CALC: 16 VOL %
O2 THERAPY: ABNORMAL
PCO2 BLDV: 57.5 MMHG (ref 40–50)
PERFORMED ON: ABNORMAL
PH BLDV: 7.38 [PH] (ref 7.35–7.45)
PLATELET # BLD AUTO: 124 K/UL (ref 135–450)
PMV BLD AUTO: 9.4 FL (ref 5–10.5)
PO2 BLDV: 172.1 MMHG (ref 25–40)
POTASSIUM SERPL-SCNC: 4.7 MMOL/L (ref 3.5–5.1)
POTASSIUM SERPL-SCNC: 5.4 MMOL/L (ref 3.5–5.1)
POTASSIUM SERPL-SCNC: 5.6 MMOL/L (ref 3.5–5.1)
RBC # BLD AUTO: 3.8 M/UL (ref 4–5.2)
SAO2 % BLDV: 99 %
SODIUM SERPL-SCNC: 134 MMOL/L (ref 136–145)
SODIUM SERPL-SCNC: 135 MMOL/L (ref 136–145)
SODIUM SERPL-SCNC: 137 MMOL/L (ref 136–145)
TIBC SERPL-MCNC: 291 UG/DL (ref 260–445)
TSH SERPL DL<=0.005 MIU/L-ACNC: 1.59 UIU/ML (ref 0.27–4.2)
WBC # BLD AUTO: 5.5 K/UL (ref 4–11)

## 2024-06-17 PROCEDURE — 6370000000 HC RX 637 (ALT 250 FOR IP): Performed by: INTERNAL MEDICINE

## 2024-06-17 PROCEDURE — 99222 1ST HOSP IP/OBS MODERATE 55: CPT | Performed by: INTERNAL MEDICINE

## 2024-06-17 PROCEDURE — 99223 1ST HOSP IP/OBS HIGH 75: CPT | Performed by: INTERNAL MEDICINE

## 2024-06-17 PROCEDURE — 83735 ASSAY OF MAGNESIUM: CPT

## 2024-06-17 PROCEDURE — 2700000000 HC OXYGEN THERAPY PER DAY

## 2024-06-17 PROCEDURE — 6370000000 HC RX 637 (ALT 250 FOR IP): Performed by: STUDENT IN AN ORGANIZED HEALTH CARE EDUCATION/TRAINING PROGRAM

## 2024-06-17 PROCEDURE — 76705 ECHO EXAM OF ABDOMEN: CPT

## 2024-06-17 PROCEDURE — 6360000002 HC RX W HCPCS: Performed by: NURSE PRACTITIONER

## 2024-06-17 PROCEDURE — APPSS30 APP SPLIT SHARED TIME 16-30 MINUTES

## 2024-06-17 PROCEDURE — 94660 CPAP INITIATION&MGMT: CPT

## 2024-06-17 PROCEDURE — 85025 COMPLETE CBC W/AUTO DIFF WBC: CPT

## 2024-06-17 PROCEDURE — 6360000002 HC RX W HCPCS: Performed by: STUDENT IN AN ORGANIZED HEALTH CARE EDUCATION/TRAINING PROGRAM

## 2024-06-17 PROCEDURE — 99233 SBSQ HOSP IP/OBS HIGH 50: CPT

## 2024-06-17 PROCEDURE — 6370000000 HC RX 637 (ALT 250 FOR IP): Performed by: NURSE PRACTITIONER

## 2024-06-17 PROCEDURE — 99223 1ST HOSP IP/OBS HIGH 75: CPT | Performed by: SURGERY

## 2024-06-17 PROCEDURE — 80048 BASIC METABOLIC PNL TOTAL CA: CPT

## 2024-06-17 PROCEDURE — 36415 COLL VENOUS BLD VENIPUNCTURE: CPT

## 2024-06-17 PROCEDURE — 2580000003 HC RX 258: Performed by: STUDENT IN AN ORGANIZED HEALTH CARE EDUCATION/TRAINING PROGRAM

## 2024-06-17 PROCEDURE — 6370000000 HC RX 637 (ALT 250 FOR IP)

## 2024-06-17 PROCEDURE — 94640 AIRWAY INHALATION TREATMENT: CPT

## 2024-06-17 PROCEDURE — 94761 N-INVAS EAR/PLS OXIMETRY MLT: CPT

## 2024-06-17 PROCEDURE — 82803 BLOOD GASES ANY COMBINATION: CPT

## 2024-06-17 PROCEDURE — 2060000000 HC ICU INTERMEDIATE R&B

## 2024-06-17 PROCEDURE — 2580000003 HC RX 258: Performed by: NURSE PRACTITIONER

## 2024-06-17 RX ORDER — INSULIN LISPRO 100 [IU]/ML
0-4 INJECTION, SOLUTION INTRAVENOUS; SUBCUTANEOUS NIGHTLY
Status: DISCONTINUED | OUTPATIENT
Start: 2024-06-17 | End: 2024-06-20 | Stop reason: HOSPADM

## 2024-06-17 RX ORDER — INSULIN LISPRO 100 [IU]/ML
0-4 INJECTION, SOLUTION INTRAVENOUS; SUBCUTANEOUS NIGHTLY
Status: DISCONTINUED | OUTPATIENT
Start: 2024-06-17 | End: 2024-06-17

## 2024-06-17 RX ORDER — FERROUS SULFATE 325(65) MG
325 TABLET ORAL
Status: DISCONTINUED | OUTPATIENT
Start: 2024-06-18 | End: 2024-06-20 | Stop reason: HOSPADM

## 2024-06-17 RX ORDER — LANOLIN ALCOHOL/MO/W.PET/CERES
400 CREAM (GRAM) TOPICAL 2 TIMES DAILY
Status: DISCONTINUED | OUTPATIENT
Start: 2024-06-17 | End: 2024-06-20 | Stop reason: HOSPADM

## 2024-06-17 RX ORDER — HYDROCODONE BITARTRATE AND ACETAMINOPHEN 10; 325 MG/1; MG/1
1 TABLET ORAL EVERY 6 HOURS PRN
Status: DISCONTINUED | OUTPATIENT
Start: 2024-06-17 | End: 2024-06-19

## 2024-06-17 RX ORDER — INSULIN LISPRO 100 [IU]/ML
0-16 INJECTION, SOLUTION INTRAVENOUS; SUBCUTANEOUS
Status: DISCONTINUED | OUTPATIENT
Start: 2024-06-17 | End: 2024-06-20 | Stop reason: HOSPADM

## 2024-06-17 RX ORDER — INSULIN LISPRO 100 [IU]/ML
0-8 INJECTION, SOLUTION INTRAVENOUS; SUBCUTANEOUS
Status: DISCONTINUED | OUTPATIENT
Start: 2024-06-17 | End: 2024-06-17

## 2024-06-17 RX ADMIN — FUROSEMIDE 20 MG: 10 INJECTION, SOLUTION INTRAMUSCULAR; INTRAVENOUS at 16:18

## 2024-06-17 RX ADMIN — INSULIN LISPRO 12 UNITS: 100 INJECTION, SOLUTION INTRAVENOUS; SUBCUTANEOUS at 16:18

## 2024-06-17 RX ADMIN — ASPIRIN 81 MG: 81 TABLET, COATED ORAL at 09:36

## 2024-06-17 RX ADMIN — FUROSEMIDE 20 MG: 10 INJECTION, SOLUTION INTRAMUSCULAR; INTRAVENOUS at 09:36

## 2024-06-17 RX ADMIN — ENOXAPARIN SODIUM 40 MG: 100 INJECTION SUBCUTANEOUS at 09:36

## 2024-06-17 RX ADMIN — INSULIN LISPRO 2 UNITS: 100 INJECTION, SOLUTION INTRAVENOUS; SUBCUTANEOUS at 11:54

## 2024-06-17 RX ADMIN — PANTOPRAZOLE SODIUM 40 MG: 40 TABLET, DELAYED RELEASE ORAL at 15:13

## 2024-06-17 RX ADMIN — Medication 400 MG: at 09:50

## 2024-06-17 RX ADMIN — HYDROCODONE BITARTRATE AND ACETAMINOPHEN 1 TABLET: 10; 325 TABLET ORAL at 23:47

## 2024-06-17 RX ADMIN — MICONAZOLE NITRATE: 20 CREAM TOPICAL at 23:40

## 2024-06-17 RX ADMIN — INSULIN LISPRO 4 UNITS: 100 INJECTION, SOLUTION INTRAVENOUS; SUBCUTANEOUS at 21:13

## 2024-06-17 RX ADMIN — IPRATROPIUM BROMIDE AND ALBUTEROL SULFATE 1 DOSE: 2.5; .5 SOLUTION RESPIRATORY (INHALATION) at 07:50

## 2024-06-17 RX ADMIN — Medication 10 ML: at 09:51

## 2024-06-17 RX ADMIN — METRONIDAZOLE 500 MG: 500 INJECTION, SOLUTION INTRAVENOUS at 15:10

## 2024-06-17 RX ADMIN — PREGABALIN 100 MG: 100 CAPSULE ORAL at 09:36

## 2024-06-17 RX ADMIN — SODIUM ZIRCONIUM CYCLOSILICATE 5 G: 5 POWDER, FOR SUSPENSION ORAL at 11:54

## 2024-06-17 RX ADMIN — METRONIDAZOLE 500 MG: 500 INJECTION, SOLUTION INTRAVENOUS at 06:35

## 2024-06-17 RX ADMIN — CEFTRIAXONE SODIUM 2000 MG: 2 INJECTION, POWDER, FOR SOLUTION INTRAMUSCULAR; INTRAVENOUS at 15:12

## 2024-06-17 RX ADMIN — ENOXAPARIN SODIUM 40 MG: 100 INJECTION SUBCUTANEOUS at 21:34

## 2024-06-17 RX ADMIN — ESCITALOPRAM OXALATE 10 MG: 10 TABLET ORAL at 09:36

## 2024-06-17 RX ADMIN — ALLOPURINOL 100 MG: 100 TABLET ORAL at 09:36

## 2024-06-17 RX ADMIN — IPRATROPIUM BROMIDE AND ALBUTEROL SULFATE 1 DOSE: 2.5; .5 SOLUTION RESPIRATORY (INHALATION) at 19:26

## 2024-06-17 RX ADMIN — Medication 400 MG: at 21:34

## 2024-06-17 RX ADMIN — Medication 10 ML: at 21:10

## 2024-06-17 RX ADMIN — METRONIDAZOLE 500 MG: 500 INJECTION, SOLUTION INTRAVENOUS at 21:14

## 2024-06-17 RX ADMIN — HYDROCODONE BITARTRATE AND ACETAMINOPHEN 1 TABLET: 7.5; 325 TABLET ORAL at 09:45

## 2024-06-17 RX ADMIN — PREGABALIN 100 MG: 100 CAPSULE ORAL at 21:34

## 2024-06-17 RX ADMIN — AMLODIPINE BESYLATE 5 MG: 5 TABLET ORAL at 09:50

## 2024-06-17 RX ADMIN — INSULIN GLARGINE 12 UNITS: 100 INJECTION, SOLUTION SUBCUTANEOUS at 21:14

## 2024-06-17 RX ADMIN — TIOTROPIUM BROMIDE INHALATION SPRAY 2 PUFF: 3.12 SPRAY, METERED RESPIRATORY (INHALATION) at 07:50

## 2024-06-17 RX ADMIN — SODIUM ZIRCONIUM CYCLOSILICATE 10 G: 10 POWDER, FOR SUSPENSION ORAL at 18:21

## 2024-06-17 RX ADMIN — Medication 2 PUFF: at 07:50

## 2024-06-17 RX ADMIN — Medication 2 PUFF: at 19:26

## 2024-06-17 RX ADMIN — IPRATROPIUM BROMIDE AND ALBUTEROL SULFATE 1 DOSE: 2.5; .5 SOLUTION RESPIRATORY (INHALATION) at 11:21

## 2024-06-17 RX ADMIN — INSULIN LISPRO 2 UNITS: 100 INJECTION, SOLUTION INTRAVENOUS; SUBCUTANEOUS at 09:35

## 2024-06-17 RX ADMIN — IPRATROPIUM BROMIDE AND ALBUTEROL SULFATE 1 DOSE: 2.5; .5 SOLUTION RESPIRATORY (INHALATION) at 15:13

## 2024-06-17 ASSESSMENT — PAIN SCALES - GENERAL
PAINLEVEL_OUTOF10: 9
PAINLEVEL_OUTOF10: 10

## 2024-06-17 ASSESSMENT — PAIN DESCRIPTION - DESCRIPTORS
DESCRIPTORS: ACHING;DISCOMFORT
DESCRIPTORS: ACHING;STABBING

## 2024-06-17 ASSESSMENT — PAIN DESCRIPTION - ORIENTATION
ORIENTATION: LOWER
ORIENTATION: RIGHT;LEFT;LOWER;MID

## 2024-06-17 ASSESSMENT — PAIN DESCRIPTION - LOCATION
LOCATION: BACK
LOCATION: BACK

## 2024-06-17 NOTE — PROGRESS NOTES
4 Eyes Skin Assessment     The patient is being assess for   Admission    I agree that 2 RN's have performed a thorough Head to Toe Skin Assessment on the patient. ALL assessment sites listed below have been assessed.      Areas assessed for pressure by both nurses:   [x]   Head, Face, and Ears   [x]   Shoulders, Back, and Chest, Abdomen  [x]   Arms, Elbows, and Hands   [x]   Coccyx, Sacrum, and Ischium  [x]   Legs, Feet, and Heels    Scratch marks over the abdomen and lower limbs. Excoriation, redness over the right breastfold. Laceration over the posterior left thigh.        Skin Assessed Under all Medical Devices by both nurses:  O2 device tubing              All Mepilex Borders were peeled back and area peeked at by both nurses:  No: NA  Please list where Mepilex Borders are located:  NA             **SHARE this note so that the co-signing nurse is able to place an eSignature**    Co-signer eSignature: Electronically signed by Yana Nevarez RN on 6/17/24 at 6:10 AM EDT    Does the Patient have Skin Breakdown related to pressure?  No     (Insert Photo here NA)         Vishnu Prevention initiated:  No   Wound Care Orders initiated:  NA      [facility] nurse consulted for Pressure Injury (Stage 3,4, Unstageable, DTI, NWPT, Complex wounds)and New or Established Ostomies:  NA      Primary Nurse eSignature: Electronically signed by Gloria Granados RN on 6/16/24 at 11:38 PM EDT

## 2024-06-17 NOTE — PLAN OF CARE
Problem: Discharge Planning  Goal: Discharge to home or other facility with appropriate resources  Outcome: Progressing  Flowsheets  Taken 6/16/2024 2333  Discharge to home or other facility with appropriate resources: Identify barriers to discharge with patient and caregiver  Taken 6/16/2024 2139  Discharge to home or other facility with appropriate resources: Identify barriers to discharge with patient and caregiver     Problem: Pain  Goal: Verbalizes/displays adequate comfort level or baseline comfort level  Outcome: Progressing     Problem: Skin/Tissue Integrity  Goal: Absence of new skin breakdown  Description: 1.  Monitor for areas of redness and/or skin breakdown  2.  Assess vascular access sites hourly  3.  Every 4-6 hours minimum:  Change oxygen saturation probe site  4.  Every 4-6 hours:  If on nasal continuous positive airway pressure, respiratory therapy assess nares and determine need for appliance change or resting period.  Outcome: Progressing     Problem: Safety - Adult  Goal: Free from fall injury  Outcome: Progressing     Problem: Metabolic/Fluid and Electrolytes - Adult  Goal: Electrolytes maintained within normal limits  Outcome: Progressing  Flowsheets (Taken 6/16/2024 2139)  Electrolytes maintained within normal limits: Monitor labs and assess patient for signs and symptoms of electrolyte imbalances

## 2024-06-17 NOTE — PLAN OF CARE
HEART FAILURE CARE PLAN:    Comorbidities Reviewed: Yes   Patient has a past medical history of Anxiety, Arthritis, CHF (congestive heart failure) (HCC), COPD (chronic obstructive pulmonary disease) (HCC), Depression, Diabetes mellitus (HCC), Diverticulosis, Hyperlipidemia, Hypertension, and Obesities, morbid (HCC).     Weights Reviewed: Yes   Admission weight: (!) 151.5 kg (334 lb)   Wt Readings from Last 3 Encounters:   06/17/24 (!) 160.3 kg (353 lb 4.8 oz)   04/08/24 (!) 147.4 kg (325 lb)   06/20/23 (!) 147.4 kg (325 lb)     Intake & Output Reviewed: Yes     Intake/Output Summary (Last 24 hours) at 6/17/2024 0726  Last data filed at 6/17/2024 0558  Gross per 24 hour   Intake 193.75 ml   Output 1000 ml   Net -806.25 ml       ECHOCARDIOGRAM Reviewed: Yes   Patient's Ejection Fraction (EF) is greater than 40%     Medications Reviewed: Yes   SCHEDULED HOSPITAL MEDICATIONS:   sodium chloride flush  5-40 mL IntraVENous 2 times per day    enoxaparin  40 mg SubCUTAneous BID    allopurinol  100 mg Oral Daily    amLODIPine  5 mg Oral Daily    aspirin  81 mg Oral Daily    escitalopram  10 mg Oral Daily    magnesium oxide  400 mg Oral Daily    miconazole   Topical Nightly    pantoprazole  40 mg Oral BID AC    budesonide-formoterol  2 puff Inhalation BID RT    And    tiotropium  2 puff Inhalation Daily RT    furosemide  20 mg IntraVENous BID    insulin glargine  12 Units SubCUTAneous Nightly    insulin lispro  0-4 Units SubCUTAneous TID WC    insulin lispro  0-4 Units SubCUTAneous Nightly    pregabalin  100 mg Oral BID    cefTRIAXone (ROCEPHIN) IV  2,000 mg IntraVENous Q24H    metroNIDAZOLE  500 mg IntraVENous Q8H    ipratropium 0.5 mg-albuterol 2.5 mg  1 Dose Inhalation 4x Daily RT     HOME MEDICATIONS:  Prior to Admission medications    Medication Sig Start Date End Date Taking? Authorizing Provider   nystatin (MYCOSTATIN) 099280 UNIT/GM powder Apply topically 4 times daily.   Yes Provider, MD Sam   metFORMIN  Sam Lozano MD   simvastatin (ZOCOR) 20 MG tablet Take 1 tablet by mouth nightly    Sam Lozano MD   hydrOXYzine pamoate (VISTARIL) 25 MG capsule Take 1 capsule by mouth 3 times daily 7/30/18   Sam Lozano MD   magnesium oxide (MAG-OX) 400 MG tablet Take 1 tablet by mouth daily    Sam Lozano MD   EPINEPHrine (EPIPEN) 0.3 MG/0.3ML SOAJ injection Inject 0.3 mLs into the muscle Allergy of unknown origin 12/1/16   Sam Lozano MD   traZODone (DESYREL) 100 MG tablet Take 1 tablet by mouth nightly 3/8/18   Sam Lozano MD   glipiZIDE (GLUCOTROL XL) 5 MG extended release tablet Take 2 tablets by mouth daily    Sam Lozano MD   albuterol sulfate  (90 BASE) MCG/ACT inhaler Inhale 2 puffs into the lungs every 6 hours as needed for Wheezing    Sam Lozano MD   nystatin (MYCOSTATIN) 673503 UNIT/GM cream Apply topically nightly Apply under breasts at night    Sam Lozano MD   aspirin 81 MG EC tablet Take 1 tablet by mouth daily  Patient not taking: Reported on 6/16/2024 2/9/16   Nini Jones MD   ferrous sulfate (FE TABS) 325 (65 FE) MG EC tablet Take 1 tablet by mouth 3 times daily (with meals)  Patient not taking: Reported on 6/16/2024 2/9/16   Nini Jones MD   dicyclomine (BENTYL) 10 MG capsule Take 1 capsule by mouth 3 times daily    Sam Lozano MD      Diet Reviewed: Yes   Diet NPO    Goal of Care Reviewed: Yes   Patient and/or Family's stated Goal of Care this Admission: Reduce shortness of breath, increase activity tolerance, better understand heart failure and disease management, be more comfortable, and reduce lower extremity edema prior to discharge.     Electronically signed by Gloria Granados RN on 6/17/2024 at 7:26 AM

## 2024-06-17 NOTE — PROGRESS NOTES
06/16/24 1886   NIV Type   $NIV $Daily Charge   Equipment Type v60   Mode Bilevel   Mask Type Full face mask   Mask Size Medium   Settings/Measurements   IPAP 16 cmH20   CPAP/EPAP 8 cmH2O   Vt (Measured) 564 mL   Rate Ordered 14   FiO2  35 %   Minute Volume (L/min) 11.5 Liters   Mask Leak (lpm) 0 lpm   Patient's Home Machine No   Patient Observation   Observations spo2 99% on 35% bipap

## 2024-06-17 NOTE — DISCHARGE INSTRUCTIONS
[facility]    Ignacio Cisneros M.D.              Austyn Davidson M.D.              [facility]     Karlo Daley M.D.               Jimmy Collier M.D.  [facility] - Thursday only                                                                                                                                                         [facility] Office        [facility] Office         [facility] - Dr. Collier Only  [address]                     [address]                [address]  [address]                               [address]                                 [place] [address]  [place] [address]              [place] [address] [phone]      POST-OPERATIVE INSTRUCTIONS    Call the office to schedule your post-operative appointment with Dr. Collier for 1 week.     --   hold trulicity this week     Please complete entire course of antibiotics.    Utilize your home pain medications as directed for pain.    ANYA drain care instructions below.  Do not shower until your ANYA drain has been removed. Sponge bathe until then.    If you have clear bandages over your incisions, you may remove them in 2 days.    Your incision(s) have been closed with one of the following:  White steri-strips; these will peel away in 7-10 days.  Surgical glue; this will dissolve   Staples, these will be removed in the office during your post-op appointment.    General guidelines for activity:  Avoid strenuous activity or lifting anything heavier than 15 pounds for 2 weeks.   It is OK to be up walking around; walking up and down stairs is also OK.   Do what is comfortable: stop and rest when you feel tired.     Drink plenty of fluids and stay on a bland diet for 2-3 days after  surgery until your bowels are working better.     Watch for signs of infection:   Fever over 100.5°   Excessive warmth or bright redness around your incisions  Leakage of bloody or cloudy fluid from you incisions    If your procedure was done laparoscopically, gas is pumped into the abdominal cavity to help your surgeon with visualization during the procedure. You may feel abdominal, shoulder, or rib pain for a few days due to this.  Utilize your pain medicine as directed.    If you experience constipation  Increase your water intake and activity; walking is best.  A stool softener or mild laxative may be necessary if you still have not had a bowel  movement ; call the office for further instructions.    Call the office with questions or concerns.        HOW TO EMPTY YOUR DRAIN    Pull the stopper out of the top of the drain.   Pour the fluid into the container you were provided.   After the drain is empty, squeeze the drain flat and push the stopper back into the top of the drain.   Remember to record the date, time, amount and color of fluid collected.   If you see a change in color of your drain, please let the office know.                      RECORD YOUR DRAIN OUTPUT(S) HERE:   DRAIN #1   r R  r L DRAIN # 2  r R  r L    Date/Time Amount  Color Date/Time Amount  Color                                                                                                                                                               Heart Failure Resources:  Heart Failure Interactive Workbook:  Go to https://[employer]/publication/?f=144199 for a Free Heart Failure Interactive Workbook provided by The American [employer] Association. This interactive workbook will provide information on Healthier Living with Heart Failure. Please copy and paste link into search bar. Use your mouse to scroll through the pages.    HF Eldridge kami:   Heart Failure Free smart phone kami available for iPhone and Android download. Use

## 2024-06-17 NOTE — PROGRESS NOTES
06/16/24 2200   RT Protocol   History Pulmonary Disease 2   Respiratory pattern 0   Breath sounds 2   Cough 0   Indications for Bronchodilator Therapy On home bronchodilators   Bronchodilator Assessment Score 4     RT Inhaler-Nebulizer Bronchodilator Protocol Note    There is a bronchodilator order in the chart from a provider indicating to follow the RT Bronchodilator Protocol and there is an “Initiate RT Inhaler-Nebulizer Bronchodilator Protocol” order as well (see protocol at bottom of note).    CXR Findings:  XR CHEST PORTABLE    Result Date: 6/16/2024  Findings suggest congestive heart failure       The findings from the last RT Protocol Assessment were as follows:   History Pulmonary Disease: Chronic pulmonary disease  Respiratory Pattern: Regular pattern and RR 12-20 bpm  Breath Sounds: Slightly diminished and/or crackles  Cough: Strong, spontaneous, non-productive  Indication for Bronchodilator Therapy: On home bronchodilators  Bronchodilator Assessment Score: 4    Aerosolized bronchodilator medication orders have been revised according to the RT Inhaler-Nebulizer Bronchodilator Protocol below.    Respiratory Therapist to perform RT Therapy Protocol Assessment initially then follow the protocol.  Repeat RT Therapy Protocol Assessment PRN for score 0-3 or on second treatment, BID, and PRN for scores above 3.    No Indications - adjust the frequency to every 6 hours PRN wheezing or bronchospasm, if no treatments needed after 48 hours then discontinue using Per Protocol order mode.     If indication present, adjust the RT bronchodilator orders based on the Bronchodilator Assessment Score as indicated below.  Use Inhaler orders unless patient has one or more of the following: on home nebulizer, not able to hold breath for 10 seconds, is not alert and oriented, cannot activate and use MDI correctly, or respiratory rate 25 breaths per minute or more, then use the equivalent nebulizer order(s) with same  Frequency and PRN reasons based on the score.  If a patient is on this medication at home then do not decrease Frequency below that used at home.    0-3 - enter or revise RT bronchodilator order(s) to equivalent RT Bronchodilator order with Frequency of every 4 hours PRN for wheezing or increased work of breathing using Per Protocol order mode.        4-6 - enter or revise RT Bronchodilator order(s) to two equivalent RT bronchodilator orders with one order with BID Frequency and one order with Frequency of every 4 hours PRN wheezing or increased work of breathing using Per Protocol order mode.        7-10 - enter or revise RT Bronchodilator order(s) to two equivalent RT bronchodilator orders with one order with TID Frequency and one order with Frequency of every 4 hours PRN wheezing or increased work of breathing using Per Protocol order mode.       11-13 - enter or revise RT Bronchodilator order(s) to one equivalent RT bronchodilator order with QID Frequency and an Albuterol order with Frequency of every 4 hours PRN wheezing or increased work of breathing using Per Protocol order mode.      Greater than 13 - enter or revise RT Bronchodilator order(s) to one equivalent RT bronchodilator order with every 4 hours Frequency and an Albuterol order with Frequency of every 2 hours PRN wheezing or increased work of breathing using Per Protocol order mode.       Electronically signed by Karlo Moreno RCP on 6/16/2024 at 10:37 PM

## 2024-06-17 NOTE — PROGRESS NOTES
Consult has been called to Dr. Benson on 6/17/24. Spoke with Whitney. 10:17 AM    Emily Monson  6/17/2024

## 2024-06-17 NOTE — PROGRESS NOTES
Consult has been called to Dr. Zamarripa on 6/17/24. Spoke with Terri. 1:32 PM    Emily Monson  6/17/2024

## 2024-06-17 NOTE — PROGRESS NOTES
Patient admitted to room 317 from [facility]. Patient is alert and oriented x4, with oxygen at 4 lpm via nasal cannula. With telemetry on. Bed is locked and in lowest position. Side rails up x2. Call light placed in patient reach. Patient informed of the hospital routine, including but not limited to lab work, vital signs, hourly rounding, etc. Care plans and education updated, CHG wipes done at time of admission.     BP (!) 172/82   Pulse 90   Temp 98.2 °F (36.8 °C) (Oral)   Resp 20   Ht 1.549 m (5' 1\")   Wt (!) 151.5 kg (334 lb)   SpO2 100%   BMI 63.11 kg/m²     Most recent set of vitals as shown.     Patient has no LDA wilma require CHG wipes, including possible a surgery incision, ospina catheter, or a central line.       Bedside Mobility Assessment Tool (BMAT):     Assessment Level 1- Sit and Shake    1. From a semi-reclined position, ask patient to sit up and rotate to a seated position at the side of the bed. Can use the bedrail.    2. Ask patient to reach out and grab your hand and shake making sure patient reaches across his/her midline.   Pass- Patient is able to come to a seated position, maintain core strength. Maintains seated balance while reaching across midline. Move on to Assessment Level 2.     Assessment Level 2- Stretch and Point   1. With patient in seated position at the side of the bed, have patient place both feet on the floor (or stool) with knees no higher than hips.    2. Ask patient to stretch one leg and straighten the knee, then bend the ankle/flex and point the toes. If appropriate, repeat with the other leg.   Pass- Patient is able to demonstrate appropriate quad strength on intended weight bearing limb(s). Move onto Assessment Level 3.     Assessment Level 3- Stand   1. Ask patient to elevate off the bed or chair (seated to standing) using an assistive device (cane, bedrail).    2. Patient should be able to raise buttocks off be and hold for a count of five. May repeat once.

## 2024-06-17 NOTE — PLAN OF CARE
HEART FAILURE CARE PLAN:    Comorbidities Reviewed: Yes   Patient has a past medical history of Anxiety, Arthritis, CHF (congestive heart failure) (HCC), COPD (chronic obstructive pulmonary disease) (HCC), Depression, Diabetes mellitus (HCC), Diverticulosis, Hyperlipidemia, Hypertension, and Obesities, morbid (HCC).     Weights Reviewed: Yes   Admission weight: (!) 151.5 kg (334 lb)   Wt Readings from Last 3 Encounters:   06/17/24 (!) 160.3 kg (353 lb 4.8 oz)   04/08/24 (!) 147.4 kg (325 lb)   06/20/23 (!) 147.4 kg (325 lb)     Intake & Output Reviewed: Yes     Intake/Output Summary (Last 24 hours) at 6/17/2024 1041  Last data filed at 6/17/2024 0950  Gross per 24 hour   Intake 203.75 ml   Output 1000 ml   Net -796.25 ml       ECHOCARDIOGRAM Reviewed: Yes   Patient's Ejection Fraction (EF) is greater than 40%     Medications Reviewed: Yes   SCHEDULED HOSPITAL MEDICATIONS:   magnesium oxide  400 mg Oral BID    sodium chloride flush  5-40 mL IntraVENous 2 times per day    enoxaparin  40 mg SubCUTAneous BID    allopurinol  100 mg Oral Daily    amLODIPine  5 mg Oral Daily    aspirin  81 mg Oral Daily    escitalopram  10 mg Oral Daily    miconazole   Topical Nightly    pantoprazole  40 mg Oral BID AC    budesonide-formoterol  2 puff Inhalation BID RT    And    tiotropium  2 puff Inhalation Daily RT    furosemide  20 mg IntraVENous BID    insulin glargine  12 Units SubCUTAneous Nightly    insulin lispro  0-4 Units SubCUTAneous TID WC    insulin lispro  0-4 Units SubCUTAneous Nightly    pregabalin  100 mg Oral BID    cefTRIAXone (ROCEPHIN) IV  2,000 mg IntraVENous Q24H    metroNIDAZOLE  500 mg IntraVENous Q8H    ipratropium 0.5 mg-albuterol 2.5 mg  1 Dose Inhalation 4x Daily RT     HOME MEDICATIONS:  Prior to Admission medications    Medication Sig Start Date End Date Taking? Authorizing Provider   nystatin (MYCOSTATIN) 063530 UNIT/GM powder Apply topically 4 times daily.   Yes Provider, MD Sam   metFORMIN  (GLUCOPHAGE-XR) 500 MG extended release tablet Take 1 tablet by mouth 2 times daily (before meals)   Yes Sam Lozano MD   meloxicam (MOBIC) 15 MG tablet Take 1 tablet by mouth daily   Yes Sam Lozano MD   fluconazole (DIFLUCAN) 150 MG tablet Take 1 tablet by mouth once a week  Patient not taking: Reported on 6/16/2024   Yes Sam Lozano MD   esomeprazole (NEXIUM) 40 MG delayed release capsule Take 1 capsule by mouth every morning (before breakfast)   Yes Sam Lozano MD   oxyCODONE-acetaminophen (PERCOCET) 7.5-325 MG per tablet Take 1 tablet by mouth every 6 hours as needed for Pain.   Yes Sam Lozano MD   potassium chloride (KLOR-CON M) 20 MEQ extended release tablet Take 1 tablet by mouth daily  Patient not taking: Reported on 6/16/2024   Yes Sam Lozano MD   fluticasone-umeclidin-vilant (TRELEGY ELLIPTA) 100-62.5-25 MCG/ACT AEPB inhaler Inhale 1 puff into the lungs daily 4/8/24   Santa Bhat MD   amLODIPine (NORVASC) 5 MG tablet Take 1 tablet by mouth daily  Patient not taking: Reported on 6/16/2024 3/25/23   Argenis Figueroa MD   furosemide (LASIX) 40 MG tablet Take 1 tablet by mouth daily    Sam Lozano MD   insulin detemir (LEVEMIR) 100 UNIT/ML injection pen Inject 60 Units into the skin nightly    Sam Lozano MD   insulin aspart (NOVOLOG FLEXPEN) 100 UNIT/ML injection pen Inject 48 Units into the skin 3 times daily (before meals)    Sam Lozano MD   Dulaglutide 4.5 MG/0.5ML SOPN Inject 4.5 mg into the skin once a week Takes on Fridays    Sam Lozano MD   escitalopram (LEXAPRO) 10 MG tablet Take 1 tablet by mouth daily 10/14/22   Shari Nye MD   allopurinol (ZYLOPRIM) 100 MG tablet Take 1 tablet by mouth daily    Sam Lozano MD   simvastatin (ZOCOR) 20 MG tablet Take 1 tablet by mouth nightly    Sam Lozano MD   hydrOXYzine pamoate (VISTARIL) 25 MG capsule Take 1

## 2024-06-17 NOTE — CONSULTS
06/17/2024 05:04 AM    HGB 10.0 06/17/2024 05:04 AM    HCT 31.1 06/17/2024 05:04 AM    MCV 81.8 06/17/2024 05:04 AM    RDW 15.1 06/17/2024 05:04 AM     06/17/2024 05:04 AM     CMP:    Lab Results   Component Value Date/Time     06/17/2024 05:04 AM    K 5.4 06/17/2024 05:04 AM    K 5.6 08/26/2023 12:10 PM    CL 95 06/17/2024 05:04 AM    CO2 35 06/17/2024 05:04 AM    BUN 28 06/17/2024 05:04 AM    CREATININE 1.1 06/17/2024 05:04 AM    GFRAA >60 10/13/2022 09:05 AM    AGRATIO 1.1 06/16/2024 12:52 PM    LABGLOM 58 06/17/2024 05:04 AM    LABGLOM 58 08/26/2023 12:10 PM    GLUCOSE 276 06/17/2024 05:04 AM    CALCIUM 9.3 06/17/2024 05:04 AM    BILITOT <0.2 06/16/2024 12:52 PM    ALKPHOS 107 06/16/2024 12:52 PM    AST 23 06/16/2024 12:52 PM    ALT 19 06/16/2024 12:52 PM     PT/INR:  No results found for: \"PTINR\"  Lab Results   Component Value Date    TROPONINI <0.01 03/20/2023       EKG:  I have reviewed EKG with the following interpretation:  Impression:  See HPI    Assessment:  Brianne Gunn is a 58 y.o. patient who presented to [facility] [place] ER 6/16/2024 with c/o abdominal distention and pain. She has PMH diastolic CHF dx years ago, DM, COPD on 4L baseline NC O2, prior tob abuse (quit 2010--prior 1ppd x 30 years), HLD and HTN. Prior testing: Echo 2/4/24 EF=50-55% (EF 55% in 3/23); Grade IIDD; mild cLVH;possible small vegetation vs flail leaflet tip on MV; IVC dilated 2.7 cm.    Ms. Gunn presented to ED with c/o abdominal bloating and pain. Recently admitted for CO2 retention. Reports symptoms x 2 weeks. She has baseline NORIEGA unchanged. Takes lasix 20mg daily at home. Admission Testing:  EKG NSR 81; PVC; anterior infarct; LV; 1st degree AV block (no change 7/23); CXR PVC; possible pulm edema; findings suggestive of heart failure. Abd/Pelvic CT findings suggestive of acute cholecystitis; diverticulosis. Gallbladder US considered acalculous cholecystitis as no clear gallstones are evident. Increased echogenicity  allowing to us to participate in the care or Brianne Gunn.

## 2024-06-17 NOTE — PROGRESS NOTES
Transferred care to PHONG Carter. Face to face bedside report given, no need voiced at this time.  Pt in bed with eyes closed.  No signs of distress noted.  Call light within reach.

## 2024-06-17 NOTE — PROGRESS NOTES
Consult has been called to Dr. Anderson on 6/17/24. Spoke with Maryse. 2:33 PM    Emily Monson  6/17/2024

## 2024-06-17 NOTE — FLOWSHEET NOTE
06/17/24 0725   Vital Signs   Temp 98.6 °F (37 °C)   Temp Source Oral   Pulse 81   Heart Rate Source Monitor   Respirations 18   BP (!) 153/89   MAP (Calculated) 110   BP Location Left lower arm   BP Method Automatic   Patient Position Semi fowlers   Oxygen Therapy   SpO2 98 %   O2 Device Nasal cannula   O2 Flow Rate (L/min) 4 L/min     Pt. Resting in bed. Call light in reach. Shift assessment completed see flow sheet. Denies any needs at this time. Will continue to monitor.

## 2024-06-17 NOTE — CONSULTS
General Surgery   Consult Note      Pt Name: Brianne Gunn  MRN: [id number]  YOB: 1965  Primary Care Physician: Ani White PA-C    Patient evaluated at the request of MAEVE Atkinson  Reason for evaluation: Cholecystitis   Date of evaluation: 2024  Admit date: 2024  LOS: Day 1    SUBJECTIVE:   History of Chief Complaint:    Brianne Gunn is a 58 y.o. female who presented with multiple complaints. Her main concern/reason for ED evaluation was bilateral upper extremity \"jerking\". This has been present for years but has been worse lately. She also notes RUQ and epigastric pain, pressure/bloating and nausea for the last few weeks. This seems to be exacerbated by food. States she has had similar abdominal issues several times over the last year. She endorses chronic SOB which is worse than usual. Also endorses lower abdominal \"swelling\".   Denies fever, chills, chest pain, vomiting, issues with bowel or bladder.   Prior abdominal surgical history includes  x 3.       Past Medical History   has a past medical history of Anxiety, Arthritis, CHF (congestive heart failure) (HCC), COPD (chronic obstructive pulmonary disease) (HCC), Depression, Diabetes mellitus (HCC), Diverticulosis, Hyperlipidemia, Hypertension, and Obesities, morbid (HCC).    Past Surgical History   has a past surgical history that includes Endometrial ablation;  section; Colonoscopy; Endoscopy, colon, diagnostic; Upper gastrointestinal endoscopy (N/A, 2023); other surgical history (2023); and Upper gastrointestinal endoscopy (N/A, 2023).    Medications  Prior to Admission medications    Medication Sig Start Date End Date Taking? Authorizing Provider   nystatin (MYCOSTATIN) 935258 UNIT/GM powder Apply topically 4 times daily.   Yes Sam Lozano MD   metFORMIN (GLUCOPHAGE-XR) 500 MG extended release tablet Take 1 tablet by mouth 2 times daily (before meals)   Yes Sam Lozano MD  presentation. Weight loss would be beneficial.      Thank you for the interesting evaluation. Will discuss assessment, status, and recommended plan of care with surgeon on call.       Bashir Clifford PA-C  6/17/2024 12:57 PM      Patient seen and examined.  I agree with the assessment and plan from MAEVE Toro.  More than half of the time spent on this encounter was completed by me including the history, physical examination and the entire medical decision making.     Patient with severe, sharp upper abdomen pain.  She has had this for a while.  It si often postprandial.  CT with GB with inflammation.   GBUS with pericholecystic fluid.     Cardiology cleared for cholecystectomy.  Pulmonary to evaluate for clearance.    Discussed with patient,  and daughter.  Plan for cholecystectomy once deemed optimized by medical services.    JAKE GARBER MD

## 2024-06-17 NOTE — PLAN OF CARE
Problem: Discharge Planning  Goal: Discharge to home or other facility with appropriate resources  6/17/2024 0935 by Raul Victor RN  Outcome: Progressing  6/17/2024 0116 by Gloria Granados RN  Outcome: Progressing  Flowsheets  Taken 6/16/2024 2333  Discharge to home or other facility with appropriate resources: Identify barriers to discharge with patient and caregiver  Taken 6/16/2024 2139  Discharge to home or other facility with appropriate resources: Identify barriers to discharge with patient and caregiver     Problem: Pain  Goal: Verbalizes/displays adequate comfort level or baseline comfort level  6/17/2024 0935 by Raul Victor RN  Outcome: Progressing  6/17/2024 0116 by Gloria Granados RN  Outcome: Progressing     Problem: Skin/Tissue Integrity  Goal: Absence of new skin breakdown  Description: 1.  Monitor for areas of redness and/or skin breakdown  2.  Assess vascular access sites hourly  3.  Every 4-6 hours minimum:  Change oxygen saturation probe site  4.  Every 4-6 hours:  If on nasal continuous positive airway pressure, respiratory therapy assess nares and determine need for appliance change or resting period.  6/17/2024 0935 by Raul Victor RN  Outcome: Progressing  6/17/2024 0116 by Gloria Granados RN  Outcome: Progressing     Problem: Safety - Adult  Goal: Free from fall injury  6/17/2024 0935 by Raul Victor RN  Outcome: Progressing  6/17/2024 0116 by Gloria Granados RN  Outcome: Progressing     Problem: Metabolic/Fluid and Electrolytes - Adult  Goal: Electrolytes maintained within normal limits  6/17/2024 0935 by Raul Victor RN  Outcome: Progressing  6/17/2024 0116 by Gloria Granados RN  Outcome: Progressing  Flowsheets (Taken 6/16/2024 2139)  Electrolytes maintained within normal limits: Monitor labs and assess patient for signs and symptoms of electrolyte imbalances     Problem: Cardiovascular - Adult  Goal: Maintains optimal cardiac output and  hemodynamic stability  Outcome: Progressing  Goal: Absence of cardiac dysrhythmias or at baseline  Outcome: Progressing     Problem: Chronic Conditions and Co-morbidities  Goal: Patient's chronic conditions and co-morbidity symptoms are monitored and maintained or improved  Outcome: Progressing

## 2024-06-17 NOTE — PROGRESS NOTES
RT Inhaler-Nebulizer Bronchodilator Protocol Note    There is a bronchodilator order in the chart from a provider indicating to follow the RT Bronchodilator Protocol and there is an “Initiate RT Inhaler-Nebulizer Bronchodilator Protocol” order as well (see protocol at bottom of note).    CXR Findings:  XR CHEST PORTABLE    Result Date: 6/16/2024  Findings suggest congestive heart failure       The findings from the last RT Protocol Assessment were as follows:   History Pulmonary Disease: (P) Chronic pulmonary disease  Respiratory Pattern: (P) Regular pattern and RR 12-20 bpm  Breath Sounds: (P) Slightly diminished and/or crackles  Cough: (P) Strong, spontaneous, non-productive  Indication for Bronchodilator Therapy: (P) On home bronchodilators  Bronchodilator Assessment Score: (P) 4    Aerosolized bronchodilator medication orders have been revised according to the RT Inhaler-Nebulizer Bronchodilator Protocol below.    Respiratory Therapist to perform RT Therapy Protocol Assessment initially then follow the protocol.  Repeat RT Therapy Protocol Assessment PRN for score 0-3 or on second treatment, BID, and PRN for scores above 3.    No Indications - adjust the frequency to every 6 hours PRN wheezing or bronchospasm, if no treatments needed after 48 hours then discontinue using Per Protocol order mode.     If indication present, adjust the RT bronchodilator orders based on the Bronchodilator Assessment Score as indicated below.  Use Inhaler orders unless patient has one or more of the following: on home nebulizer, not able to hold breath for 10 seconds, is not alert and oriented, cannot activate and use MDI correctly, or respiratory rate 25 breaths per minute or more, then use the equivalent nebulizer order(s) with same Frequency and PRN reasons based on the score.  If a patient is on this medication at home then do not decrease Frequency below that used at home.    0-3 - enter or revise RT bronchodilator order(s) to

## 2024-06-17 NOTE — CONSULTS
daily    Provider, MD Sam      Objective:     ADMISSION DIAGNOSIS:   Cholecystitis [K81.9]  Acute on chronic respiratory failure with hypercapnia (HCC) [J96.22]  Acute congestive heart failure, unspecified heart failure type (HCC) [I50.9]    WEIGHTS:    Admission weight: (!) 151.5 kg (334 lb)   Wt Readings from Last 3 Encounters:   06/17/24 (!) 160.3 kg (353 lb 4.8 oz)   04/08/24 (!) 147.4 kg (325 lb)   06/20/23 (!) 147.4 kg (325 lb)     INTAKE & OUTPUT:   Intake/Output Summary (Last 24 hours) at 6/17/2024 1036  Last data filed at 6/17/2024 0950  Gross per 24 hour   Intake 203.75 ml   Output 1000 ml   Net -796.25 ml     ECHOCARDIOGRAM: EF 55% 3/23    Assessment:     Patient resting in bed at this time on  4 L O2.  Pt with complaints of shortness of breath; no complaints of chest pain. Patient is on 4/L oxygen at baseline with BiPAP at night. Patient came in due to increased swelling to abdomen and legs with pain to her knees. Patient has gained 28 lbs in the last 2 months. Patient did not have a scale at home. Lives with spouse who will help with adl's, cooking, cleaning, and transportation.     Provided the Patient with Heart Failure education on: signs/symptoms to monitor, medications, daily weights, low sodium diet, 2000 ml fluid restriction, and activity. Reviewed HF Zones (green/yellow/red) and importance of reporting yellow symptoms on Magnet provided. Reinforced the importance of daily weights and to report weight gain of 3 lbs in one day and 5 lbs in one week to Provider. Provided patient with a paper copy weight log to keep track of daily weights. Provided patient with a scale to take home and demonstrated how to use it.     Reviewed low sodium diet and fluid restrictions. Provided 32 oz labeled water pitcher to monitor intake of fluids. Encouraged patient to look at food labels to help reduce sodium. Uses some salt with food. Recommended no added salt and alternatives with no salt. Patient unsure  Vishnu  [x]  Emphasize daily weights: Instruct patient to call the MD if weight gain of 3 lbs in 1 day or 5 lbs in a week.   [x]  Review sodium restriction diet. Encourage patient to not add table salt and avoid foods high in sodium.   [x]  Educate further on fluid restriction of 48 oz - 64 oz with labeled pitcher during inpatient admission.  [x]  Continue to educate on signs & symptoms of Heart Failure.  []  Other:            Electronically signed by Leigh Ann Briscoe MSN, RN  on 6/17/2024 at 10:36 AM

## 2024-06-17 NOTE — PROGRESS NOTES
from 2/4/2024  Study Conclusions     - Left ventricle: The cavity size is normal. Wall thickness was increased in     a pattern of mild LVH. Systolic function is normal. The estimated ejection     fraction is 50-55%. Contrast opacified study didnot show any LV thrombus.     Grade II diastolic dysfunction.   - Aortic valve: There is trivial regurgitation.   - Mitral valve: Thickened anterior mitral leaflet tip. There is a small     echodensity seen attached to anterior mitral leaflet tip(image 14) could     be a small vegetation vs flail leaflet tip.Consider KHADAR if clinically     indicated.   - Right ventricle: Systolic function is normal by visual assessment. TAPSE     2.31 cm.   - Inferior vena cava: The IVC is dilated.   - Technically difficult study.       Assessment/Plan:    #Abdominal pain   #Acute cholecystitis, acalculous   -blood cultures pending   -on IV rocephin and flagyl  -prn antiemetics and pain control   -NPO after midnight  -General surgery consulted   -Pulmonology and consulted for surgery clearance, patient is high risk with co morbidities   -cardiology consulted for cardiac clearance prior to surgery     #Acute on chronic diastolic HF  -anasarca on CT and possible CHF on CXR   -on IV lasix 20 BID  -daily weights, intake and output   -last echo as above   -cardiology consulted    #COPD without AE   #Chronic hypoxic and hypercarbic respiratory failure  -on BIPAP nightly  on 4 L O2 daily  -stable on this  -continue prn inhalers     #Hyperkalemia  #Mildly elevated Cr   -Cr 1.2-->1.1  -baseline Cr ~0.7  -on lasix IV and Cr did improve slightly today   -lokelma once  -repeat BMP tonight      #Elevated troponin   -25 x 2   -no CP   -EKG without acute abnormalities     #Normocytic anemia  -will start ferrous sulfate  -monitor CBC     #HTN   -on lasix, norvasc     #Pyuria   -urine culture pending   -on abx as above     #DM type 2   -lantus   -SSI   -monitor BG     #Thrombocytopenia  -monitor  -appears

## 2024-06-17 NOTE — CONSULTS
[facility]                                 Pulmonary Consult /Progress Note :                                                                  CC worsening shortness of    HPI:   Patient is 58 -year-old female with a 50-60 pack -year smoking history.   quit smoking few years ago    Known COPD and on 4 L o2     She  presented to the hospital with abdominal pain ,RUQ pain   With bloating and nausea for the last few days ,pain worse with food   No chest Pain   No fever or chills    She is also known history of congestive heart failure      She is on diuresis at home    She is not on any type of noninvasive mechanical ventilation, she states she only take albuterol  BNP is elevated     She has possible acute Cholecystitis and plan for surgery       Past Medical History:   Diagnosis Date    Anxiety     Arthritis     CHF (congestive heart failure) (HCC)     COPD (chronic obstructive pulmonary disease) (HCC)     Depression     Diabetes mellitus (HCC)     Diverticulosis     Hyperlipidemia     Hypertension     Obesities, morbid (HCC)        Past Surgical History:        Procedure Laterality Date     SECTION      x3    COLONOSCOPY      ENDOMETRIAL ABLATION      ENDOSCOPY, COLON, DIAGNOSTIC      OTHER SURGICAL HISTORY  2023    EGD BIOPSY    UPPER GASTROINTESTINAL ENDOSCOPY N/A 2023    EGD BIOPSY performed by Kirby Sims DO at [facility] ENDOSCOPY    UPPER GASTROINTESTINAL ENDOSCOPY N/A 2023    EGD BIOPSY performed by Kirby Sims DO at [facility] ENDOSCOPY       Allergies:  is allergic to hydromorphone hcl, ace inhibitors, azithromycin, dilaudid [hydromorphone hcl], lisinopril, morphine, and penicillins.  Social History:    TOBACCO:   reports that she quit smoking about 9 years ago. Her smoking use included cigarettes. She started smoking about 49 years ago. She has a 80.0 pack-year smoking history. She has been exposed to

## 2024-06-17 NOTE — CARE COORDINATION
Case Management Assessment  Initial Evaluation    Date/Time of Evaluation: 6/17/2024 10:30 AM  Assessment Completed by: Vale Gordillo RN    If patient is discharged prior to next notation, then this note serves as note for discharge by case management.    Patient Name: Brianne Gunn                   YOB: 1965  Diagnosis: Cholecystitis [K81.9]  Acute on chronic respiratory failure with hypercapnia (HCC) [J96.22]  Acute congestive heart failure, unspecified heart failure type (HCC) [I50.9]                   Date / Time: 6/16/2024 12:48 PM    Patient Admission Status: Inpatient   Readmission Risk (Low < 19, Mod (19-27), High > 27): Readmission Risk Score: 14.4    Current PCP: Ani White PA-C  PCP verified by CM? Yes    Chart Reviewed: Yes      History Provided by: Patient  Patient Orientation: Alert and Oriented    Patient Cognition: Alert    Hospitalization in the last 30 days (Readmission):  No    If yes, Readmission Assessment in CM Navigator will be completed.    Advance Directives:      Code Status: Full Code   Patient's Primary Decision Maker is: Legal Next of Kin    Primary Decision Maker: Jose Gunn - Spouse - [phone]    Discharge Planning:    Patient lives with: Spouse/Significant Other Type of Home: Trailer/Mobile Home  Primary Care Giver: Self  Patient Support Systems include: Spouse/Significant Other   Current Financial resources: Medicare, Medicaid  Current community resources: None  Current services prior to admission: Durable Medical Equipment, Home Bipap, Oxygen Therapy ([facility])            Current DME: Cane, Walker, Wheelchair, Hospital Bed            Type of Home Care services:  None    ADLS  Prior functional level: Independent in ADLs/IADLs  Current functional level: Independent in ADLs/IADLs    PT AM-PAC:   /24  OT AM-PAC:   /24    Family can provide assistance at DC: Yes  Would you like Case Management to discuss the discharge plan with any other family members/significant  provider and agrees with the discharge plan. Freedom of choice list with basic dialogue that supports the patient's individualized plan of care/goals and shares the quality data associated with the providers was provided to:     Patient Representative Name:       The Patient and/or Patient Representative Agree with the Discharge Plan?      Vale Gordillo RN  Case Management Department  Ph: [phone] Fax: [phone]

## 2024-06-17 NOTE — ACP (ADVANCE CARE PLANNING)
Advance Care Planning     General Advance Care Planning (ACP) Conversation    Date of Conversation: 6/17/2024  Conducted with: Patient with Decision Making Capacity  Other persons present: None    Healthcare Decision Maker:   Primary Decision Maker: Jose Gunn - Spouse - [phone]  Click here to complete Healthcare Decision Makers including selection of the Healthcare Decision Maker Relationship (ie \"Primary\").       Content/Action Overview:  DECLINED ACP Conversation - will revisit periodically  Reviewed DNR/DNI and patient elects Full Code (Attempt Resuscitation)        Length of Voluntary ACP Conversation in minutes:  <16 minutes (Non-Billable)    Vale Gordillo RN

## 2024-06-18 ENCOUNTER — ANESTHESIA (OUTPATIENT)
Dept: OPERATING ROOM | Age: 59
DRG: 417 | End: 2024-06-18
Payer: MEDICARE

## 2024-06-18 ENCOUNTER — PREP FOR PROCEDURE (OUTPATIENT)
Dept: SURGERY | Age: 59
End: 2024-06-18

## 2024-06-18 ENCOUNTER — ANESTHESIA EVENT (OUTPATIENT)
Dept: OPERATING ROOM | Age: 59
DRG: 417 | End: 2024-06-18
Payer: MEDICARE

## 2024-06-18 DIAGNOSIS — K81.0 ACUTE CHOLECYSTITIS: ICD-10-CM

## 2024-06-18 LAB
ALBUMIN SERPL-MCNC: 3.4 G/DL (ref 3.4–5)
ALBUMIN/GLOB SERPL: 1 {RATIO} (ref 1.1–2.2)
ALP SERPL-CCNC: 77 U/L (ref 40–129)
ALT SERPL-CCNC: 12 U/L (ref 10–40)
ANION GAP SERPL CALCULATED.3IONS-SCNC: 5 MMOL/L (ref 3–16)
AST SERPL-CCNC: 12 U/L (ref 15–37)
BASOPHILS # BLD: 0 K/UL (ref 0–0.2)
BASOPHILS NFR BLD: 0.7 %
BILIRUB SERPL-MCNC: <0.2 MG/DL (ref 0–1)
BUN SERPL-MCNC: 21 MG/DL (ref 7–20)
CALCIUM SERPL-MCNC: 9.3 MG/DL (ref 8.3–10.6)
CHLORIDE SERPL-SCNC: 97 MMOL/L (ref 99–110)
CO2 SERPL-SCNC: 35 MMOL/L (ref 21–32)
CREAT SERPL-MCNC: 0.9 MG/DL (ref 0.6–1.1)
DEPRECATED RDW RBC AUTO: 14.8 % (ref 12.4–15.4)
EOSINOPHIL # BLD: 0.1 K/UL (ref 0–0.6)
EOSINOPHIL NFR BLD: 3.3 %
GFR SERPLBLD CREATININE-BSD FMLA CKD-EPI: 73 ML/MIN/{1.73_M2}
GLUCOSE BLD-MCNC: 243 MG/DL (ref 70–99)
GLUCOSE BLD-MCNC: 310 MG/DL (ref 70–99)
GLUCOSE BLD-MCNC: 336 MG/DL (ref 70–99)
GLUCOSE BLD-MCNC: 363 MG/DL (ref 70–99)
GLUCOSE BLD-MCNC: 368 MG/DL (ref 70–99)
GLUCOSE BLD-MCNC: 376 MG/DL (ref 70–99)
GLUCOSE BLD-MCNC: 380 MG/DL (ref 70–99)
GLUCOSE SERPL-MCNC: 258 MG/DL (ref 70–99)
HCT VFR BLD AUTO: 30.6 % (ref 36–48)
HGB BLD-MCNC: 9.9 G/DL (ref 12–16)
LYMPHOCYTES # BLD: 1.2 K/UL (ref 1–5.1)
LYMPHOCYTES NFR BLD: 27.1 %
MAGNESIUM SERPL-MCNC: 1.8 MG/DL (ref 1.8–2.4)
MCH RBC QN AUTO: 26.6 PG (ref 26–34)
MCHC RBC AUTO-ENTMCNC: 32.4 G/DL (ref 31–36)
MCV RBC AUTO: 82.1 FL (ref 80–100)
MONOCYTES # BLD: 0.3 K/UL (ref 0–1.3)
MONOCYTES NFR BLD: 7.9 %
NEUTROPHILS # BLD: 2.7 K/UL (ref 1.7–7.7)
NEUTROPHILS NFR BLD: 61 %
PERFORMED ON: ABNORMAL
PLATELET # BLD AUTO: 112 K/UL (ref 135–450)
PMV BLD AUTO: 9.1 FL (ref 5–10.5)
POTASSIUM SERPL-SCNC: 4.6 MMOL/L (ref 3.5–5.1)
PROT SERPL-MCNC: 6.7 G/DL (ref 6.4–8.2)
RBC # BLD AUTO: 3.73 M/UL (ref 4–5.2)
SODIUM SERPL-SCNC: 137 MMOL/L (ref 136–145)
WBC # BLD AUTO: 4.4 K/UL (ref 4–11)

## 2024-06-18 PROCEDURE — 85025 COMPLETE CBC W/AUTO DIFF WBC: CPT

## 2024-06-18 PROCEDURE — 6370000000 HC RX 637 (ALT 250 FOR IP): Performed by: HOSPITALIST

## 2024-06-18 PROCEDURE — 83735 ASSAY OF MAGNESIUM: CPT

## 2024-06-18 PROCEDURE — 6370000000 HC RX 637 (ALT 250 FOR IP): Performed by: NURSE PRACTITIONER

## 2024-06-18 PROCEDURE — 6360000002 HC RX W HCPCS: Performed by: STUDENT IN AN ORGANIZED HEALTH CARE EDUCATION/TRAINING PROGRAM

## 2024-06-18 PROCEDURE — 94660 CPAP INITIATION&MGMT: CPT

## 2024-06-18 PROCEDURE — 47562 LAPAROSCOPIC CHOLECYSTECTOMY: CPT | Performed by: SURGERY

## 2024-06-18 PROCEDURE — 6360000002 HC RX W HCPCS: Performed by: NURSE ANESTHETIST, CERTIFIED REGISTERED

## 2024-06-18 PROCEDURE — 2709999900 HC NON-CHARGEABLE SUPPLY: Performed by: SURGERY

## 2024-06-18 PROCEDURE — 99233 SBSQ HOSP IP/OBS HIGH 50: CPT | Performed by: INTERNAL MEDICINE

## 2024-06-18 PROCEDURE — 2580000003 HC RX 258: Performed by: NURSE PRACTITIONER

## 2024-06-18 PROCEDURE — 3600000004 HC SURGERY LEVEL 4 BASE: Performed by: SURGERY

## 2024-06-18 PROCEDURE — 3600000014 HC SURGERY LEVEL 4 ADDTL 15MIN: Performed by: SURGERY

## 2024-06-18 PROCEDURE — 2000000000 HC ICU R&B

## 2024-06-18 PROCEDURE — 2700000000 HC OXYGEN THERAPY PER DAY

## 2024-06-18 PROCEDURE — 3700000000 HC ANESTHESIA ATTENDED CARE: Performed by: SURGERY

## 2024-06-18 PROCEDURE — 99232 SBSQ HOSP IP/OBS MODERATE 35: CPT | Performed by: SURGERY

## 2024-06-18 PROCEDURE — APPSS15 APP SPLIT SHARED TIME 0-15 MINUTES

## 2024-06-18 PROCEDURE — 6370000000 HC RX 637 (ALT 250 FOR IP): Performed by: STUDENT IN AN ORGANIZED HEALTH CARE EDUCATION/TRAINING PROGRAM

## 2024-06-18 PROCEDURE — 2500000003 HC RX 250 WO HCPCS: Performed by: NURSE ANESTHETIST, CERTIFIED REGISTERED

## 2024-06-18 PROCEDURE — 94640 AIRWAY INHALATION TREATMENT: CPT

## 2024-06-18 PROCEDURE — 3700000001 HC ADD 15 MINUTES (ANESTHESIA): Performed by: SURGERY

## 2024-06-18 PROCEDURE — 88304 TISSUE EXAM BY PATHOLOGIST: CPT

## 2024-06-18 PROCEDURE — A4217 STERILE WATER/SALINE, 500 ML: HCPCS | Performed by: SURGERY

## 2024-06-18 PROCEDURE — 6360000002 HC RX W HCPCS: Performed by: SURGERY

## 2024-06-18 PROCEDURE — 2580000003 HC RX 258: Performed by: STUDENT IN AN ORGANIZED HEALTH CARE EDUCATION/TRAINING PROGRAM

## 2024-06-18 PROCEDURE — 6360000002 HC RX W HCPCS

## 2024-06-18 PROCEDURE — 6360000002 HC RX W HCPCS: Performed by: NURSE PRACTITIONER

## 2024-06-18 PROCEDURE — 6370000000 HC RX 637 (ALT 250 FOR IP): Performed by: INTERNAL MEDICINE

## 2024-06-18 PROCEDURE — 80053 COMPREHEN METABOLIC PANEL: CPT

## 2024-06-18 PROCEDURE — 2580000003 HC RX 258: Performed by: SURGERY

## 2024-06-18 PROCEDURE — 7100000001 HC PACU RECOVERY - ADDTL 15 MIN: Performed by: SURGERY

## 2024-06-18 PROCEDURE — 2720000010 HC SURG SUPPLY STERILE: Performed by: SURGERY

## 2024-06-18 PROCEDURE — 0FT44ZZ RESECTION OF GALLBLADDER, PERCUTANEOUS ENDOSCOPIC APPROACH: ICD-10-PCS | Performed by: SURGERY

## 2024-06-18 PROCEDURE — 2580000003 HC RX 258: Performed by: NURSE ANESTHETIST, CERTIFIED REGISTERED

## 2024-06-18 PROCEDURE — 2500000003 HC RX 250 WO HCPCS: Performed by: SURGERY

## 2024-06-18 PROCEDURE — 7100000000 HC PACU RECOVERY - FIRST 15 MIN: Performed by: SURGERY

## 2024-06-18 PROCEDURE — 36415 COLL VENOUS BLD VENIPUNCTURE: CPT

## 2024-06-18 PROCEDURE — 94761 N-INVAS EAR/PLS OXIMETRY MLT: CPT

## 2024-06-18 PROCEDURE — 6370000000 HC RX 637 (ALT 250 FOR IP)

## 2024-06-18 RX ORDER — KETOROLAC TROMETHAMINE 30 MG/ML
INJECTION, SOLUTION INTRAMUSCULAR; INTRAVENOUS PRN
Status: DISCONTINUED | OUTPATIENT
Start: 2024-06-18 | End: 2024-06-18 | Stop reason: SDUPTHER

## 2024-06-18 RX ORDER — MORPHINE SULFATE 4 MG/ML
INJECTION, SOLUTION INTRAMUSCULAR; INTRAVENOUS
Status: COMPLETED
Start: 2024-06-18 | End: 2024-06-18

## 2024-06-18 RX ORDER — MORPHINE SULFATE 2 MG/ML
2 INJECTION, SOLUTION INTRAMUSCULAR; INTRAVENOUS EVERY 5 MIN PRN
Status: DISCONTINUED | OUTPATIENT
Start: 2024-06-18 | End: 2024-06-18 | Stop reason: HOSPADM

## 2024-06-18 RX ORDER — GLYCOPYRROLATE 0.2 MG/ML
INJECTION INTRAMUSCULAR; INTRAVENOUS PRN
Status: DISCONTINUED | OUTPATIENT
Start: 2024-06-18 | End: 2024-06-18 | Stop reason: SDUPTHER

## 2024-06-18 RX ORDER — MIDAZOLAM HYDROCHLORIDE 1 MG/ML
1 INJECTION INTRAMUSCULAR; INTRAVENOUS EVERY 5 MIN PRN
Status: DISCONTINUED | OUTPATIENT
Start: 2024-06-18 | End: 2024-06-18 | Stop reason: HOSPADM

## 2024-06-18 RX ORDER — MAGNESIUM HYDROXIDE 1200 MG/15ML
LIQUID ORAL CONTINUOUS PRN
Status: COMPLETED | OUTPATIENT
Start: 2024-06-18 | End: 2024-06-18

## 2024-06-18 RX ORDER — LIDOCAINE HYDROCHLORIDE 20 MG/ML
INJECTION, SOLUTION INFILTRATION; PERINEURAL PRN
Status: DISCONTINUED | OUTPATIENT
Start: 2024-06-18 | End: 2024-06-18 | Stop reason: SDUPTHER

## 2024-06-18 RX ORDER — SODIUM CHLORIDE, SODIUM LACTATE, POTASSIUM CHLORIDE, AND CALCIUM CHLORIDE .6; .31; .03; .02 G/100ML; G/100ML; G/100ML; G/100ML
IRRIGANT IRRIGATION PRN
Status: DISCONTINUED | OUTPATIENT
Start: 2024-06-18 | End: 2024-06-18 | Stop reason: ALTCHOICE

## 2024-06-18 RX ORDER — DIPHENHYDRAMINE HYDROCHLORIDE 50 MG/ML
12.5 INJECTION INTRAMUSCULAR; INTRAVENOUS
Status: DISCONTINUED | OUTPATIENT
Start: 2024-06-18 | End: 2024-06-18 | Stop reason: HOSPADM

## 2024-06-18 RX ORDER — INSULIN LISPRO 100 [IU]/ML
8 INJECTION, SOLUTION INTRAVENOUS; SUBCUTANEOUS ONCE
Status: COMPLETED | OUTPATIENT
Start: 2024-06-18 | End: 2024-06-18

## 2024-06-18 RX ORDER — SODIUM CHLORIDE 9 MG/ML
INJECTION, SOLUTION INTRAVENOUS PRN
Status: DISCONTINUED | OUTPATIENT
Start: 2024-06-18 | End: 2024-06-18 | Stop reason: HOSPADM

## 2024-06-18 RX ORDER — SODIUM CHLORIDE, SODIUM LACTATE, POTASSIUM CHLORIDE, CALCIUM CHLORIDE 600; 310; 30; 20 MG/100ML; MG/100ML; MG/100ML; MG/100ML
INJECTION, SOLUTION INTRAVENOUS CONTINUOUS PRN
Status: DISCONTINUED | OUTPATIENT
Start: 2024-06-18 | End: 2024-06-18 | Stop reason: SDUPTHER

## 2024-06-18 RX ORDER — ROCURONIUM BROMIDE 10 MG/ML
INJECTION, SOLUTION INTRAVENOUS PRN
Status: DISCONTINUED | OUTPATIENT
Start: 2024-06-18 | End: 2024-06-18 | Stop reason: SDUPTHER

## 2024-06-18 RX ORDER — SODIUM CHLORIDE 0.9 % (FLUSH) 0.9 %
5-40 SYRINGE (ML) INJECTION PRN
Status: DISCONTINUED | OUTPATIENT
Start: 2024-06-18 | End: 2024-06-18 | Stop reason: HOSPADM

## 2024-06-18 RX ORDER — HYDRALAZINE HYDROCHLORIDE 20 MG/ML
5 INJECTION INTRAMUSCULAR; INTRAVENOUS
Status: DISCONTINUED | OUTPATIENT
Start: 2024-06-18 | End: 2024-06-18 | Stop reason: HOSPADM

## 2024-06-18 RX ORDER — INSULIN LISPRO 100 [IU]/ML
5 INJECTION, SOLUTION INTRAVENOUS; SUBCUTANEOUS ONCE
Status: DISCONTINUED | OUTPATIENT
Start: 2024-06-18 | End: 2024-06-18 | Stop reason: ALTCHOICE

## 2024-06-18 RX ORDER — FENTANYL CITRATE 50 UG/ML
INJECTION, SOLUTION INTRAMUSCULAR; INTRAVENOUS PRN
Status: DISCONTINUED | OUTPATIENT
Start: 2024-06-18 | End: 2024-06-18 | Stop reason: SDUPTHER

## 2024-06-18 RX ORDER — BUPIVACAINE HYDROCHLORIDE 5 MG/ML
INJECTION, SOLUTION EPIDURAL; INTRACAUDAL PRN
Status: DISCONTINUED | OUTPATIENT
Start: 2024-06-18 | End: 2024-06-18 | Stop reason: ALTCHOICE

## 2024-06-18 RX ORDER — NALOXONE HYDROCHLORIDE 0.4 MG/ML
INJECTION, SOLUTION INTRAMUSCULAR; INTRAVENOUS; SUBCUTANEOUS PRN
Status: DISCONTINUED | OUTPATIENT
Start: 2024-06-18 | End: 2024-06-18 | Stop reason: HOSPADM

## 2024-06-18 RX ORDER — KETAMINE HYDROCHLORIDE 100 MG/ML
INJECTION, SOLUTION INTRAMUSCULAR; INTRAVENOUS PRN
Status: DISCONTINUED | OUTPATIENT
Start: 2024-06-18 | End: 2024-06-18 | Stop reason: SDUPTHER

## 2024-06-18 RX ORDER — KETOROLAC TROMETHAMINE 30 MG/ML
30 INJECTION, SOLUTION INTRAMUSCULAR; INTRAVENOUS ONCE
Status: DISCONTINUED | OUTPATIENT
Start: 2024-06-18 | End: 2024-06-18 | Stop reason: HOSPADM

## 2024-06-18 RX ORDER — SODIUM CHLORIDE 0.9 % (FLUSH) 0.9 %
5-40 SYRINGE (ML) INJECTION EVERY 12 HOURS SCHEDULED
Status: DISCONTINUED | OUTPATIENT
Start: 2024-06-18 | End: 2024-06-18 | Stop reason: HOSPADM

## 2024-06-18 RX ORDER — PROPOFOL 10 MG/ML
INJECTION, EMULSION INTRAVENOUS PRN
Status: DISCONTINUED | OUTPATIENT
Start: 2024-06-18 | End: 2024-06-18 | Stop reason: SDUPTHER

## 2024-06-18 RX ORDER — MORPHINE SULFATE 2 MG/ML
1 INJECTION, SOLUTION INTRAMUSCULAR; INTRAVENOUS EVERY 5 MIN PRN
Status: DISCONTINUED | OUTPATIENT
Start: 2024-06-18 | End: 2024-06-18 | Stop reason: HOSPADM

## 2024-06-18 RX ORDER — ONDANSETRON 2 MG/ML
4 INJECTION INTRAMUSCULAR; INTRAVENOUS EVERY 10 MIN PRN
Status: DISCONTINUED | OUTPATIENT
Start: 2024-06-18 | End: 2024-06-18 | Stop reason: HOSPADM

## 2024-06-18 RX ADMIN — TIOTROPIUM BROMIDE INHALATION SPRAY 2 PUFF: 3.12 SPRAY, METERED RESPIRATORY (INHALATION) at 08:43

## 2024-06-18 RX ADMIN — INSULIN GLARGINE 12 UNITS: 100 INJECTION, SOLUTION SUBCUTANEOUS at 20:20

## 2024-06-18 RX ADMIN — INSULIN HUMAN 5 UNITS: 100 INJECTION, SOLUTION PARENTERAL at 23:35

## 2024-06-18 RX ADMIN — INSULIN LISPRO 4 UNITS: 100 INJECTION, SOLUTION INTRAVENOUS; SUBCUTANEOUS at 20:20

## 2024-06-18 RX ADMIN — METRONIDAZOLE 500 MG: 500 INJECTION, SOLUTION INTRAVENOUS at 18:35

## 2024-06-18 RX ADMIN — IPRATROPIUM BROMIDE AND ALBUTEROL SULFATE 1 DOSE: 2.5; .5 SOLUTION RESPIRATORY (INHALATION) at 19:02

## 2024-06-18 RX ADMIN — Medication 2 PUFF: at 08:43

## 2024-06-18 RX ADMIN — Medication 2 PUFF: at 19:02

## 2024-06-18 RX ADMIN — Medication 400 MG: at 20:21

## 2024-06-18 RX ADMIN — HYDROXYZINE HYDROCHLORIDE 25 MG: 25 TABLET ORAL at 20:22

## 2024-06-18 RX ADMIN — Medication 10 ML: at 20:21

## 2024-06-18 RX ADMIN — SUGAMMADEX 400 MG: 100 INJECTION, SOLUTION INTRAVENOUS at 11:28

## 2024-06-18 RX ADMIN — IPRATROPIUM BROMIDE AND ALBUTEROL SULFATE 1 DOSE: 2.5; .5 SOLUTION RESPIRATORY (INHALATION) at 08:43

## 2024-06-18 RX ADMIN — KETAMINE HYDROCHLORIDE 20 MG: 100 INJECTION, SOLUTION, CONCENTRATE INTRAMUSCULAR; INTRAVENOUS at 10:04

## 2024-06-18 RX ADMIN — Medication 10 ML: at 08:43

## 2024-06-18 RX ADMIN — INSULIN LISPRO 8 UNITS: 100 INJECTION, SOLUTION INTRAVENOUS; SUBCUTANEOUS at 12:18

## 2024-06-18 RX ADMIN — PREGABALIN 100 MG: 100 CAPSULE ORAL at 20:21

## 2024-06-18 RX ADMIN — ROCURONIUM BROMIDE 30 MG: 10 INJECTION, SOLUTION INTRAVENOUS at 10:19

## 2024-06-18 RX ADMIN — MICONAZOLE NITRATE: 20 CREAM TOPICAL at 22:00

## 2024-06-18 RX ADMIN — PROPOFOL 200 MG: 10 INJECTION, EMULSION INTRAVENOUS at 10:03

## 2024-06-18 RX ADMIN — GLYCOPYRROLATE 0.2 MG: 0.2 INJECTION, SOLUTION INTRAMUSCULAR; INTRAVENOUS at 09:58

## 2024-06-18 RX ADMIN — TRAZODONE HYDROCHLORIDE 100 MG: 100 TABLET ORAL at 20:25

## 2024-06-18 RX ADMIN — ENOXAPARIN SODIUM 40 MG: 100 INJECTION SUBCUTANEOUS at 20:19

## 2024-06-18 RX ADMIN — SUGAMMADEX 200 MG: 100 INJECTION, SOLUTION INTRAVENOUS at 11:35

## 2024-06-18 RX ADMIN — SUGAMMADEX 200 MG: 100 INJECTION, SOLUTION INTRAVENOUS at 11:45

## 2024-06-18 RX ADMIN — LIDOCAINE HYDROCHLORIDE 60 MG: 20 INJECTION, SOLUTION INFILTRATION; PERINEURAL at 10:03

## 2024-06-18 RX ADMIN — MORPHINE SULFATE 2 MG: 4 INJECTION, SOLUTION INTRAMUSCULAR; INTRAVENOUS at 13:21

## 2024-06-18 RX ADMIN — HYDROCODONE BITARTRATE AND ACETAMINOPHEN 1 TABLET: 10; 325 TABLET ORAL at 21:45

## 2024-06-18 RX ADMIN — ROCURONIUM BROMIDE 60 MG: 10 INJECTION, SOLUTION INTRAVENOUS at 10:03

## 2024-06-18 RX ADMIN — CEFTRIAXONE SODIUM 2000 MG: 2 INJECTION, POWDER, FOR SOLUTION INTRAMUSCULAR; INTRAVENOUS at 20:19

## 2024-06-18 RX ADMIN — KETOROLAC TROMETHAMINE 30 MG: 30 INJECTION, SOLUTION INTRAMUSCULAR at 11:20

## 2024-06-18 RX ADMIN — FUROSEMIDE 20 MG: 10 INJECTION, SOLUTION INTRAMUSCULAR; INTRAVENOUS at 18:30

## 2024-06-18 RX ADMIN — HYDROCODONE BITARTRATE AND ACETAMINOPHEN 1 TABLET: 10; 325 TABLET ORAL at 15:49

## 2024-06-18 RX ADMIN — FENTANYL CITRATE 50 MCG: 50 INJECTION INTRAMUSCULAR; INTRAVENOUS at 10:20

## 2024-06-18 RX ADMIN — AMLODIPINE BESYLATE 5 MG: 5 TABLET ORAL at 08:43

## 2024-06-18 RX ADMIN — KETAMINE HYDROCHLORIDE 20 MG: 100 INJECTION, SOLUTION, CONCENTRATE INTRAMUSCULAR; INTRAVENOUS at 10:15

## 2024-06-18 RX ADMIN — ENOXAPARIN SODIUM 40 MG: 100 INJECTION SUBCUTANEOUS at 09:50

## 2024-06-18 RX ADMIN — MORPHINE SULFATE 2 MG: 4 INJECTION, SOLUTION INTRAMUSCULAR; INTRAVENOUS at 13:02

## 2024-06-18 RX ADMIN — METRONIDAZOLE 500 MG: 500 INJECTION, SOLUTION INTRAVENOUS at 05:49

## 2024-06-18 RX ADMIN — SODIUM CHLORIDE, POTASSIUM CHLORIDE, SODIUM LACTATE AND CALCIUM CHLORIDE: 600; 310; 30; 20 INJECTION, SOLUTION INTRAVENOUS at 09:58

## 2024-06-18 RX ADMIN — ROCURONIUM BROMIDE 20 MG: 10 INJECTION, SOLUTION INTRAVENOUS at 11:03

## 2024-06-18 RX ADMIN — METRONIDAZOLE 500 MG: 500 INJECTION, SOLUTION INTRAVENOUS at 23:37

## 2024-06-18 ASSESSMENT — PAIN - FUNCTIONAL ASSESSMENT
PAIN_FUNCTIONAL_ASSESSMENT: ACTIVITIES ARE NOT PREVENTED
PAIN_FUNCTIONAL_ASSESSMENT: ACTIVITIES ARE NOT PREVENTED
PAIN_FUNCTIONAL_ASSESSMENT: 0-10
PAIN_FUNCTIONAL_ASSESSMENT: 0-10

## 2024-06-18 ASSESSMENT — PAIN SCALES - GENERAL
PAINLEVEL_OUTOF10: 7
PAINLEVEL_OUTOF10: 0
PAINLEVEL_OUTOF10: 7
PAINLEVEL_OUTOF10: 10
PAINLEVEL_OUTOF10: 0
PAINLEVEL_OUTOF10: 10

## 2024-06-18 ASSESSMENT — PAIN DESCRIPTION - DESCRIPTORS
DESCRIPTORS: ACHING
DESCRIPTORS: ACHING;PRESSURE;SHARP;SHOOTING
DESCRIPTORS: SHARP

## 2024-06-18 ASSESSMENT — PAIN DESCRIPTION - LOCATION
LOCATION: ABDOMEN

## 2024-06-18 ASSESSMENT — PAIN DESCRIPTION - ORIENTATION
ORIENTATION: MID
ORIENTATION: RIGHT
ORIENTATION: INNER;RIGHT

## 2024-06-18 ASSESSMENT — PAIN DESCRIPTION - FREQUENCY: FREQUENCY: CONTINUOUS

## 2024-06-18 ASSESSMENT — PAIN DESCRIPTION - ONSET: ONSET: PROGRESSIVE

## 2024-06-18 ASSESSMENT — COPD QUESTIONNAIRES: CAT_SEVERITY: SEVERE

## 2024-06-18 NOTE — PLAN OF CARE
Problem: Discharge Planning  Goal: Discharge to home or other facility with appropriate resources  Outcome: Progressing     Problem: Pain  Goal: Verbalizes/displays adequate comfort level or baseline comfort level  Outcome: Progressing     Problem: Skin/Tissue Integrity  Goal: Absence of new skin breakdown  Description: 1.  Monitor for areas of redness and/or skin breakdown  2.  Assess vascular access sites hourly  3.  Every 4-6 hours minimum:  Change oxygen saturation probe site  4.  Every 4-6 hours:  If on nasal continuous positive airway pressure, respiratory therapy assess nares and determine need for appliance change or resting period.  Outcome: Progressing     Problem: Safety - Adult  Goal: Free from fall injury  Outcome: Progressing     Problem: Metabolic/Fluid and Electrolytes - Adult  Goal: Electrolytes maintained within normal limits  Outcome: Progressing     Problem: Cardiovascular - Adult  Goal: Maintains optimal cardiac output and hemodynamic stability  Outcome: Progressing  Goal: Absence of cardiac dysrhythmias or at baseline  Outcome: Progressing     Problem: Chronic Conditions and Co-morbidities  Goal: Patient's chronic conditions and co-morbidity symptoms are monitored and maintained or improved  Outcome: Progressing

## 2024-06-18 NOTE — PLAN OF CARE
Tele removed and pt placed on tele in SDS. CMU notified. Tele placed in bag and tied to pt's bed.              Pre-Operative:  1.  Patient/Caregiver identifies - states name and date of birth.  2.  The patient is free from signs and symptoms of injury.  3.  The patient receives appropriate medication(s), safely administered during the Perioperative period.  4.  The patient is free from signs and symptoms of infection.  5.  The patient has wound / tissue perfusion.  6.  The patients's fluid, electrolyte, and acid-base balances are established preoperatively.  7.  The patient's pulmonary function is established preoperatively.  8.  The patient's cardiovascular status is established preoperatively.  9.  The patient / caregiver demonstrates knowledge of nutritional management related to the operative or other invasive procedure.  10.  The patient/caregiver demonstrates knowledge of medication management.  11.  The patient/caregiver demonstrates knowledge of pain management.  12.  The patient participates in the rehabilitation process as applicable.  13.  The patient/caregiver participates in decisions affection his or her Perioperative plan of care.  14.  The patient's care is consistent with the individualized Perioperative plan of care.  15.  The patient's right to privacy is maintained.  16.  The patient is the recipient of competent and ethical care within legal standards of practice.  17.  The patient's value system, lifestyle, ethnicity, and culture are considered, respected, and incorporated in the Perioperative plan of care and understands special services available.  18.  The patient demonstrates and/or reports adequate pain control throughout the the Perioperative period.  19.  The patient's neurological status is established preoperatively.  20.  The patient/caregiver demonstrates knowledge of the expected responses to the operative or invasive procedure.  21.  Patient/Caregiver has reduced anxiety.   Interventions- Familiarize with environment and equipment.  22. Patient/Caregiver verbalizes understanding of Phase I and Phase II process.  23.  Patient pain level is established preoperatively using age appropriate pain scale.  24.  The patient will move to fall risk upon sedation- during and through the recovery phase.  Interventions- orient the patient to the environment, especially the location of the bathroom; provide treaded socks/non-skid footwear; demonstrate and teach back use of the nurse's call system; instruct the patient to call for help before getting out of bed; lock all movable equipment before transferring patient; keep bed in lowest position possible. 25.  Other:

## 2024-06-18 NOTE — PROGRESS NOTES
CHF Nutrition Education    Consult received for heart failure diet education.  Education deferred or not appropriate at this time related to patient is in OR for laparoscopic cholecystectomy, possible open procedure today.      Electronically signed by Winnie Grier RD, CARO on 6/18/2024 at 10:57 AM

## 2024-06-18 NOTE — PROGRESS NOTES
06/18/24 0114   NIV Type   Mode Bilevel   Mask Type Full face mask   Mask Size Medium   Assessment   Respirations 18   SpO2 97 %   Settings/Measurements   IPAP 16 cmH20   CPAP/EPAP 8 cmH2O   Vt (Measured) 596 mL   Rate Ordered 14   FiO2  35 %

## 2024-06-18 NOTE — PROGRESS NOTES
Occupational Therapy/Physical Therapy    Pt off floor to OR; will need new orders for PT/OT when medically appropriate.    Nancy Adams, OTR/L #[id number]   Devyn Frias, PT, DPT

## 2024-06-18 NOTE — FLOWSHEET NOTE
06/18/24 0745   Vital Signs   Temp 97.4 °F (36.3 °C)   Temp Source Oral   Pulse 69   Heart Rate Source Monitor   Respirations 17   /72   MAP (Calculated) 94   BP Method Automatic   Patient Position Semi fowlers   Height and Weight   Weight - Scale (!) 158.4 kg (349 lb 3.2 oz)   BMI (Calculated) 66.1     Pt. Resting in bed. Call light in reach. Shift assessment completed see flow sheet. Denies any needs at this time. Will continue to monitor.

## 2024-06-18 NOTE — ANESTHESIA POSTPROCEDURE EVALUATION
Department of Anesthesiology  Postprocedure Note    Patient: Brianne Gunn  MRN: [id number]  YOB: 1965  Date of evaluation: 6/18/2024    Procedure Summary       Date: 06/18/24 Room / Location: [address]    Anesthesia Start: 0958 Anesthesia Stop: 1201    Procedure: CHOLECYSTECTOMY LAPAROSCOPIC (Abdomen) Diagnosis:       Acute cholecystitis      (Acute cholecystitis [K81.0])    Surgeons: Jimmy Collier MD Responsible Provider: Perry Frederick MD    Anesthesia Type: general ASA Status: 3            Anesthesia Type: No value filed.    Mackenzie Phase I: Mackenzie Score: 8    Mackenzie Phase II:      Anesthesia Post Evaluation    Patient location during evaluation: PACU  Level of consciousness: awake  Airway patency: patent  Nausea & Vomiting: no nausea  Cardiovascular status: blood pressure returned to baseline  Respiratory status: acceptable  Hydration status: euvolemic  Pain management: adequate    No notable events documented.

## 2024-06-18 NOTE — PROGRESS NOTES
Reassessment was completed Post Lap Cholecystectomy (see flow sheet). Pt is A/O, complains of abdominal pain- PRN medication given (see MAR).  Pt initially refusing Purewick.     Respirations are even, unlabored, with diminished sounds. On 4L O2 via NC.  Scheduled medications to follow-   Infusing:  N/A  Call light within reach. Bed in lowest position. Bed alarm on.     Patient is not able to demonstrated the ability to move from a reclining position to an upright position within the recliner. however patient is alert, oriented and able to provide informed consent

## 2024-06-18 NOTE — PROGRESS NOTES
General Surgery  Daily Progress Note    Pt Name: Brianne Gunn  Medical Record Number: [id number]  Date of Birth 1965   Today's Date: 6/18/2024  Admit date: 6/16/2024  LOS: Day 2    SUBJECTIVE  Abdominal pain is controlled currently.       OBJECTIVE  Vitals:    06/18/24 0114 06/18/24 0415 06/18/24 0418 06/18/24 0745   BP:  137/73  138/72   Pulse:  64  69   Resp: 18 18 20 17   Temp:  97.5 °F (36.4 °C)  97.4 °F (36.3 °C)   TempSrc:  Axillary  Oral   SpO2: 97% 97% 97%    Weight:  (!) 158.4 kg (349 lb 4.8 oz)  (!) 158.4 kg (349 lb 3.2 oz)   Height:           Gen: No distress. Alert.   Resp: Normal rate. Easy and unlabored. No accessory muscle use. 4L NC.  CV: Regular rate. Regular rhythm.   GI: +RUQ and epigastric TTP. Soft, protuberant, non-distended.  Normal bowel sounds.  Skin: Warm and dry. No nodule or rash on exposed extremities.       I/O last 3 completed shifts:  In: 663.8 [P.O.:460; I.V.:10; IV Piggyback:193.8]  Out: 3470 [Urine:3470]  No intake/output data recorded.    LABS  CBC:   Recent Labs     06/16/24  1252 06/17/24  0504 06/18/24  0409   WBC 4.8 5.5 4.4   HGB 10.2* 10.0* 9.9*   HCT 32.4* 31.1* 30.6*   MCV 81.9 81.8 82.1   * 124* 112*     BMP:   Recent Labs     06/17/24  1554 06/17/24  2158 06/18/24  0409   * 137 137   K 5.6* 4.7 4.6   CL 96* 95* 97*   CO2 32 37* 35*   BUN 24* 23* 21*   CREATININE 0.9 1.0 0.9     LIVER PROFILE:   Recent Labs     06/16/24  1252 06/18/24  0409   AST 23 12*   ALT 19 12   LIPASE 59.0  --    BILITOT <0.2 <0.2   ALKPHOS 107 77     PT/INR: No results for input(s): \"PROTIME\", \"INR\" in the last 72 hours.  APTT: No results for input(s): \"APTT\" in the last 72 hours.  UA:  Recent Labs     06/16/24  1441   COLORU Yellow   PHUR 6.0   WBCUA 6-9*   RBCUA 0-2   BACTERIA 1+*   CLARITYU SL CLOUDY*   LEUKOCYTESUR SMALL*   UROBILINOGEN 0.2   BILIRUBINUR Negative   BLOODU Negative   GLUCOSEU >=1000*         IMAGING  US GALLBLADDER RUQ   Final Result   Nonspecific gallbladder  baseline risk vs benefit profile favors proceeding with surgery to eliminate the risk of worsening GB disease. Patient understands she is high risk. She understands the possibility of pneumonia, increased O2 demand or prolonged need for intubation. She is agreeable to surgery despite these risks.       PLAN:  Laparoscopic cholecystectomy, possible open procedure today  Description of the procedure, risks, benefits and alternatives were discussed with patient. Questions answered and patient is agreeable to surgery.  Abx to include rocephin and flagyl - PCN allergy noted  Diet NPO  PRN pain control and antiemetics  DVT prophylaxis with Lx  Morbid obesity complicates assessment and treatment placing patient at risk for multiple co-morbidities as well as early death and contributing to the patient's presentation. Weight loss would be beneficial.      Dispo: surgery today    Bashir Clifford PA-C  6/18/2024 7:58 AM      Patient seen and examined.  I agree with the assessment and plan from MAEVE Toro.  More than half of the time spent on this encounter was completed by me including the history, physical examination and the entire medical decision making.     Patient still with upper abdomen pain.  Dr. Bhat and Dr. Anderson feel patient is as optimized as possible for surgery.    The diagnosis and recommended procedure were explained.  Questions answered.  Prepare for gallbladder surgery.     JAKE GARBER MD

## 2024-06-18 NOTE — PROGRESS NOTES
Pt in bed, eyes closed. No distress noted. Call light in reach. Will continue to monitor.  Margaret Seymour RN

## 2024-06-18 NOTE — PROGRESS NOTES
Gave patient 2mg of morphine, Lorena from sx wasted the other 2.  Patient expressing a lot of pain and pressure. Patient able to tolerate liquids without nausea.

## 2024-06-18 NOTE — PROGRESS NOTES
Pt in bed, awake, A/O X4. Very sleepy. Keeps falling asleep in between talking and this RN asking questions. Up to BSC finally awake. Shift assessment complete, night meds given. No C/o pain at this time. Night time snack given.  . No other needs expressed. Call light in reach. Will monitor. Margaret Seymour, RN

## 2024-06-18 NOTE — PROGRESS NOTES
IM Progress Note    Admit Date:  6/16/2024    Abdominal pain   Acalculous cholecystitis    Severe COPD   Chronic resp failure      S/P muna today     Subjective:  Ms. Gunn  seen in ICU     S/p lap muna today   - Patient admitted to the ICU for closer monitoring.       Pt awake, laert. Stable on  O2 4L .  Tolerating clears     Objective:   Patient Vitals for the past 4 hrs:   BP Temp Temp src Pulse Resp SpO2   06/18/24 1600 (!) 157/88 97.8 °F (36.6 °C) Oral 88 18 98 %   06/18/24 1530 (!) 158/104 -- -- 91 14 92 %   06/18/24 1515 (!) 158/84 -- -- 89 14 94 %   06/18/24 1500 (!) 165/87 -- -- 90 16 94 %   06/18/24 1430 (!) 161/84 -- -- 89 15 95 %   06/18/24 1415 (!) 162/79 -- -- 88 15 94 %   06/18/24 1400 (!) 160/83 -- -- 88 13 93 %   06/18/24 1358 (!) 160/83 -- -- 90 17 93 %   06/18/24 1354 -- -- -- 87 -- --   06/18/24 1345 (!) 153/76 -- -- 87 15 92 %   06/18/24 1340 -- -- -- 85 14 93 %   06/18/24 1335 -- -- -- 84 14 92 %   06/18/24 1330 (!) 144/80 -- -- 85 13 (!) 89 %            Intake/Output Summary (Last 24 hours) at 6/18/2024 1728  Last data filed at 6/18/2024 1550  Gross per 24 hour   Intake 920 ml   Output 2080 ml   Net -1160 ml         Physical Exam:    Gen: No distress. Alert. +Chronically ill obese female   Eyes: PERRL. No sclera icterus. No conjunctival injection.   Neck: No JVD.  Trachea midline.  Resp: No accessory muscle use. No crackles. No wheezes. No rhonchi. +Diminished breath sounds  CV: Regular rate. Regular rhythm. No murmur.  No rub. +trace bilateral lower extremity edema.   Peripheral Pulses: +2 palpable, equal bilaterally   GI:    mild diffuse tenderness. S/P muna, dressings + . BS +   Skin: Warm and dry. No nodule on exposed extremities. No rash on exposed extremities.   M/S: No cyanosis. No joint deformity. No clubbing.   Neuro: Awake. Grossly nonfocal    Psych: Oriented x 3. No anxiety or agitation.         Medications:  magnesium oxide, 400 mg, BID  ferrous sulfate, 325 mg, Daily with  now.    She is medically stable continue to monitor     Likely transfer out of ICU to PCU in the next couple of hours    Luis Miguel Syed MD  06/18/24  5:28 PM

## 2024-06-18 NOTE — PROGRESS NOTES
Patient transferred via bed in stable condition with all belongings to room 3003.Rebecca Paiz RN

## 2024-06-18 NOTE — ANESTHESIA PRE PROCEDURE
Past Medical History:        Diagnosis Date    Anxiety     Arthritis     CHF (congestive heart failure) (HCC)     COPD (chronic obstructive pulmonary disease) (HCC)     Depression     Diabetes mellitus (HCC)     Diverticulosis     Hyperlipidemia     Hypertension     Obesities, morbid (HCC)        Past Surgical History:        Procedure Laterality Date     SECTION      x3    COLONOSCOPY      ENDOMETRIAL ABLATION      ENDOSCOPY, COLON, DIAGNOSTIC      OTHER SURGICAL HISTORY  2023    EGD BIOPSY    UPPER GASTROINTESTINAL ENDOSCOPY N/A 2023    EGD BIOPSY performed by Kirby Sims DO at [facility] ENDOSCOPY    UPPER GASTROINTESTINAL ENDOSCOPY N/A 2023    EGD BIOPSY performed by Kirby Sims DO at [facility] ENDOSCOPY       Social History:    Social History     Tobacco Use    Smoking status: Former     Current packs/day: 0.00     Average packs/day: 2.0 packs/day for 40.0 years (80.0 ttl pk-yrs)     Types: Cigarettes     Start date: 1974     Quit date: 2014     Years since quittin.4     Passive exposure: Past    Smokeless tobacco: Never   Substance Use Topics    Alcohol use: No                                Counseling given: Not Answered      Vital Signs (Current):   Vitals:    24 2339 24 0114 24 0415 24 0418   BP: (!) 151/76  137/73    Pulse: 74  64    Resp:    Temp: 99.1 °F (37.3 °C)  97.5 °F (36.4 °C)    TempSrc: Oral  Axillary    SpO2: 97% 97% 97% 97%   Weight:   (!) 158.4 kg (349 lb 4.8 oz)    Height:                                                  BP Readings from Last 3 Encounters:   24 137/73   24 134/68   23 138/78       NPO Status:                                                                                 BMI:   Wt Readings from Last 3 Encounters:   24 (!) 158.4 kg (349 lb 4.8 oz)   24 (!) 147.4 kg (325 lb)   23 (!) 147.4 kg (325 lb)     Body mass index is 66 kg/m².    CBC:   Lab

## 2024-06-18 NOTE — BRIEF OP NOTE
Brief Postoperative Note      Patient: Brianne Gunn  YOB: 1965  MRN: [id number]    Date of Procedure: 6/18/2024    Pre-Op Diagnosis Codes:     * Acute cholecystitis [K81.0]    Post-Op Diagnosis: Same       Procedure(s):  CHOLECYSTECTOMY LAPAROSCOPIC    Surgeon(s):  Jimmy Garber MD    Assistant:  Surgical Assistant: Ignacio Sosa    Anesthesia: General    Estimated Blood Loss (mL): Minimal    Complications: None    Specimens:   ID Type Source Tests Collected by Time Destination   A : gallbladder and contents Tissue Tissue SURGICAL PATHOLOGY Jimmy Garber MD 6/18/2024 1022        Implants:  * No implants in log *      Drains:   Closed/Suction Drain Right Abdomen;RUQ Bulb (Active)       Findings:  Infection Present At Time Of Surgery (PATOS) (choose all levels that have infection present):  - Organ Space infection (below fascia) present as evidenced by phlegmon  Other Findings: As above    Electronically signed by JIMMY GARBER MD on 6/18/2024 at 11:37 AM

## 2024-06-18 NOTE — PROGRESS NOTES
[facility]                                 Pulmonary Consult /Progress Note :                                                                  CC worsening shortness of    HPI:   S/P lab cholecystectomy  Doing great  On NC  Doing great post surgery     Objective:   PHYSICAL EXAM:    Blood pressure (!) 152/80, pulse 87, temperature 97.3 °F (36.3 °C), temperature source Temporal, resp. rate 21, height 1.549 m (5' 1\"), weight (!) 158.3 kg (349 lb), SpO2 93 %, not currently breastfeeding.' on RA  Gen: No distress.   Eyes: PERRL. No sclera icterus. No conjunctival injection.   ENT: No discharge. Pharynx clear.   Neck: Trachea midline. No obvious mass.   Resp: Rhonchi bilateral   CV: Regular rate. Regular rhythm. No murmur or rub. No edema.   GI: RUQ-tender. Non-distended. No hernia.   Skin: Warm and dry. No nodule on exposed extremities.   Lymph: No cervical LAD. No supraclavicular LAD.   M/S: No cyanosis. No joint deformity. No clubbing.   Neuro: Awake. Alert. Moves all four extremities.   Psych: Oriented x 3. No anxiety.           DATA reviewed by me:   CXRno acute process ,        LABS/IMAGING:    CBC:  Lab Results   Component Value Date    WBC 4.4 06/18/2024    HGB 9.9 (L) 06/18/2024    HCT 30.6 (L) 06/18/2024    MCV 82.1 06/18/2024     (L) 06/18/2024    LYMPHOPCT 27.1 06/18/2024    RBC 3.73 (L) 06/18/2024    MCH 26.6 06/18/2024    MCHC 32.4 06/18/2024    RDW 14.8 06/18/2024    NEUTOPHILPCT 61.0 06/18/2024    MONOPCT 7.9 06/18/2024    EOSPCT 3.3 06/18/2024    BASOPCT 0.7 06/18/2024    NEUTROABS 2.7 06/18/2024    MONOSABS 0.3 06/18/2024    EOSABS 0.1 06/18/2024    BASOSABS 0.0 06/18/2024       Recent Labs     06/18/24  0409 06/17/24  0504 06/16/24  1252   WBC 4.4 5.5 4.8   HGB 9.9* 10.0* 10.2*   HCT 30.6* 31.1* 32.4*   MCV 82.1 81.8 81.9   * 124* 129*           BMP:   Recent Labs     06/17/24  1554 06/17/24  2158 06/18/24  0409   * 137  137   K 5.6* 4.7 4.6   CL 96* 95* 97*   CO2 32 37* 35*   BUN 24* 23* 21*   CREATININE 0.9 1.0 0.9           MG:   Lab Results   Component Value Date/Time    MG 1.80 06/18/2024 04:09 AM     Ca/Phos:   Lab Results   Component Value Date    CALCIUM 9.3 06/18/2024    PHOS 3.9 08/20/2019     LIVER PROFILE:   Recent Labs     06/16/24  1252 06/18/24  0409   AST 23 12*   ALT 19 12   LIPASE 59.0  --    BILITOT <0.2 <0.2   ALKPHOS 107 77           PT/INR: No results for input(s): \"PROTIME\", \"INR\" in the last 72 hours.  APTT: No results for input(s): \"APTT\" in the last 72 hours.    Cardiac Enzymes:  Lab Results   Component Value Date    TROPONINI <0.01 03/20/2023       Assessment:       -Chronic hypercapnic /.hypoxic respiratory  failure  Copd with no exacerbation    -Possible JOSE on NIMB   -Acute cholecystitis   Possible CHF       Plan:      *-continue BiPAP 16/8 ,may change AVAPS since   she is on Trilogy 400 ,P high 32   *- No PFT  *-on Lasix   *- CXR with congestion   Pending  pneumonia panel'  ABG compensated ,PH N ,Co2 seems at base line ,mild elevation  Will get follow up PFT down the road    Trelegy seems helping a lot will keep ,here will use Budesonide and Spiriva   Aggressive pulmonary toilet     Thank you very much for allowing me to participate in the care of this pleasant patient , should you have any questions ,please do not hesitate to contact me      Santa Bhat MD,[facility]  Pulmonary&Critical Care Medicine    [facility]    NOTE: This report was transcribed using voice recognition software. Every effort was made to ensure accuracy; however, inadvertent computerized transcription errors may be present.

## 2024-06-19 LAB
ALBUMIN SERPL-MCNC: 3.5 G/DL (ref 3.4–5)
ALBUMIN/GLOB SERPL: 1 {RATIO} (ref 1.1–2.2)
ALP SERPL-CCNC: 72 U/L (ref 40–129)
ALT SERPL-CCNC: 15 U/L (ref 10–40)
ANION GAP SERPL CALCULATED.3IONS-SCNC: 8 MMOL/L (ref 3–16)
AST SERPL-CCNC: 18 U/L (ref 15–37)
BACTERIA UR CULT: NORMAL
BASOPHILS # BLD: 0 K/UL (ref 0–0.2)
BASOPHILS NFR BLD: 0.2 %
BILIRUB SERPL-MCNC: <0.2 MG/DL (ref 0–1)
BUN SERPL-MCNC: 27 MG/DL (ref 7–20)
CALCIUM SERPL-MCNC: 9.1 MG/DL (ref 8.3–10.6)
CHLORIDE SERPL-SCNC: 98 MMOL/L (ref 99–110)
CO2 SERPL-SCNC: 33 MMOL/L (ref 21–32)
CREAT SERPL-MCNC: 1 MG/DL (ref 0.6–1.1)
DEPRECATED RDW RBC AUTO: 14.8 % (ref 12.4–15.4)
EOSINOPHIL # BLD: 0 K/UL (ref 0–0.6)
EOSINOPHIL NFR BLD: 0.1 %
GFR SERPLBLD CREATININE-BSD FMLA CKD-EPI: 65 ML/MIN/{1.73_M2}
GLUCOSE BLD-MCNC: 251 MG/DL (ref 70–99)
GLUCOSE BLD-MCNC: 273 MG/DL (ref 70–99)
GLUCOSE BLD-MCNC: 283 MG/DL (ref 70–99)
GLUCOSE BLD-MCNC: 294 MG/DL (ref 70–99)
GLUCOSE BLD-MCNC: 349 MG/DL (ref 70–99)
GLUCOSE SERPL-MCNC: 310 MG/DL (ref 70–99)
HCT VFR BLD AUTO: 31.2 % (ref 36–48)
HGB BLD-MCNC: 10.1 G/DL (ref 12–16)
LYMPHOCYTES # BLD: 0.7 K/UL (ref 1–5.1)
LYMPHOCYTES NFR BLD: 12.2 %
MAGNESIUM SERPL-MCNC: 1.7 MG/DL (ref 1.8–2.4)
MCH RBC QN AUTO: 26.6 PG (ref 26–34)
MCHC RBC AUTO-ENTMCNC: 32.5 G/DL (ref 31–36)
MCV RBC AUTO: 82.1 FL (ref 80–100)
MONOCYTES # BLD: 0.4 K/UL (ref 0–1.3)
MONOCYTES NFR BLD: 6.1 %
NEUTROPHILS # BLD: 4.8 K/UL (ref 1.7–7.7)
NEUTROPHILS NFR BLD: 81.4 %
PERFORMED ON: ABNORMAL
PLATELET # BLD AUTO: 133 K/UL (ref 135–450)
PMV BLD AUTO: 8.9 FL (ref 5–10.5)
POTASSIUM SERPL-SCNC: 5.1 MMOL/L (ref 3.5–5.1)
PROT SERPL-MCNC: 6.9 G/DL (ref 6.4–8.2)
RBC # BLD AUTO: 3.8 M/UL (ref 4–5.2)
REPORT: NORMAL
SODIUM SERPL-SCNC: 139 MMOL/L (ref 136–145)
WBC # BLD AUTO: 5.9 K/UL (ref 4–11)

## 2024-06-19 PROCEDURE — 6370000000 HC RX 637 (ALT 250 FOR IP): Performed by: SURGERY

## 2024-06-19 PROCEDURE — 31622 DX BRONCHOSCOPE/WASH: CPT

## 2024-06-19 PROCEDURE — 83735 ASSAY OF MAGNESIUM: CPT

## 2024-06-19 PROCEDURE — 6360000002 HC RX W HCPCS: Performed by: SURGERY

## 2024-06-19 PROCEDURE — 6360000002 HC RX W HCPCS: Performed by: STUDENT IN AN ORGANIZED HEALTH CARE EDUCATION/TRAINING PROGRAM

## 2024-06-19 PROCEDURE — 2580000003 HC RX 258: Performed by: SURGERY

## 2024-06-19 PROCEDURE — 2060000000 HC ICU INTERMEDIATE R&B

## 2024-06-19 PROCEDURE — 94150 VITAL CAPACITY TEST: CPT

## 2024-06-19 PROCEDURE — 80053 COMPREHEN METABOLIC PANEL: CPT

## 2024-06-19 PROCEDURE — 94640 AIRWAY INHALATION TREATMENT: CPT

## 2024-06-19 PROCEDURE — 6370000000 HC RX 637 (ALT 250 FOR IP)

## 2024-06-19 PROCEDURE — 99233 SBSQ HOSP IP/OBS HIGH 50: CPT | Performed by: INTERNAL MEDICINE

## 2024-06-19 PROCEDURE — 85025 COMPLETE CBC W/AUTO DIFF WBC: CPT

## 2024-06-19 PROCEDURE — 6370000000 HC RX 637 (ALT 250 FOR IP): Performed by: INTERNAL MEDICINE

## 2024-06-19 PROCEDURE — 2700000000 HC OXYGEN THERAPY PER DAY

## 2024-06-19 PROCEDURE — 87040 BLOOD CULTURE FOR BACTERIA: CPT

## 2024-06-19 PROCEDURE — 36415 COLL VENOUS BLD VENIPUNCTURE: CPT

## 2024-06-19 PROCEDURE — 94761 N-INVAS EAR/PLS OXIMETRY MLT: CPT

## 2024-06-19 PROCEDURE — 6370000000 HC RX 637 (ALT 250 FOR IP): Performed by: NURSE PRACTITIONER

## 2024-06-19 PROCEDURE — 94660 CPAP INITIATION&MGMT: CPT

## 2024-06-19 PROCEDURE — 6360000002 HC RX W HCPCS: Performed by: NURSE PRACTITIONER

## 2024-06-19 PROCEDURE — 2000000000 HC ICU R&B

## 2024-06-19 RX ORDER — PREGABALIN 100 MG/1
100 CAPSULE ORAL 2 TIMES DAILY
Status: DISCONTINUED | OUTPATIENT
Start: 2024-06-20 | End: 2024-06-20 | Stop reason: HOSPADM

## 2024-06-19 RX ORDER — OXYCODONE HYDROCHLORIDE 5 MG/1
5 TABLET ORAL EVERY 6 HOURS PRN
Status: DISCONTINUED | OUTPATIENT
Start: 2024-06-19 | End: 2024-06-20 | Stop reason: HOSPADM

## 2024-06-19 RX ADMIN — INSULIN LISPRO 8 UNITS: 100 INJECTION, SOLUTION INTRAVENOUS; SUBCUTANEOUS at 12:25

## 2024-06-19 RX ADMIN — ENOXAPARIN SODIUM 40 MG: 100 INJECTION SUBCUTANEOUS at 08:58

## 2024-06-19 RX ADMIN — Medication 2 PUFF: at 07:40

## 2024-06-19 RX ADMIN — FERROUS SULFATE TAB 325 MG (65 MG ELEMENTAL FE) 325 MG: 325 (65 FE) TAB at 08:57

## 2024-06-19 RX ADMIN — INSULIN LISPRO 8 UNITS: 100 INJECTION, SOLUTION INTRAVENOUS; SUBCUTANEOUS at 09:11

## 2024-06-19 RX ADMIN — ALLOPURINOL 100 MG: 100 TABLET ORAL at 08:58

## 2024-06-19 RX ADMIN — ENOXAPARIN SODIUM 40 MG: 100 INJECTION SUBCUTANEOUS at 20:02

## 2024-06-19 RX ADMIN — ASPIRIN 81 MG: 81 TABLET, COATED ORAL at 08:57

## 2024-06-19 RX ADMIN — OXYCODONE 5 MG: 5 TABLET ORAL at 21:08

## 2024-06-19 RX ADMIN — IPRATROPIUM BROMIDE AND ALBUTEROL SULFATE 1 DOSE: 2.5; .5 SOLUTION RESPIRATORY (INHALATION) at 18:53

## 2024-06-19 RX ADMIN — PANTOPRAZOLE SODIUM 40 MG: 40 TABLET, DELAYED RELEASE ORAL at 15:19

## 2024-06-19 RX ADMIN — METRONIDAZOLE 500 MG: 500 INJECTION, SOLUTION INTRAVENOUS at 23:42

## 2024-06-19 RX ADMIN — MICONAZOLE NITRATE: 20 CREAM TOPICAL at 23:46

## 2024-06-19 RX ADMIN — PANTOPRAZOLE SODIUM 40 MG: 40 TABLET, DELAYED RELEASE ORAL at 05:40

## 2024-06-19 RX ADMIN — Medication 10 ML: at 21:09

## 2024-06-19 RX ADMIN — Medication 400 MG: at 08:57

## 2024-06-19 RX ADMIN — INSULIN LISPRO 8 UNITS: 100 INJECTION, SOLUTION INTRAVENOUS; SUBCUTANEOUS at 16:55

## 2024-06-19 RX ADMIN — ESCITALOPRAM OXALATE 10 MG: 10 TABLET ORAL at 08:58

## 2024-06-19 RX ADMIN — IPRATROPIUM BROMIDE AND ALBUTEROL SULFATE 1 DOSE: 2.5; .5 SOLUTION RESPIRATORY (INHALATION) at 15:35

## 2024-06-19 RX ADMIN — FUROSEMIDE 20 MG: 10 INJECTION, SOLUTION INTRAMUSCULAR; INTRAVENOUS at 08:58

## 2024-06-19 RX ADMIN — TIOTROPIUM BROMIDE INHALATION SPRAY 2 PUFF: 3.12 SPRAY, METERED RESPIRATORY (INHALATION) at 07:40

## 2024-06-19 RX ADMIN — OXYCODONE 5 MG: 5 TABLET ORAL at 15:19

## 2024-06-19 RX ADMIN — CEFTRIAXONE SODIUM 2000 MG: 2 INJECTION, POWDER, FOR SOLUTION INTRAMUSCULAR; INTRAVENOUS at 15:35

## 2024-06-19 RX ADMIN — METRONIDAZOLE 500 MG: 500 INJECTION, SOLUTION INTRAVENOUS at 16:15

## 2024-06-19 RX ADMIN — FUROSEMIDE 20 MG: 10 INJECTION, SOLUTION INTRAMUSCULAR; INTRAVENOUS at 18:35

## 2024-06-19 RX ADMIN — Medication 400 MG: at 20:02

## 2024-06-19 RX ADMIN — Medication 2 PUFF: at 18:53

## 2024-06-19 RX ADMIN — Medication 10 ML: at 09:30

## 2024-06-19 RX ADMIN — IPRATROPIUM BROMIDE AND ALBUTEROL SULFATE 1 DOSE: 2.5; .5 SOLUTION RESPIRATORY (INHALATION) at 07:40

## 2024-06-19 RX ADMIN — METRONIDAZOLE 500 MG: 500 INJECTION, SOLUTION INTRAVENOUS at 05:44

## 2024-06-19 RX ADMIN — INSULIN GLARGINE 12 UNITS: 100 INJECTION, SOLUTION SUBCUTANEOUS at 20:03

## 2024-06-19 RX ADMIN — OXYCODONE 5 MG: 5 TABLET ORAL at 08:58

## 2024-06-19 RX ADMIN — IPRATROPIUM BROMIDE AND ALBUTEROL SULFATE 1 DOSE: 2.5; .5 SOLUTION RESPIRATORY (INHALATION) at 12:12

## 2024-06-19 RX ADMIN — ONDANSETRON 4 MG: 4 TABLET, ORALLY DISINTEGRATING ORAL at 09:23

## 2024-06-19 RX ADMIN — HYDROCODONE BITARTRATE AND ACETAMINOPHEN 1 TABLET: 10; 325 TABLET ORAL at 04:26

## 2024-06-19 RX ADMIN — ONDANSETRON 4 MG: 2 INJECTION INTRAMUSCULAR; INTRAVENOUS at 21:29

## 2024-06-19 ASSESSMENT — PAIN SCALES - GENERAL
PAINLEVEL_OUTOF10: 6
PAINLEVEL_OUTOF10: 0
PAINLEVEL_OUTOF10: 9
PAINLEVEL_OUTOF10: 4
PAINLEVEL_OUTOF10: 8
PAINLEVEL_OUTOF10: 9
PAINLEVEL_OUTOF10: 6
PAINLEVEL_OUTOF10: 0
PAINLEVEL_OUTOF10: 7

## 2024-06-19 ASSESSMENT — PAIN DESCRIPTION - ORIENTATION
ORIENTATION: RIGHT
ORIENTATION: RIGHT;MID
ORIENTATION: RIGHT;MID
ORIENTATION: RIGHT;MID;UPPER

## 2024-06-19 ASSESSMENT — PAIN DESCRIPTION - DESCRIPTORS
DESCRIPTORS: ACHING
DESCRIPTORS: STABBING
DESCRIPTORS: SHOOTING;STABBING
DESCRIPTORS: STABBING;SHARP
DESCRIPTORS: ACHING

## 2024-06-19 ASSESSMENT — PAIN DESCRIPTION - LOCATION
LOCATION: ABDOMEN

## 2024-06-19 ASSESSMENT — PAIN - FUNCTIONAL ASSESSMENT: PAIN_FUNCTIONAL_ASSESSMENT: ACTIVITIES ARE NOT PREVENTED

## 2024-06-19 NOTE — PROGRESS NOTES
Patient denies any pain or discomfort at this time. Afebrile. Output from ANYA drain has bloody to serous sanguinous =205 mls for the 12 hrs/  Standby assist going to the bedside commode.    PIV on left posterior hand remains intact.    /63   Pulse 74   Temp 97.9 °F (36.6 °C) (Oral)   Resp (!) 8   Ht 1.549 m (5' 1\")   Wt (!) 155.6 kg (343 lb)   SpO2 97%   BMI 64.81 kg/m²

## 2024-06-19 NOTE — PROGRESS NOTES
06/19/24 0050   NIV Type   Mode Bilevel   Mask Type Full face mask   Mask Size Medium   Assessment   Respirations 16   SpO2 98 %   Settings/Measurements   IPAP 16 cmH20   CPAP/EPAP 8 cmH2O   Vt (Measured) 581 mL   Rate Ordered 14   FiO2  35 %

## 2024-06-19 NOTE — PLAN OF CARE
HEART FAILURE CARE PLAN:    Comorbidities Reviewed: Yes   Patient has a past medical history of Anxiety, Arthritis, CHF (congestive heart failure) (HCC), COPD (chronic obstructive pulmonary disease) (HCC), Depression, Diabetes mellitus (HCC), Diverticulosis, Hyperlipidemia, Hypertension, and Obesities, morbid (HCC).     Weights Reviewed: Yes   Admission weight: (!) 151.5 kg (334 lb)   Wt Readings from Last 3 Encounters:   06/18/24 (!) 158.3 kg (349 lb)   04/08/24 (!) 147.4 kg (325 lb)   06/20/23 (!) 147.4 kg (325 lb)     Intake & Output Reviewed: Yes     Intake/Output Summary (Last 24 hours) at 6/18/2024 2217  Last data filed at 6/18/2024 2000  Gross per 24 hour   Intake 710 ml   Output 1285 ml   Net -575 ml       ECHOCARDIOGRAM Reviewed: Yes   Patient's Ejection Fraction (EF) is greater than 40%     Medications Reviewed: Yes   SCHEDULED HOSPITAL MEDICATIONS:   magnesium oxide  400 mg Oral BID    ferrous sulfate  325 mg Oral Daily with breakfast    insulin lispro  0-16 Units SubCUTAneous TID WC    insulin lispro  0-4 Units SubCUTAneous Nightly    sodium chloride flush  5-40 mL IntraVENous 2 times per day    enoxaparin  40 mg SubCUTAneous BID    allopurinol  100 mg Oral Daily    amLODIPine  5 mg Oral Daily    aspirin  81 mg Oral Daily    escitalopram  10 mg Oral Daily    miconazole   Topical Nightly    pantoprazole  40 mg Oral BID AC    budesonide-formoterol  2 puff Inhalation BID RT    And    tiotropium  2 puff Inhalation Daily RT    furosemide  20 mg IntraVENous BID    insulin glargine  12 Units SubCUTAneous Nightly    pregabalin  100 mg Oral BID    cefTRIAXone (ROCEPHIN) IV  2,000 mg IntraVENous Q24H    metroNIDAZOLE  500 mg IntraVENous Q8H    ipratropium 0.5 mg-albuterol 2.5 mg  1 Dose Inhalation 4x Daily RT     HOME MEDICATIONS:  Prior to Admission medications    Medication Sig Start Date End Date Taking? Authorizing Provider   nystatin (MYCOSTATIN) 520960 UNIT/GM powder Apply topically 4 times daily.   Yes  pamoate (VISTARIL) 25 MG capsule Take 1 capsule by mouth 3 times daily 7/30/18   Sam Lozano MD   magnesium oxide (MAG-OX) 400 MG tablet Take 1 tablet by mouth daily    Sam Lozano MD   EPINEPHrine (EPIPEN) 0.3 MG/0.3ML SOAJ injection Inject 0.3 mLs into the muscle Allergy of unknown origin 12/1/16   Sam Lozano MD   traZODone (DESYREL) 100 MG tablet Take 1 tablet by mouth nightly 3/8/18   Sam Lozano MD   glipiZIDE (GLUCOTROL XL) 5 MG extended release tablet Take 2 tablets by mouth daily    Sam Lozano MD   albuterol sulfate  (90 BASE) MCG/ACT inhaler Inhale 2 puffs into the lungs every 6 hours as needed for Wheezing    Sam Lozano MD   nystatin (MYCOSTATIN) 571720 UNIT/GM cream Apply topically nightly Apply under breasts at night    Sam Lozano MD   aspirin 81 MG EC tablet Take 1 tablet by mouth daily  Patient not taking: Reported on 6/16/2024 2/9/16   Nini Jones MD   ferrous sulfate (FE TABS) 325 (65 FE) MG EC tablet Take 1 tablet by mouth 3 times daily (with meals)  Patient not taking: Reported on 6/16/2024 2/9/16   Nini Jones MD   dicyclomine (BENTYL) 10 MG capsule Take 1 capsule by mouth 3 times daily    Sam Lozano MD      Diet Reviewed: Yes   Diet NPO    Goal of Care Reviewed: Yes   Patient and/or Family's stated Goal of Care this Admission: Reduce shortness of breath, increase activity tolerance, better understand heart failure and disease management, be more comfortable, and reduce lower extremity edema prior to discharge.     Electronically signed by Jacqueline Chapman RN on 6/18/2024 at 10:17 PM

## 2024-06-19 NOTE — PLAN OF CARE
HEART FAILURE CARE PLAN:    Comorbidities Reviewed: Yes   Patient has a past medical history of Anxiety, Arthritis, CHF (congestive heart failure) (HCC), COPD (chronic obstructive pulmonary disease) (HCC), Depression, Diabetes mellitus (HCC), Diverticulosis, Hyperlipidemia, Hypertension, and Obesities, morbid (HCC).     Weights Reviewed: Yes   Admission weight: (!) 151.5 kg (334 lb)   Wt Readings from Last 3 Encounters:   06/19/24 (!) 155.6 kg (343 lb)   04/08/24 (!) 147.4 kg (325 lb)   06/20/23 (!) 147.4 kg (325 lb)     Intake & Output Reviewed: Yes     Intake/Output Summary (Last 24 hours) at 6/19/2024 1725  Last data filed at 6/19/2024 1646  Gross per 24 hour   Intake 1150.05 ml   Output 1055 ml   Net 95.05 ml       ECHOCARDIOGRAM Reviewed: Yes   Patient's Ejection Fraction (EF) is greater than 40%     Medications Reviewed: Yes   SCHEDULED HOSPITAL MEDICATIONS:   [START ON 6/20/2024] pregabalin  100 mg Oral BID    magnesium oxide  400 mg Oral BID    ferrous sulfate  325 mg Oral Daily with breakfast    insulin lispro  0-16 Units SubCUTAneous TID WC    insulin lispro  0-4 Units SubCUTAneous Nightly    sodium chloride flush  5-40 mL IntraVENous 2 times per day    enoxaparin  40 mg SubCUTAneous BID    allopurinol  100 mg Oral Daily    [Held by provider] amLODIPine  5 mg Oral Daily    aspirin  81 mg Oral Daily    escitalopram  10 mg Oral Daily    miconazole   Topical Nightly    pantoprazole  40 mg Oral BID AC    budesonide-formoterol  2 puff Inhalation BID RT    And    tiotropium  2 puff Inhalation Daily RT    furosemide  20 mg IntraVENous BID    insulin glargine  12 Units SubCUTAneous Nightly    cefTRIAXone (ROCEPHIN) IV  2,000 mg IntraVENous Q24H    metroNIDAZOLE  500 mg IntraVENous Q8H    ipratropium 0.5 mg-albuterol 2.5 mg  1 Dose Inhalation 4x Daily RT     HOME MEDICATIONS:  Prior to Admission medications    Medication Sig Start Date End Date Taking? Authorizing Provider   nystatin (MYCOSTATIN) 690200 UNIT/GM

## 2024-06-19 NOTE — PROGRESS NOTES
Shift assessment completed (see flowsheet).  Patient is alert and oriented X4.  On 4 LPM NC.    IV access:  Left posterior hand    Dry ,clean, intact sterile dressing on ANYA drain site, draining bloody output.     Standby assist going to the bedside commode.

## 2024-06-19 NOTE — PROGRESS NOTES
IM Progress Note    Admit Date:  6/16/2024    Abdominal pain   Acalculous cholecystitis    Severe COPD   Chronic resp failure     Subjective:  Ms. Gunn  seen up in ICU with no acute issues  Still with some abd pain   Ambulating to [facility] , not passing flatus       S/P lap muna and observed in ICU overnight with no acute events  Stable on 4 L overnight       Objective:   Patient Vitals for the past 4 hrs:   BP Pulse Resp SpO2 Weight   06/19/24 0716 -- 71 -- -- --   06/19/24 0700 119/67 73 11 96 % --   06/19/24 0600 114/63 74 (!) 8 97 % --   06/19/24 0539 -- -- -- -- (!) 155.6 kg (343 lb)   06/19/24 0516 -- 76 -- -- --   06/19/24 0500 -- 74 (!) 8 98 % --            Intake/Output Summary (Last 24 hours) at 6/19/2024 0812  Last data filed at 6/19/2024 0400  Gross per 24 hour   Intake 710 ml   Output 1215 ml   Net -505 ml         Physical Exam:    Gen: middle aged obese No distress. Alert.awake oriented   Eyes: PERRL. No sclera icterus. No conjunctival injection.   Neck: No JVD.  Trachea midline.  Resp: No accessory muscle use. No crackles. No wheezes. No rhonchi. +Diminished breath sounds  CV: Regular rate. Regular rhythm. No murmur.  No rub. +trace bilateral lower extremity edema.   Peripheral Pulses: +2 palpable, equal bilaterally   GI:    mild diffuse tenderness. S/P muna, dressings + . BS +   Skin: Warm and dry. No nodule on exposed extremities. No rash on exposed extremities.   M/S: No cyanosis. No joint deformity. No clubbing.   Neuro: Awake. Grossly nonfocal    Psych: Oriented x 3. No anxiety or agitation.         Medications:  magnesium oxide, 400 mg, BID  ferrous sulfate, 325 mg, Daily with breakfast  insulin lispro, 0-16 Units, TID WC  insulin lispro, 0-4 Units, Nightly  sodium chloride flush, 5-40 mL, 2 times per day  enoxaparin, 40 mg, BID  allopurinol, 100 mg, Daily  amLODIPine, 5 mg, Daily  aspirin, 81 mg, Daily  escitalopram, 10 mg, Daily  miconazole, , Nightly  pantoprazole, 40 mg, BID

## 2024-06-19 NOTE — PLAN OF CARE
Problem: Discharge Planning  Goal: Discharge to home or other facility with appropriate resources     Problem: Pain  Goal: Verbalizes/displays adequate comfort level or baseline comfort level  6/18/2024 2207 by Jacqueline Chapman RN  Outcome: Progressing     Problem: Skin/Tissue Integrity  Goal: Absence of new skin breakdown  Description: 1.  Monitor for areas of redness and/or skin breakdown  2.  Assess vascular access sites hourly  3.  Every 4-6 hours minimum:  Change oxygen saturation probe site  4.  Every 4-6 hours:  If on nasal continuous positive airway pressure, respiratory therapy assess nares and determine need for appliance change or resting period.  6/18/2024 2207 by Jacqueline Chapman RN  Outcome: Progressing     Problem: Safety - Adult  Goal: Free from fall injury  6/18/2024 2207 by Jacqueline Chapman RN  Outcome: Progressing     Problem: Metabolic/Fluid and Electrolytes - Adult  Goal: Electrolytes maintained within normal limits  6/18/2024 2207 by Jacqueline Chapman RN  Outcome: Progressing  Flowsheets (Taken 6/18/2024 2000)  Electrolytes maintained within normal limits:   Monitor labs and assess patient for signs and symptoms of electrolyte imbalances   Administer electrolyte replacement as ordered     Problem: Chronic Conditions and Co-morbidities  Goal: Patient's chronic conditions and co-morbidity symptoms are monitored and maintained or improved  6/18/2024 2207 by Jacqueline Chapman RN  Outcome: Progressing     Problem: Cardiovascular - Adult  Goal: Maintains optimal cardiac output and hemodynamic stability  6/18/2024 2207 by Jacqueline Chapman RN  Outcome: Progressing  Flowsheets (Taken 6/18/2024 2000)  Maintains optimal cardiac output and hemodynamic stability: Monitor blood pressure and heart rate  Goal: Absence of cardiac dysrhythmias or at baseline  6/18/2024 2207 by Jacqueline Chapman RN  Outcome: Progressing  Flowsheets (Taken 6/18/2024 2000)  Absence of cardiac dysrhythmias

## 2024-06-19 NOTE — PROGRESS NOTES
[facility]                                 Pulmonary Consult /Progress Note :                                                                  CC worsening shortness of    HPI:   S/P lab cholecystectomy  Doing great  Used her BiPAP   On NC  Doing great post surgery     Objective:   PHYSICAL EXAM:    Blood pressure 114/63, pulse 74, temperature 97.9 °F (36.6 °C), temperature source Oral, resp. rate (!) 8, height 1.549 m (5' 1\"), weight (!) 155.6 kg (343 lb), SpO2 97 %, not currently breastfeeding.' on RA  Gen: No distress.   Eyes: PERRL. No sclera icterus. No conjunctival injection.   ENT: No discharge. Pharynx clear.   Neck: Trachea midline. No obvious mass.   Resp: Rhonchi bilateral   CV: Regular rate. Regular rhythm. No murmur or rub. No edema.   GI: RUQ-tender. Non-distended. No hernia.   Skin: Warm and dry. No nodule on exposed extremities.   Lymph: No cervical LAD. No supraclavicular LAD.   M/S: No cyanosis. No joint deformity. No clubbing.   Neuro: Awake. Alert. Moves all four extremities.   Psych: Oriented x 3. No anxiety.           DATA reviewed by me:   CXRno acute process ,        LABS/IMAGING:    CBC:  Lab Results   Component Value Date    WBC 5.9 06/19/2024    HGB 10.1 (L) 06/19/2024    HCT 31.2 (L) 06/19/2024    MCV 82.1 06/19/2024     (L) 06/19/2024    LYMPHOPCT 12.2 06/19/2024    RBC 3.80 (L) 06/19/2024    MCH 26.6 06/19/2024    MCHC 32.5 06/19/2024    RDW 14.8 06/19/2024    NEUTOPHILPCT 81.4 06/19/2024    MONOPCT 6.1 06/19/2024    EOSPCT 0.1 06/19/2024    BASOPCT 0.2 06/19/2024    NEUTROABS 4.8 06/19/2024    MONOSABS 0.4 06/19/2024    EOSABS 0.0 06/19/2024    BASOSABS 0.0 06/19/2024       Recent Labs     06/19/24  0448 06/18/24  0409 06/17/24  0504   WBC 5.9 4.4 5.5   HGB 10.1* 9.9* 10.0*   HCT 31.2* 30.6* 31.1*   MCV 82.1 82.1 81.8   * 112* 124*           BMP:   Recent Labs     06/17/24  2158 06/18/24  0409 06/19/24  0448

## 2024-06-19 NOTE — PLAN OF CARE
Problem: Discharge Planning  Goal: Discharge to home or other facility with appropriate resources  Outcome: Progressing     Problem: Skin/Tissue Integrity  Goal: Absence of new skin breakdown  Description: 1.  Monitor for areas of redness and/or skin breakdown  2.  Assess vascular access sites hourly  3.  Every 4-6 hours minimum:  Change oxygen saturation probe site  4.  Every 4-6 hours:  If on nasal continuous positive airway pressure, respiratory therapy assess nares and determine need for appliance change or resting period.  Outcome: Progressing     Problem: Cardiovascular - Adult  Goal: Maintains optimal cardiac output and hemodynamic stability  Recent Flowsheet Documentation  Taken 6/19/2024 0400 by Jacqueline Chapman, PHONG  Maintains optimal cardiac output and hemodynamic stability: Monitor blood pressure and heart rate

## 2024-06-19 NOTE — PROGRESS NOTES
General Surgery  Daily Progress Note    Pt Name: Brianne Gunn  Medical Record Number: [id number]  Date of Birth 1965   Today's Date: 6/19/2024  Admit date: 6/16/2024  LOS: Day 3    SUBJECTIVE  Doing well following surgery. Having expected abdominal pain but this is improved from prior to surgery.       OBJECTIVE  Vitals:    06/19/24 0716 06/19/24 0800 06/19/24 0900 06/19/24 1000   BP:  111/78     Pulse: 71 76 82 76   Resp:  10  15   Temp:  97.3 °F (36.3 °C)     TempSrc:  Temporal     SpO2:  97% 95% 97%   Weight:       Height:           Gen: No distress. Alert.   Resp: Normal rate. Easy and unlabored. No accessory muscle use. 4L NC.  CV: Regular rate. Regular rhythm.   GI: +Mild expected incisional and RUQ TTP. Soft, protuberant, non-distended.  Normal bowel sounds. Surgical dressings CDI. ANYA drain with thin bloody drainage.   Skin: Warm and dry. No nodule or rash on exposed extremities.       I/O last 3 completed shifts:  In: 710 [I.V.:710]  Out: 2585 [Urine:2320; Drains:265]  No intake/output data recorded.    LABS  CBC:   Recent Labs     06/17/24  0504 06/18/24  0409 06/19/24  0448   WBC 5.5 4.4 5.9   HGB 10.0* 9.9* 10.1*   HCT 31.1* 30.6* 31.2*   MCV 81.8 82.1 82.1   * 112* 133*     BMP:   Recent Labs     06/17/24  2158 06/18/24  0409 06/19/24  0448    137 139   K 4.7 4.6 5.1   CL 95* 97* 98*   CO2 37* 35* 33*   BUN 23* 21* 27*   CREATININE 1.0 0.9 1.0     LIVER PROFILE:   Recent Labs     06/16/24  1252 06/18/24  0409 06/19/24  0448   AST 23 12* 18   ALT 19 12 15   LIPASE 59.0  --   --    BILITOT <0.2 <0.2 <0.2   ALKPHOS 107 77 72     PT/INR: No results for input(s): \"PROTIME\", \"INR\" in the last 72 hours.  APTT: No results for input(s): \"APTT\" in the last 72 hours.  UA:  Recent Labs     06/16/24  1441   COLORU Yellow   PHUR 6.0   WBCUA 6-9*   RBCUA 0-2   BACTERIA 1+*   CLARITYU SL CLOUDY*   LEUKOCYTESUR SMALL*   UROBILINOGEN 0.2   BILIRUBINUR Negative   BLOODU Negative   GLUCOSEU >=1000*

## 2024-06-19 NOTE — PROGRESS NOTES
Reassessment completed see flowsheet.    No changes to previous exam.  Pt A/O x4 awake talking w/family.  Pt was able to eat lunch no c/o N/V.  Per note from GS okay to advance to easy to chew diet.

## 2024-06-19 NOTE — PROGRESS NOTES
4 Eyes Skin Assessment     NAME:  Brianne Gunn  YOB: 1965  MEDICAL RECORD NUMBER:  [id number]    The patient is being assessed for  Other shift    I agree that at least one RN has performed a thorough Head to Toe Skin Assessment on the patient. ALL assessment sites listed below have been assessed.      Areas assessed by both nurses:    Head, Face, Ears, Shoulders, Back, Chest, Arms, Elbows, Hands, Sacrum. Buttock, Coccyx, Ischium, Legs. Feet and Heels, and Under Medical Devices         Does the Patient have a Wound? No noted wound(s)       Vishnu Prevention initiated by RN: Yes  Wound Care Orders initiated by RN: No    Pressure Injury (Stage 3,4, Unstageable, DTI, NWPT, and Complex wounds) if present, place Wound referral order by RN under : No    New Ostomies, if present place, Ostomy referral order under : No     Nurse 1 eSignature: Electronically signed by Jacqueline Chapman RN on 6/18/24 at 10:14 PM EDT    **SHARE this note so that the co-signing nurse can place an eSignature**            Nurse 2 eSignature: Electronically signed by Monique Heath RN on 6/19/24 at 6:05 AM EDT

## 2024-06-19 NOTE — PROGRESS NOTES
Repeated , after giving the additional reg insulin, notified MD, advised to conntinue monitoring since the patient has received everything.

## 2024-06-19 NOTE — PROGRESS NOTES
Shift assessment completed, see flow sheet.   Pt in bed awake A/O x4.  Pt does have pain in ABD 8/10.  Pt has some nausea but does not want prn at this time      SpO2 96%. Respirations are easy, even, and unlabored.   Bilateral lung sounds diminished. On 4L which is what she wears at home.    VSS  NSR on the monitor  .      PIV, WNL   All lines and monitoring devices in place. Bed in lowest position with wheels locked. No needs expressed at this time. Will continue to monitor.

## 2024-06-19 NOTE — PROGRESS NOTES
for wheezing or increased work of breathing using Per Protocol order mode.        4-6 - enter or revise RT Bronchodilator order(s) to two equivalent RT bronchodilator orders with one order with BID Frequency and one order with Frequency of every 4 hours PRN wheezing or increased work of breathing using Per Protocol order mode.        7-10 - enter or revise RT Bronchodilator order(s) to two equivalent RT bronchodilator orders with one order with TID Frequency and one order with Frequency of every 4 hours PRN wheezing or increased work of breathing using Per Protocol order mode.       11-13 - enter or revise RT Bronchodilator order(s) to one equivalent RT bronchodilator order with QID Frequency and an Albuterol order with Frequency of every 4 hours PRN wheezing or increased work of breathing using Per Protocol order mode.      Greater than 13 - enter or revise RT Bronchodilator order(s) to one equivalent RT bronchodilator order with every 4 hours Frequency and an Albuterol order with Frequency of every 2 hours PRN wheezing or increased work of breathing using Per Protocol order mode.         Electronically signed by Angel Roa RCP on 6/19/2024 at 7:44 AM

## 2024-06-20 VITALS
WEIGHT: 293 LBS | SYSTOLIC BLOOD PRESSURE: 126 MMHG | BODY MASS INDEX: 55.32 KG/M2 | HEIGHT: 61 IN | TEMPERATURE: 97.5 F | RESPIRATION RATE: 15 BRPM | HEART RATE: 77 BPM | OXYGEN SATURATION: 99 % | DIASTOLIC BLOOD PRESSURE: 76 MMHG

## 2024-06-20 LAB
ALBUMIN SERPL-MCNC: 3.4 G/DL (ref 3.4–5)
ALBUMIN/GLOB SERPL: 1.1 {RATIO} (ref 1.1–2.2)
ALP SERPL-CCNC: 65 U/L (ref 40–129)
ALT SERPL-CCNC: 15 U/L (ref 10–40)
ANION GAP SERPL CALCULATED.3IONS-SCNC: 8 MMOL/L (ref 3–16)
AST SERPL-CCNC: 17 U/L (ref 15–37)
BILIRUB SERPL-MCNC: <0.2 MG/DL (ref 0–1)
BUN SERPL-MCNC: 29 MG/DL (ref 7–20)
CALCIUM SERPL-MCNC: 8.8 MG/DL (ref 8.3–10.6)
CHLORIDE SERPL-SCNC: 98 MMOL/L (ref 99–110)
CO2 SERPL-SCNC: 34 MMOL/L (ref 21–32)
CREAT SERPL-MCNC: 1 MG/DL (ref 0.6–1.1)
GFR SERPLBLD CREATININE-BSD FMLA CKD-EPI: 65 ML/MIN/{1.73_M2}
GLUCOSE BLD-MCNC: 262 MG/DL (ref 70–99)
GLUCOSE BLD-MCNC: 320 MG/DL (ref 70–99)
GLUCOSE SERPL-MCNC: 281 MG/DL (ref 70–99)
MAGNESIUM SERPL-MCNC: 1.6 MG/DL (ref 1.8–2.4)
PERFORMED ON: ABNORMAL
PERFORMED ON: ABNORMAL
POTASSIUM SERPL-SCNC: 4.2 MMOL/L (ref 3.5–5.1)
PROT SERPL-MCNC: 6.6 G/DL (ref 6.4–8.2)
SODIUM SERPL-SCNC: 140 MMOL/L (ref 136–145)

## 2024-06-20 PROCEDURE — 97166 OT EVAL MOD COMPLEX 45 MIN: CPT

## 2024-06-20 PROCEDURE — 94640 AIRWAY INHALATION TREATMENT: CPT

## 2024-06-20 PROCEDURE — 6360000002 HC RX W HCPCS: Performed by: SURGERY

## 2024-06-20 PROCEDURE — 80053 COMPREHEN METABOLIC PANEL: CPT

## 2024-06-20 PROCEDURE — 6370000000 HC RX 637 (ALT 250 FOR IP): Performed by: SURGERY

## 2024-06-20 PROCEDURE — 2700000000 HC OXYGEN THERAPY PER DAY

## 2024-06-20 PROCEDURE — 97530 THERAPEUTIC ACTIVITIES: CPT

## 2024-06-20 PROCEDURE — 83735 ASSAY OF MAGNESIUM: CPT

## 2024-06-20 PROCEDURE — 36415 COLL VENOUS BLD VENIPUNCTURE: CPT

## 2024-06-20 PROCEDURE — 94660 CPAP INITIATION&MGMT: CPT

## 2024-06-20 PROCEDURE — 97535 SELF CARE MNGMENT TRAINING: CPT

## 2024-06-20 PROCEDURE — 99233 SBSQ HOSP IP/OBS HIGH 50: CPT | Performed by: INTERNAL MEDICINE

## 2024-06-20 PROCEDURE — 2580000003 HC RX 258: Performed by: SURGERY

## 2024-06-20 PROCEDURE — 99238 HOSP IP/OBS DSCHRG MGMT 30/<: CPT | Performed by: INTERNAL MEDICINE

## 2024-06-20 PROCEDURE — 97161 PT EVAL LOW COMPLEX 20 MIN: CPT

## 2024-06-20 PROCEDURE — 94761 N-INVAS EAR/PLS OXIMETRY MLT: CPT

## 2024-06-20 PROCEDURE — 6370000000 HC RX 637 (ALT 250 FOR IP): Performed by: INTERNAL MEDICINE

## 2024-06-20 RX ORDER — METRONIDAZOLE 500 MG/1
500 TABLET ORAL 3 TIMES DAILY
Qty: 18 TABLET | Refills: 0 | Status: SHIPPED | OUTPATIENT
Start: 2024-06-20 | End: 2024-06-26

## 2024-06-20 RX ORDER — GLIPIZIDE 5 MG/1
5 TABLET ORAL
Status: DISCONTINUED | OUTPATIENT
Start: 2024-06-20 | End: 2024-06-20 | Stop reason: HOSPADM

## 2024-06-20 RX ORDER — CIPROFLOXACIN 500 MG/1
500 TABLET, FILM COATED ORAL 2 TIMES DAILY
Qty: 12 TABLET | Refills: 0 | Status: SHIPPED | OUTPATIENT
Start: 2024-06-20 | End: 2024-06-26

## 2024-06-20 RX ORDER — FUROSEMIDE 40 MG/1
40 TABLET ORAL ONCE
Status: COMPLETED | OUTPATIENT
Start: 2024-06-20 | End: 2024-06-20

## 2024-06-20 RX ADMIN — INSULIN LISPRO 12 UNITS: 100 INJECTION, SOLUTION INTRAVENOUS; SUBCUTANEOUS at 12:10

## 2024-06-20 RX ADMIN — METRONIDAZOLE 500 MG: 500 INJECTION, SOLUTION INTRAVENOUS at 06:38

## 2024-06-20 RX ADMIN — TIOTROPIUM BROMIDE INHALATION SPRAY 2 PUFF: 3.12 SPRAY, METERED RESPIRATORY (INHALATION) at 08:39

## 2024-06-20 RX ADMIN — Medication 10 ML: at 09:37

## 2024-06-20 RX ADMIN — IPRATROPIUM BROMIDE AND ALBUTEROL SULFATE 1 DOSE: 2.5; .5 SOLUTION RESPIRATORY (INHALATION) at 08:39

## 2024-06-20 RX ADMIN — METFORMIN HYDROCHLORIDE 500 MG: 500 TABLET ORAL at 09:26

## 2024-06-20 RX ADMIN — ENOXAPARIN SODIUM 40 MG: 100 INJECTION SUBCUTANEOUS at 09:25

## 2024-06-20 RX ADMIN — INSULIN LISPRO 8 UNITS: 100 INJECTION, SOLUTION INTRAVENOUS; SUBCUTANEOUS at 09:28

## 2024-06-20 RX ADMIN — ASPIRIN 81 MG: 81 TABLET, COATED ORAL at 09:27

## 2024-06-20 RX ADMIN — FERROUS SULFATE TAB 325 MG (65 MG ELEMENTAL FE) 325 MG: 325 (65 FE) TAB at 09:27

## 2024-06-20 RX ADMIN — ONDANSETRON 4 MG: 4 TABLET, ORALLY DISINTEGRATING ORAL at 09:26

## 2024-06-20 RX ADMIN — Medication 2 PUFF: at 08:39

## 2024-06-20 RX ADMIN — OXYCODONE 5 MG: 5 TABLET ORAL at 09:27

## 2024-06-20 RX ADMIN — PREGABALIN 100 MG: 100 CAPSULE ORAL at 09:26

## 2024-06-20 RX ADMIN — GLIPIZIDE 5 MG: 5 TABLET ORAL at 09:26

## 2024-06-20 RX ADMIN — PANTOPRAZOLE SODIUM 40 MG: 40 TABLET, DELAYED RELEASE ORAL at 06:36

## 2024-06-20 RX ADMIN — ESCITALOPRAM OXALATE 10 MG: 10 TABLET ORAL at 09:27

## 2024-06-20 RX ADMIN — MAGNESIUM SULFATE HEPTAHYDRATE 2000 MG: 40 INJECTION, SOLUTION INTRAVENOUS at 03:44

## 2024-06-20 RX ADMIN — Medication 400 MG: at 09:27

## 2024-06-20 RX ADMIN — FUROSEMIDE 40 MG: 40 TABLET ORAL at 09:27

## 2024-06-20 RX ADMIN — OXYCODONE 5 MG: 5 TABLET ORAL at 03:05

## 2024-06-20 RX ADMIN — ALLOPURINOL 100 MG: 100 TABLET ORAL at 09:27

## 2024-06-20 ASSESSMENT — PAIN SCALES - GENERAL
PAINLEVEL_OUTOF10: 4
PAINLEVEL_OUTOF10: 9
PAINLEVEL_OUTOF10: 9

## 2024-06-20 ASSESSMENT — PAIN DESCRIPTION - LOCATION
LOCATION: INCISION
LOCATION: ABDOMEN

## 2024-06-20 ASSESSMENT — PAIN DESCRIPTION - ORIENTATION: ORIENTATION: RIGHT

## 2024-06-20 ASSESSMENT — PAIN DESCRIPTION - DESCRIPTORS: DESCRIPTORS: ACHING

## 2024-06-20 NOTE — PROGRESS NOTES
Inpatient Occupational Therapy Evaluation and Treatment    Unit: ICU  Date:  6/20/2024  Patient Name:    Brianne Gunn  Admitting diagnosis:  Cholecystitis [K81.9]  Acute on chronic respiratory failure with hypercapnia (HCC) [J96.22]  Acute congestive heart failure, unspecified heart failure type (HCC) [I50.9]  Admit Date:  6/16/2024  Precautions/Restrictions/WB Status/ Lines/ Wounds/ Oxygen: Fall risk and Lines (IV, Supplemental O2 (4L), and ANYA drain)    Pt seen for cotreatment this date due to patient safety, patient endurance, and need for the assistance of 2 skilled therapists    Treatment Time:  4627-7653  Treatment Number:  1  Timed Code Treatment Minutes: 29 minutes  Total Treatment Minutes:  39  minutes    Patient Goals for Therapy: \"practice with stairs \"          Discharge Recommendations: Home with 24/7 assist  and Home OT  DME needs for discharge: Needs Met       Therapy recommendations for staff:   Assist of 1 for ambulation with use of rolling walker (RW) and gait belt to/from BSC  to/from chair  within room    History of Present Illness: Per H&P by Dr. Robin: \" 58 y.o. female with COPD/chronic respiratory failure on 4L NC O2, chronic diastolic heart failure, T2DM w/ insulin dependence, GERD, Morbid obesity/JOSE presented as a transfer from [facility] due to abdominal pain and respiratory concerns. Patient reports that her main concern is \"jerking and shakes\" which started almost a week ago. Additionally this is associated with some speech abnormalities and intermittent disorientation reported by spouse at bedside.       Patient was accepted by my colleague earlier today, during my evaluation patient was alert oriented x 3 hemodynamically stable.  Still reports right upper quadrant abdominal pain.  Endorses compliance with home medications and CPAP\"     06/18 - CHOLECYSTECTOMY LAPAROSCOPIC      AM-PAC Score: AM-PAC Inpatient Daily Activity Raw Score: 17     Subjective:  Patient lying reclined in bed with  feeling nervous about her entry steps at home, so pt was instructed in and demonstrated ascending/descending step 3x with RW serving as B hand rails to simulate home environment, requiring CGA and verbal cueing for sequencing and safety awareness. Pt educated on and verbalized understanding of CHF Education Forms. Pt would benefit from continued OT services to address aforementioned deficits and reinforcement of CHF education to reduce caregiver burden and increase pt's safety with ADL participation.     Recommending Home 24 hr assist and with home OT upon discharge as patient functioning close to baseline level    Goal(s) :   To be met in 3 Visits:  Bed to toilet/BSC:       SBA  Pt will complete 3/3 CHF goals     3/3    To be met in 5 Visits:  Supine to/from Sit in preparation for ADL task:   CGA  Grooming       SBA  Upper Body Dressing:      CGA  Lower Body Dressing:      Mod A  Pt to demonstrate UE therapeutic exs x 15 reps with minimal cues    Rehabilitation Potential: Good  Strengths for achieving goals include: Pt motivated, Family Support, and Pt cooperative   Barriers to achieving goals include:  Pain    Plan:  To be seen 2-3 x/wk  while in acute care setting for therapeutic exercises, bed mobility, transfers, family/patient education, ADL/IADL retraining, and energy conservation training.    Electronically signed by Dena Liang on 6/20/2024 at 12:24 PM    Dena Liang OTS     Occupational therapy treatment completed under supervision of this therapist.  Nancy Adams OTR/L #[id number]      If patient discharges from this facility prior to next visit, this note will serve as the Discharge Summary

## 2024-06-20 NOTE — DISCHARGE INSTR - COC
Feedings:843179191}  Liquids: {Slp liquid thickness:44527}  Daily Fluid Restriction: {CHP DME Yes amt example:767288698}  Last Modified Barium Swallow with Video (Video Swallowing Test): {Done Not Done Date:}    Treatments at the Time of Hospital Discharge:   Respiratory Treatments: ***  Oxygen Therapy:  {Therapy; copd oxygen:86738}  Ventilator:    {[facility] Vent List:473691240}    Rehab Therapies: {THERAPEUTIC INTERVENTION:4671160701}  Weight Bearing Status/Restrictions: {[facility] Weight Bearin}  Other Medical Equipment (for information only, NOT a DME order):  {EQUIPMENT:407167786}  Other Treatments: ***    Patient's personal belongings (please select all that are sent with patient):  {CHP DME Belongings:512487779}    RN SIGNATURE:  {Esignature:998474022}    CASE MANAGEMENT/SOCIAL WORK SECTION    Inpatient Status Date: 2024    Readmission Risk Assessment Score:  Readmission Risk              Risk of Unplanned Readmission:  16           Discharging to Facility/ Agency   Name: [facility] SN,PT/OT, HHA    / signature: Electronically signed by Uzma Casas RN on 24 at 10:29 AM EDT    PHYSICIAN SECTION    Prognosis: {Prognosis:7576411425}    Condition at Discharge: { Patient Condition:732980770}    Rehab Potential (if transferring to Rehab): {Prognosis:8066458105}    Recommended Labs or Other Treatments After Discharge: ***    Physician Certification: I certify the above information and transfer of Brianne Gunn  is necessary for the continuing treatment of the diagnosis listed and that she requires {Admit to Appropriate Level of Care:69477} for {GREATER/LESS:692769738} 30 days.     Update Admission H&P: {CHP DME Changes in HandP:816435960}    PHYSICIAN SIGNATURE:  Electronically signed by BARRY Sandoval/Uzma Casas, PHONG on 24 at 10:29 AM EDT

## 2024-06-20 NOTE — PROGRESS NOTES
Reassessment complete. No changes from previous assessment. Pt refused to keep BiPap on at this time. Pt also refused bath.

## 2024-06-20 NOTE — PLAN OF CARE
Problem: Discharge Planning  Goal: Discharge to home or other facility with appropriate resources  Outcome: Progressing     Problem: Pain  Goal: Verbalizes/displays adequate comfort level or baseline comfort level  Outcome: Progressing     Problem: Skin/Tissue Integrity  Goal: Absence of new skin breakdown  Description: 1.  Monitor for areas of redness and/or skin breakdown  2.  Assess vascular access sites hourly  3.  Every 4-6 hours minimum:  Change oxygen saturation probe site  4.  Every 4-6 hours:  If on nasal continuous positive airway pressure, respiratory therapy assess nares and determine need for appliance change or resting period.  Outcome: Progressing     Problem: Safety - Adult  Goal: Free from fall injury  Outcome: Progressing     Problem: Metabolic/Fluid and Electrolytes - Adult  Goal: Electrolytes maintained within normal limits  Outcome: Progressing     Problem: Chronic Conditions and Co-morbidities  Goal: Patient's chronic conditions and co-morbidity symptoms are monitored and maintained or improved  Outcome: Progressing     Problem: Cardiovascular - Adult  Goal: Maintains optimal cardiac output and hemodynamic stability  Outcome: Progressing

## 2024-06-20 NOTE — PROGRESS NOTES
06/20/24 0035   NIV Type   Mode Bilevel   Mask Type Full face mask   Mask Size Medium   Assessment   Respirations 20   SpO2 98 %   Settings/Measurements   IPAP 16 cmH20   CPAP/EPAP 8 cmH2O   Vt (Measured) 540 mL   Rate Ordered 14   FiO2  35 %

## 2024-06-20 NOTE — PROGRESS NOTES
[facility]                                 Pulmonary Consult /Progress Note :                                                                  CC worsening shortness of    HPI:   S/P lab cholecystectomy  Doing great  Used her BiPAP   On NC  Doing great post surgery     Objective:   PHYSICAL EXAM:    Blood pressure (!) 117/54, pulse 80, temperature 98.4 °F (36.9 °C), temperature source Oral, resp. rate 18, height 1.549 m (5' 1\"), weight (!) 156 kg (344 lb), SpO2 97 %, not currently breastfeeding.' on RA  Gen: No distress.   Eyes: PERRL. No sclera icterus. No conjunctival injection.   ENT: No discharge. Pharynx clear.   Neck: Trachea midline. No obvious mass.   Resp: Rhonchi bilateral   CV: Regular rate. Regular rhythm. No murmur or rub. No edema.   GI: RUQ-tender. Non-distended. No hernia.   Skin: Warm and dry. No nodule on exposed extremities.   Lymph: No cervical LAD. No supraclavicular LAD.   M/S: No cyanosis. No joint deformity. No clubbing.   Neuro: Awake. Alert. Moves all four extremities.   Psych: Oriented x 3. No anxiety.           DATA reviewed by me:   CXRno acute process ,        LABS/IMAGING:    CBC:  Lab Results   Component Value Date    WBC 5.9 06/19/2024    HGB 10.1 (L) 06/19/2024    HCT 31.2 (L) 06/19/2024    MCV 82.1 06/19/2024     (L) 06/19/2024    LYMPHOPCT 12.2 06/19/2024    RBC 3.80 (L) 06/19/2024    MCH 26.6 06/19/2024    MCHC 32.5 06/19/2024    RDW 14.8 06/19/2024    NEUTOPHILPCT 81.4 06/19/2024    MONOPCT 6.1 06/19/2024    EOSPCT 0.1 06/19/2024    BASOPCT 0.2 06/19/2024    NEUTROABS 4.8 06/19/2024    MONOSABS 0.4 06/19/2024    EOSABS 0.0 06/19/2024    BASOSABS 0.0 06/19/2024       Recent Labs     06/19/24  0448 06/18/24  0409 06/17/24  0504   WBC 5.9 4.4 5.5   HGB 10.1* 9.9* 10.0*   HCT 31.2* 30.6* 31.1*   MCV 82.1 82.1 81.8   * 112* 124*           BMP:   Recent Labs     06/18/24  0409 06/19/24  0448 06/20/24  0300     139 140   K 4.6 5.1 4.2   CL 97* 98* 98*   CO2 35* 33* 34*   BUN 21* 27* 29*   CREATININE 0.9 1.0 1.0           MG:   Lab Results   Component Value Date/Time    MG 1.60 06/20/2024 03:00 AM     Ca/Phos:   Lab Results   Component Value Date    CALCIUM 8.8 06/20/2024    PHOS 3.9 08/20/2019     LIVER PROFILE:   Recent Labs     06/18/24  0409 06/19/24  0448 06/20/24  0300   AST 12* 18 17   ALT 12 15 15   BILITOT <0.2 <0.2 <0.2   ALKPHOS 77 72 65           PT/INR: No results for input(s): \"PROTIME\", \"INR\" in the last 72 hours.  APTT: No results for input(s): \"APTT\" in the last 72 hours.    Cardiac Enzymes:  Lab Results   Component Value Date    TROPONINI <0.01 03/20/2023       Assessment:       -Chronic hypercapnic /.hypoxic respiratory  failure  Copd with no exacerbation    -Possible JOSE on NIMB   -Acute cholecystitis   Possible CHF       Plan:      *-continue BiPAP 16/8 ,may change AVAPS since   she is on Trilogy 400 ,P high 32 ,as needed and bedtime   *- No PFT  Transfer PCU  Still NPO   *-on Lasix   *- CXR with congestion   Pending  pneumonia panel'  DVT lovenox when ok with GS  Diet per surgery   ABG compensated ,PH N ,Co2 seems at base line ,mild elevation  Will get follow up PFT down the road    Trelegy seems helping a lot will keep ,here will use Budesonide and Spiriva   Aggressive pulmonary toilet     Thank you very much for allowing me to participate in the care of this pleasant patient , should you have any questions ,please do not hesitate to contact me      Santa Bhat MD,[facility]  Pulmonary&Critical [facility] Medicine    [facility]    NOTE: This report was transcribed using voice recognition software. Every effort was made to ensure accuracy; however, inadvertent computerized transcription errors may be present.

## 2024-06-20 NOTE — PROGRESS NOTES
Inpatient Physical Therapy Evaluation & Treatment    Unit: ICU  Date:  6/20/2024  Patient Name:    Brianne Gunn  Admitting diagnosis:  Cholecystitis [K81.9]  Acute on chronic respiratory failure with hypercapnia (HCC) [J96.22]  Acute congestive heart failure, unspecified heart failure type (HCC) [I50.9]  Admit Date:  6/16/2024  Precautions/Restrictions/WB Status/ Lines/ Wounds/ Oxygen: Fall risk, Bed/chair alarm, and Lines (Drains (ANYA)) telemetry disconnected, pt getting ready for d/c.    Pt seen for cotreatment this date due to patient safety, patient endurance, acute illness/injury, and limited functional status information    Treatment Time:  10:35-11:14  Treatment Number:  1   Timed Code Treatment Minutes: 29 minutes  Total Treatment Minutes:  39  minutes    Patient Stated Goals for Therapy: \" I want to try stairs, I feel nervous about them \"          Discharge Recommendations: Home with 24hr assistance and Home PT  DME needs for discharge: Needs Met       Therapy recommendation for EMS Transport: can transport by wheelchair    Therapy recommendations for staff:   Assist of 1 for ambulation with use of rolling walker (RW) and gait belt to/from [facility]  within room    History of Present Illness:   Per H&P by Dr. Robin:   \" 58 y.o. female with COPD/chronic respiratory failure on 4L NC O2, chronic diastolic heart failure, T2DM w/ insulin dependence, GERD, Morbid obesity/JOSE presented as a transfer from [facility] due to abdominal pain and respiratory concerns. Patient reports that her main concern is \"jerking and shakes\" which started almost a week ago. Additionally this is associated with some speech abnormalities and intermittent disorientation reported by spouse at bedside.       Patient was accepted by my colleague earlier today, during my evaluation patient was alert oriented x 3 hemodynamically stable.  Still reports right upper quadrant abdominal pain.  Endorses compliance with home medications and CPAP\"     device)    Rehabilitation Potential: Good  Strengths for achieving goals include:   Pt motivated, PLOF, Family Support, and Pt cooperative   Barriers to achieving goals include:    Complexity of condition, Chronicity of condition, and Pain    Plan    To be seen 3-5 x / week  while in acute care setting for therapeutic exercises, bed mobility, transfers, progressive gait training, balance training, and family/patient education.    Signature: Devyn Frias, PT     If patient discharges from this facility prior to next visit, this note will serve as the Discharge Summary.    CHF Education  ______________________________________________________________________    ADDENDUM   PHYSICAL THERAPY HEART FAILURE EDUCATION GOALS    1.Identifying HF Activity Zone with the Self Check Plan prior to exercises or walking  [] Goal Met. Patient educated in and demonstrated/verbalized how to identify his/her HF Activity Zone with the Self Check Plan referencing Red/Yellow/Green Light Symbol prior to exercises or walking to appropriately determine daily activity level.  [x] Goal established but not yet met; will introduce or reinforce next session.  [] Goal N/A    Today patient verbalizes that he/she is in the:  [] Green Zone/All Clear  [] Yellow Zone/Caution  [] Red Zone/Medical Alert    2.Rating self on FANNY scale of Rating of Perceived Exertion (RPE)  [] Goal Met. Patient educated in and demonstrated/verbalized understanding how to rate oneself on the RPE scale.  [x] Goal established but not yet met; will introduce or reinforce next session.  [] Goal N/A    Today's Activity Completed:  Stairs, transfers  Patient's stated RPE during this activity:  []6  No exertion at all  []7  Extremely light  []8  [] 9  Very light (For a healthy person, it is like walking slowly at his or her own pace for some minutes)  []10  []11  Light  []12  []13  Somewhat hard (exercise, but it still feels OK to continue)  []14  []15  Hard (heavy)  []16  []17

## 2024-06-20 NOTE — DISCHARGE SUMMARY
Name:  Brianne Gunn  Room:  3003/3003-01  MRN:    [id number]    IM Discharge Summary    Discharging Physician:  Leo george MD    Admit: 6/16/2024    Discharge:      PCP      Ani White PA-C    Diagnoses and hospital course  this Admission          #Acute acalculous cholecystitis  #Abdominal pain   - started with pain control, IVF and antiemetics    - ct concerning for acalculous muna, started on IV rocephin and flagyl  -General surgery consulted and after medical clearance she had lap muna, monitored in ICU overnight and did well  - stable on home o2 of 4 L , used home cpap at night   - resume diet and ambulating well   - oral pain meds  - dc planning to home today , she will keep ANYA drain until follow up appt next week         #Acute on chronic diastolic HF  -Anasarca on CT and possible CHF on CXR   -cardiology consulted  -started on IV lasix 20 BID- can transition to oral at dc  -daily weights, intake and output   -last echo as below      # staph epi bacteremia - unsure if  true infection   No foreign implants in body  Repeat cx with no growth  Likely contamination      #COPD without AE   #Chronic hypoxic and hypercarbic respiratory failure  -on BIPAP nightly  on 4 L O2 daily  -stable on this  -continue prn inhalers      #Hyperkalemia  #Mildly elevated Cr   -Cr 1.2-->1.1  -baseline Cr ~0.7  -resolved with lasix and lokelma     #Elevated troponin   -25 x 2   -no CP   -EKG without acute abnormalities      #Normocytic anemia  -on ferrous sulfate  -monitor CBC      #HTN   -on lasix, norvasc      #Pyuria - uti ruled out     #DM type 2   -lantus   -SSI   -monitor BG      #Thrombocytopenia  -monitor  -appears chronic      #Chronic pain   -on percocet, lyrica      #JOSE   -home CPAP     #Hx of TIA   -on ASA, statin at home      #Morbid obesity  -recommend weight loss and dietary changes  -BMI 66.76     Dc to home     HPI     Brianneradha Gunn is a 58 y.o. female with COPD/chronic respiratory failure on 4L NC O2,

## 2024-06-20 NOTE — CARE COORDINATION
DISCHARGE ORDER  Date/Time 2024 10:34 AM  Completed by: Uzma Casas RN, Case Management    Patient Name: Brianne Gunn      : 1965  Admitting Diagnosis: Cholecystitis [K81.9]  Acute on chronic respiratory failure with hypercapnia (HCC) [J96.22]  Acute congestive heart failure, unspecified heart failure type (HCC) [I50.9]      Admit order Date and Status:inpt  (verify MD's last order for status of admission)      Noted discharge order.   If applicable PT/OT recommendation at Discharge: tbd  DME recommendation by PT/OT:tbd  Confirmed discharge plan  (pt)    Discharge Plan: Reviewed chart. Plan continues home with spouse. Writer spoke with pt and pt elects [facility]. Referral called to Brianne with [facility] and she can accept the pt at [place] today. Spouse to transport to home today.    Date of Last IMM Given: 2024    Reviewed chart.  Role of discharge planner explained and patient verbalized understanding. Discharge order is noted.    Has Home O2 in place on admit:  Yes  Informed of need to bring portable home O2 tank on day of discharge for nursing to connect prior to leaving:   Yes  Verbalized agreement/Understanding:   Yes  Pt is being d/c'd to home today. Pt's O2 sats are 99% on 4 liters.    Discharge timeout done with PHONG Jarrett. All discharge needs and concerns addressed.

## 2024-06-20 NOTE — PROGRESS NOTES
Discharge instruction given the pt and her . Both are able to verbalize understanding.  Pt discharged on 4 liter NC.

## 2024-06-20 NOTE — PROGRESS NOTES
Shift assessment completed, see flow sheet. Pt is alert and oriented. Wakes easily. Vital signs are Stable.     Temp-98.4  HR-80  RR-17  BP-117/54  SpO2-96%     All ICU lines remain in place. Dressings are C/D/I.     Call light within reach.

## 2024-06-20 NOTE — CARE COORDINATION
06/20/24 1047   IMM Letter   IMM Letter given to Patient/Family/Significant other/Guardian/POA/by: PHONG Andrade   IMM Letter date given: 06/20/24   IMM Letter time given: 1047      CM delivered 2nd IMM delivered within 4 hours for DC, verbal explanation of patient rights at bedside. Pt voiced understanding of discharge MCR rights and is agreeable to discharge.

## 2024-06-20 NOTE — PLAN OF CARE
HEART FAILURE CARE PLAN:    Comorbidities Reviewed: Yes   Patient has a past medical history of Anxiety, Arthritis, CHF (congestive heart failure) (HCC), COPD (chronic obstructive pulmonary disease) (HCC), Depression, Diabetes mellitus (HCC), Diverticulosis, Hyperlipidemia, Hypertension, and Obesities, morbid (HCC).     Weights Reviewed: Yes   Admission weight: (!) 151.5 kg (334 lb)   Wt Readings from Last 3 Encounters:   06/20/24 (!) 156 kg (344 lb)   04/08/24 (!) 147.4 kg (325 lb)   06/20/23 (!) 147.4 kg (325 lb)     Intake & Output Reviewed: Yes     Intake/Output Summary (Last 24 hours) at 6/20/2024 1151  Last data filed at 6/20/2024 0842  Gross per 24 hour   Intake 1300.05 ml   Output 1155 ml   Net 145.05 ml       ECHOCARDIOGRAM Reviewed: Yes   Patient's Ejection Fraction (EF) is greater than 40%     Medications Reviewed: Yes   SCHEDULED HOSPITAL MEDICATIONS:   metFORMIN  500 mg Oral BID WC    glipiZIDE  5 mg Oral BID AC    pregabalin  100 mg Oral BID    magnesium oxide  400 mg Oral BID    ferrous sulfate  325 mg Oral Daily with breakfast    insulin lispro  0-16 Units SubCUTAneous TID WC    insulin lispro  0-4 Units SubCUTAneous Nightly    sodium chloride flush  5-40 mL IntraVENous 2 times per day    enoxaparin  40 mg SubCUTAneous BID    allopurinol  100 mg Oral Daily    [Held by provider] amLODIPine  5 mg Oral Daily    aspirin  81 mg Oral Daily    escitalopram  10 mg Oral Daily    miconazole   Topical Nightly    pantoprazole  40 mg Oral BID AC    budesonide-formoterol  2 puff Inhalation BID RT    And    tiotropium  2 puff Inhalation Daily RT    insulin glargine  12 Units SubCUTAneous Nightly    cefTRIAXone (ROCEPHIN) IV  2,000 mg IntraVENous Q24H    metroNIDAZOLE  500 mg IntraVENous Q8H    ipratropium 0.5 mg-albuterol 2.5 mg  1 Dose Inhalation 4x Daily RT     HOME MEDICATIONS:  Prior to Admission medications    Medication Sig Start Date End Date Taking? Authorizing Provider   ciprofloxacin (CIPRO) 500 MG tablet  pamoate (VISTARIL) 25 MG capsule Take 1 capsule by mouth 3 times daily 7/30/18   Sam Lozano MD   magnesium oxide (MAG-OX) 400 MG tablet Take 1 tablet by mouth 2 times daily    Sam Lozano MD   EPINEPHrine (EPIPEN) 0.3 MG/0.3ML SOAJ injection Inject 0.3 mLs into the muscle Allergy of unknown origin 12/1/16   Sam Lozano MD   traZODone (DESYREL) 100 MG tablet Take 1 tablet by mouth nightly 3/8/18   Sam Lozano MD   glipiZIDE (GLUCOTROL XL) 5 MG extended release tablet Take 2 tablets by mouth daily    Sam Lozano MD   albuterol sulfate  (90 BASE) MCG/ACT inhaler Inhale 2 puffs into the lungs every 6 hours as needed for Wheezing    Sam Lozano MD   nystatin (MYCOSTATIN) 333615 UNIT/GM cream Apply topically nightly Apply under breasts at night    Sam Lozano MD   ferrous sulfate (FE TABS) 325 (65 FE) MG EC tablet Take 1 tablet by mouth 3 times daily (with meals)  Patient not taking: Reported on 6/16/2024 2/9/16   Nini Jones MD   dicyclomine (BENTYL) 10 MG capsule Take 1 capsule by mouth 3 times daily    Sam Lozano MD      Diet Reviewed: Yes   ADULT DIET; Easy to Chew; 4 carb choices (60 gm/meal)    Goal of Care Reviewed: Yes   Patient and/or Family's stated Goal of Care this Admission: Reduce shortness of breath, increase activity tolerance, better understand heart failure and disease management, be more comfortable, and reduce lower extremity edema prior to discharge.     Electronically signed by Leigh Ann Briscoe RN on 6/20/2024 at 11:51 AM

## 2024-06-20 NOTE — CONSULTS
Consult completed on 6/17. Reinforced education on: signs/symptoms to monitor, medications, daily weights, low sodium diet, 2000 ml fluid restriction, and activity. Patient was given a scale to take home. Patient has folder, water pitcher and scale. Reminded her of follow-up appointment on 6/25 at 1515 with PCP. [facility] being set up with [facility]. Denies any further needs.     Education time spent: 20 minutes    Electronically signed by Leigh Ann Briscoe RN on 6/20/2024 at 11:51 AM

## 2024-06-20 NOTE — PROGRESS NOTES
4 Eyes Skin Assessment     NAME:  Brianne Gunn  YOB: 1965  MEDICAL RECORD NUMBER:  [id number]    The patient is being assessed for  Other Shift Assessment     I agree that at least one RN has performed a thorough Head to Toe Skin Assessment on the patient. ALL assessment sites listed below have been assessed.      Areas assessed by both nurses:    Head, Face, Ears, Shoulders, Back, Chest, Arms, Elbows, Hands, Sacrum. Buttock, Coccyx, Ischium, Legs. Feet and Heels, and Under Medical Devices         Does the Patient have a Wound? No noted wound(s)       Vishnu Prevention initiated by RN: Yes  Wound Care Orders initiated by RN: Yes    Pressure Injury (Stage 3,4, Unstageable, DTI, NWPT, and Complex wounds) if present, place Wound referral order by RN under : No    New Ostomies, if present place, Ostomy referral order under : No     Nurse 1 eSignature: Electronically signed by Monique Heath RN on 6/20/24 at 7:22 AM EDT    **SHARE this note so that the co-signing nurse can place an eSignature**    Nurse 2 eSignature: {Esignature:[id number]}

## 2024-06-21 ENCOUNTER — FOLLOWUP TELEPHONE ENCOUNTER (OUTPATIENT)
Dept: ADMINISTRATIVE | Age: 59
End: 2024-06-21

## 2024-06-21 LAB
BACTERIA BLD CULT ORG #2: ABNORMAL
BACTERIA BLD CULT ORG #2: ABNORMAL
BACTERIA BLD CULT: ABNORMAL
ORGANISM: ABNORMAL

## 2024-06-21 NOTE — OP NOTE
[address] [address]                            OPERATIVE REPORT      PATIENT NAME: ELÍAS BAKER               : 1965  MED REC NO: [id number]                      ROOM: 3003  ACCOUNT NO: [id number]                       ADMIT DATE: 2024  PROVIDER: Jimmy Collier MD      DATE OF PROCEDURE:  2024    SURGEON:  Jimmy Collier MD    OPERATION PERFORMED:  Complicated laparoscopic cholecystectomy.    PREOPERATIVE DIAGNOSES:    1. Acute cholecystitis.  2. Morbid obesity.  3. Chronic obstructive pulmonary disease.  4. Personal history of congestive heart failure.    POSTOPERATIVE DIAGNOSES:    1. Acute cholecystitis.  2. Morbid obesity.  3. Chronic obstructive pulmonary disease.  4. Personal history of congestive heart failure.    ANESTHESIA:  General.    COMPLICATIONS:  None.    ESTIMATED BLOOD LOSS:  Less than 50 mL.    INDICATION FOR OPERATION:  A 58-year-old female admitted to the hospital with intractable upper abdominal and right upper quadrant pain.  Imaging showed inflammatory change around the gallbladder.  There were no obvious stones.  She was tender in the right upper quadrant.  She had previous similar symptoms, but not this severe.  They were often postprandial.  I recommended operative intervention for her gallbladder.  She was preoperatively optimized by the Cardiology and Pulmonary and Medical services.  The patient understood the risks and benefits of surgery and wanted to proceed.    DESCRIPTION OF OPERATION:  The patient was brought to the operating room.  General anesthesia was induced.  She was prepped and draped in the usual surgical sterile fashion.  A vertical supraumbilical incision was made.  Veress needle was inserted.  Pneumoperitoneum was established.  Disposable 5 mm trocar was passed through the incision.  Laparoscope was inserted.  Under direct vision, an 11 mm port was placed

## 2024-06-21 NOTE — TELEPHONE ENCOUNTER
Heart Failure Follow-Up Call:    Call within 72 Hours of Discharge: Yes     Patient: Brianne Gunn   Patient : 1965   MRN: [id number]    Date of discharge: 24    Discharge department/facility: ICU / [facility]    Discharge Disposition: Home with [facility]    RARS: Readmission Risk Score: 14.2    Spoke with: Jose / Spouse  [phone] cell phone.     Spoke to spouse Baljinder. Stated Brianne is taking a nap. Patient has not had any new SOB, swelling, or weight gain. No questions on AVS or medications. [facility] set up with [facility]. Reminded them of follow-up appointment on  at 0125 with PCP Ani White.     Reinforced Heart Failure education on: signs/symptoms to monitor, medications, daily weights, low sodium diet, 2000 ml fluid restriction, and activity. Provided Heart Failure Nurse number at [phone] for any further questions or assistance with resources.     Follow Up  Future Appointments   Date Time Provider Department Center   2024  2:00 PM [facility] CT MAIN [facility] CT SC Carlo Rad   2024  3:00 PM [facility] PULMONARY FUNCTION TESTING [facility] PFT [place] HOD   2024  1:00 PM [facility]       Electronically signed by Leigh Ann Briscoe, MSN, RN  on 2024 at 10:28 AM

## 2024-06-23 LAB
BACTERIA BLD CULT ORG #2: NORMAL
BACTERIA BLD CULT: NORMAL

## 2024-06-26 ENCOUNTER — OFFICE VISIT (OUTPATIENT)
Dept: SURGERY | Age: 59
End: 2024-06-26

## 2024-06-26 VITALS
WEIGHT: 293 LBS | DIASTOLIC BLOOD PRESSURE: 70 MMHG | BODY MASS INDEX: 55.32 KG/M2 | HEIGHT: 61 IN | SYSTOLIC BLOOD PRESSURE: 136 MMHG

## 2024-06-26 DIAGNOSIS — Z09 SURGICAL FOLLOW-UP CARE: Primary | ICD-10-CM

## 2024-06-26 PROCEDURE — 99024 POSTOP FOLLOW-UP VISIT: CPT | Performed by: SURGERY

## 2024-06-26 RX ORDER — ONDANSETRON 4 MG/1
4 TABLET, FILM COATED ORAL 3 TIMES DAILY PRN
Qty: 15 TABLET | Refills: 0 | Status: SHIPPED | OUTPATIENT
Start: 2024-06-26

## 2024-06-26 RX ORDER — FLUCONAZOLE 150 MG/1
150 TABLET ORAL ONCE
Qty: 1 TABLET | Refills: 0 | Status: SHIPPED | OUTPATIENT
Start: 2024-06-26 | End: 2024-06-26

## 2024-06-26 NOTE — PROGRESS NOTES
Physician Progress Note      PATIENT:               ELÍAS BAKER  CSN #:                  [id number]  :                       1965  ADMIT DATE:       2024 12:48 PM  DISCH DATE:        2024 12:15 PM  RESPONDING  PROVIDER #:        Leo Bragg MD          QUERY TEXT:    Patient admitted with abdominal bloating and pain. Noted documentation of   acute on chronic respiratory failure with hypercapnia in H&P on . In   order to support the diagnosis of acute respiratory failure, please include   additional clinical indicators in your documentation.  Or please document if   the diagnosis of acute respiratory failure has been ruled out after further   study.    The medical record reflects the following:  Risk Factors: 58 year old female on 4 lpm O2 at baseline, acute dCHF, COPD  Clinical Indicators:  ED provider note- Has chronic respiratory failure   on 4 nasal cannula oxygen all the time states feels a little bit more short of   breath than usual has also had a cough... Pulmonary: Effort: Pulmonary effort   is normal. Breath sounds: No wheezing or rales. Comments: Diminished breath   sounds... She has chronic respiratory failure on 4 L nasal cannula oxygen all   the time and is at her baseline here.  H&P- 58 y.o. female with   COPD/chronic respiratory failure on 4L NC O2... Acute on chronic Respiratory   failure w/ Hypercapnia  Treatment: 4 lpm O2, Albuterol, Symbicort, Duoneb    Acute Respiratory Failure Clinical Indicators per [employer] [employer] Training Guide and   Quick Reference Guide:  pO2 < 60 mmHg or SpO2 (pulse oximetry) < 91% breathing room air  pCO2 > 50 and pH < 7.35  P/F ratio (pO2 / FIO2) < 300  pO2 decrease or pCO2 increase by 10 mmHg from baseline (if known)  Supplemental oxygen of 40% or more  Presence of respiratory distress, tachypnea, dyspnea, shortness of breath,   wheezing  Unable to speak in complete sentences  Use of accessory muscles to breathe  Extreme anxiety

## 2024-07-16 ENCOUNTER — TELEPHONE (OUTPATIENT)
Dept: PULMONOLOGY | Age: 59
End: 2024-07-16

## 2024-07-16 NOTE — TELEPHONE ENCOUNTER
Patient cancelled appointment on 7/24 with Dr. Bhat for pft, ct chest w/o contrast .    Reason: Transportation issues; truck broke down and don't know when it will be fixed.    Patient did not reschedule appointment.    DID NOT COMPLETE CT OR PFT      Last OV 4/8/24

## 2024-07-17 PROBLEM — Z01.818 PRE-OP EVALUATION: Status: RESOLVED | Noted: 2024-06-17 | Resolved: 2024-07-17

## 2024-08-06 ENCOUNTER — TELEPHONE (OUTPATIENT)
Dept: PULMONOLOGY | Age: 59
End: 2024-08-06

## 2024-08-06 DIAGNOSIS — G47.33 OSA (OBSTRUCTIVE SLEEP APNEA): Primary | ICD-10-CM

## 2024-08-25 ENCOUNTER — APPOINTMENT (OUTPATIENT)
Dept: CT IMAGING | Age: 59
DRG: 291 | End: 2024-08-25
Payer: MEDICARE

## 2024-08-25 ENCOUNTER — HOSPITAL ENCOUNTER (INPATIENT)
Age: 59
LOS: 2 days | Discharge: HOME | DRG: 291 | End: 2024-08-27
Attending: STUDENT IN AN ORGANIZED HEALTH CARE EDUCATION/TRAINING PROGRAM | Admitting: STUDENT IN AN ORGANIZED HEALTH CARE EDUCATION/TRAINING PROGRAM
Payer: MEDICARE

## 2024-08-25 ENCOUNTER — APPOINTMENT (OUTPATIENT)
Dept: GENERAL RADIOLOGY | Age: 59
DRG: 291 | End: 2024-08-25
Payer: MEDICARE

## 2024-08-25 DIAGNOSIS — I50.33 ACUTE ON CHRONIC DIASTOLIC CONGESTIVE HEART FAILURE (HCC): Primary | ICD-10-CM

## 2024-08-25 DIAGNOSIS — I50.31 CHF (CONGESTIVE HEART FAILURE), NYHA CLASS I, ACUTE, DIASTOLIC (HCC): ICD-10-CM

## 2024-08-25 LAB
ALBUMIN SERPL-MCNC: 3.8 G/DL (ref 3.4–5)
ALBUMIN/GLOB SERPL: 1.1 {RATIO} (ref 1.1–2.2)
ALP SERPL-CCNC: 110 U/L (ref 40–129)
ALT SERPL-CCNC: 15 U/L (ref 10–40)
ANION GAP SERPL CALCULATED.3IONS-SCNC: 9 MMOL/L (ref 3–16)
AST SERPL-CCNC: 22 U/L (ref 15–37)
BACTERIA URNS QL MICRO: ABNORMAL /HPF
BASE EXCESS BLDV CALC-SCNC: 3.9 MMOL/L (ref -3–3)
BASOPHILS # BLD: 0 K/UL (ref 0–0.2)
BASOPHILS NFR BLD: 0.3 %
BILIRUB SERPL-MCNC: <0.2 MG/DL (ref 0–1)
BILIRUB UR QL STRIP.AUTO: NEGATIVE
BUN SERPL-MCNC: 30 MG/DL (ref 7–20)
CALCIUM SERPL-MCNC: 9.3 MG/DL (ref 8.3–10.6)
CHLORIDE SERPL-SCNC: 96 MMOL/L (ref 99–110)
CLARITY UR: ABNORMAL
CO2 BLDV-SCNC: 31 MMOL/L
CO2 SERPL-SCNC: 32 MMOL/L (ref 21–32)
COHGB MFR BLDV: 0.4 % (ref 0–1.5)
COLOR UR: YELLOW
CREAT SERPL-MCNC: 1.3 MG/DL (ref 0.6–1.1)
DEPRECATED RDW RBC AUTO: 15.4 % (ref 12.4–15.4)
EKG ATRIAL RATE: 76 BPM
EKG DIAGNOSIS: NORMAL
EKG P AXIS: 8 DEGREES
EKG P-R INTERVAL: 178 MS
EKG Q-T INTERVAL: 452 MS
EKG QRS DURATION: 100 MS
EKG QTC CALCULATION (BAZETT): 508 MS
EKG R AXIS: 23 DEGREES
EKG T AXIS: 43 DEGREES
EKG VENTRICULAR RATE: 76 BPM
EOSINOPHIL # BLD: 0.1 K/UL (ref 0–0.6)
EOSINOPHIL NFR BLD: 2.1 %
EPI CELLS #/AREA URNS HPF: ABNORMAL /HPF (ref 0–5)
GFR SERPLBLD CREATININE-BSD FMLA CKD-EPI: 47 ML/MIN/{1.73_M2}
GLUCOSE BLD-MCNC: 181 MG/DL (ref 70–99)
GLUCOSE BLD-MCNC: 257 MG/DL (ref 70–99)
GLUCOSE SERPL-MCNC: 285 MG/DL (ref 70–99)
GLUCOSE UR STRIP.AUTO-MCNC: 100 MG/DL
HCG SERPL QL: NEGATIVE
HCO3 BLDV-SCNC: 29.7 MMOL/L (ref 23–29)
HCT VFR BLD AUTO: 33.9 % (ref 36–48)
HGB BLD-MCNC: 10.7 G/DL (ref 12–16)
HGB UR QL STRIP.AUTO: NEGATIVE
KETONES UR STRIP.AUTO-MCNC: NEGATIVE MG/DL
LACTATE BLDV-SCNC: 1.7 MMOL/L (ref 0.4–1.9)
LACTATE BLDV-SCNC: 2.7 MMOL/L (ref 0.4–1.9)
LEUKOCYTE ESTERASE UR QL STRIP.AUTO: NEGATIVE
LIPASE SERPL-CCNC: 36 U/L (ref 13–60)
LYMPHOCYTES # BLD: 1.3 K/UL (ref 1–5.1)
LYMPHOCYTES NFR BLD: 21.5 %
MCH RBC QN AUTO: 26.2 PG (ref 26–34)
MCHC RBC AUTO-ENTMCNC: 31.5 G/DL (ref 31–36)
MCV RBC AUTO: 83 FL (ref 80–100)
METHGB MFR BLDV: 0.1 %
MONOCYTES # BLD: 0.4 K/UL (ref 0–1.3)
MONOCYTES NFR BLD: 6.3 %
NEUTROPHILS # BLD: 4 K/UL (ref 1.7–7.7)
NEUTROPHILS NFR BLD: 69.8 %
NITRITE UR QL STRIP.AUTO: NEGATIVE
NT-PROBNP SERPL-MCNC: 1191 PG/ML (ref 0–124)
O2 CT VFR BLDV CALC: 16 VOL %
O2 THERAPY: ABNORMAL
PCO2 BLDV: 50.2 MMHG (ref 40–50)
PERFORMED ON: ABNORMAL
PERFORMED ON: ABNORMAL
PH BLDV: 7.39 [PH] (ref 7.35–7.45)
PH UR STRIP.AUTO: 7 [PH] (ref 5–8)
PLATELET # BLD AUTO: 152 K/UL (ref 135–450)
PMV BLD AUTO: 9.7 FL (ref 5–10.5)
PO2 BLDV: 132.4 MMHG (ref 25–40)
POTASSIUM SERPL-SCNC: 4.9 MMOL/L (ref 3.5–5.1)
PROT SERPL-MCNC: 7.3 G/DL (ref 6.4–8.2)
PROT UR STRIP.AUTO-MCNC: ABNORMAL MG/DL
RBC # BLD AUTO: 4.08 M/UL (ref 4–5.2)
RBC #/AREA URNS HPF: ABNORMAL /HPF (ref 0–4)
SAO2 % BLDV: 99 %
SODIUM SERPL-SCNC: 137 MMOL/L (ref 136–145)
SP GR UR STRIP.AUTO: 1.02 (ref 1–1.03)
TROPONIN, HIGH SENSITIVITY: 30 NG/L (ref 0–14)
TROPONIN, HIGH SENSITIVITY: 30 NG/L (ref 0–14)
UA COMPLETE W REFLEX CULTURE PNL UR: ABNORMAL
UA DIPSTICK W REFLEX MICRO PNL UR: YES
URN SPEC COLLECT METH UR: ABNORMAL
UROBILINOGEN UR STRIP-ACNC: 0.2 E.U./DL
WBC # BLD AUTO: 5.8 K/UL (ref 4–11)
WBC #/AREA URNS HPF: ABNORMAL /HPF (ref 0–5)

## 2024-08-25 PROCEDURE — 83540 ASSAY OF IRON: CPT

## 2024-08-25 PROCEDURE — 82803 BLOOD GASES ANY COMBINATION: CPT

## 2024-08-25 PROCEDURE — 93010 ELECTROCARDIOGRAM REPORT: CPT | Performed by: INTERNAL MEDICINE

## 2024-08-25 PROCEDURE — 80053 COMPREHEN METABOLIC PANEL: CPT

## 2024-08-25 PROCEDURE — G0378 HOSPITAL OBSERVATION PER HR: HCPCS

## 2024-08-25 PROCEDURE — 94640 AIRWAY INHALATION TREATMENT: CPT

## 2024-08-25 PROCEDURE — 81001 URINALYSIS AUTO W/SCOPE: CPT

## 2024-08-25 PROCEDURE — 71046 X-RAY EXAM CHEST 2 VIEWS: CPT

## 2024-08-25 PROCEDURE — 99285 EMERGENCY DEPT VISIT HI MDM: CPT

## 2024-08-25 PROCEDURE — 83880 ASSAY OF NATRIURETIC PEPTIDE: CPT

## 2024-08-25 PROCEDURE — 2700000000 HC OXYGEN THERAPY PER DAY

## 2024-08-25 PROCEDURE — 2060000000 HC ICU INTERMEDIATE R&B

## 2024-08-25 PROCEDURE — 6360000002 HC RX W HCPCS: Performed by: INTERNAL MEDICINE

## 2024-08-25 PROCEDURE — 93005 ELECTROCARDIOGRAM TRACING: CPT | Performed by: REGISTERED NURSE

## 2024-08-25 PROCEDURE — 6360000004 HC RX CONTRAST MEDICATION: Performed by: REGISTERED NURSE

## 2024-08-25 PROCEDURE — 84703 CHORIONIC GONADOTROPIN ASSAY: CPT

## 2024-08-25 PROCEDURE — 83690 ASSAY OF LIPASE: CPT

## 2024-08-25 PROCEDURE — 6370000000 HC RX 637 (ALT 250 FOR IP): Performed by: INTERNAL MEDICINE

## 2024-08-25 PROCEDURE — 74177 CT ABD & PELVIS W/CONTRAST: CPT

## 2024-08-25 PROCEDURE — 96374 THER/PROPH/DIAG INJ IV PUSH: CPT

## 2024-08-25 PROCEDURE — 2580000003 HC RX 258: Performed by: INTERNAL MEDICINE

## 2024-08-25 PROCEDURE — 36415 COLL VENOUS BLD VENIPUNCTURE: CPT

## 2024-08-25 PROCEDURE — 83550 IRON BINDING TEST: CPT

## 2024-08-25 PROCEDURE — 94761 N-INVAS EAR/PLS OXIMETRY MLT: CPT

## 2024-08-25 PROCEDURE — 85025 COMPLETE CBC W/AUTO DIFF WBC: CPT

## 2024-08-25 PROCEDURE — 84484 ASSAY OF TROPONIN QUANT: CPT

## 2024-08-25 PROCEDURE — 96372 THER/PROPH/DIAG INJ SC/IM: CPT

## 2024-08-25 PROCEDURE — 83605 ASSAY OF LACTIC ACID: CPT

## 2024-08-25 RX ORDER — ENOXAPARIN SODIUM 100 MG/ML
30 INJECTION SUBCUTANEOUS 2 TIMES DAILY
Status: DISCONTINUED | OUTPATIENT
Start: 2024-08-25 | End: 2024-08-26

## 2024-08-25 RX ORDER — INSULIN LISPRO 100 [IU]/ML
0-4 INJECTION, SOLUTION INTRAVENOUS; SUBCUTANEOUS NIGHTLY
Status: DISCONTINUED | OUTPATIENT
Start: 2024-08-25 | End: 2024-08-27 | Stop reason: HOSPADM

## 2024-08-25 RX ORDER — DICYCLOMINE HYDROCHLORIDE 10 MG/1
10 CAPSULE ORAL 3 TIMES DAILY
Status: DISCONTINUED | OUTPATIENT
Start: 2024-08-25 | End: 2024-08-27 | Stop reason: HOSPADM

## 2024-08-25 RX ORDER — SODIUM CHLORIDE 0.9 % (FLUSH) 0.9 %
5-40 SYRINGE (ML) INJECTION EVERY 12 HOURS SCHEDULED
Status: DISCONTINUED | OUTPATIENT
Start: 2024-08-25 | End: 2024-08-27 | Stop reason: HOSPADM

## 2024-08-25 RX ORDER — GLUCAGON 1 MG/ML
1 KIT INJECTION PRN
Status: DISCONTINUED | OUTPATIENT
Start: 2024-08-25 | End: 2024-08-27 | Stop reason: HOSPADM

## 2024-08-25 RX ORDER — ONDANSETRON 2 MG/ML
4 INJECTION INTRAMUSCULAR; INTRAVENOUS EVERY 6 HOURS PRN
Status: DISCONTINUED | OUTPATIENT
Start: 2024-08-25 | End: 2024-08-27 | Stop reason: HOSPADM

## 2024-08-25 RX ORDER — ALBUTEROL SULFATE 90 UG/1
2 AEROSOL, METERED RESPIRATORY (INHALATION) EVERY 6 HOURS PRN
Status: DISCONTINUED | OUTPATIENT
Start: 2024-08-25 | End: 2024-08-25

## 2024-08-25 RX ORDER — INSULIN LISPRO 100 [IU]/ML
0-4 INJECTION, SOLUTION INTRAVENOUS; SUBCUTANEOUS NIGHTLY
Status: DISCONTINUED | OUTPATIENT
Start: 2024-08-25 | End: 2024-08-25

## 2024-08-25 RX ORDER — ALLOPURINOL 100 MG/1
100 TABLET ORAL DAILY
Status: DISCONTINUED | OUTPATIENT
Start: 2024-08-26 | End: 2024-08-27 | Stop reason: HOSPADM

## 2024-08-25 RX ORDER — HYDROXYZINE HYDROCHLORIDE 25 MG/1
25 TABLET, FILM COATED ORAL 3 TIMES DAILY PRN
Status: DISCONTINUED | OUTPATIENT
Start: 2024-08-25 | End: 2024-08-27 | Stop reason: HOSPADM

## 2024-08-25 RX ORDER — TRAZODONE HYDROCHLORIDE 100 MG/1
100 TABLET ORAL NIGHTLY
Status: DISCONTINUED | OUTPATIENT
Start: 2024-08-25 | End: 2024-08-26

## 2024-08-25 RX ORDER — ESCITALOPRAM OXALATE 10 MG/1
10 TABLET ORAL DAILY
Status: DISCONTINUED | OUTPATIENT
Start: 2024-08-26 | End: 2024-08-27 | Stop reason: HOSPADM

## 2024-08-25 RX ORDER — ALBUTEROL SULFATE 90 UG/1
2 AEROSOL, METERED RESPIRATORY (INHALATION) EVERY 4 HOURS PRN
Status: DISCONTINUED | OUTPATIENT
Start: 2024-08-25 | End: 2024-08-27 | Stop reason: HOSPADM

## 2024-08-25 RX ORDER — INSULIN LISPRO 100 [IU]/ML
0-8 INJECTION, SOLUTION INTRAVENOUS; SUBCUTANEOUS
Status: DISCONTINUED | OUTPATIENT
Start: 2024-08-25 | End: 2024-08-25

## 2024-08-25 RX ORDER — BUDESONIDE AND FORMOTEROL FUMARATE DIHYDRATE 80; 4.5 UG/1; UG/1
2 AEROSOL RESPIRATORY (INHALATION)
Status: DISCONTINUED | OUTPATIENT
Start: 2024-08-25 | End: 2024-08-27 | Stop reason: HOSPADM

## 2024-08-25 RX ORDER — ACETAMINOPHEN 325 MG/1
650 TABLET ORAL EVERY 6 HOURS PRN
Status: DISCONTINUED | OUTPATIENT
Start: 2024-08-25 | End: 2024-08-27 | Stop reason: HOSPADM

## 2024-08-25 RX ORDER — SODIUM CHLORIDE 9 MG/ML
INJECTION, SOLUTION INTRAVENOUS PRN
Status: DISCONTINUED | OUTPATIENT
Start: 2024-08-25 | End: 2024-08-27 | Stop reason: HOSPADM

## 2024-08-25 RX ORDER — POLYETHYLENE GLYCOL 3350 17 G/17G
17 POWDER, FOR SOLUTION ORAL DAILY PRN
Status: DISCONTINUED | OUTPATIENT
Start: 2024-08-25 | End: 2024-08-27 | Stop reason: HOSPADM

## 2024-08-25 RX ORDER — ONDANSETRON 4 MG/1
4 TABLET, ORALLY DISINTEGRATING ORAL EVERY 8 HOURS PRN
Status: DISCONTINUED | OUTPATIENT
Start: 2024-08-25 | End: 2024-08-27 | Stop reason: HOSPADM

## 2024-08-25 RX ORDER — IOPAMIDOL 755 MG/ML
75 INJECTION, SOLUTION INTRAVASCULAR
Status: COMPLETED | OUTPATIENT
Start: 2024-08-25 | End: 2024-08-25

## 2024-08-25 RX ORDER — ATORVASTATIN CALCIUM 10 MG/1
10 TABLET, FILM COATED ORAL NIGHTLY
Status: DISCONTINUED | OUTPATIENT
Start: 2024-08-25 | End: 2024-08-27 | Stop reason: HOSPADM

## 2024-08-25 RX ORDER — INSULIN GLARGINE 100 [IU]/ML
15 INJECTION, SOLUTION SUBCUTANEOUS NIGHTLY
Status: DISCONTINUED | OUTPATIENT
Start: 2024-08-25 | End: 2024-08-27 | Stop reason: HOSPADM

## 2024-08-25 RX ORDER — FUROSEMIDE 10 MG/ML
40 INJECTION INTRAMUSCULAR; INTRAVENOUS 2 TIMES DAILY
Status: DISCONTINUED | OUTPATIENT
Start: 2024-08-25 | End: 2024-08-27

## 2024-08-25 RX ORDER — DEXTROSE MONOHYDRATE 100 MG/ML
INJECTION, SOLUTION INTRAVENOUS CONTINUOUS PRN
Status: DISCONTINUED | OUTPATIENT
Start: 2024-08-25 | End: 2024-08-27 | Stop reason: HOSPADM

## 2024-08-25 RX ORDER — ACETAMINOPHEN 650 MG/1
650 SUPPOSITORY RECTAL EVERY 6 HOURS PRN
Status: DISCONTINUED | OUTPATIENT
Start: 2024-08-25 | End: 2024-08-27 | Stop reason: HOSPADM

## 2024-08-25 RX ORDER — SODIUM CHLORIDE 0.9 % (FLUSH) 0.9 %
5-40 SYRINGE (ML) INJECTION PRN
Status: DISCONTINUED | OUTPATIENT
Start: 2024-08-25 | End: 2024-08-27 | Stop reason: HOSPADM

## 2024-08-25 RX ORDER — INSULIN LISPRO 100 [IU]/ML
0-16 INJECTION, SOLUTION INTRAVENOUS; SUBCUTANEOUS
Status: DISCONTINUED | OUTPATIENT
Start: 2024-08-26 | End: 2024-08-27 | Stop reason: HOSPADM

## 2024-08-25 RX ADMIN — BUDESONIDE AND FORMOTEROL FUMARATE DIHYDRATE 2 PUFF: 80; 4.5 AEROSOL RESPIRATORY (INHALATION) at 20:20

## 2024-08-25 RX ADMIN — Medication 10 ML: at 21:27

## 2024-08-25 RX ADMIN — IOPAMIDOL 75 ML: 755 INJECTION, SOLUTION INTRAVENOUS at 15:17

## 2024-08-25 RX ADMIN — DICYCLOMINE HYDROCHLORIDE 10 MG: 10 CAPSULE ORAL at 21:27

## 2024-08-25 RX ADMIN — TRAZODONE HYDROCHLORIDE 100 MG: 100 TABLET ORAL at 21:27

## 2024-08-25 RX ADMIN — ATORVASTATIN CALCIUM 10 MG: 10 TABLET, FILM COATED ORAL at 21:27

## 2024-08-25 RX ADMIN — FUROSEMIDE 40 MG: 10 INJECTION, SOLUTION INTRAMUSCULAR; INTRAVENOUS at 21:26

## 2024-08-25 RX ADMIN — ENOXAPARIN SODIUM 30 MG: 100 INJECTION SUBCUTANEOUS at 21:27

## 2024-08-25 RX ADMIN — INSULIN GLARGINE 15 UNITS: 100 INJECTION, SOLUTION SUBCUTANEOUS at 21:27

## 2024-08-25 ASSESSMENT — PAIN DESCRIPTION - ORIENTATION: ORIENTATION: LOWER;MID

## 2024-08-25 ASSESSMENT — LIFESTYLE VARIABLES
HOW OFTEN DO YOU HAVE A DRINK CONTAINING ALCOHOL: NEVER
HOW MANY STANDARD DRINKS CONTAINING ALCOHOL DO YOU HAVE ON A TYPICAL DAY: PATIENT DOES NOT DRINK

## 2024-08-25 ASSESSMENT — PAIN SCALES - GENERAL: PAINLEVEL_OUTOF10: 6

## 2024-08-25 ASSESSMENT — PAIN DESCRIPTION - PAIN TYPE: TYPE: ACUTE PAIN

## 2024-08-25 ASSESSMENT — PAIN DESCRIPTION - DESCRIPTORS: DESCRIPTORS: OTHER (COMMENT)

## 2024-08-25 ASSESSMENT — PAIN DESCRIPTION - LOCATION: LOCATION: ABDOMEN

## 2024-08-25 NOTE — ED PROVIDER NOTES
belongings in hand. filed at 08/20/2019 1610          Urine Drug Screenings (1 yr)       Urine Drug Screen  Collected: 7/16/2023  1:23 AM (Final result)              Drug screen multi urine  Collected: 2/5/2016 10:20 AM (Final result)                  Medication Contract and Consent for Opioid Use Documents Filed        No documents found                    MDM:   This patient was seen and evaluated by myself and my attending physician  Records Reviewed : Outpatient Notes brief review of relevant medical records was completed .  Most recent echo completed with transthoracic approach in October 2023 showed a EF of 55 to 60% with left ventricular diastolic function consistent with grade 1 impaired relaxation.  Patient admitted to the hospital for acute on chronic respiratory failure in June 2024 as well    Patient presents to the emergency department today for evaluation of increasing shortness of breath with abdominal distention, right lower quadrant abdominal pain and increasing peripheral edema.  On exam she is alert and oriented she is hemodynamically stable and nontoxic in appearance.  She is currently maintaining oxygen saturations on 4 L nasal cannula which is baseline for her.  I discussed with her plan for evaluation including laboratory studies and imaging and she was in agreement.    Laboratory studies and images were evaluated  CBC negative for leukocytosis does reveal hemoglobin 10.7, hematocrit 33.9.  CMP reveals BUN of 30 which appears relatively baseline, creatinine 1.3, GFR 47 which is down from patient's baseline.  Lipase normal at 36.  Lactic elevated at 2.7.  Venous blood gas reveals pCO2 of 50.2, pO2 of 132.4, bicarb 29.7, base excess 3.9.  Troponin initially 30, repeat troponin stable at 30  BNP elevated 1191.  hCG is negative.  Urinalysis is negative for blood, nitrates or leukocytes.  Microscopic urinalysis reveals contamination with 21-50 epithelial cells and 2+ bacteria.  EKG was interpreted  by the attending physician does reveal sinus rhythm with frequent PVCs and ventricular rate of 76 bpm.  CT abdomen pelvis with IV contrast interpreted by the radiologist for no acute process in the abdomen and pelvics.  Appendix is within normal limit.  Cellulitis versus anasarca involving inferior abdominal wall.  No overlying exam findings suspicious for developing cellulitis.    On reevaluation I did discuss with patient plan for disposition of admission for further treatment for CHF exacerbation despite outpatient treatment and patient was in agreement with this.  Consult placed to hospital medicine team via [employer] for disposition of admission.  Orders placed for admission and care transitioned at this time.    Social Determinants Significantly Affecting Health : None    Is this patient to be included in the SEP-1 Core Measure due to severe sepsis or septic shock?   No     Exclusion criteria - the patient is NOT to be included for SEP-1 Core Measure due to:  Infection is not suspected    Discharge Time out:  CC Reviewed Yes   Test Results Yes     Vitals:    08/25/24 1702   BP: (!) 112/59   Pulse: 67   Resp: 15   Temp:    SpO2: 97%              FINAL IMPRESSION      1. Acute on chronic diastolic congestive heart failure (HCC)          DISPOSITION/PLAN   DISPOSITION Admitted 08/25/2024 06:08:34 PM  Condition at Disposition: Data Unavailable      PATIENT REFERREDTO:  No follow-up provider specified.    DISCHARGE MEDICATIONS:  New Prescriptions    No medications on file       DISCONTINUED MEDICATIONS:  Discontinued Medications    No medications on file              (Please note that portions ofthis note were completed with a voice recognition program.  Efforts were made to edit the dictations but occasionally words are mis-transcribed.)    AMAYA Gong CNP (electronically signed)       Mirtha Magallanes APRN - CNP  08/25/24 1805       Mirtha Magallanes APRN - CNP  08/25/24 1801

## 2024-08-25 NOTE — ED PROVIDER NOTES
THIS IS MY CARLOS SUPERVISORY AND SHARED VISIT NOTE:    I personally saw the patient and made/approved the management plan and take responsibility for the patient management.    Labs Reviewed   CBC WITH AUTO DIFFERENTIAL - Abnormal; Notable for the following components:       Result Value    Hemoglobin 10.7 (*)     Hematocrit 33.9 (*)     All other components within normal limits   COMPREHENSIVE METABOLIC PANEL W/ REFLEX TO MG FOR LOW K - Abnormal; Notable for the following components:    Chloride 96 (*)     Glucose 285 (*)     BUN 30 (*)     Creatinine 1.3 (*)     Est, Glom Filt Rate 47 (*)     All other components within normal limits   LACTATE, SEPSIS - Abnormal; Notable for the following components:    Lactic Acid, Sepsis 2.7 (*)     All other components within normal limits   BLOOD GAS, VENOUS - Abnormal; Notable for the following components:    pCO2, Isaac 50.2 (*)     PO2, Iasac 132.4 (*)     HCO3, Venous 29.7 (*)     Base Excess, Isaac 3.9 (*)     All other components within normal limits   TROPONIN - Abnormal; Notable for the following components:    Troponin, High Sensitivity 30 (*)     All other components within normal limits   TROPONIN - Abnormal; Notable for the following components:    Troponin, High Sensitivity 30 (*)     All other components within normal limits   BRAIN NATRIURETIC PEPTIDE - Abnormal; Notable for the following components:    Pro-BNP 1,191 (*)     All other components within normal limits   URINALYSIS WITH REFLEX TO CULTURE - Abnormal; Notable for the following components:    Clarity, UA SL CLOUDY (*)     Glucose, Ur 100 (*)     Protein, UA TRACE (*)     All other components within normal limits   MICROSCOPIC URINALYSIS - Abnormal; Notable for the following components:    Epithelial Cells, UA 21-50 (*)     Bacteria, UA 2+ (*)     All other components within normal limits   POCT GLUCOSE - Abnormal; Notable for the following components:    POC Glucose 181 (*)     All other components within  normal limits   POCT GLUCOSE - Abnormal; Notable for the following components:    POC Glucose 257 (*)     All other components within normal limits   LIPASE   LACTATE, SEPSIS   HCG, SERUM, QUALITATIVE   BASIC METABOLIC PANEL   MAGNESIUM   LIPID PANEL   IRON AND TIBC     XR CHEST (2 VW)   Final Result   Cardiomegaly, vascular prominence and bibasilar atelectasis.  Nonspecific,   may represent pulmonary edema/fluid overload.         CT ABDOMEN PELVIS W IV CONTRAST Additional Contrast? None   Preliminary Result   1. No acute process in the abdomen or pelvis. The appendix is within normal   limits.   2. Cellulitis versus anasarca involving the inferior abdominal wall.   3. Status post cholecystectomy.   4. Chronic left lower lobe opacity is partially visualized, most likely   atelectasis/scarring.           History: Patient is a 59-year-old female, presenting with concerns for shortness of breath, abdominal swelling.  Patient states that she was seen by her PCP few days ago and had her Lasix increased but the swelling is ongoing and she is having worsening orthopnea and shortness of breath.  She is on her baseline oxygen.    Exam: Patient is resting comfortably and is in no acute distress.  Heart regular rate and rhythm.  Lungs clear to auscultation bilaterally.  Abdomen soft, with diffuse swelling.  No rashes.    MDM: Patient is a 59-year-old female, presenting with concerns for orthopnea, shortness of breath, abdominal swelling.  Does appear to be fluid overloaded.  Upon arrival in the ED, vitals reassuring.  Patient is in no acute distress.  Chest x-ray shows cardiomegaly, possible pulmonary edema and fluid overload.  Patient is on her baseline 4 L of oxygen but has had recent increase in Lasix and is still having significant symptoms.  CT abdomen pelvis shows evidence of anasarca of the inferior abdominal wall.  See no clinical evidence of cellulitis at this point in time.  Feel the patient would benefit from

## 2024-08-25 NOTE — ED TRIAGE NOTES
Pt c/o 6/10 lower abdominal pain.  Pt states that she was at her PCP Friday and doctor told her that she was borderline CHF and cellulitis.  No ABX.  Pt states that she feels that she is retaining fluid and her abdomen is swollen.

## 2024-08-26 PROBLEM — G47.33 OSA TREATED WITH BIPAP: Status: ACTIVE | Noted: 2024-08-26

## 2024-08-26 PROBLEM — D50.9 IRON DEFICIENCY ANEMIA: Status: ACTIVE | Noted: 2024-08-26

## 2024-08-26 LAB
ANION GAP SERPL CALCULATED.3IONS-SCNC: 9 MMOL/L (ref 3–16)
BUN SERPL-MCNC: 32 MG/DL (ref 7–20)
CALCIUM SERPL-MCNC: 9.4 MG/DL (ref 8.3–10.6)
CHLORIDE SERPL-SCNC: 98 MMOL/L (ref 99–110)
CHOLEST SERPL-MCNC: 191 MG/DL (ref 0–199)
CO2 SERPL-SCNC: 34 MMOL/L (ref 21–32)
CREAT SERPL-MCNC: 1.1 MG/DL (ref 0.6–1.2)
GFR SERPLBLD CREATININE-BSD FMLA CKD-EPI: 58 ML/MIN/{1.73_M2}
GLUCOSE BLD-MCNC: 186 MG/DL (ref 70–99)
GLUCOSE BLD-MCNC: 225 MG/DL (ref 70–99)
GLUCOSE BLD-MCNC: 229 MG/DL (ref 70–99)
GLUCOSE BLD-MCNC: 303 MG/DL (ref 70–99)
GLUCOSE BLD-MCNC: 371 MG/DL (ref 70–99)
GLUCOSE SERPL-MCNC: 209 MG/DL (ref 70–99)
HDLC SERPL-MCNC: 55 MG/DL (ref 40–60)
IRON SATN MFR SERPL: 12 % (ref 15–50)
IRON SERPL-MCNC: 36 UG/DL (ref 37–145)
LDLC SERPL CALC-MCNC: 91 MG/DL
MAGNESIUM SERPL-MCNC: 1.9 MG/DL (ref 1.8–2.4)
PERFORMED ON: ABNORMAL
POTASSIUM SERPL-SCNC: 4.3 MMOL/L (ref 3.5–5.1)
SODIUM SERPL-SCNC: 141 MMOL/L (ref 136–145)
TIBC SERPL-MCNC: 305 UG/DL (ref 260–445)
TRIGL SERPL-MCNC: 224 MG/DL (ref 0–150)
VLDLC SERPL CALC-MCNC: 45 MG/DL

## 2024-08-26 PROCEDURE — 99223 1ST HOSP IP/OBS HIGH 75: CPT | Performed by: INTERNAL MEDICINE

## 2024-08-26 PROCEDURE — 2060000000 HC ICU INTERMEDIATE R&B

## 2024-08-26 PROCEDURE — 2580000003 HC RX 258: Performed by: INTERNAL MEDICINE

## 2024-08-26 PROCEDURE — 6360000002 HC RX W HCPCS: Performed by: INTERNAL MEDICINE

## 2024-08-26 PROCEDURE — 6370000000 HC RX 637 (ALT 250 FOR IP): Performed by: STUDENT IN AN ORGANIZED HEALTH CARE EDUCATION/TRAINING PROGRAM

## 2024-08-26 PROCEDURE — 96372 THER/PROPH/DIAG INJ SC/IM: CPT

## 2024-08-26 PROCEDURE — 6370000000 HC RX 637 (ALT 250 FOR IP): Performed by: INTERNAL MEDICINE

## 2024-08-26 PROCEDURE — 80061 LIPID PANEL: CPT

## 2024-08-26 PROCEDURE — 83735 ASSAY OF MAGNESIUM: CPT

## 2024-08-26 PROCEDURE — 80048 BASIC METABOLIC PNL TOTAL CA: CPT

## 2024-08-26 PROCEDURE — 96376 TX/PRO/DX INJ SAME DRUG ADON: CPT

## 2024-08-26 PROCEDURE — 94761 N-INVAS EAR/PLS OXIMETRY MLT: CPT

## 2024-08-26 PROCEDURE — 2700000000 HC OXYGEN THERAPY PER DAY

## 2024-08-26 PROCEDURE — 99232 SBSQ HOSP IP/OBS MODERATE 35: CPT | Performed by: INTERNAL MEDICINE

## 2024-08-26 PROCEDURE — 94640 AIRWAY INHALATION TREATMENT: CPT

## 2024-08-26 PROCEDURE — 36415 COLL VENOUS BLD VENIPUNCTURE: CPT

## 2024-08-26 PROCEDURE — G0378 HOSPITAL OBSERVATION PER HR: HCPCS

## 2024-08-26 RX ORDER — LANOLIN ALCOHOL/MO/W.PET/CERES
400 CREAM (GRAM) TOPICAL 2 TIMES DAILY
Status: DISCONTINUED | OUTPATIENT
Start: 2024-08-26 | End: 2024-08-27 | Stop reason: HOSPADM

## 2024-08-26 RX ORDER — ENOXAPARIN SODIUM 100 MG/ML
40 INJECTION SUBCUTANEOUS 2 TIMES DAILY
Status: DISCONTINUED | OUTPATIENT
Start: 2024-08-26 | End: 2024-08-27 | Stop reason: HOSPADM

## 2024-08-26 RX ORDER — PANTOPRAZOLE SODIUM 40 MG/1
40 TABLET, DELAYED RELEASE ORAL
Status: DISCONTINUED | OUTPATIENT
Start: 2024-08-26 | End: 2024-08-27 | Stop reason: HOSPADM

## 2024-08-26 RX ORDER — TRAZODONE HYDROCHLORIDE 100 MG/1
100 TABLET ORAL NIGHTLY PRN
Status: DISCONTINUED | OUTPATIENT
Start: 2024-08-26 | End: 2024-08-27 | Stop reason: HOSPADM

## 2024-08-26 RX ORDER — INSULIN GLARGINE 100 [IU]/ML
10 INJECTION, SOLUTION SUBCUTANEOUS ONCE
Status: COMPLETED | OUTPATIENT
Start: 2024-08-26 | End: 2024-08-26

## 2024-08-26 RX ORDER — OXYCODONE AND ACETAMINOPHEN 7.5; 325 MG/1; MG/1
1 TABLET ORAL EVERY 6 HOURS PRN
Status: DISCONTINUED | OUTPATIENT
Start: 2024-08-26 | End: 2024-08-27 | Stop reason: HOSPADM

## 2024-08-26 RX ORDER — FERROUS SULFATE 325(65) MG
325 TABLET ORAL
Status: DISCONTINUED | OUTPATIENT
Start: 2024-08-26 | End: 2024-08-27 | Stop reason: HOSPADM

## 2024-08-26 RX ADMIN — INSULIN LISPRO 4 UNITS: 100 INJECTION, SOLUTION INTRAVENOUS; SUBCUTANEOUS at 17:20

## 2024-08-26 RX ADMIN — FUROSEMIDE 40 MG: 10 INJECTION, SOLUTION INTRAMUSCULAR; INTRAVENOUS at 17:22

## 2024-08-26 RX ADMIN — ENOXAPARIN SODIUM 40 MG: 100 INJECTION SUBCUTANEOUS at 21:03

## 2024-08-26 RX ADMIN — TRAZODONE HYDROCHLORIDE 100 MG: 100 TABLET ORAL at 21:02

## 2024-08-26 RX ADMIN — INSULIN LISPRO 4 UNITS: 100 INJECTION, SOLUTION INTRAVENOUS; SUBCUTANEOUS at 21:02

## 2024-08-26 RX ADMIN — INSULIN LISPRO 16 UNITS: 100 INJECTION, SOLUTION INTRAVENOUS; SUBCUTANEOUS at 12:12

## 2024-08-26 RX ADMIN — DICYCLOMINE HYDROCHLORIDE 10 MG: 10 CAPSULE ORAL at 13:16

## 2024-08-26 RX ADMIN — ALLOPURINOL 100 MG: 100 TABLET ORAL at 09:42

## 2024-08-26 RX ADMIN — DICYCLOMINE HYDROCHLORIDE 10 MG: 10 CAPSULE ORAL at 09:42

## 2024-08-26 RX ADMIN — Medication 10 ML: at 21:04

## 2024-08-26 RX ADMIN — INSULIN GLARGINE 15 UNITS: 100 INJECTION, SOLUTION SUBCUTANEOUS at 21:03

## 2024-08-26 RX ADMIN — INSULIN GLARGINE 10 UNITS: 100 INJECTION, SOLUTION SUBCUTANEOUS at 09:34

## 2024-08-26 RX ADMIN — ENOXAPARIN SODIUM 40 MG: 100 INJECTION SUBCUTANEOUS at 09:35

## 2024-08-26 RX ADMIN — PANTOPRAZOLE SODIUM 40 MG: 40 TABLET, DELAYED RELEASE ORAL at 05:55

## 2024-08-26 RX ADMIN — Medication 10 ML: at 09:43

## 2024-08-26 RX ADMIN — OXYCODONE HYDROCHLORIDE AND ACETAMINOPHEN 1 TABLET: 7.5; 325 TABLET ORAL at 09:42

## 2024-08-26 RX ADMIN — DICYCLOMINE HYDROCHLORIDE 10 MG: 10 CAPSULE ORAL at 21:02

## 2024-08-26 RX ADMIN — BUDESONIDE AND FORMOTEROL FUMARATE DIHYDRATE 2 PUFF: 80; 4.5 AEROSOL RESPIRATORY (INHALATION) at 07:30

## 2024-08-26 RX ADMIN — Medication 400 MG: at 09:42

## 2024-08-26 RX ADMIN — ATORVASTATIN CALCIUM 10 MG: 10 TABLET, FILM COATED ORAL at 21:02

## 2024-08-26 RX ADMIN — FUROSEMIDE 40 MG: 10 INJECTION, SOLUTION INTRAMUSCULAR; INTRAVENOUS at 09:43

## 2024-08-26 RX ADMIN — OXYCODONE HYDROCHLORIDE AND ACETAMINOPHEN 1 TABLET: 7.5; 325 TABLET ORAL at 02:51

## 2024-08-26 RX ADMIN — Medication 400 MG: at 21:02

## 2024-08-26 RX ADMIN — BUDESONIDE AND FORMOTEROL FUMARATE DIHYDRATE 2 PUFF: 80; 4.5 AEROSOL RESPIRATORY (INHALATION) at 20:19

## 2024-08-26 RX ADMIN — ESCITALOPRAM OXALATE 10 MG: 10 TABLET ORAL at 09:42

## 2024-08-26 RX ADMIN — OXYCODONE HYDROCHLORIDE AND ACETAMINOPHEN 1 TABLET: 7.5; 325 TABLET ORAL at 21:02

## 2024-08-26 RX ADMIN — FERROUS SULFATE TAB 325 MG (65 MG ELEMENTAL FE) 325 MG: 325 (65 FE) TAB at 09:50

## 2024-08-26 ASSESSMENT — PAIN DESCRIPTION - DESCRIPTORS
DESCRIPTORS: ACHING
DESCRIPTORS: ACHING;SHARP;STABBING
DESCRIPTORS: ACHING;DISCOMFORT
DESCRIPTORS: ACHING;SHARP;STABBING

## 2024-08-26 ASSESSMENT — PAIN SCALES - GENERAL
PAINLEVEL_OUTOF10: 6
PAINLEVEL_OUTOF10: 8
PAINLEVEL_OUTOF10: 8
PAINLEVEL_OUTOF10: 4
PAINLEVEL_OUTOF10: 8
PAINLEVEL_OUTOF10: 8
PAINLEVEL_OUTOF10: 3

## 2024-08-26 ASSESSMENT — PAIN DESCRIPTION - LOCATION
LOCATION: BACK

## 2024-08-26 ASSESSMENT — PAIN DESCRIPTION - ORIENTATION
ORIENTATION: LOWER
ORIENTATION: RIGHT;LEFT;LOWER;MID
ORIENTATION: LOWER
ORIENTATION: LOWER

## 2024-08-26 ASSESSMENT — PAIN - FUNCTIONAL ASSESSMENT
PAIN_FUNCTIONAL_ASSESSMENT: ACTIVITIES ARE NOT PREVENTED
PAIN_FUNCTIONAL_ASSESSMENT: PREVENTS OR INTERFERES SOME ACTIVE ACTIVITIES AND ADLS

## 2024-08-26 ASSESSMENT — PAIN SCALES - WONG BAKER: WONGBAKER_NUMERICALRESPONSE: HURTS A LITTLE BIT

## 2024-08-26 ASSESSMENT — PAIN DESCRIPTION - PAIN TYPE: TYPE: CHRONIC PAIN

## 2024-08-26 ASSESSMENT — PAIN DESCRIPTION - FREQUENCY: FREQUENCY: CONTINUOUS

## 2024-08-26 ASSESSMENT — PAIN DESCRIPTION - ONSET: ONSET: ON-GOING

## 2024-08-26 NOTE — PROGRESS NOTES
Bedside report and transfer of care given to Barbara Leigh. Pt currently resting in bed with the call light within reach. Pt denies any other care needs at this time. Pt stable at this time.

## 2024-08-26 NOTE — ACP (ADVANCE CARE PLANNING)
Advance Care Planning     General Advance Care Planning (ACP) Conversation    Date of Conversation: 8/26/2024  Conducted with: Patient with Decision Making Capacity  Other persons present: Spouse Jose    Healthcare Decision Maker:   Primary Decision Maker: Jose Gunn - Spouse - [phone]    Secondary Decision Maker: ArielHaydee - Child - [phone]       Content/Action Overview:  DECLINED ACP Conversation - will revisit periodically  Reviewed DNR/DNI and patient elects Full Code (Attempt Resuscitation)        Length of Voluntary ACP Conversation in minutes:  <16 minutes (Non-Billable)    Nick Hedrick RN

## 2024-08-26 NOTE — PROGRESS NOTES
IM Progress Note    Admit Date:  8/25/2024  1    Interval history:    59 y.o. female who presented to [facility] with abdominal and leg swelling     Subjective:  Ms. Gunn seen up in bed, doing better   Still with abd distention and hand swelling per pt  Good diuresis  1.5 L neg since arrival     Objective:   /62   Pulse 66   Temp 98 °F (36.7 °C) (Oral)   Resp 18   Ht 1.575 m (5' 2\")   Wt (!) 157.1 kg (346 lb 6 oz)   SpO2 94%   BMI 63.35 kg/m²     Intake/Output Summary (Last 24 hours) at 8/26/2024 0747  Last data filed at 8/26/2024 0557  Gross per 24 hour   Intake --   Output 1550 ml   Net -1550 ml       Physical Exam:          General:  obese middle aged female up in bed,   Awake, alert and oriented. Appears to be not in any distress  Mucous Membranes:  Pink , anicteric  Neck: No JVD, no carotid bruit, no thyromegaly  Chest:  Clear to auscultation bilaterally diminished in bases  Cardiovascular:  RRR S1S2 heard, no murmurs or gallops  Abdomen:  Soft, morbid obesity   undistended, non tender, no organomegaly, BS present  Extremities: No edema or cyanosis. Distal pulses well felt  Neurological : grossly normal with gen weakness      Medications:   Scheduled Medications:    pantoprazole  40 mg Oral QAM AC    magnesium oxide  400 mg Oral BID    sodium chloride flush  5-40 mL IntraVENous 2 times per day    enoxaparin  30 mg SubCUTAneous BID    allopurinol  100 mg Oral Daily    dicyclomine  10 mg Oral TID    escitalopram  10 mg Oral Daily    budesonide-formoterol  2 puff Inhalation BID RT    And    tiotropium  2 puff Inhalation Daily RT    insulin glargine  15 Units SubCUTAneous Nightly    traZODone  100 mg Oral Nightly    atorvastatin  10 mg Oral Nightly    furosemide  40 mg IntraVENous BID    insulin lispro  0-16 Units SubCUTAneous TID WC    insulin lispro  0-4 Units SubCUTAneous Nightly     I   sodium chloride      dextrose       oxyCODONE-acetaminophen, sodium chloride flush, sodium chloride, ondansetron

## 2024-08-26 NOTE — PROGRESS NOTES
Patient admitted to room 308 from er. Patient oriented to room, call light, bed rails, phone, lights and bathroom. Patient instructed about the schedule of the day including: vital sign frequency, lab draws, possible tests, frequency of MD and staff rounds, daily weights, I &O's and prescribed diet. 46 Telemetry box in place, patient aware of placement and reason. Bed locked, in lowest position, side rails up 2/4, call light within reach.        Recliner Assessment  Patient is able to demonstrate the ability to move from a reclining position to an upright position within the recliner.       4 Eyes Skin Assessment     NAME:  Brianne Gunn  YOB: 1965  MEDICAL RECORD NUMBER:  [id number]    The patient is being assessed for  Admission    I agree that at least one RN has performed a thorough Head to Toe Skin Assessment on the patient. ALL assessment sites listed below have been assessed.      Areas assessed by both nurses:    Head, Face, Ears, Shoulders, Back, Chest, Arms, Elbows, Hands, Sacrum. Buttock, Coccyx, Ischium, Legs. Feet and Heels, and Under Medical Devices         Does the Patient have a Wound? No noted wound(s)       Vishnu Prevention initiated by RN: No  Wound Care Orders initiated by RN: No    Pressure Injury (Stage 3,4, Unstageable, DTI, NWPT, and Complex wounds) if present, place Wound referral order by RN under : No    New Ostomies, if present place, Ostomy referral order under : No     Nurse 1 eSignature: Electronically signed by Paola Rutledge RN on 8/25/24 at 9:37 PM EDT    **SHARE this note so that the co-signing nurse can place an eSignature**    Nurse 2 eSignature: Electronically signed by Virgen Tran RN on 8/25/24 at 9:41 PM EDT

## 2024-08-26 NOTE — CONSULTS
15, AST 22, H/H 10.7/33.9 and Gregory 30 x 2.   Diagnosis acute on chronic diastolic CHF in middle-aged female with multiple med problems.    Recs:  ? Etiology of decompensation. Possibly not enough diuretic home regimen.  Continue IV lasix 40 BID. Diuresing well with net negative 1.7L.  Salt/fluid restrict, daily weights, strict I/O's  Likely would d/c on lasix 80mg daily (prior home regimen 40mg qd). She can f/u regular cards Dr. Hwang and he can adjust as OP.  No need for cardiac testing at this time. Recent ECHO 2/4/24. No infectious signs or symptoms. No need for KHADAR.  Continue lipitor 10 qd    Note acute on chronic diastolic CHF increases her morbidity and mortality requiring IV diuresis.     Hopefully home tomorrow.    Patient Active Problem List   Diagnosis    Acute respiratory failure (HCC)    Fluid overload    Acidosis    Panniculitis    Chronic obstructive pulmonary disease (HCC)    General weakness    PNA (pneumonia)    Elevated blood pressure reading    Morbid obesity (HCC)    Hyperlipidemia    DM (diabetes mellitus) (HCC)    CHF (congestive heart failure) (HCC)    Tremor    Hypomagnesemia    Tremors of nervous system    Generalized weakness    Hyperkalemia    Acute respiratory failure with hypoxemia (HCC)    Hypercapnia    Fall at home, initial encounter    Unable to walk    Acute hip pain, left    Acute pain of both knees    Acute pain of left shoulder    Dysphagia    Lactic acidosis    Fall on same level from slipping, tripping, or stumbling    Cholecystitis    Acute on chronic respiratory failure with hypercapnia (HCC)    Chronic respiratory failure with hypoxia and hypercapnia (HCC)    Acute on chronic diastolic CHF (congestive heart failure) (HCC)    Acute cholecystitis    CHF (congestive heart failure), NYHA class I, acute, diastolic ([facility])        Thank you for allowing to us to participate in the care or Brianne Gunn. Further evaluation will be based upon the patient's clinical course and  testing results.

## 2024-08-26 NOTE — H&P
Hospital Medicine History & Physical      Date of Admission: 8/25/2024    Date of Service:  Pt seen/examined on 8/25/2024    [x]Admitted to Inpatient with expected LOS greater than two midnights due to medical therapy.  []Placed in Observation status.    Chief Admission Complaint: Lower abdominal swelling    Presenting Admission History:      59 y.o. female who presented to [facility] with abdominal swelling.  PMHx significant for cholecystitis, chronic diastolic heart failure, COPD on 4 L home O2, anemia, hypertension, type 2 diabetes, JOSE on CPAP, history of TIA, morbid obesity.      Patient reports that she has been having increasing abdominal swelling for the past couple days to week and says initially she thought was cellulitis because she has had cellulitis in the past that presented similarly.  She was seen by her PCP last Friday and her Lasix were increased from 40 mg daily to 3 times daily for 3 days but her swelling persisted.  She also complained of worsening orthopnea.  She is a baseline, she does have worsening breathing while she lays flat but this was worse than usual.  Says she had some pain in her left upper chest but pointing more towards her shoulder that is worse when she moves her left arm.  Otherwise denies any other chest pain.  She came to the emergency department because her symptoms are not improving despite the increased dose of Lasix.  She denies swelling in her lower extremities.  Denies nausea, vomiting, diarrhea, constipation.  She did complain of some generalized aching abdominal pain associate with the swelling.   Otherwise, no other acute complaints.    In the ED, she remained on her baseline 4 L oxygen.  Vital signs were stable.  ABG was unremarkable.BMP reviewed and show sodium 137, potassium 4.9, chloride 96.  Her creatinine was 1.3 and she had initial lactic of 2.7 that improved to 1.7.  Her glucose was also elevated to 85.  proBNP came back at 1191.  Her high sensitive troponins    Yes Sam Lozano MD   nystatin (MYCOSTATIN) 401079 UNIT/GM cream Apply topically nightly Apply under breasts at night   Yes Sam Lozano MD   ferrous sulfate (FE TABS) 325 (65 FE) MG EC tablet Take 1 tablet by mouth 3 times daily (with meals) 2/9/16  Yes Nini Jones MD   dicyclomine (BENTYL) 10 MG capsule Take 1 capsule by mouth 3 times daily   Yes Sam Lozano MD   nystatin (MYCOSTATIN) 022902 UNIT/GM powder Apply topically 4 times daily.  Patient not taking: Reported on 8/25/2024    Sam Lozano MD   potassium chloride (KLOR-CON M) 20 MEQ extended release tablet Take 1 tablet by mouth daily  Patient not taking: Reported on 6/16/2024    Sam Lozano MD   EPINEPHrine (EPIPEN) 0.3 MG/0.3ML SOAJ injection Inject 0.3 mLs into the muscle Allergy of unknown origin  Patient not taking: Reported on 8/25/2024 12/1/16   Sam Lozano MD       Labs: Personally reviewed and interpreted for clinical significance.   Recent Labs     08/25/24  1438   WBC 5.8   HGB 10.7*   HCT 33.9*        Recent Labs     08/25/24  1438      K 4.9   CL 96*   CO2 32   BUN 30*   CREATININE 1.3*   CALCIUM 9.3     Recent Labs     08/25/24  1438 08/25/24  1546   PROBNP 1,191*  --    TROPHS 30* 30*     No results for input(s): \"LABA1C\" in the last 72 hours.  Recent Labs     08/25/24  1438   AST 22   ALT 15   BILITOT <0.2   ALKPHOS 110     No results for input(s): \"INR\", \"LACTA\", \"TSH\" in the last 72 hours.     Martin Godfrey MD

## 2024-08-26 NOTE — PLAN OF CARE
HEART FAILURE CARE PLAN:    Comorbidities Reviewed: Yes   Patient has a past medical history of Anxiety, Arthritis, CHF (congestive heart failure) (HCC), COPD (chronic obstructive pulmonary disease) (HCC), Depression, Diabetes mellitus (HCC), Diverticulosis, Hyperlipidemia, Hypertension, and Obesities, morbid (HCC).     Weights Reviewed: Yes   Admission weight: 113.4 kg (250 lb)   Wt Readings from Last 3 Encounters:   08/25/24 (!) 157.1 kg (346 lb 6 oz)   06/26/24 (!) 156 kg (344 lb)   06/20/24 (!) 156 kg (344 lb)     Intake & Output Reviewed: Yes     Intake/Output Summary (Last 24 hours) at 8/26/2024 1015  Last data filed at 8/26/2024 1010  Gross per 24 hour   Intake 380 ml   Output 2050 ml   Net -1670 ml       ECHOCARDIOGRAM Reviewed: Yes   Patient's Ejection Fraction (EF) is greater than 40%     Medications Reviewed: Yes   SCHEDULED HOSPITAL MEDICATIONS:   pantoprazole  40 mg Oral QAM AC    magnesium oxide  400 mg Oral BID    enoxaparin  40 mg SubCUTAneous BID    ferrous sulfate  325 mg Oral Daily with breakfast    sodium chloride flush  5-40 mL IntraVENous 2 times per day    allopurinol  100 mg Oral Daily    dicyclomine  10 mg Oral TID    escitalopram  10 mg Oral Daily    budesonide-formoterol  2 puff Inhalation BID RT    And    tiotropium  2 puff Inhalation Daily RT    insulin glargine  15 Units SubCUTAneous Nightly    atorvastatin  10 mg Oral Nightly    furosemide  40 mg IntraVENous BID    insulin lispro  0-16 Units SubCUTAneous TID WC    insulin lispro  0-4 Units SubCUTAneous Nightly     HOME MEDICATIONS:  Prior to Admission medications    Medication Sig Start Date End Date Taking? Authorizing Provider   ondansetron (ZOFRAN) 4 MG tablet Take 1 tablet by mouth 3 times daily as needed for Nausea or Vomiting 6/26/24  Yes Jimmy Collier MD   metFORMIN (GLUCOPHAGE-XR) 500 MG extended release tablet Take 1 tablet by mouth 2 times daily (before meals)   Yes ProviderSam MD   meloxicam (MOBIC) 15 MG

## 2024-08-26 NOTE — CARE COORDINATION
Case Management Assessment  Initial Evaluation    Date/Time of Evaluation: 8/26/2024 8:56 AM  Assessment Completed by: Nick Hedrick RN    If patient is discharged prior to next notation, then this note serves as note for discharge by case management.    Patient Name: Brianne Gunn                   YOB: 1965  Diagnosis: Acute on chronic diastolic congestive heart failure (HCC) [I50.33]  CHF (congestive heart failure), NYHA class I, acute, diastolic (HCC) [I50.31]                   Date / Time: 8/25/2024  1:33 PM    Patient Admission Status: Inpatient   Readmission Risk (Low < 19, Mod (19-27), High > 27): Readmission Risk Score: 18.5    Current PCP: Ani White PA-C  PCP verified by CM? Yes    Chart Reviewed: Yes      History Provided by: Patient  Patient Orientation: Alert and Oriented    Patient Cognition: Alert    Hospitalization in the last 30 days (Readmission):  no   If yes, Readmission Assessment in CM Navigator will be completed.    Advance Directives:      Code Status: Full Code   Patient's Primary Decision Maker is: Legal Next of Kin    Primary Decision Maker: Jose Gunn - Spouse - [phone]    Secondary Decision Maker: Hadyee George - Child - [phone]    Discharge Planning:    Patient lives with: Spouse/Significant Other Type of Home: Trailer/Mobile Home  Primary Care Giver: Self  Patient Support Systems include: Spouse/Significant Other   Current Financial resources: Medicare, Medicaid  Current community resources: None  Current services prior to admission: C-pap, Oxygen Therapy, Durable Medical Equipment            Current DME: Oxygen Therapy (Comment), Shower Chair, Walker, Cane, Wheelchair ([employer])            Type of Home Care services:  None    ADLS  Prior functional level: Independent in ADLs/IADLs  Current functional level: Independent in ADLs/IADLs    PT AM-PAC:   /24  OT AM-PAC:   /24    Family can provide assistance at DC: Yes  Would you like Case Management to discuss

## 2024-08-26 NOTE — PROGRESS NOTES
Cardiology Consult has been called to [facility] on 08/26. Spoke with answering service. 6:27 AM    Halle Monson RN  8/26/2024

## 2024-08-26 NOTE — DISCHARGE INSTRUCTIONS
Low salt diet  Daily weights  F/w PCP in 1 week for renal panel  Take lasix 40mg daily except on Wednesday and Saturday take 2 x 40 mg   F/w cardiology in 2 weeks        Heart Failure Resources:  Heart Failure Interactive Workbook:  Go to https://TIKI.VN.fuseSPORT/publication/?y=227647 for a Free Heart Failure Interactive Workbook provided by The American Heart Association. This interactive workbook will provide information on Healthier Living with Heart Failure. Please copy and paste link into search bar. Use your mouse to scroll through the pages.    HF [place] kami:   Heart Failure Free smart phone kami available for iPhone and Android download. Use your phone to track sodium intake, fluid intake, symptoms, and weight.     Low Sodium Diet / Recipes:  Go to www.[employer] [email or web address].[email or web address] website for “renal” diet which is Low Sodium! [employer] is a dialysis company, but this website offers free seasonal cookbooks. Each quarter, they will release 25-30 new recipes with a breakdown of calories, sodium, and glucose. You can also go to [email or web address]/recipes website for free recipes.     Discharge Instruction Video:  Scan the QR code below with your camera and click the canva.com link to open the video and watch educational information on Heart Failure and Medications from one of our nurses.   https://www.Trainfox/design/DAFZnsH_JRk/3VhkqcnGLBIziIHecA1mon/edit    Home Exercise Program:   Identification of Green/Yellow/Red zones:  You should be able to identify when you feel good (green zone), if you have 1-2 symptoms of HF (yellow zone), or if you are in need of medical attention (red zone).  In your CHF education folder you were provided a “stop light tool” to outline this information.     We want to you to rate your exertion levels:    Our therapy team has discussed means of identification with you such as the \"Iván scale.\"  The Iván rating scale ranges from 6 to 20, where 6 means \"no exertion at all\" and 20 means \"maximal

## 2024-08-26 NOTE — FLOWSHEET NOTE
08/26/24 0741   Vital Signs   Temp 98.2 °F (36.8 °C)   Temp Source Oral   Pulse 69   Heart Rate Source Monitor   Respirations 18   /67   MAP (Calculated) 89   BP Location Right lower arm   BP Method Automatic   Patient Position Semi fowlers   Pain Assessment   Pain Assessment 0-10   Pain Level 8   Patient's Stated Pain Goal 5   Pain Location Back   Pain Orientation Lower   Pain Descriptors Aching;Sharp;Stabbing   Functional Pain Assessment Prevents or interferes some active activities and ADLs   Pain Type Chronic pain   Pain Frequency Continuous   Pain Onset On-going   Non-Pharmaceutical Pain Intervention(s) Repositioned;Rest   Oxygen Therapy   SpO2 96 %   O2 Device Nasal cannula   O2 Flow Rate (L/min) 4 L/min     AM assessment complete. Pt a&o x4.  at bedside. They are aware of new orders. C/o back pain as described above. All AM medications given per orders. Call light and bedside table within reach.

## 2024-08-26 NOTE — FLOWSHEET NOTE
08/25/24 2324   Vital Signs   Temp 97.8 °F (36.6 °C)   Temp Source Oral   Pulse 70   Heart Rate Source Monitor   Respirations 16   /65   MAP (Calculated) 89   BP Location Right lower arm   BP Method Automatic   Patient Position Semi fowlers   Oxygen Therapy   SpO2 98 %   O2 Flow Rate (L/min) 4 L/min     Patient resting in bed. No S/S of acute distress noted. Call light in easy reach.

## 2024-08-26 NOTE — PROGRESS NOTES
Pt's bs 371, per SSI give 16u and notify physican. JessicaServe sent to Dr Jones to inform him and to see if he would like any additional insulin given. Awaiting response.

## 2024-08-26 NOTE — PROGRESS NOTES
CHF Nutrition Education    Consult received for heart failure diet education.  Education deferred or not appropriate at this time related to previous diet education provided on low sodium diet; patient expressed no additional education needed at this time. Cardiology nurse educated patient today. No additional education needs expressed.       Electronically signed by Winnie Grier RD, LD on 8/26/2024 at 1:58 PM

## 2024-08-26 NOTE — CONSULTS
[facility]   HEART FAILURE PROGRAM    Provided the Patient with Heart Failure education on: signs/symptoms to monitor, medications, daily weights, low sodium diet, 2000 ml fluid restriction, and activity. Reviewed HF Zones (green/yellow/red) and importance of reporting yellow symptoms on Magnet provided. Reinforced the importance of daily weights and to report weight gain of 3 lbs in one day and 5 lbs in one week to Provider. Provided patient with a paper copy weight log to keep track of daily weights.    Education:     EDUCATIONAL MATERIALS PROVIDED:    [x]  [place]: A Patient Education Guide Living with Heart Failure Booklet  [x]  Heart Failure Zones Self-Check Plan  [x]  Weight and Heart Failure Zone Log  [x]  AHA: HF and Your Ejection Fraction Explained  [x]  Sleep Disorders and Your Heart  [x]  AHA: HF [place] Aids Patients at Home  [x]  Magnet: Signs of Heart Failure    PATIENT/CAREGIVER TEACHING:   Level of patient/caregiver understanding able to:   [x] Verbalize understanding   [] Demonstrate understanding       [] Teach back        [] Needs reinforcement     []  Other:      TEACHING TIME:  10 minutes       Recommendations:     [x]  Encourage to call Heart Failure Resource Line [phone] with any questions or concerns.  [x]  Encourage follow-up appointment compliance.   [x]  Emphasize daily weights: Instruct patient to call the MD if weight gain of 3 lbs in 1 day or 5 lbs in a week.   [x]  Review sodium restriction diet. Encourage patient to not add table salt and avoid foods high in sodium.   [x]  Educate further on fluid restriction of 48 oz - 64 oz with labeled pitcher during inpatient admission.  [x]  Continue to educate on signs & symptoms of Heart Failure.  []  Other:            Electronically signed by Electronically signed by Shira Yates RN on 8/26/2024 at 1:36 PM

## 2024-08-27 VITALS
SYSTOLIC BLOOD PRESSURE: 122 MMHG | OXYGEN SATURATION: 96 % | RESPIRATION RATE: 18 BRPM | BODY MASS INDEX: 53.92 KG/M2 | WEIGHT: 293 LBS | HEART RATE: 67 BPM | HEIGHT: 62 IN | DIASTOLIC BLOOD PRESSURE: 61 MMHG | TEMPERATURE: 97.5 F

## 2024-08-27 LAB
ANION GAP SERPL CALCULATED.3IONS-SCNC: 6 MMOL/L (ref 3–16)
BUN SERPL-MCNC: 32 MG/DL (ref 7–20)
CALCIUM SERPL-MCNC: 9.1 MG/DL (ref 8.3–10.6)
CHLORIDE SERPL-SCNC: 98 MMOL/L (ref 99–110)
CO2 SERPL-SCNC: 35 MMOL/L (ref 21–32)
CREAT SERPL-MCNC: 1.3 MG/DL (ref 0.6–1.2)
GFR SERPLBLD CREATININE-BSD FMLA CKD-EPI: 47 ML/MIN/{1.73_M2}
GLUCOSE BLD-MCNC: 290 MG/DL (ref 70–99)
GLUCOSE BLD-MCNC: 315 MG/DL (ref 70–99)
GLUCOSE BLD-MCNC: 343 MG/DL (ref 70–99)
GLUCOSE SERPL-MCNC: 338 MG/DL (ref 70–99)
MAGNESIUM SERPL-MCNC: 2 MG/DL (ref 1.8–2.4)
PERFORMED ON: ABNORMAL
POTASSIUM SERPL-SCNC: 4.4 MMOL/L (ref 3.5–5.1)
SODIUM SERPL-SCNC: 139 MMOL/L (ref 136–145)

## 2024-08-27 PROCEDURE — 99238 HOSP IP/OBS DSCHRG MGMT 30/<: CPT | Performed by: INTERNAL MEDICINE

## 2024-08-27 PROCEDURE — 80048 BASIC METABOLIC PNL TOTAL CA: CPT

## 2024-08-27 PROCEDURE — 83735 ASSAY OF MAGNESIUM: CPT

## 2024-08-27 PROCEDURE — 96375 TX/PRO/DX INJ NEW DRUG ADDON: CPT

## 2024-08-27 PROCEDURE — 6360000002 HC RX W HCPCS: Performed by: INTERNAL MEDICINE

## 2024-08-27 PROCEDURE — G0378 HOSPITAL OBSERVATION PER HR: HCPCS

## 2024-08-27 PROCEDURE — 36415 COLL VENOUS BLD VENIPUNCTURE: CPT

## 2024-08-27 PROCEDURE — 94761 N-INVAS EAR/PLS OXIMETRY MLT: CPT

## 2024-08-27 PROCEDURE — 6370000000 HC RX 637 (ALT 250 FOR IP): Performed by: STUDENT IN AN ORGANIZED HEALTH CARE EDUCATION/TRAINING PROGRAM

## 2024-08-27 PROCEDURE — 6370000000 HC RX 637 (ALT 250 FOR IP): Performed by: INTERNAL MEDICINE

## 2024-08-27 PROCEDURE — 96376 TX/PRO/DX INJ SAME DRUG ADON: CPT

## 2024-08-27 PROCEDURE — 96372 THER/PROPH/DIAG INJ SC/IM: CPT

## 2024-08-27 PROCEDURE — 94640 AIRWAY INHALATION TREATMENT: CPT

## 2024-08-27 PROCEDURE — 2580000003 HC RX 258: Performed by: INTERNAL MEDICINE

## 2024-08-27 PROCEDURE — 99233 SBSQ HOSP IP/OBS HIGH 50: CPT | Performed by: INTERNAL MEDICINE

## 2024-08-27 PROCEDURE — 2700000000 HC OXYGEN THERAPY PER DAY

## 2024-08-27 RX ORDER — FUROSEMIDE 40 MG
40 TABLET ORAL DAILY
Status: DISCONTINUED | OUTPATIENT
Start: 2024-08-27 | End: 2024-08-27 | Stop reason: HOSPADM

## 2024-08-27 RX ORDER — POTASSIUM CHLORIDE 1500 MG/1
20 TABLET, EXTENDED RELEASE ORAL DAILY
Qty: 60 TABLET | Refills: 0 | Status: SHIPPED | OUTPATIENT
Start: 2024-08-27

## 2024-08-27 RX ORDER — FUROSEMIDE 40 MG
TABLET ORAL
Qty: 60 TABLET | Refills: 3
Start: 2024-08-27

## 2024-08-27 RX ADMIN — ONDANSETRON 4 MG: 2 INJECTION INTRAMUSCULAR; INTRAVENOUS at 05:51

## 2024-08-27 RX ADMIN — FERROUS SULFATE TAB 325 MG (65 MG ELEMENTAL FE) 325 MG: 325 (65 FE) TAB at 08:09

## 2024-08-27 RX ADMIN — BUDESONIDE AND FORMOTEROL FUMARATE DIHYDRATE 2 PUFF: 80; 4.5 AEROSOL RESPIRATORY (INHALATION) at 08:06

## 2024-08-27 RX ADMIN — FUROSEMIDE 40 MG: 10 INJECTION, SOLUTION INTRAMUSCULAR; INTRAVENOUS at 08:06

## 2024-08-27 RX ADMIN — DICYCLOMINE HYDROCHLORIDE 10 MG: 10 CAPSULE ORAL at 08:08

## 2024-08-27 RX ADMIN — Medication 10 ML: at 08:07

## 2024-08-27 RX ADMIN — INSULIN LISPRO 8 UNITS: 100 INJECTION, SOLUTION INTRAVENOUS; SUBCUTANEOUS at 08:05

## 2024-08-27 RX ADMIN — OXYCODONE HYDROCHLORIDE AND ACETAMINOPHEN 1 TABLET: 7.5; 325 TABLET ORAL at 08:09

## 2024-08-27 RX ADMIN — PANTOPRAZOLE SODIUM 40 MG: 40 TABLET, DELAYED RELEASE ORAL at 05:51

## 2024-08-27 RX ADMIN — TIOTROPIUM BROMIDE INHALATION SPRAY 2 PUFF: 3.12 SPRAY, METERED RESPIRATORY (INHALATION) at 08:07

## 2024-08-27 RX ADMIN — ESCITALOPRAM OXALATE 10 MG: 10 TABLET ORAL at 08:09

## 2024-08-27 RX ADMIN — Medication 400 MG: at 08:08

## 2024-08-27 RX ADMIN — ALLOPURINOL 100 MG: 100 TABLET ORAL at 08:09

## 2024-08-27 RX ADMIN — INSULIN LISPRO 12 UNITS: 100 INJECTION, SOLUTION INTRAVENOUS; SUBCUTANEOUS at 12:52

## 2024-08-27 RX ADMIN — ENOXAPARIN SODIUM 40 MG: 100 INJECTION SUBCUTANEOUS at 08:08

## 2024-08-27 ASSESSMENT — PAIN DESCRIPTION - LOCATION
LOCATION: BACK
LOCATION: BACK

## 2024-08-27 ASSESSMENT — PAIN DESCRIPTION - ORIENTATION: ORIENTATION: LOWER

## 2024-08-27 ASSESSMENT — PAIN SCALES - GENERAL
PAINLEVEL_OUTOF10: 8
PAINLEVEL_OUTOF10: 7

## 2024-08-27 NOTE — PROGRESS NOTES
sodium chloride, ondansetron **OR** ondansetron, polyethylene glycol, acetaminophen **OR** acetaminophen, hydrOXYzine HCl, glucose, dextrose bolus **OR** dextrose bolus, glucagon (rDNA), dextrose, albuterol sulfate HFA    Lab Data:  Recent Labs     08/25/24  1438   WBC 5.8   HGB 10.7*   HCT 33.9*   MCV 83.0        Recent Labs     08/25/24  1438 08/26/24  0431 08/27/24  0421    141 139   K 4.9 4.3 4.4   CL 96* 98* 98*   CO2 32 34* 35*   BUN 30* 32* 32*   CREATININE 1.3* 1.1 1.3*     No results for input(s): \"CKTOTAL\", \"CKMB\", \"CKMBINDEX\", \"TROPONINI\" in the last 72 hours.    Coagulation:   Lab Results   Component Value Date/Time    INR 0.97 10/06/2016 04:55 PM     Cardiac markers:   Lab Results   Component Value Date/Time    TROPONINI <0.01 03/20/2023 03:09 PM         Lab Results   Component Value Date    ALT 15 08/25/2024    AST 22 08/25/2024    ALKPHOS 110 08/25/2024    BILITOT <0.2 08/25/2024       Lab Results   Component Value Date    INR 0.97 10/06/2016    INR 0.99 02/04/2016    INR 1.11 12/26/2014    PROTIME 11.1 10/06/2016    PROTIME 11.3 02/04/2016    PROTIME 12.0 12/26/2014       Radiology      XR CHEST (2 VW)   Final Result   Cardiomegaly, vascular prominence and bibasilar atelectasis.  Nonspecific,   may represent pulmonary edema/fluid overload.         CT ABDOMEN PELVIS W IV CONTRAST Additional Contrast? None   Preliminary Result   1. No acute process in the abdomen or pelvis. The appendix is within normal   limits.   2. Cellulitis versus anasarca involving the inferior abdominal wall.   3. Status post cholecystectomy.   4. Chronic left lower lobe opacity is partially visualized, most likely   atelectasis/scarring.           ECHO   10/23  Technically difficult study due to poor acoustic windows     Left ventricular systolic function appears to be normal with an   estimated ejection fraction of 55 to 60%     Left ventricular diastolic function is consistent with (grade I)   impaired  relaxation.     Normal right ventricular systolic function     No significant valvular disease     Agitated saline study is inconclusive due to poor visualization     There is a trivial circumferential pericardial effusion.     No prior echocardiogram for comparison     Assessment & Plan:      #Acute on chronic HFpEF exacerbation:   Last echo with normal EF but impaired relaxation   - tried increasing home lasix 40 mg daily to BID x 3 days with no benefit   -Started IV  Lasix 40 mg twice daily with good diuresis , net neg of 3.7L  - resp status stable on home o2 of 4 L   -Strict I's and O's  - low salt diet  - resumed home norvasc   - recommend to take lasix 40 mg daily except on wed and sat take 80 mg   - dc to home  - fw cardiology as outpt       #Type 2 diabetes  - holding home meds glipizide, trulicity   -Lantus 15 units nightly plus sliding scale insulin.  Adjust basal dose based on patient's daily needs.       #JOSE on BiPAP at home:  -Patient did not bring home machine with her.  She does not want to use hospital machine tonight.     #COPD with chronic hypoxic resp failure on  4 L:   Not in acute exacerbation  -Restart home inhalers     #History of TIA:  -Restart home Lipitor and ASA       #Thrombocytopenia  -monitor  -appears chronic      #Chronic pain   -on percocet, lyrica      #Morbid obesity class III- weight loss will benefit      #Chronic anemia- stable hb at 10.7. low iron noted.   Start oral iron supplements    Lovenox   Full code      Leo Jones MD, 8/27/2024 8:30 AM

## 2024-08-27 NOTE — DISCHARGE SUMMARY
08/26/24  0431 08/27/24  0421    141 139   K 4.9 4.3 4.4   CL 96* 98* 98*   CO2 32 34* 35*   BUN 30* 32* 32*   CREATININE 1.3* 1.1 1.3*     LIVER PROFILE:   Recent Labs     08/25/24  1438   AST 22   ALT 15   LIPASE 36.0   BILITOT <0.2   ALKPHOS 110     UA:  Recent Labs     08/25/24  1437   COLORU Yellow   PHUR 7.0   WBCUA 3-5   RBCUA None seen   BACTERIA 2+*   CLARITYU SL CLOUDY*   LEUKOCYTESUR Negative   UROBILINOGEN 0.2   BILIRUBINUR Negative   BLOODU Negative   GLUCOSEU 100*       ABG    Lab Results   Component Value Date/Time    QLG1RAW 30.0 04/08/2024 03:53 PM    BEART 3.0 04/08/2024 03:53 PM    P5EUBXNS 92.6 04/08/2024 03:53 PM    PHART 7.333 04/08/2024 03:53 PM    NGO3SHS 57.8 04/08/2024 03:53 PM    PO2ART 69.8 04/08/2024 03:53 PM    BFS4HXV 31.8 04/08/2024 03:53 PM         CULTURES  Results       ** No results found for the last 336 hours. **              RADIOLOGY  XR CHEST (2 VW)   Final Result   Cardiomegaly, vascular prominence and bibasilar atelectasis.  Nonspecific,   may represent pulmonary edema/fluid overload.         CT ABDOMEN PELVIS W IV CONTRAST Additional Contrast? None   Preliminary Result   1. No acute process in the abdomen or pelvis. The appendix is within normal   limits.   2. Cellulitis versus anasarca involving the inferior abdominal wall.   3. Status post cholecystectomy.   4. Chronic left lower lobe opacity is partially visualized, most likely   atelectasis/scarring.             ECHO   10/23  Technically difficult study due to poor acoustic windows     Left ventricular systolic function appears to be normal with an   estimated ejection fraction of 55 to 60%     Left ventricular diastolic function is consistent with (grade I)   impaired relaxation.     Normal right ventricular systolic function     No significant valvular disease     Agitated saline study is inconclusive due to poor visualization     There is a trivial circumferential pericardial effusion.     No prior  tablet  Commonly known as: ZOCOR     traZODone 100 MG tablet  Commonly known as: EAMON     Otonieljesse Ellipta 100-62.5-25 MCG/ACT Aepb inhaler  Generic drug: fluticasone-umeclidin-vilant  Inhale 1 puff into the lungs daily            ASK your doctor about these medications      EPINEPHrine 0.3 MG/0.3ML Soaj injection  Commonly known as: EPIPEN               Where to Get Your Medications        These medications were sent to [facility]  - [facility] [address] - P [phone] - F [phone]  [address] [place] [address]      Phone: [phone]   potassium chloride 20 MEQ extended release tablet       Information about where to get these medications is not yet available    Ask your nurse or doctor about these medications  furosemide 40 MG tablet           Discharged in stable condition to home.    Follow Up:  Follow up with PCP.

## 2024-08-27 NOTE — CARE COORDINATION
Met with pt at bedside. Plan to return home with spouse. Pt wears continuous oxygen via [employer]. Pt has bipap at home via [employer]. Declines HHC. Spouse to provide transport home. Spouse to bring portable o2 tank. No DME or DC needs identified.     IMM- 8/25    O2- 96% 4L

## 2024-08-27 NOTE — PROGRESS NOTES
Shift assessment complete, morning medications given, patient resting with  complaints of pain 8 out of 10 in back percocet given per MAR, patient helped to bedside for breakfast,, will continue to monitor. Lacey Thomason RN

## 2024-08-27 NOTE — PROGRESS NOTES
O2 sat @ rest on RA is 88%.  O2 sat with activity on RA is 84%.  O2 sat at rest with oxygen at 4 lpm is 97%.  O2 sat with activity with O2 @ 4 lpm is 92%.  Lacey Thomason RN

## 2024-08-27 NOTE — PLAN OF CARE
HEART FAILURE CARE PLAN:    Comorbidities Reviewed: Yes   Patient has a past medical history of Anxiety, Arthritis, CHF (congestive heart failure) (HCC), COPD (chronic obstructive pulmonary disease) (HCC), Depression, Diabetes mellitus (HCC), Diverticulosis, Hyperlipidemia, Hypertension, and Obesities, morbid (HCC).     Weights Reviewed: Yes   Admission weight: 113.4 kg (250 lb)   Wt Readings from Last 3 Encounters:   08/27/24 (!) 156 kg (344 lb)   06/26/24 (!) 156 kg (344 lb)   06/20/24 (!) 156 kg (344 lb)     Intake & Output Reviewed: Yes     Intake/Output Summary (Last 24 hours) at 8/27/2024 0740  Last data filed at 8/26/2024 2338  Gross per 24 hour   Intake 1200 ml   Output 2975 ml   Net -1775 ml       ECHOCARDIOGRAM Reviewed: Yes   Patient's Ejection Fraction (EF) is greater than 40%     Medications Reviewed: Yes   SCHEDULED HOSPITAL MEDICATIONS:   pantoprazole  40 mg Oral QAM AC    magnesium oxide  400 mg Oral BID    enoxaparin  40 mg SubCUTAneous BID    ferrous sulfate  325 mg Oral Daily with breakfast    sodium chloride flush  5-40 mL IntraVENous 2 times per day    allopurinol  100 mg Oral Daily    dicyclomine  10 mg Oral TID    escitalopram  10 mg Oral Daily    budesonide-formoterol  2 puff Inhalation BID RT    And    tiotropium  2 puff Inhalation Daily RT    insulin glargine  15 Units SubCUTAneous Nightly    atorvastatin  10 mg Oral Nightly    furosemide  40 mg IntraVENous BID    insulin lispro  0-16 Units SubCUTAneous TID WC    insulin lispro  0-4 Units SubCUTAneous Nightly     HOME MEDICATIONS:  Prior to Admission medications    Medication Sig Start Date End Date Taking? Authorizing Provider   ondansetron (ZOFRAN) 4 MG tablet Take 1 tablet by mouth 3 times daily as needed for Nausea or Vomiting 6/26/24  Yes Jimmy Collier MD   metFORMIN (GLUCOPHAGE-XR) 500 MG extended release tablet Take 1 tablet by mouth 2 times daily (before meals)   Yes Provider, MD Sam   meloxicam (MOBIC) 15 MG tablet

## 2024-08-27 NOTE — PROGRESS NOTES
[facility]   Progress Note  Cardiology      HPI: Seeing Ms. Gunn today for f/u diastolic CHF. She c/o mild nausea today but overall feels better. No other complaints. Tele NSR 71bpm, PVC's.      Physical Examination:    Vitals:    08/27/24 0718   BP: 136/66   Pulse: 75   Resp: 18   Temp: 97.9 °F (36.6 °C)   SpO2: 97%          Constitutional and General Appearance: NAD   Respiratory:  Normal excursion and expansion without use of accessory muscles  Resp Auscultation: Normal breath sounds without dullness  Cardiovascular:  The apical impulses not displaced  Heart tones are crisp and normal; +regular with occ ectopy  Cervical veins are not engorged  The carotid upstroke is normal in amplitude and contour without delay or bruit  Normal S1S2, No S3, +II/VI SUKI  Peripheral pulses are symmetrical and full  There is no clubbing, cyanosis of the extremities.  No edema  Pedal Pulses: 2+ and equal   Abdomen:  No masses or tenderness  Liver/Spleen: No Abnormalities Noted  Neurological/Psychiatric:  Alert and oriented in all spheres  Moves all extremities well  No abnormalities of mood, affect, memory, mentation, or behavior are noted  Skin: warm and dry      Lab Results   Component Value Date    WBC 5.8 08/25/2024    HGB 10.7 (L) 08/25/2024    HCT 33.9 (L) 08/25/2024    MCV 83.0 08/25/2024     08/25/2024     Lab Results   Component Value Date    CREATININE 1.3 (H) 08/27/2024    BUN 32 (H) 08/27/2024     08/27/2024    K 4.4 08/27/2024    CL 98 (L) 08/27/2024    CO2 35 (H) 08/27/2024     Lab Results   Component Value Date    INR 0.97 10/06/2016    PROTIME 11.1 10/06/2016       Assessment:  Brianne Gunn is a 59 y.o. patient who presented to [facility] [place] with c/o SOB, abdominal pain and edema.  She has PMH chronic diastolic CHF dx years ago, DM, COPD on 4L baseline NC O2, prior tob abuse (quit 2010--prior 1ppd x 30 years), JOSE on CPAP, HLD and HTN. Prior testing: Echo 2/4/24 EF=50-55% (EF 55% in 3/23);  Hyperlipidemia    DM (diabetes mellitus) (HCC)    CHF (congestive heart failure) (HCC)    Tremor    Hypomagnesemia    Tremors of nervous system    Generalized weakness    Hyperkalemia    Acute respiratory failure with hypoxemia (HCC)    Hypercapnia    Fall at home, initial encounter    Unable to walk    Acute hip pain, left    Acute pain of both knees    Acute pain of left shoulder    Dysphagia    Lactic acidosis    Fall on same level from slipping, tripping, or stumbling    Cholecystitis    Acute on chronic respiratory failure with hypercapnia (HCC)    Chronic respiratory failure with hypoxia (HCC)    Acute on chronic diastolic CHF (congestive heart failure) (HCC)    Acute cholecystitis    CHF (congestive heart failure), NYHA class I, acute, diastolic (HCC)    Iron deficiency anemia    JOSE treated with BiPAP

## 2024-08-27 NOTE — DISCHARGE INSTR - COC
Continuity of Care Form    Patient Name: Brianne Gunn   :  1965  MRN:  [id number]    Admit date:  2024  Discharge date:  24      Code Status Order: Full Code   Advance Directives:   Advance Care Flowsheet Documentation             Admitting Physician:  Martin Godfrey MD  PCP: Ani White PA-C    Discharging Nurse: Lacey DARLING  Discharging Hospital Unit/Room#: /0308-01  Discharging Unit Phone Number: [phone]    Emergency Contact:   Extended Emergency Contact Information  Primary Emergency Contact: Haydee George  Home Phone: [phone]  Mobile Phone: [phone]  Relation: Child  Secondary Emergency Contact: GunnJose  Address: [address]  Home Phone: [phone]  Mobile Phone: [phone]  Relation: Spouse    Past Surgical History:  Past Surgical History:   Procedure Laterality Date     SECTION      x3    CHOLECYSTECTOMY, LAPAROSCOPIC N/A 2024    CHOLECYSTECTOMY LAPAROSCOPIC performed by Jimmy Collier MD at [facility] OR    COLONOSCOPY      ENDOMETRIAL ABLATION      ENDOSCOPY, COLON, DIAGNOSTIC      OTHER SURGICAL HISTORY  2023    EGD BIOPSY    OTHER SURGICAL HISTORY Right     carpel tunnel release    UPPER GASTROINTESTINAL ENDOSCOPY N/A 2023    EGD [facility]SY [facility]    UPPER GASTROINTESTINAL ENDOSCOPY N/A 2023    EGD BIOPSY performed [facility] Kirby Sim[facility] DO [facility]       Immunization History:   Immunization History   Administered Date(s) Administered    COVID-19, MODERNA BLUE border, Primary or Immunocompromised, (age 12y+), IM, 100 mcg/0.5mL 2021, 2021, 12/10/2021    COVID-19, MODERNA, ( formula), (age 12y+), IM, 50mcg/0.5mL 10/06/2023    COVID-19, PFIZER Bivalent, DO NOT Dilute, (age 12y+), IM, 30 mcg/0.3 mL 10/05/2022    Influenza Virus Vaccine 2009, 2011, 2011, 10/05/2012, 10/08/2012,  with BiPAP G47.33       Isolation/Infection:   Isolation            No Isolation          Patient Infection Status       None to display                     Nurse Assessment:  Last Vital Signs: /66   Pulse 76   Temp 97.9 °F (36.6 °C) (Oral)   Resp 18   Ht 1.575 m (5' 2\")   Wt (!) 156 kg (344 lb)   SpO2 96%   BMI 62.92 kg/m²     Last documented pain score (0-10 scale): Pain Level: 8  Last Weight:   Wt Readings from Last 1 Encounters:   08/27/24 (!) 156 kg (344 lb)     Mental Status:  oriented and alert    IV Access:  - None    Nursing Mobility/ADLs:  Walking   Assisted  Transfer  Assisted  Bathing  Assisted  Dressing  Assisted  Toileting  Assisted  Feeding  Assisted  Med Admin  Assisted  Med Delivery   none    Wound Care Documentation and Therapy:  Incision 06/18/24 Abdomen (Active)   Number of days: 70        Elimination:  Continence:   Bowel: Yes  Bladder: Yes  Urinary Catheter: None   Colostomy/Ileostomy/Ileal Conduit: No       Date of Last BM:     Intake/Output Summary (Last 24 hours) at 8/27/2024 1117  Last data filed at 8/27/2024 1102  Gross per 24 hour   Intake 900 ml   Output 2925 ml   Net -2025 ml     I/O last 3 completed shifts:  In: 1200 [P.O.:1200]  Out: 4525 [Urine:4525]    Safety Concerns:     At Risk for Falls    Impairments/Disabilities:      None    Nutrition Therapy:  Current Nutrition Therapy:   - Oral Diet:  Carb Control 4 carbs/meal (1800kcals/day)    Routes of Feeding: Oral  Liquids: Thin Liquids  Daily Fluid Restriction: yes - amount 2000 ml  Last Modified Barium Swallow with Video (Video Swallowing Test): not done    Treatments at the Time of Hospital Discharge:   Respiratory Treatments:   Oxygen Therapy:  is on oxygen at 4 L/min per nasal cannula.  Ventilator:    - No ventilator support    Rehab Therapies:   Weight Bearing Status/Restrictions: No weight bearing restrictions  Other Medical Equipment (for information only, NOT a DME order):    Other Treatments:     Patient's

## 2024-08-27 NOTE — PROGRESS NOTES
Pt in bed, awake, A/O X4. Shift assessment complete, night meds given. Pt C/O 8/10 pain PRN percocet given at this time.  . No other needs expressed. Call light in reach. Will monitor.  Margaret Seymour RN

## 2024-08-27 NOTE — PROGRESS NOTES
Patient educated on discharge instructions as well as new medications use, dosage, administration and possible side effects.  Patient verified knowledge. IV removed without difficulty and dry dressing in place. Telemetry monitor removed and returned to [facility]. Pt left facility in stable condition to Home with all of their personal belongings. Lacey Thomason RN

## 2024-08-30 ENCOUNTER — FOLLOWUP TELEPHONE ENCOUNTER (OUTPATIENT)
Dept: ADMINISTRATIVE | Age: 59
End: 2024-08-30

## 2024-08-30 NOTE — TELEPHONE ENCOUNTER
Heart Failure Follow-Up Call:    Call within 72 Hours of Discharge: Yes     Patient: Brianne Gunn   Patient : 1965   MRN: [id number]    Date of discharge: 24    Discharge department/facility: [facility]    Discharge Disposition: Home    RARS: Readmission Risk Score: 19    Spoke with: Jose / Spouse    Jose stated that Brianne is sleeping at this time. Denies any new SOB or swelling at this time. Unable to get daily weights due to being unsteady. Reinforced Heart Failure education on: signs/symptoms to monitor, medications, daily weights, low sodium diet, 2000 ml fluid restriction, and activity. Followed up with PCP today in office. Requesting HHC. Called PCP office to notify patient/spouse interested in HHC through [facility]. Notifed [facility]. [facility] reaching out to PCP to obtain order and will call Jose the spouse back.     Provided Jose with Heart Failure Nurse number at [phone] for any further questions or assistance with resources.     Follow Up  Future Appointments   Date Time Provider Department Center   10/22/2024  2:00 PM [facility] [place]ULMONARY FUNCTION TESTING [facility] [place] PFT [place] HOD   10/22/2024  3:00 PM [facility]   10/22/2024  3:30 PM Santa Bhat MD [facility]       Electronically signed by Leigh Ann Briscoe, MSN, RN  on 2024 at 3:01 PM

## 2024-10-16 ENCOUNTER — TELEPHONE (OUTPATIENT)
Dept: PULMONOLOGY | Age: 59
End: 2024-10-16

## 2024-10-16 NOTE — TELEPHONE ENCOUNTER
Marilyn from [facility] called to give an update that she faxed over a download for her vent and will need a new order signed when patient comes into the office next week.    She also needs the notes to reflect a conversation of the use of the vent.

## 2024-10-22 ENCOUNTER — OFFICE VISIT (OUTPATIENT)
Dept: PULMONOLOGY | Age: 59
End: 2024-10-22

## 2024-10-22 ENCOUNTER — HOSPITAL ENCOUNTER (OUTPATIENT)
Dept: CT IMAGING | Age: 59
Discharge: HOME | End: 2024-10-22
Payer: MEDICARE

## 2024-10-22 ENCOUNTER — HOSPITAL ENCOUNTER (OUTPATIENT)
Dept: PULMONOLOGY | Age: 59
Discharge: HOME | End: 2024-10-22
Payer: MEDICARE

## 2024-10-22 VITALS
BODY MASS INDEX: 55.32 KG/M2 | RESPIRATION RATE: 18 BRPM | HEART RATE: 89 BPM | DIASTOLIC BLOOD PRESSURE: 64 MMHG | SYSTOLIC BLOOD PRESSURE: 112 MMHG | WEIGHT: 293 LBS | HEIGHT: 61 IN | OXYGEN SATURATION: 95 %

## 2024-10-22 DIAGNOSIS — J44.9 CHRONIC OBSTRUCTIVE PULMONARY DISEASE, UNSPECIFIED COPD TYPE (HCC): Primary | ICD-10-CM

## 2024-10-22 DIAGNOSIS — J44.9 CHRONIC OBSTRUCTIVE PULMONARY DISEASE, UNSPECIFIED COPD TYPE (HCC): ICD-10-CM

## 2024-10-22 DIAGNOSIS — Z23 FLU VACCINE NEED: ICD-10-CM

## 2024-10-22 LAB
DLCO %PRED: 63 %
DLCO PRED: NORMAL
DLCO/VA %PRED: NORMAL
DLCO/VA PRED: NORMAL
DLCO/VA: NORMAL
DLCO: NORMAL
EXPIRATORY TIME-POST: NORMAL
EXPIRATORY TIME: NORMAL
FEF 25-75 %CHNG: NORMAL
FEF 25-75 POST %PRED: NORMAL
FEF 25-75% %PRED-PRE: NORMAL
FEF 25-75% PRED: NORMAL
FEF 25-75-POST: NORMAL
FEF 25-75-PRE: NORMAL
FEV1 %PRED-POST: 47 %
FEV1 %PRED-PRE: 37 %
FEV1 PRED: NORMAL
FEV1-POST: NORMAL
FEV1-PRE: NORMAL
FEV1/FVC %PRED-POST: NORMAL
FEV1/FVC %PRED-PRE: NORMAL
FEV1/FVC PRED: NORMAL
FEV1/FVC-POST: 91 %
FEV1/FVC-PRE: 83 %
FVC %PRED-POST: NORMAL
FVC %PRED-PRE: NORMAL
FVC PRED: NORMAL
FVC-POST: NORMAL
FVC-PRE: NORMAL
GAW %PRED: NORMAL
GAW PRED: NORMAL
GAW: NORMAL
IC PRE %PRED: NORMAL
IC PRED: NORMAL
IC: NORMAL
MEP: NORMAL
MIP: NORMAL
MVV %PRED-PRE: NORMAL
MVV PRED: NORMAL
MVV-PRE: NORMAL
PEF %PRED-POST: NORMAL
PEF %PRED-PRE: NORMAL
PEF PRED: NORMAL
PEF%CHNG: NORMAL
PEF-POST: NORMAL
PEF-PRE: NORMAL
RAW %PRED: NORMAL
RAW PRED: NORMAL
RAW: NORMAL
RV PRE %PRED: NORMAL
RV PRED: NORMAL
RV: NORMAL
SVC %PRED: NORMAL
SVC PRED: NORMAL
SVC: NORMAL
TLC PRE %PRED: 63 %
TLC PRED: NORMAL
TLC: NORMAL
VA %PRED: NORMAL
VA PRED: NORMAL
VA: NORMAL
VTG %PRED: NORMAL
VTG PRED: NORMAL
VTG: NORMAL

## 2024-10-22 PROCEDURE — 94729 DIFFUSING CAPACITY: CPT

## 2024-10-22 PROCEDURE — 94727 GAS DIL/WSHOT DETER LNG VOL: CPT

## 2024-10-22 PROCEDURE — 94760 N-INVAS EAR/PLS OXIMETRY 1: CPT

## 2024-10-22 PROCEDURE — 94640 AIRWAY INHALATION TREATMENT: CPT

## 2024-10-22 PROCEDURE — 6370000000 HC RX 637 (ALT 250 FOR IP): Performed by: INTERNAL MEDICINE

## 2024-10-22 PROCEDURE — 71250 CT THORAX DX C-: CPT

## 2024-10-22 PROCEDURE — 94060 EVALUATION OF WHEEZING: CPT

## 2024-10-22 RX ORDER — ALBUTEROL SULFATE 90 UG/1
4 INHALANT RESPIRATORY (INHALATION) ONCE
Status: COMPLETED | OUTPATIENT
Start: 2024-10-22 | End: 2024-10-22

## 2024-10-22 RX ADMIN — ALBUTEROL SULFATE 4 PUFF: 90 AEROSOL, METERED RESPIRATORY (INHALATION) at 14:16

## 2024-10-22 ASSESSMENT — PULMONARY FUNCTION TESTS
FEV1_PERCENT_PREDICTED_PRE: 37
FEV1_PERCENT_PREDICTED_POST: 47
FEV1/FVC_PRE: 83
FEV1/FVC_POST: 91

## 2024-10-22 NOTE — PROGRESS NOTES
up  Ct still with atelectasis LLL   Quit more than 10 years ago   Will get follow up PFT,will get    Trelegy seems helping a lot   Eosinophilic count,reorder ,does not want biological   Ige  She has shoulder pain and she will benefit from low weight concentrator   Patient is on noninvasive mechanical ventilation and she has a lot of benefit from using it, as it preventing her from acute COPD exacerbation and acute hypercapnic respiratory failure exacerbation  She is using the machine 4 hours at least daily and she is faithful and it is helping her    Advise to lose weight  Promenaded start Ozempic or Mounjaro as per PCPto help with weight which I believe causing a lot of breathing issues  Need to continue home O2 and wean gradually     Thank you very much for allowing me to participate in the care of this pleasant patient , should you have any questions ,please do not hesitate to contact me      Santa Bhat MD,[facility]  Pulmonary&Critical Care Medicine    [facility]    NOTE: This report was transcribed using voice recognition software. Every effort was made to ensure accuracy; however, inadvertent computerized transcription errors may be present.

## 2024-10-22 NOTE — TELEPHONE ENCOUNTER
Patient is here for appt on 10/22/24. Please add the discussion and benefit of use of the vent in office visit notes

## 2024-10-23 NOTE — PROCEDURES
[address] [address]                           PULMONARY FUNCTION      PATIENT NAME: ELÍAS BAKER               : 1965  MED REC NO: [id number]                      ROOM:   ACCOUNT NO: [id number]                       ADMIT DATE: 10/22/2024  PROVIDER: Jake Moe MD      DATE OF PROCEDURE: 10/22/2024    SURGEON:  Jake Moe MD    REFERRING PHYSICIAN:  CALIXTO MCGEE    INDICATION:  COPD.    OVERALL INTERPRETATION:    1. Spirometry revealed evidence of obstructive defect.  FEV1 is 0.89 L, which is 37% predicted.  No significant response to bronchodilators.  FEV1/FVC ratio of 65%.  2. Lung volume revealed moderate restrictive defect.  Total lung capacity 3.03 L which is 63% predicted.  3. Diffusion capacity is moderately decreased to 14.84, which is 63% predicted.  4. Flow volume loop suggestive of combined obstructive or restrictive defect.    CONCLUSION:    1. Combined obstructive with moderate restrictive defect and moderately decreased diffusion capacity.  2. No bronchodilator response.          JAKE MOE MD      D:  10/22/2024 16:24:09     T:  10/22/2024 23:07:15     /SHARYN  Job #:  [id number]     Doc#:  [id number]

## 2024-10-24 ENCOUNTER — TELEPHONE (OUTPATIENT)
Dept: PULMONOLOGY | Age: 59
End: 2024-10-24

## 2024-10-28 ENCOUNTER — TELEPHONE (OUTPATIENT)
Dept: PULMONOLOGY | Age: 59
End: 2024-10-28

## 2024-10-28 NOTE — TELEPHONE ENCOUNTER
Agnieszka from [facility] called.    Dr. Bhat put in his notes from 10/22 that she did not have a ventilator. She does.  He needs to ammend his notes and sign the order that Agnieszka faxed over here on 10/16 in order for her to keep her ventilator.    Agnieszka will probably stop here today just to see if it's possible that it was done.

## 2024-10-28 NOTE — TELEPHONE ENCOUNTER
Patient is continuing to use and benefit from non invasive ventilation. Patient is compliant.   Please add this  sentence to visit note from 10/22/2024

## 2025-04-28 ENCOUNTER — TELEPHONE (OUTPATIENT)
Dept: PULMONOLOGY | Age: 60
End: 2025-04-28

## 2025-04-28 NOTE — TELEPHONE ENCOUNTER
Pt was in the hospital on 4/9/25 at . Does she need any follow up imaging before her appt on 4/30/25?

## 2025-04-30 ENCOUNTER — OFFICE VISIT (OUTPATIENT)
Dept: PULMONOLOGY | Age: 60
End: 2025-04-30
Payer: MEDICARE

## 2025-04-30 VITALS
HEART RATE: 82 BPM | DIASTOLIC BLOOD PRESSURE: 78 MMHG | BODY MASS INDEX: 55.32 KG/M2 | SYSTOLIC BLOOD PRESSURE: 122 MMHG | OXYGEN SATURATION: 94 % | WEIGHT: 293 LBS | HEIGHT: 61 IN | RESPIRATION RATE: 17 BRPM

## 2025-04-30 DIAGNOSIS — J44.9 CHRONIC OBSTRUCTIVE PULMONARY DISEASE, UNSPECIFIED COPD TYPE (HCC): Primary | ICD-10-CM

## 2025-04-30 PROCEDURE — 99214 OFFICE O/P EST MOD 30 MIN: CPT | Performed by: INTERNAL MEDICINE

## 2025-04-30 RX ORDER — UMECLIDINIUM BROMIDE AND VILANTEROL TRIFENATATE 62.5; 25 UG/1; UG/1
1 POWDER RESPIRATORY (INHALATION) DAILY
COMMUNITY

## 2025-04-30 RX ORDER — EPINEPHRINE 0.3 MG/.3ML
0.3 INJECTION SUBCUTANEOUS AS NEEDED
Qty: 1 EACH | Refills: 1 | Status: SHIPPED | OUTPATIENT
Start: 2025-04-30

## 2025-04-30 NOTE — PROGRESS NOTES
[facility]                                 Pulmonary Consult /Progress Note :                                                                   hospital follow up for hypercapnic RF    HPI:   Patient is 57-year-old female with a 50-60 pack -year smoking history.   quit smoking few years ago 2014  presented to the hospital with worsening mental status and worsening shortness of breath that has been going on for the last few days and get worse in the last 24 hours before admission    Patient is known COPD and she supposed to follow-up with me in the office in January    She is also known history of congestive heart failure    She is on diuresis at home    She is not on any type of noninvasive mechanical ventilation, she states she only take albuterol  Today visit  She was discharged home on Trilogy /AVAps  She is on 4 L   Still over weight with more fluid overlaod,on LAsix   No cough   Does not ambulate much       Past Medical History:   Diagnosis Date    Anxiety     Arthritis     CHF (congestive heart failure) (HCC)     COPD (chronic obstructive pulmonary disease) (HCC)     Depression     Diabetes mellitus (HCC)     Diverticulosis     Hyperlipidemia     Hypertension     Obesities, morbid (HCC)        Past Surgical History:        Procedure Laterality Date     SECTION      x3    COLONOSCOPY      ENDOMETRIAL ABLATION      ENDOSCOPY, COLON, DIAGNOSTIC      OTHER SURGICAL HISTORY  2023    EGD BIOPSY    UPPER GASTROINTESTINAL ENDOSCOPY N/A 2023    EGD BIOPSY performed by Kirby Sims DO at [facility] ENDOSCOPY    UPPER GASTROINTESTINAL ENDOSCOPY N/A 2023    EGD BIOPSY performed by Kirby Sims DO at [facility] ENDOSCOPY       Allergies:  is allergic to hydromorphone hcl, ace inhibitors, azithromycin, dilaudid [hydromorphone hcl], lisinopril, morphine, and penicillins.  Social History:    TOBACCO:   reports that she quit

## 2025-05-02 ENCOUNTER — TELEPHONE (OUTPATIENT)
Dept: PULMONOLOGY | Age: 60
End: 2025-05-02

## 2025-05-02 DIAGNOSIS — J44.9 CHRONIC OBSTRUCTIVE PULMONARY DISEASE, UNSPECIFIED COPD TYPE (HCC): Primary | ICD-10-CM

## 2025-05-02 NOTE — TELEPHONE ENCOUNTER
Dr. Bhat wants to start patient on Dupixent.  Enrollment forms have been filled out and signed by patient and Dr. Bhat.    PA submitted through CoverMyMeds for Dupixent.  Awaiting determination.

## 2025-05-02 NOTE — TELEPHONE ENCOUNTER
Patient needs an eosinophil count test to help get dupixent approved.     Patient was called and verbally understood

## (undated) PROCEDURE — 0FT44ZZ RESECTION OF GALLBLADDER, PERCUTANEOUS ENDOSCOPIC APPROACH: ICD-10-PCS

## (undated) DEVICE — CONMED SCOPE SAVER BITE BLOCK, 20X27 MM: Brand: SCOPE SAVER

## (undated) DEVICE — ENDOSCOPIC KIT 2 12 FT OP4 DE2 GWN SYR

## (undated) DEVICE — CANNULA,OXY,ADULT,SUPERSOFT,W/7'TUB,SC: Brand: MEDLINE INDUSTRIES, INC.

## (undated) DEVICE — FORCEP BX STD CAP 240CM RAD JAW 4

## (undated) DEVICE — CUTTER ENDOSCP L340MM LIN ARTC SGL STROKE FIRING ENDOPATH

## (undated) DEVICE — TROCAR: Brand: KII FIOS FIRST ENTRY

## (undated) DEVICE — RETRACTOR ENDOSCP SHFT L31MM DIA12MM BLK ATRAUM RECTANG

## (undated) DEVICE — TROCAR: Brand: KII® OPTICAL ACCESS SYSTEM

## (undated) DEVICE — KIT EVAC 100CC W10MMXL20CM SIL FULL PERF HUBLESS FLAT DRN

## (undated) DEVICE — YANKAUER,BULB TIP,W/O VENT,RIGID,STERILE: Brand: MEDLINE

## (undated) DEVICE — GLOVE ORANGE PI 8 1/2   MSG9085

## (undated) DEVICE — SUTURE PERMA-HAND SZ 2-0 L30IN NONABSORBABLE BLK L26MM SH K833H

## (undated) DEVICE — SEALER LAP L37CM MARYLAND JAW OPN NANO COAT MULTIFUNCTIONAL

## (undated) DEVICE — INSUFFLATION NEEDLE TO ESTABLISH PNEUMOPERITONEUM.: Brand: INSUFFLATION NEEDLE

## (undated) DEVICE — RELOAD STPL H1-2.5X45MM VASC THN TISS WHT 6 ROW B FRM SGL

## (undated) DEVICE — GAUZE,SPONGE,4"X4",8PLY,STRL,LF,10/TRAY: Brand: MEDLINE

## (undated) DEVICE — 3M™ TEGADERM™ TRANSPARENT FILM DRESSING FRAME STYLE, 1626W, 4 IN X 4-3/4 IN (10 CM X 12 CM), 50/CT 4CT/CASE: Brand: 3M™ TEGADERM™

## (undated) DEVICE — MHCZ GENERAL LAPAROSCOPY: Brand: MEDLINE INDUSTRIES, INC.

## (undated) DEVICE — TISSUE RETRIEVAL SYSTEM: Brand: INZII RETRIEVAL SYSTEM

## (undated) DEVICE — GOWN,AURORA,NONREINF,RAGLAN,XXL,STERILE: Brand: MEDLINE

## (undated) DEVICE — TROCAR: Brand: KII® SLEEVE

## (undated) DEVICE — DISPOSABLE LAPAROSCOPIC CLIP APPLIER WITH 20 CLIPS.: Brand: EPIX® UNIVERSAL CLIP APPLIER